# Patient Record
Sex: MALE | Race: OTHER
[De-identification: names, ages, dates, MRNs, and addresses within clinical notes are randomized per-mention and may not be internally consistent; named-entity substitution may affect disease eponyms.]

---

## 2017-06-21 ENCOUNTER — HOSPITAL ENCOUNTER (INPATIENT)
Dept: HOSPITAL 8 - ED | Age: 22
LOS: 2 days | Discharge: HOME | DRG: 392 | End: 2017-06-23
Admitting: INTERNAL MEDICINE
Payer: COMMERCIAL

## 2017-06-21 VITALS — DIASTOLIC BLOOD PRESSURE: 68 MMHG | SYSTOLIC BLOOD PRESSURE: 124 MMHG

## 2017-06-21 VITALS — WEIGHT: 119.49 LBS | BODY MASS INDEX: 20.4 KG/M2 | HEIGHT: 64 IN

## 2017-06-21 DIAGNOSIS — M41.9: ICD-10-CM

## 2017-06-21 DIAGNOSIS — Z80.9: ICD-10-CM

## 2017-06-21 DIAGNOSIS — E86.0: ICD-10-CM

## 2017-06-21 DIAGNOSIS — E83.52: ICD-10-CM

## 2017-06-21 DIAGNOSIS — Z98.1: ICD-10-CM

## 2017-06-21 DIAGNOSIS — F12.90: ICD-10-CM

## 2017-06-21 DIAGNOSIS — R10.9: Primary | ICD-10-CM

## 2017-06-21 DIAGNOSIS — Z83.3: ICD-10-CM

## 2017-06-21 DIAGNOSIS — R73.9: ICD-10-CM

## 2017-06-21 DIAGNOSIS — G43.D0: ICD-10-CM

## 2017-06-21 LAB
AST SERPL-CCNC: 35 U/L (ref 15–37)
BUN SERPL-MCNC: 10 MG/DL (ref 7–18)

## 2017-06-21 PROCEDURE — 76700 US EXAM ABDOM COMPLETE: CPT

## 2017-06-21 PROCEDURE — 83690 ASSAY OF LIPASE: CPT

## 2017-06-21 PROCEDURE — 85025 COMPLETE CBC W/AUTO DIFF WBC: CPT

## 2017-06-21 PROCEDURE — 80048 BASIC METABOLIC PNL TOTAL CA: CPT

## 2017-06-21 PROCEDURE — 96374 THER/PROPH/DIAG INJ IV PUSH: CPT

## 2017-06-21 PROCEDURE — S0028 INJECTION, FAMOTIDINE, 20 MG: HCPCS

## 2017-06-21 PROCEDURE — 80053 COMPREHEN METABOLIC PANEL: CPT

## 2017-06-21 PROCEDURE — 74177 CT ABD & PELVIS W/CONTRAST: CPT

## 2017-06-21 PROCEDURE — 96375 TX/PRO/DX INJ NEW DRUG ADDON: CPT

## 2017-06-21 PROCEDURE — 36415 COLL VENOUS BLD VENIPUNCTURE: CPT

## 2017-06-21 PROCEDURE — 86677 HELICOBACTER PYLORI ANTIBODY: CPT

## 2017-06-21 RX ADMIN — ONDANSETRON PRN MG: 2 INJECTION, SOLUTION INTRAMUSCULAR; INTRAVENOUS at 20:44

## 2017-06-21 RX ADMIN — ENOXAPARIN SODIUM SCH MG: 40 INJECTION SUBCUTANEOUS at 22:22

## 2017-06-21 RX ADMIN — DEXTROSE AND SODIUM CHLORIDE SCH MLS/HR: 5; .45 INJECTION, SOLUTION INTRAVENOUS at 22:18

## 2017-06-21 RX ADMIN — MORPHINE SULFATE PRN MG: 4 INJECTION INTRAVENOUS at 21:05

## 2017-06-21 RX ADMIN — MORPHINE SULFATE PRN MG: 4 INJECTION INTRAVENOUS at 17:41

## 2017-06-21 RX ADMIN — MORPHINE SULFATE PRN MG: 4 INJECTION INTRAVENOUS at 18:13

## 2017-06-22 VITALS — SYSTOLIC BLOOD PRESSURE: 100 MMHG | DIASTOLIC BLOOD PRESSURE: 55 MMHG

## 2017-06-22 VITALS — SYSTOLIC BLOOD PRESSURE: 91 MMHG | DIASTOLIC BLOOD PRESSURE: 51 MMHG

## 2017-06-22 VITALS — SYSTOLIC BLOOD PRESSURE: 133 MMHG | DIASTOLIC BLOOD PRESSURE: 83 MMHG

## 2017-06-22 VITALS — DIASTOLIC BLOOD PRESSURE: 62 MMHG | SYSTOLIC BLOOD PRESSURE: 103 MMHG

## 2017-06-22 VITALS — DIASTOLIC BLOOD PRESSURE: 56 MMHG | SYSTOLIC BLOOD PRESSURE: 95 MMHG

## 2017-06-22 LAB — BUN SERPL-MCNC: 10 MG/DL (ref 7–18)

## 2017-06-22 RX ADMIN — OXYCODONE HYDROCHLORIDE PRN MG: 5 TABLET ORAL at 19:49

## 2017-06-22 RX ADMIN — DEXTROSE AND SODIUM CHLORIDE SCH MLS/HR: 5; .45 INJECTION, SOLUTION INTRAVENOUS at 19:49

## 2017-06-22 RX ADMIN — METOCLOPRAMIDE SCH MG: 5 INJECTION, SOLUTION INTRAMUSCULAR; INTRAVENOUS at 14:30

## 2017-06-22 RX ADMIN — OXYCODONE HYDROCHLORIDE PRN MG: 5 TABLET ORAL at 13:49

## 2017-06-22 RX ADMIN — MORPHINE SULFATE PRN MG: 4 INJECTION INTRAVENOUS at 05:29

## 2017-06-22 RX ADMIN — ONDANSETRON SCH MG: 2 INJECTION, SOLUTION INTRAMUSCULAR; INTRAVENOUS at 22:00

## 2017-06-22 RX ADMIN — ONDANSETRON SCH MG: 2 INJECTION, SOLUTION INTRAMUSCULAR; INTRAVENOUS at 18:31

## 2017-06-22 RX ADMIN — ENOXAPARIN SODIUM SCH MG: 40 INJECTION SUBCUTANEOUS at 22:22

## 2017-06-22 RX ADMIN — OXYCODONE HYDROCHLORIDE PRN MG: 5 TABLET ORAL at 09:39

## 2017-06-22 RX ADMIN — ONDANSETRON SCH MG: 2 INJECTION, SOLUTION INTRAMUSCULAR; INTRAVENOUS at 13:49

## 2017-06-22 RX ADMIN — ONDANSETRON PRN MG: 2 INJECTION, SOLUTION INTRAMUSCULAR; INTRAVENOUS at 09:39

## 2017-06-22 RX ADMIN — DEXTROSE AND SODIUM CHLORIDE SCH MLS/HR: 5; .45 INJECTION, SOLUTION INTRAVENOUS at 06:25

## 2017-06-22 RX ADMIN — METOCLOPRAMIDE SCH MG: 5 INJECTION, SOLUTION INTRAMUSCULAR; INTRAVENOUS at 19:49

## 2017-06-22 RX ADMIN — LORAZEPAM PRN MG: 2 INJECTION INTRAMUSCULAR; INTRAVENOUS at 20:16

## 2017-06-23 VITALS — DIASTOLIC BLOOD PRESSURE: 60 MMHG | SYSTOLIC BLOOD PRESSURE: 100 MMHG

## 2017-06-23 VITALS — DIASTOLIC BLOOD PRESSURE: 51 MMHG | SYSTOLIC BLOOD PRESSURE: 94 MMHG

## 2017-06-23 RX ADMIN — ONDANSETRON SCH MG: 2 INJECTION, SOLUTION INTRAMUSCULAR; INTRAVENOUS at 01:48

## 2017-06-23 RX ADMIN — ONDANSETRON SCH MG: 2 INJECTION, SOLUTION INTRAMUSCULAR; INTRAVENOUS at 05:50

## 2017-06-23 RX ADMIN — ONDANSETRON SCH MG: 2 INJECTION, SOLUTION INTRAMUSCULAR; INTRAVENOUS at 10:00

## 2017-06-23 RX ADMIN — LORAZEPAM PRN MG: 2 INJECTION INTRAMUSCULAR; INTRAVENOUS at 11:07

## 2017-06-23 RX ADMIN — METOCLOPRAMIDE SCH MG: 5 INJECTION, SOLUTION INTRAMUSCULAR; INTRAVENOUS at 08:30

## 2017-06-23 RX ADMIN — METOCLOPRAMIDE SCH MG: 5 INJECTION, SOLUTION INTRAMUSCULAR; INTRAVENOUS at 01:48

## 2017-06-23 RX ADMIN — DEXTROSE AND SODIUM CHLORIDE SCH MLS/HR: 5; .45 INJECTION, SOLUTION INTRAVENOUS at 04:48

## 2018-04-19 ENCOUNTER — OFFICE VISIT (OUTPATIENT)
Dept: URGENT CARE | Facility: PHYSICIAN GROUP | Age: 23
End: 2018-04-19
Payer: COMMERCIAL

## 2018-04-19 VITALS
WEIGHT: 131 LBS | HEART RATE: 76 BPM | TEMPERATURE: 99.2 F | HEIGHT: 63 IN | BODY MASS INDEX: 23.21 KG/M2 | DIASTOLIC BLOOD PRESSURE: 60 MMHG | SYSTOLIC BLOOD PRESSURE: 110 MMHG | OXYGEN SATURATION: 98 %

## 2018-04-19 DIAGNOSIS — M54.50 ACUTE LEFT-SIDED LOW BACK PAIN WITHOUT SCIATICA: ICD-10-CM

## 2018-04-19 PROCEDURE — 99203 OFFICE O/P NEW LOW 30 MIN: CPT | Performed by: PHYSICIAN ASSISTANT

## 2018-04-19 RX ORDER — MELOXICAM 15 MG/1
15 TABLET ORAL DAILY
Qty: 30 TAB | Refills: 0 | Status: ON HOLD | OUTPATIENT
Start: 2018-04-19 | End: 2020-07-05

## 2018-04-19 ASSESSMENT — ENCOUNTER SYMPTOMS
CONSTITUTIONAL NEGATIVE: 1
PERIANAL NUMBNESS: 0
FALLS: 0
NUMBNESS: 0
TINGLING: 0
BOWEL INCONTINENCE: 0
CARDIOVASCULAR NEGATIVE: 1
BACK PAIN: 1
GASTROINTESTINAL NEGATIVE: 1
PARESIS: 0
RESPIRATORY NEGATIVE: 1

## 2018-04-19 ASSESSMENT — PAIN SCALES - GENERAL: PAINLEVEL: 8=MODERATE-SEVERE PAIN

## 2018-04-19 NOTE — LETTER
April 19, 2018         Patient: Grey Quarles   YOB: 1995   Date of Visit: 4/19/2018           To Whom it May Concern:    Grey Quarles was seen in my clinic on 4/19/2018. He may return to work on Sat. April 21st. He is restricted from lifting more than 20lbs and bending for more than 4 hours per shift. These restrictions are recommended for 2 weeks    If you have any questions or concerns, please don't hesitate to call.        Sincerely,           Emmanuel Pena P.A.-C.  Electronically Signed

## 2018-04-20 NOTE — PROGRESS NOTES
Subjective:      Grey Quarles is a 22 y.o. male who presents with Back Pain (sharp pain, painful walking, Hx of back surgery(March 12, 2017) Lower back, x4 days)            Patient had spinal fusion surgery last year, 3/12/17, in [place] to correct scoliosis. Muscular tenderness last 4 days. Requesting note for work.      Back Pain   This is a new problem. The current episode started in the past 7 days (4 days). The problem occurs constantly. The problem has been gradually improving since onset. The pain is present in the lumbar spine. The quality of the pain is described as aching. The pain does not radiate. The symptoms are aggravated by position and standing. Pertinent negatives include no bladder incontinence, bowel incontinence, numbness, paresis, perianal numbness or tingling. Risk factors: Surgery. He has tried nothing for the symptoms. The treatment provided no relief.       PMH:  has a past medical history of Abdominal migraine; Abdominal migraine; Colitis; and Gastritis.  MEDS:   Current Outpatient Prescriptions:   •  metoclopramide (REGLAN) 10 MG Tab, Take 1 Tab by mouth 3 times a day as needed (Nausea / vomiting)., Disp: 6 Tab, Rfl: 0  •  ondansetron (ZOFRAN ODT) 4 MG TABLET DISPERSIBLE, Take 1 Tab by mouth every 6 hours as needed for Nausea/Vomiting., Disp: 10 Tab, Rfl: 0  ALLERGIES: No Known Allergies  SURGHX:   Past Surgical History:   Procedure Laterality Date   • GASTROSCOPY-ENDO  11/15/2014    Performed by Robert Taylor M.D. at [facility]COPY [facility]   • GASTROSCOPY  9/3/2014    Performed by Robert Taylor M.D. at SUR[facility]ERY [facility]   • OTHER      hernia     SOCHX:  reports that he has never smoked. He has never used smokeless tobacco. He reports that he uses drugs, including Inhaled and Marijuana. He reports that he does not drink alcohol.  FH: family history is not on file.      Review of Systems   Constitutional: Negative.    HENT: Negative.    Respiratory: Negative.   "  Cardiovascular: Negative.    Gastrointestinal: Negative.  Negative for bowel incontinence.   Genitourinary: Negative.  Negative for bladder incontinence.   Musculoskeletal: Positive for back pain. Negative for falls.   Neurological: Negative for tingling and numbness.       Medications, Allergies, and current problem list reviewed today in Epic     Objective:     /60   Pulse 76   Temp 37.3 °C (99.2 °F)   Ht 1.6 m (5' 3\")   Wt 59.4 kg (131 lb)   SpO2 98%   BMI 23.21 kg/m²      Physical Exam   Constitutional: He is oriented to person, place, and time. He appears well-developed and well-nourished. No distress.   HENT:   Head: Normocephalic and atraumatic.   Eyes: Conjunctivae and EOM are normal.   Neck: Normal range of motion. Neck supple.   Cardiovascular: Normal rate, regular rhythm and normal heart sounds.    No murmur heard.  Pulmonary/Chest: Effort normal and breath sounds normal. No respiratory distress. He has no wheezes.   Musculoskeletal:        Lumbar back: He exhibits tenderness and pain. He exhibits normal range of motion, no bony tenderness, no swelling, no edema, no deformity and no spasm.        Back:    No midline tenderness. No deformities, bruising, ecchymosis. Range of motion normal. Lower extremity strength and DTRs are equal. No ataxia.   Neurological: He is alert and oriented to person, place, and time.   Skin: Skin is warm and dry. He is not diaphoretic.   Psychiatric: He has a normal mood and affect. His behavior is normal. Judgment and thought content normal.   Nursing note and vitals reviewed.              Assessment/Plan:     1. Acute left-sided low back pain without sciatica  meloxicam (MOBIC) 15 MG tablet     Exam unremarkable, vital signs normal, no red flag symptoms. Acute muscular strain.  Conservative measures, meloxicam  Note for work  Return to clinic or go to ED if symptoms worsen or persist. Indications for ED discussed at length. Patient and mother voices " understanding. Follow-up with your primary care provider in 3-5 days. Red flags discussed. All side effects of medication discussed including allergic response, GI upset, tendon injury, etc.    Please note that this dictation was created using voice recognition software. I have made every reasonable attempt to correct obvious errors, but I expect that there are errors of grammar and possibly content that I did not discover before finalizing the note.

## 2018-05-16 ENCOUNTER — HOSPITAL ENCOUNTER (EMERGENCY)
Facility: MEDICAL CENTER | Age: 23
End: 2018-05-16
Attending: EMERGENCY MEDICINE
Payer: COMMERCIAL

## 2018-05-16 ENCOUNTER — APPOINTMENT (OUTPATIENT)
Dept: RADIOLOGY | Facility: MEDICAL CENTER | Age: 23
End: 2018-05-16
Attending: EMERGENCY MEDICINE
Payer: COMMERCIAL

## 2018-05-16 VITALS
OXYGEN SATURATION: 97 % | SYSTOLIC BLOOD PRESSURE: 116 MMHG | WEIGHT: 125 LBS | TEMPERATURE: 98.5 F | DIASTOLIC BLOOD PRESSURE: 64 MMHG | HEART RATE: 98 BPM | BODY MASS INDEX: 21.34 KG/M2 | HEIGHT: 64 IN | RESPIRATION RATE: 18 BRPM

## 2018-05-16 DIAGNOSIS — E86.0 DEHYDRATION: ICD-10-CM

## 2018-05-16 DIAGNOSIS — R10.13 ABDOMINAL PAIN, ACUTE, EPIGASTRIC: ICD-10-CM

## 2018-05-16 DIAGNOSIS — M41.9 SCOLIOSIS, UNSPECIFIED SCOLIOSIS TYPE, UNSPECIFIED SPINAL REGION: ICD-10-CM

## 2018-05-16 DIAGNOSIS — F12.10 MARIJUANA ABUSE: ICD-10-CM

## 2018-05-16 DIAGNOSIS — R11.2 NON-INTRACTABLE VOMITING WITH NAUSEA, UNSPECIFIED VOMITING TYPE: ICD-10-CM

## 2018-05-16 LAB
ALBUMIN SERPL BCP-MCNC: 4.9 G/DL (ref 3.2–4.9)
ALBUMIN/GLOB SERPL: 1.4 G/DL
ALP SERPL-CCNC: 48 U/L (ref 30–99)
ALT SERPL-CCNC: 12 U/L (ref 2–50)
ANION GAP SERPL CALC-SCNC: 17 MMOL/L (ref 0–11.9)
AST SERPL-CCNC: 38 U/L (ref 12–45)
BASOPHILS # BLD AUTO: 0.2 % (ref 0–1.8)
BASOPHILS # BLD: 0.03 K/UL (ref 0–0.12)
BILIRUB SERPL-MCNC: 0.9 MG/DL (ref 0.1–1.5)
BUN SERPL-MCNC: 14 MG/DL (ref 8–22)
CALCIUM SERPL-MCNC: 9.9 MG/DL (ref 8.5–10.5)
CHLORIDE SERPL-SCNC: 105 MMOL/L (ref 96–112)
CO2 SERPL-SCNC: 18 MMOL/L (ref 20–33)
CREAT SERPL-MCNC: 0.87 MG/DL (ref 0.5–1.4)
EKG IMPRESSION: NORMAL
EOSINOPHIL # BLD AUTO: 0 K/UL (ref 0–0.51)
EOSINOPHIL NFR BLD: 0 % (ref 0–6.9)
ERYTHROCYTE [DISTWIDTH] IN BLOOD BY AUTOMATED COUNT: 36.8 FL (ref 35.9–50)
GLOBULIN SER CALC-MCNC: 3.6 G/DL (ref 1.9–3.5)
GLUCOSE SERPL-MCNC: 127 MG/DL (ref 65–99)
HCT VFR BLD AUTO: 49.3 % (ref 42–52)
HGB BLD-MCNC: 16.9 G/DL (ref 14–18)
IMM GRANULOCYTES # BLD AUTO: 0.05 K/UL (ref 0–0.11)
IMM GRANULOCYTES NFR BLD AUTO: 0.3 % (ref 0–0.9)
LACTATE BLD-SCNC: 1.3 MMOL/L (ref 0.5–2)
LIPASE SERPL-CCNC: 280 U/L (ref 11–82)
LYMPHOCYTES # BLD AUTO: 1.36 K/UL (ref 1–4.8)
LYMPHOCYTES NFR BLD: 9.4 % (ref 22–41)
MCH RBC QN AUTO: 28.9 PG (ref 27–33)
MCHC RBC AUTO-ENTMCNC: 34.3 G/DL (ref 33.7–35.3)
MCV RBC AUTO: 84.4 FL (ref 81.4–97.8)
MONOCYTES # BLD AUTO: 0.62 K/UL (ref 0–0.85)
MONOCYTES NFR BLD AUTO: 4.3 % (ref 0–13.4)
NEUTROPHILS # BLD AUTO: 12.35 K/UL (ref 1.82–7.42)
NEUTROPHILS NFR BLD: 85.8 % (ref 44–72)
NRBC # BLD AUTO: 0 K/UL
NRBC BLD-RTO: 0 /100 WBC
PLATELET # BLD AUTO: 342 K/UL (ref 164–446)
PMV BLD AUTO: 10.9 FL (ref 9–12.9)
POTASSIUM SERPL-SCNC: 4.4 MMOL/L (ref 3.6–5.5)
PROT SERPL-MCNC: 8.5 G/DL (ref 6–8.2)
RBC # BLD AUTO: 5.84 M/UL (ref 4.7–6.1)
SODIUM SERPL-SCNC: 140 MMOL/L (ref 135–145)
WBC # BLD AUTO: 14.4 K/UL (ref 4.8–10.8)

## 2018-05-16 PROCEDURE — 96375 TX/PRO/DX INJ NEW DRUG ADDON: CPT

## 2018-05-16 PROCEDURE — 80053 COMPREHEN METABOLIC PANEL: CPT

## 2018-05-16 PROCEDURE — 99285 EMERGENCY DEPT VISIT HI MDM: CPT

## 2018-05-16 PROCEDURE — 85025 COMPLETE CBC W/AUTO DIFF WBC: CPT

## 2018-05-16 PROCEDURE — 83605 ASSAY OF LACTIC ACID: CPT

## 2018-05-16 PROCEDURE — 700105 HCHG RX REV CODE 258: Performed by: EMERGENCY MEDICINE

## 2018-05-16 PROCEDURE — 96374 THER/PROPH/DIAG INJ IV PUSH: CPT

## 2018-05-16 PROCEDURE — 93005 ELECTROCARDIOGRAM TRACING: CPT | Performed by: EMERGENCY MEDICINE

## 2018-05-16 PROCEDURE — 700111 HCHG RX REV CODE 636 W/ 250 OVERRIDE (IP): Performed by: EMERGENCY MEDICINE

## 2018-05-16 PROCEDURE — 83690 ASSAY OF LIPASE: CPT

## 2018-05-16 PROCEDURE — 74177 CT ABD & PELVIS W/CONTRAST: CPT

## 2018-05-16 PROCEDURE — 700117 HCHG RX CONTRAST REV CODE 255: Performed by: EMERGENCY MEDICINE

## 2018-05-16 PROCEDURE — 96361 HYDRATE IV INFUSION ADD-ON: CPT

## 2018-05-16 RX ORDER — SODIUM CHLORIDE 9 MG/ML
1000 INJECTION, SOLUTION INTRAVENOUS ONCE
Status: COMPLETED | OUTPATIENT
Start: 2018-05-16 | End: 2018-05-16

## 2018-05-16 RX ORDER — DIPHENHYDRAMINE HYDROCHLORIDE 50 MG/ML
25 INJECTION INTRAMUSCULAR; INTRAVENOUS ONCE
Status: COMPLETED | OUTPATIENT
Start: 2018-05-16 | End: 2018-05-16

## 2018-05-16 RX ORDER — HALOPERIDOL 5 MG/ML
2.5 INJECTION INTRAMUSCULAR ONCE
Status: COMPLETED | OUTPATIENT
Start: 2018-05-16 | End: 2018-05-16

## 2018-05-16 RX ORDER — ONDANSETRON 4 MG/1
4 TABLET, ORALLY DISINTEGRATING ORAL EVERY 8 HOURS PRN
Qty: 10 TAB | Refills: 0 | Status: SHIPPED | OUTPATIENT
Start: 2018-05-16 | End: 2018-09-30

## 2018-05-16 RX ORDER — METOCLOPRAMIDE HYDROCHLORIDE 5 MG/ML
10 INJECTION INTRAMUSCULAR; INTRAVENOUS ONCE
Status: COMPLETED | OUTPATIENT
Start: 2018-05-16 | End: 2018-05-16

## 2018-05-16 RX ADMIN — METOCLOPRAMIDE 10 MG: 5 INJECTION, SOLUTION INTRAMUSCULAR; INTRAVENOUS at 06:40

## 2018-05-16 RX ADMIN — SODIUM CHLORIDE 1000 ML: 9 INJECTION, SOLUTION INTRAVENOUS at 05:09

## 2018-05-16 RX ADMIN — DIPHENHYDRAMINE HYDROCHLORIDE 25 MG: 50 INJECTION, SOLUTION INTRAMUSCULAR; INTRAVENOUS at 06:41

## 2018-05-16 RX ADMIN — SODIUM CHLORIDE 1000 ML: 9 INJECTION, SOLUTION INTRAVENOUS at 04:03

## 2018-05-16 RX ADMIN — IOHEXOL 100 ML: 350 INJECTION, SOLUTION INTRAVENOUS at 05:53

## 2018-05-16 RX ADMIN — HALOPERIDOL LACTATE 2.5 MG: 5 INJECTION, SOLUTION INTRAMUSCULAR at 04:04

## 2018-05-16 ASSESSMENT — PAIN SCALES - GENERAL: PAINLEVEL_OUTOF10: 9

## 2018-05-16 ASSESSMENT — LIFESTYLE VARIABLES: DO YOU DRINK ALCOHOL: NO

## 2018-05-16 NOTE — DISCHARGE INSTRUCTIONS
You were seen and evaluated in the Emergency Department at [facility] for:     Nausea, vomiting, and upper abdominal pain.     You had the following tests and studies:    Blood tests and CT scan showed dehydration, some mild inflammation of your pancreas, but no dangerous cause of your symptoms.     You received the following medications:    Haldol, reglan, benadryl    You received the following prescriptions:    Ondansetron for nausea.  ----------------------------    Please make sure to follow up with:    Your primary care doctor for a recheck in 1 day, but come right back today if you get ANY worse!  Try to avoid marijuana use, this may be contributing to your symptoms.  Come right back to the ER if you get ANY worse.    Good luck, we hope you get better soon!  ----------------------------    We always encourage patients to return IMMEDIATELY if they have:  Increased or changing pain, passing out, fevers over 100.4 (taken in your mouth or rectally) for more than 2 days, redness or swelling of skin or tissues, feeling like your heart is beating fast, chest pain that is new or worsening, trouble breathing, feeling like your throat is closing up and can not breath, inability to walk, weakness of any part of your body, new dizziness, severe bleeding that won't stop from any part of your body, if you can't eat or drink, or if you have any other concerns.   If you feel worse, please know that you can always return with any questions, concerns, worse symptoms, or you are feeling unsafe. We certainly cannot say for sure that we have ruled out every illness or dangerous disease, but we feel that at this specific time, your exam, tests, and vital signs like heart rate and blood pressure are safe for discharge.         Abdominal Pain, Adult  Many things can cause belly (abdominal) pain. Most times, belly pain is not dangerous. Many cases of belly pain can be watched and treated at home. Sometimes belly pain is  serious, though. Your doctor will try to find the cause of your belly pain.  Follow these instructions at home:  · Take over-the-counter and prescription medicines only as told by your doctor. Do not take medicines that help you poop (laxatives) unless told to by your doctor.  · Drink enough fluid to keep your pee (urine) clear or pale yellow.  · Watch your belly pain for any changes.  · Keep all follow-up visits as told by your doctor. This is important.  Contact a doctor if:  · Your belly pain changes or gets worse.  · You are not hungry, or you lose weight without trying.  · You are having trouble pooping (constipated) or have watery poop (diarrhea) for more than 2-3 days.  · You have pain when you pee or poop.  · Your belly pain wakes you up at night.  · Your pain gets worse with meals, after eating, or with certain foods.  · You are throwing up and cannot keep anything down.  · You have a fever.  Get help right away if:  · Your pain does not go away as soon as your doctor says it should.  · You cannot stop throwing up.  · Your pain is only in areas of your belly, such as the right side or the left lower part of the belly.  · You have bloody or black poop, or poop that looks like tar.  · You have very bad pain, cramping, or bloating in your belly.  · You have signs of not having enough fluid or water in your body (dehydration), such as:  ¨ Dark pee, very little pee, or no pee.  ¨ Cracked lips.  ¨ Dry mouth.  ¨ Sunken eyes.  ¨ Sleepiness.  ¨ Weakness.  This information is not intended to replace advice given to you by your health care provider. Make sure you discuss any questions you have with your health care provider.  Document Released: 06/05/2009 Document Revised: 07/07/2017 Document Reviewed: 05/31/2017  [employer] Interactive Patient Education © 2017 [employer]      Dehydration, Adult  Dehydration is when there is not enough fluid or water in your body. This happens when you lose more fluids than you take  in. Dehydration can range from mild to very bad. It should be treated right away to keep it from getting very bad.  Symptoms of mild dehydration may include:  · Thirst.  · Dry lips.  · Slightly dry mouth.  · Dry, warm skin.  · Dizziness.  Symptoms of moderate dehydration may include:  · Very dry mouth.  · Muscle cramps.  · Dark pee (urine). Pee may be the color of tea.  · Your body making less pee.  · Your eyes making fewer tears.  · Heartbeat that is uneven or faster than normal (palpitations).  · Headache.  · Light-headedness, especially when you stand up from sitting.  · Fainting (syncope).  Symptoms of very bad dehydration may include:  · Changes in skin, such as:  ¨ Cold and clammy skin.  ¨ Blotchy (mottled) or pale skin.  ¨ Skin that does not quickly return to normal after being lightly pinched and let go (poor skin turgor).  · Changes in body fluids, such as:  ¨ Feeling very thirsty.  ¨ Your eyes making fewer tears.  ¨ Not sweating when body temperature is high, such as in hot weather.  ¨ Your body making very little pee.  · Changes in vital signs, such as:  ¨ Weak pulse.  ¨ Pulse that is more than 100 beats a minute when you are sitting still.  ¨ Fast breathing.  ¨ Low blood pressure.  · Other changes, such as:  ¨ Sunken eyes.  ¨ Cold hands and feet.  ¨ Confusion.  ¨ Lack of energy (lethargy).  ¨ Trouble waking up from sleep.  ¨ Short-term weight loss.  ¨ Unconsciousness.  Follow these instructions at home:  · If told by your doctor, drink an ORS:  ¨ Make an ORS by using instructions on the package.  ¨ Start by drinking small amounts, about ½ cup (120 mL) every 5-10 minutes.  ¨ Slowly drink more until you have had the amount that your doctor said to have.  · Drink enough clear fluid to keep your pee clear or pale yellow. If you were told to drink an ORS, finish the ORS first, then start slowly drinking clear fluids. Drink fluids such as:  ¨ Water. Do not drink only water by itself. Doing that can make the  salt (sodium) level in your body get too low (hyponatremia).  ¨ Ice chips.  ¨ Fruit juice that you have added water to (diluted).  ¨ Low-calorie sports drinks.  · Avoid:  ¨ Alcohol.  ¨ Drinks that have a lot of sugar. These include high-calorie sports drinks, fruit juice that does not have water added, and soda.  ¨ Caffeine.  ¨ Foods that are greasy or have a lot of fat or sugar.  · Take over-the-counter and prescription medicines only as told by your doctor.  · Do not take salt tablets. Doing that can make the salt level in your body get too high (hypernatremia).  · Eat foods that have minerals (electrolytes). Examples include bananas, oranges, potatoes, tomatoes, and spinach.  · Keep all follow-up visits as told by your doctor. This is important.  Contact a doctor if:  · You have belly (abdominal) pain that:  ¨ Gets worse.  ¨ Stays in one area (localizes).  · You have a rash.  · You have a stiff neck.  · You get angry or annoyed more easily than normal (irritability).  · You are more sleepy than normal.  · You have a harder time waking up than normal.  · You feel:  ¨ Weak.  ¨ Dizzy.  ¨ Very thirsty.  · You have peed (urinated) only a small amount of very dark pee during 6-8 hours.  Get help right away if:  · You have symptoms of very bad dehydration.  · You cannot drink fluids without throwing up (vomiting).  · Your symptoms get worse with treatment.  · You have a fever.  · You have a very bad headache.  · You are throwing up or having watery poop (diarrhea) and it:  ¨ Gets worse.  ¨ Does not go away.  · You have blood or something green (bile) in your throw-up.  · You have blood in your poop (stool). This may cause poop to look black and tarry.  · You have not peed in 6-8 hours.  · You pass out (faint).  · Your heart rate when you are sitting still is more than 100 beats a minute.  · You have trouble breathing.  This information is not intended to replace advice given to you by your health care provider. Make  sure you discuss any questions you have with your health care provider.  Document Released: 10/14/2010 Document Revised: 07/07/2017 Document Reviewed: 02/10/2017  [employer] Interactive Patient Education © 2017 [employer]      Marijuana Abuse  Your exam shows you have used marijuana or pot. There are many health problems related to marijuana abuse. These include:  · Bronchitis.  · Chronic cough.  · Emphysema.  · Lung and upper airway cancer.  Abusers also experience impairment in:  · Memory.  · Judgment.  · Ability to learn.  · Coordination.  Students who smoke marijuana:  · Get lower grades.  · Are less likely to graduate than those who do not.  Adults who abuse marijuana:  · Have problems at work.  · May even lose their jobs due to:  · Poor work performance.  · Absenteeism.  Attention, memory, and learning skills have been shown to be diminished for up to 6 months after stopping regular use, and there is evidence that the effects can be cumulative over a lifetime.   Heavier use of marijuana also puts a strain on relationships with friends and loved ones and can lead to moodiness and loss of confidence. Acute intoxication can lead to:  · Increased anxiety.  · A panic episode.  It also increases the risk for having an automobile accident. This is especially true if the pot is combined with alcohol or other intoxicants. Treatment for acute intoxication is rarely needed. However, medicine to reduce anxiety may be helpful in some people.  Millions of people are considered to be dependent on marijuana. It is long-term regular use that leads to addiction and all of its complex problems. Information on the problem of addiction and the health problems of long-term abuse is posted at the National [place] for Drug Abuse website, www.drugabuse.gov. Consult with your doctor or counselor if you want further information and support in handling this common problem.  Document Released: 01/25/2006 Document Revised: 03/11/2013  Document Reviewed: 11/11/2008  [employer]® Patient Information ©2014 [employer]

## 2018-05-16 NOTE — ED PROVIDER NOTES
ED PROVIDER NOTE     Scribed for Ehsan Posey M.D. by Carla Suresh. 5/16/2018, 3:58 AM.    CHIEF COMPLAINT  Chief Complaint   Patient presents with   • Abdominal Pain   • N/V   • Back Pain       HPI    Primary care provider: Ashley Arroyo M.D.  Means of arrival: Walk-in  History obtained from: Patient  History limited by: None    Grey Quarles is a 23 y.o. male who presents with abdominal pain onset yesterday. The patient reports he was sleeping when the onset suddenly began. His abdominal pain is located to his epigastric region. The patient reports associated rhinorrhea, nausea, and vomiting. He has taken Promethazine and a muscle relaxer with no relief to his pain. Grey states he has a history of Scoliosis and cyclic vomiting. He additionally presents with back pain. The patient denies fever, cough, flank pain, dysuria, chest pain, and shortness of breath. Grey confirms he smokes marijuana once a day. He denies alcohol use. The patient states he has had similar abdominal pain before. He was seen in the ED for similar symptoms and was diagnosed with cyclic vomiting with concern for cannabinoid hyperemesis, although he still smokes marijuana daily.       REVIEW OF SYSTEMS  Constitutional: Negative for fevers or chills.   HENT: Negative for sore throat.  Positive for rhinorrhea.   Eyes: Negative for vision changes or discharge.   Respiratory: Negative for cough or shortness of breath.    Cardiovascular: Negative for chest pain or palpitations.   Gastrointestinal: Positive for nausea, vomiting, abdominal pain.   Genitourinary: Negative for dysuria or flank pain.   Musculoskeletal: Positive for back pain and left knee pain.   Skin: Negative for itching or rash.   Neurological: Negative for sensory or motor changes.   Endo/Heme/Allergies: Negative for weight changes or hives.   Psychiatric/Behavioral: Negative for depression or suicidal ideas.   All other systems reviewed and are negative.  "C.    PAST MEDICAL HISTORY   has a past medical history of Abdominal migraine; Abdominal migraine; Colitis; and Gastritis.    PAST FAMILY HISTORY  History reviewed. No pertinent family history.    SOCIAL HISTORY  Social History     Social History Main Topics   • Smoking status: Never Smoker   • Smokeless tobacco: Never Used   • Alcohol use No   • Drug use: Yes     Types: Inhaled, Marijuana      Comment: THC daily   • Sexual activity: No       SURGICAL HISTORY   has a past surgical history that includes gastroscopy (9/3/2014); gastroscopy-endo (11/15/2014); and other.    CURRENT MEDICATIONS  Home Medications     Reviewed by Bronwyn Barron R.N. (Registered Nurse) on 05/16/18 at 0175  Med List Status: Not Addressed   Medication Last Dose Status   meloxicam (MOBIC) 15 MG tablet  Active   metoclopramide (REGLAN) 10 MG Tab  Active   ondansetron (ZOFRAN ODT) 4 MG TABLET DISPERSIBLE  Active                ALLERGIES  No Known Allergies    PHYSICAL EXAM  VITAL SIGNS: /67   Pulse (!) 110   Temp 36.9 °C (98.5 °F)   Resp 18   Ht 1.626 m (5' 4\")   Wt 56.7 kg (125 lb)   SpO2 98%   BMI 21.46 kg/m²    Pulse ox interpretation: On room air, I interpret this pulse ox as normal.  Constitutional: Unkempt, mild distress.  HEENT: Normocephalic, atraumatic. Posterior pharynx clear, mucous membranes dry.  Eyes:  EOMI. Normal sclera.  Neck: Supple, Full range of motion, nontender.  Chest/Pulmonary: Clear to ausculation bilaterally, no wheezes or rhonchi.  Cardiovascular: Tachycardia and regular rhythm, no murmur.   Abdomen: Soft, no rebound, guarding, or masses. Epigastric tenderness.   Back: No CVA tenderness, nontender midline, no step offs. Scoliosis is present but no midline tenderness.   Musculoskeletal: No deformity, no edema.  : Normal external exam.   Neuro: Clear speech, normal coordination, cranial nerves II-XII grossly intact.  Psych: Normal mood and affect.  Skin: No rashes, warm and dry.    DIAGNOSTIC STUDIES / " PROCEDURES    LABS & EKG  Results for orders placed or performed during the hospital encounter of 05/16/18   CBC WITH DIFFERENTIAL   Result Value Ref Range    WBC 14.4 (H) 4.8 - 10.8 K/uL    RBC 5.84 4.70 - 6.10 M/uL    Hemoglobin 16.9 14.0 - 18.0 g/dL    Hematocrit 49.3 42.0 - 52.0 %    MCV 84.4 81.4 - 97.8 fL    MCH 28.9 27.0 - 33.0 pg    MCHC 34.3 33.7 - 35.3 g/dL    RDW 36.8 35.9 - 50.0 fL    Platelet Count 342 164 - 446 K/uL    MPV 10.9 9.0 - 12.9 fL    Neutrophils-Polys 85.80 (H) 44.00 - 72.00 %    Lymphocytes 9.40 (L) 22.00 - 41.00 %    Monocytes 4.30 0.00 - 13.40 %    Eosinophils 0.00 0.00 - 6.90 %    Basophils 0.20 0.00 - 1.80 %    Immature Granulocytes 0.30 0.00 - 0.90 %    Nucleated RBC 0.00 /100 WBC    Neutrophils (Absolute) 12.35 (H) 1.82 - 7.42 K/uL    Lymphs (Absolute) 1.36 1.00 - 4.80 K/uL    Monos (Absolute) 0.62 0.00 - 0.85 K/uL    Eos (Absolute) 0.00 0.00 - 0.51 K/uL    Baso (Absolute) 0.03 0.00 - 0.12 K/uL    Immature Granulocytes (abs) 0.05 0.00 - 0.11 K/uL    NRBC (Absolute) 0.00 K/uL   CMP   Result Value Ref Range    Sodium 140 135 - 145 mmol/L    Potassium 4.4 3.6 - 5.5 mmol/L    Chloride 105 96 - 112 mmol/L    Co2 18 (L) 20 - 33 mmol/L    Anion Gap 17.0 (H) 0.0 - 11.9    Glucose 127 (H) 65 - 99 mg/dL    Bun 14 8 - 22 mg/dL    Creatinine 0.87 0.50 - 1.40 mg/dL    Calcium 9.9 8.5 - 10.5 mg/dL    AST(SGOT) 38 12 - 45 U/L    ALT(SGPT) 12 2 - 50 U/L    Alkaline Phosphatase 48 30 - 99 U/L    Total Bilirubin 0.9 0.1 - 1.5 mg/dL    Albumin 4.9 3.2 - 4.9 g/dL    Total Protein 8.5 (H) 6.0 - 8.2 g/dL    Globulin 3.6 (H) 1.9 - 3.5 g/dL    A-G Ratio 1.4 g/dL   LIPASE   Result Value Ref Range    Lipase 280 (H) 11 - 82 U/L   ESTIMATED GFR   Result Value Ref Range    GFR If African American >60 >60 mL/min/1.73 m 2    GFR If Non African American >60 >60 mL/min/1.73 m 2   LACTIC ACID   Result Value Ref Range    Lactic Acid 1.3 0.5 - 2.0 mmol/L   EKG (ER)   Result Value Ref Range    Report       [facility]    Test Date:  2018  Pt Name:    TOMÁS HOOKS         Department: ER  MRN:        [id number]                      Room:        13  Gender:     Male                         Technician: [id number]  :        1995                   Requested By:EHSAN ROBERTS  Order #:    [id number]                    Reading MD: Ehsan Roberts MD    Measurements  Intervals                                Axis  Rate:       77                           P:          62  NH:         112                          QRS:        75  QRSD:       80                           T:          59  QT:         400  QTc:        453    Interpretive Statements  SINUS RHYTHM  BORDERLINE SHORT NH INTERVAL  no stemi, strain, dysrhythmia, or long qtc    Compared to ECG 2016 21:32:21  Sinus arrhythmia no longer present  First degree AV block no longer present    Electronically Signed On 2018 16:09:39 PDT by Ehsan Roberts MD         RADIOLOGY  CT-ABDOMEN-PELVIS WITH   Final Result      1.  Artifact limits exam.   2.  Appendix is partially visualized.  Appendicitis is felt unlikely.   3.  No focal mesenteric inflammatory process.   4.  No gross CT evidence for acute pancreatitis.        COURSE & MEDICAL DECISION MAKING    This is a 23 y.o. male who presents with n/v, epigastric abdominal pain, many prior episodes.     Differential Diagnosis includes but is not limited to:  Cyclic vomiting syndrome, pancreatitis, appendicitis, hepatobiliary disease, gastritis, cannabinoid hyperemesis syndrome    4:00 AM Patient was evaluated at bedside. Ordered CBC with Differential, CMP, Lipase, Urinalysis, and EKG to evaluate. Patient will be treated with Haldol 2.5 mg and NS Infusion 1,000 mL secondary to his tachycardia and dry mucous membranes, with concern for dehydration.    EKG reviewed, no STEMI, strain, dysrhythmia, or long intervals.    5:06 AM Labs reviewed, the patient does have an elevated white  blood cell counts, no significant anemia, he does have an anion gap metabolic acidosis.  LFTs are within normal limits, lipase slightly elevated.  He is feeling slightly better with haloperidol.  Still has epigastric tenderness. Ordered for Lactic Acid and CT-Abdomen Pelvis to evaluate. Patient will be treated with NS Infusion 1,000 mL.     While awaiting imaging study results the patient has renewed nausea, plan metoclopramide and diphenhydramine.  Lactic acid level is normal doubt ischemic gut or severe sepsis.    CT scan without any acute process.  Doubt appendicitis or perforation or obstruction.  No CT evidence of pancreatitis or pseudocyst or other complication.    On reassessment, the patient has normal vital signs and a soft abdomen, he is feeling much better.  He has moist mucous membranes normal vital signs and is tolerating his fluid bolus well, I feel like he has had a very positive response to IV fluid rehydration.  Long discussion regarding need to see his marijuana as cannabinoid hyperemesis syndrome is a possibility, however he must return to the ER immediately if he has any new or worsening symptoms.  His workup is reassuring at this time, I feel he is safe for discharge as he is much more comfortable and not vomiting.  Follow-up with primary care doctor, gastroenterology if symptoms persist.  Ondansetron prescribed for symptom control at home.    Medications   NS infusion 1,000 mL (0 mL Intravenous Stopped 5/16/18 0509)   haloperidol lactate (HALDOL) injection 2.5 mg (2.5 mg Intravenous Given 5/16/18 0404)   NS infusion 1,000 mL (0 mL Intravenous Stopped 5/16/18 0632)   iohexol (OMNIPAQUE) 350 mg/mL (100 mL Intravenous Given 5/16/18 0577)   diphenhydrAMINE (BENADRYL) injection 25 mg (25 mg Intravenous Given 5/16/18 0641)   metoclopramide (REGLAN) injection 10 mg (10 mg Intravenous Given 5/16/18 0640)     FINAL IMPRESSION  1. Non-intractable vomiting with nausea, unspecified vomiting type    2.  Dehydration    3. Abdominal pain, acute, epigastric    4. Marijuana abuse    5. Scoliosis, unspecified scoliosis type, unspecified spinal region        PRESCRIPTIONS  Discharge Medication List as of 5/16/2018  7:07 AM      START taking these medications    Details   ondansetron (ZOFRAN ODT) 4 MG TABLET DISPERSIBLE Take 1 Tab by mouth every 8 hours as needed for Nausea., Disp-10 Tab, R-0, Print Rx Paper             FOLLOW UP  Ashley Arroyo M.D.  [address] [id number] [phone]    Schedule an appointment as soon as possible for a visit in 1 day      [facility], Emergency Dept  [address] [facility] [id number] [phone]  Today  If symptoms worsen        -DISCHARGE-       The patient is referred to a primary physician for blood pressure management, diabetic screening, and for all other preventative health concerns.     Pertinent Labs & Imaging studies reviewed and verified by myself, as well as nursing notes and the patient's past medical, family, and social histories (See chart for details).    Results, exam findings, clinical impression, presumed diagnosis, treatment options, and strict return precautions were discussed with the patient, and they verbalized understanding, agreed with, and appreciated the plan of care.    ICarla (Scribe), am scribing for, and in the presence of, Ehsan Posey M.D..    Electronically signed by: Carla uSresh (Scribe), 5/16/2018    IEhsan M.D. personally performed the services described in this documentation, as scribed by Carla Suresh in my presence, and it is both accurate and complete.    Portions of this record were made with voice recognition software.  Despite my review, spelling/grammar/context errors may still remain.  Interpretation of this chart should be taken in this context.    The note accurately reflects work and decisions made by me.  Ehsan Posey  5/16/2018  4:13 PM

## 2018-05-16 NOTE — ED NOTES
ED doc educated pt in regards to stopping cannabis at this time that is attributing to the abdomen pain.  Pt will be discharged once NS infused.

## 2018-05-16 NOTE — ED NOTES
"Grey Quarles  Chief Complaint   Patient presents with   • Abdominal Pain   • N/V   • Back Pain     Pt ambulatory to triage with above complaint. Pt states abd pain and N/V started at noon yesterday. Pt has been unable to keep any food or fluids down. Pt states back pain may be unrelated to previous s/s, pain started 1 month ago. Pt has hx of corrective back surgery for scoliosis.     /67   Pulse (!) 110   Temp 36.9 °C (98.5 °F)   Resp 18   Ht 1.626 m (5' 4\")   Wt 56.7 kg (125 lb)   SpO2 98%   BMI 21.46 kg/m²     Pt informed of triage process and encouraged to notify staff of any changes or concerns. Pt verbalized understanding of instructions. Apologized for long wait time. Pt placed back in lobby.   "

## 2018-09-30 ENCOUNTER — HOSPITAL ENCOUNTER (EMERGENCY)
Facility: MEDICAL CENTER | Age: 23
End: 2018-09-30
Attending: EMERGENCY MEDICINE
Payer: COMMERCIAL

## 2018-09-30 VITALS
TEMPERATURE: 99 F | HEIGHT: 63 IN | OXYGEN SATURATION: 95 % | WEIGHT: 130 LBS | HEART RATE: 75 BPM | RESPIRATION RATE: 20 BRPM | DIASTOLIC BLOOD PRESSURE: 90 MMHG | SYSTOLIC BLOOD PRESSURE: 133 MMHG | BODY MASS INDEX: 23.04 KG/M2

## 2018-09-30 DIAGNOSIS — E86.0 DEHYDRATION: ICD-10-CM

## 2018-09-30 DIAGNOSIS — R10.33 PERIUMBILICAL ABDOMINAL PAIN: ICD-10-CM

## 2018-09-30 DIAGNOSIS — F12.188 CANNABIS HYPEREMESIS SYNDROME CONCURRENT WITH AND DUE TO CANNABIS ABUSE (HCC): ICD-10-CM

## 2018-09-30 LAB
ALBUMIN SERPL BCP-MCNC: 5.7 G/DL (ref 3.2–4.9)
ALBUMIN/GLOB SERPL: 1.5 G/DL
ALP SERPL-CCNC: 59 U/L (ref 30–99)
ALT SERPL-CCNC: 16 U/L (ref 2–50)
ANION GAP SERPL CALC-SCNC: 15 MMOL/L (ref 0–11.9)
AST SERPL-CCNC: 33 U/L (ref 12–45)
BASOPHILS # BLD AUTO: 0.2 % (ref 0–1.8)
BASOPHILS # BLD: 0.03 K/UL (ref 0–0.12)
BILIRUB SERPL-MCNC: 0.8 MG/DL (ref 0.1–1.5)
BUN SERPL-MCNC: 27 MG/DL (ref 8–22)
CALCIUM SERPL-MCNC: 10.9 MG/DL (ref 8.5–10.5)
CHLORIDE SERPL-SCNC: 103 MMOL/L (ref 96–112)
CO2 SERPL-SCNC: 25 MMOL/L (ref 20–33)
CREAT SERPL-MCNC: 1.12 MG/DL (ref 0.5–1.4)
EOSINOPHIL # BLD AUTO: 0 K/UL (ref 0–0.51)
EOSINOPHIL NFR BLD: 0 % (ref 0–6.9)
ERYTHROCYTE [DISTWIDTH] IN BLOOD BY AUTOMATED COUNT: 38.1 FL (ref 35.9–50)
GLOBULIN SER CALC-MCNC: 3.9 G/DL (ref 1.9–3.5)
GLUCOSE SERPL-MCNC: 119 MG/DL (ref 65–99)
HCT VFR BLD AUTO: 50 % (ref 42–52)
HGB BLD-MCNC: 17.9 G/DL (ref 14–18)
IMM GRANULOCYTES # BLD AUTO: 0.07 K/UL (ref 0–0.11)
IMM GRANULOCYTES NFR BLD AUTO: 0.4 % (ref 0–0.9)
LIPASE SERPL-CCNC: 8 U/L (ref 11–82)
LYMPHOCYTES # BLD AUTO: 0.71 K/UL (ref 1–4.8)
LYMPHOCYTES NFR BLD: 4.2 % (ref 22–41)
MCH RBC QN AUTO: 29.9 PG (ref 27–33)
MCHC RBC AUTO-ENTMCNC: 35.8 G/DL (ref 33.7–35.3)
MCV RBC AUTO: 83.5 FL (ref 81.4–97.8)
MONOCYTES # BLD AUTO: 0.9 K/UL (ref 0–0.85)
MONOCYTES NFR BLD AUTO: 5.4 % (ref 0–13.4)
NEUTROPHILS # BLD AUTO: 15.02 K/UL (ref 1.82–7.42)
NEUTROPHILS NFR BLD: 89.8 % (ref 44–72)
NRBC # BLD AUTO: 0 K/UL
NRBC BLD-RTO: 0 /100 WBC
PLATELET # BLD AUTO: 314 K/UL (ref 164–446)
PMV BLD AUTO: 10.9 FL (ref 9–12.9)
POTASSIUM SERPL-SCNC: 3.5 MMOL/L (ref 3.6–5.5)
PROT SERPL-MCNC: 9.6 G/DL (ref 6–8.2)
RBC # BLD AUTO: 5.99 M/UL (ref 4.7–6.1)
SODIUM SERPL-SCNC: 143 MMOL/L (ref 135–145)
WBC # BLD AUTO: 16.7 K/UL (ref 4.8–10.8)

## 2018-09-30 PROCEDURE — 96375 TX/PRO/DX INJ NEW DRUG ADDON: CPT

## 2018-09-30 PROCEDURE — 700111 HCHG RX REV CODE 636 W/ 250 OVERRIDE (IP): Performed by: EMERGENCY MEDICINE

## 2018-09-30 PROCEDURE — 99284 EMERGENCY DEPT VISIT MOD MDM: CPT

## 2018-09-30 PROCEDURE — 80053 COMPREHEN METABOLIC PANEL: CPT

## 2018-09-30 PROCEDURE — 96374 THER/PROPH/DIAG INJ IV PUSH: CPT

## 2018-09-30 PROCEDURE — 700105 HCHG RX REV CODE 258: Performed by: EMERGENCY MEDICINE

## 2018-09-30 PROCEDURE — 85025 COMPLETE CBC W/AUTO DIFF WBC: CPT

## 2018-09-30 PROCEDURE — 83690 ASSAY OF LIPASE: CPT

## 2018-09-30 RX ORDER — HALOPERIDOL 5 MG/ML
5 INJECTION INTRAMUSCULAR ONCE
Status: COMPLETED | OUTPATIENT
Start: 2018-09-30 | End: 2018-09-30

## 2018-09-30 RX ORDER — ONDANSETRON 4 MG/1
4 TABLET, ORALLY DISINTEGRATING ORAL EVERY 8 HOURS PRN
Qty: 10 TAB | Refills: 0 | Status: ON HOLD | OUTPATIENT
Start: 2018-09-30 | End: 2021-05-30

## 2018-09-30 RX ORDER — MORPHINE SULFATE 4 MG/ML
4 INJECTION, SOLUTION INTRAMUSCULAR; INTRAVENOUS ONCE
Status: COMPLETED | OUTPATIENT
Start: 2018-09-30 | End: 2018-09-30

## 2018-09-30 RX ORDER — LORAZEPAM 2 MG/ML
1 INJECTION INTRAMUSCULAR ONCE
Status: COMPLETED | OUTPATIENT
Start: 2018-09-30 | End: 2018-09-30

## 2018-09-30 RX ORDER — SODIUM CHLORIDE 9 MG/ML
1000 INJECTION, SOLUTION INTRAVENOUS ONCE
Status: COMPLETED | OUTPATIENT
Start: 2018-09-30 | End: 2018-09-30

## 2018-09-30 RX ORDER — DICYCLOMINE HYDROCHLORIDE 10 MG/1
10 CAPSULE ORAL
Qty: 30 CAP | Refills: 0 | Status: ON HOLD
Start: 2018-09-30 | End: 2021-05-30

## 2018-09-30 RX ORDER — ONDANSETRON 4 MG/1
4 TABLET, ORALLY DISINTEGRATING ORAL ONCE
Qty: 10 TAB | Refills: 0 | Status: SHIPPED
Start: 2018-09-30 | End: 2018-09-30

## 2018-09-30 RX ORDER — ONDANSETRON 2 MG/ML
4 INJECTION INTRAMUSCULAR; INTRAVENOUS
Status: DISCONTINUED | OUTPATIENT
Start: 2018-09-30 | End: 2018-09-30 | Stop reason: HOSPADM

## 2018-09-30 RX ADMIN — HALOPERIDOL LACTATE 5 MG: 5 INJECTION, SOLUTION INTRAMUSCULAR at 16:30

## 2018-09-30 RX ADMIN — ONDANSETRON HYDROCHLORIDE 4 MG: 2 INJECTION INTRAMUSCULAR; INTRAVENOUS at 16:35

## 2018-09-30 RX ADMIN — SODIUM CHLORIDE 1000 ML: 9 INJECTION, SOLUTION INTRAVENOUS at 16:29

## 2018-09-30 RX ADMIN — LORAZEPAM 1 MG: 2 INJECTION INTRAMUSCULAR; INTRAVENOUS at 16:55

## 2018-09-30 RX ADMIN — MORPHINE SULFATE 4 MG: 4 INJECTION INTRAVENOUS at 16:52

## 2018-09-30 RX ADMIN — SODIUM CHLORIDE 1000 ML: 9 INJECTION, SOLUTION INTRAVENOUS at 17:50

## 2018-09-30 ASSESSMENT — PAIN SCALES - GENERAL: PAINLEVEL_OUTOF10: 10

## 2018-09-30 NOTE — ED TRIAGE NOTES
Chief Complaint   Patient presents with   • Back Pain     ambulates to triage c/o mid back pain since Scoliosis surgery last year. pain became worse since started having abdominal pain.    • N/V     since yesterday. vomited multiple time yesterday. denies diarrhea.     Noted uncomfortable in triage. Triage process explained. Instructed to notify staff for any worsening symptoms.

## 2018-09-30 NOTE — ED PROVIDER NOTES
ED Provider Note    CHIEF COMPLAINT  Chief Complaint   Patient presents with   • Back Pain     ambulates to triage c/o mid back pain since Scoliosis surgery last year. pain became worse since started having abdominal pain.    • N/V     since yesterday. vomited multiple time yesterday. denies diarrhea.       HPI  Grey Quarles is a 23 y.o. male who presents for abdominal pain radiating to the back associated with uncontrolled vomiting since yesterday.  Pain severity 10 of 10.  Denies fever diarrhea constipation dysuria urgency or frequency.  History of cannabis hyperemesis for the past 3 years.  He is greatly decreased his cannabis intake but smoked 3 days ago and he believes this triggered his episode.  It is become much less frequent lately.  Denies diabetes peptic ulcer disease pancreatitis or gastritis.  He did drink alcohol yesterday.  Does not take nonsteroidal anti-inflammatories.  No past surgical history of the abdomen.  History of spinal mague placement for scoliosis    REVIEW OF SYSTEMS  Pertinent positives include: Abdominal pain, hyperemesis.  Pertinent negatives include: Dysuria, urgency, frequency, hematuria, kidney stones.  10+ systems reviewed and negative.      PAST MEDICAL HISTORY  Past Medical History:   Diagnosis Date   • Abdominal migraine    • Colitis    • Gastritis    Cannabis hyperemesis    SOCIAL HISTORY  Social History   Substance Use Topics   • Smoking status: Never Smoker   • Smokeless tobacco: Never Used   • Alcohol use No     History   Drug Use   • Types: Inhaled, Marijuana     Reports use less than daily now       SURGICAL HISTORY  Past Surgical History:   Procedure Laterality Date   • GASTROSCOPY-ENDO  11/15/2014    Performed by Robert Taylor M.D. at ENDOSCOPY [facility]   • GASTROSCOPY  9/3/2014    Performed by Robert Taylor M.D. at SURGERY [facility]   • OTHER      hernia       CURRENT MEDICATIONS    Current Outpatient Prescriptions   Medication Sig Dispense  "Refill   • ondansetron (ZOFRAN ODT) 4 MG TABLET DISPERSIBLE Take 1 Tab by mouth every 8 hours as needed for Nausea. 10 Tab 0   • meloxicam (MOBIC) 15 MG tablet Take 1 Tab by mouth every day. 30 Tab 0       ALLERGIES  No Known Allergies    PHYSICAL EXAM  VITAL SIGNS: /90   Pulse (!) 114   Temp 37.2 °C (99 °F)   Resp 16   Ht 1.6 m (5' 3\")   Wt 59 kg (130 lb)   SpO2 94%   BMI 23.03 kg/m²   Reviewed and elevated blood pressure, tachycardic  Constitutional: Well developed, Well nourished, appears at least mildly ill.  HENT: Normocephalic, atraumatic, bilateral external ears normal, oropharynx moist, No exudates or erythema.   Eyes: PERRLA, conjunctiva pink, no scleral icterus.   Cardiovascular: Regular tachycardic without murmur, rub, gallop.  Respiratory: No rales, rhonchi, wheeze.  Gastrointestinal: Soft, supraumbilical without rebound or guarding.  No masses or distention.  Bowel sounds normal.  Skin: No erythema, no rash.   Genitourinary:  No costovertebral angle tenderness.   Neurologic: Alert & oriented x 3, cranial nerves 2-12 intact by passive exam.  No focal deficit noted.  Psychiatric: Affect normal, Judgment normal, Mood normal.     DIFFERENTIAL DIAGNOSIS:  And it was hyperemesis, pancreatitis, doubt bowel obstruction, viral syndrome.    RADIOLOGY/PROCEDURES  CT abdomen pelvis reviewed from May and unremarkable    LABORATORY:  Results for orders placed or performed during the hospital encounter of 09/30/18   CBC WITH DIFFERENTIAL   Result Value Ref Range    WBC 16.7 (H) 4.8 - 10.8 K/uL    RBC 5.99 4.70 - 6.10 M/uL    Hemoglobin 17.9 14.0 - 18.0 g/dL    Hematocrit 50.0 42.0 - 52.0 %    MCV 83.5 81.4 - 97.8 fL    MCH 29.9 27.0 - 33.0 pg    MCHC 35.8 (H) 33.7 - 35.3 g/dL    RDW 38.1 35.9 - 50.0 fL    Platelet Count 314 164 - 446 K/uL    MPV 10.9 9.0 - 12.9 fL    Neutrophils-Polys 89.80 (H) 44.00 - 72.00 %    Lymphocytes 4.20 (L) 22.00 - 41.00 %    Monocytes 5.40 0.00 - 13.40 %    Eosinophils 0.00 " 0.00 - 6.90 %    Basophils 0.20 0.00 - 1.80 %    Immature Granulocytes 0.40 0.00 - 0.90 %    Nucleated RBC 0.00 /100 WBC    Neutrophils (Absolute) 15.02 (H) 1.82 - 7.42 K/uL    Lymphs (Absolute) 0.71 (L) 1.00 - 4.80 K/uL    Monos (Absolute) 0.90 (H) 0.00 - 0.85 K/uL    Eos (Absolute) 0.00 0.00 - 0.51 K/uL    Baso (Absolute) 0.03 0.00 - 0.12 K/uL    Immature Granulocytes (abs) 0.07 0.00 - 0.11 K/uL    NRBC (Absolute) 0.00 K/uL   COMP METABOLIC PANEL   Result Value Ref Range    Sodium 143 135 - 145 mmol/L    Potassium 3.5 (L) 3.6 - 5.5 mmol/L    Chloride 103 96 - 112 mmol/L    Co2 25 20 - 33 mmol/L    Anion Gap 15.0 (H) 0.0 - 11.9    Glucose 119 (H) 65 - 99 mg/dL    Bun 27 (H) 8 - 22 mg/dL    Creatinine 1.12 0.50 - 1.40 mg/dL    Calcium 10.9 (H) 8.5 - 10.5 mg/dL    AST(SGOT) 33 12 - 45 U/L    ALT(SGPT) 16 2 - 50 U/L    Alkaline Phosphatase 59 30 - 99 U/L    Total Bilirubin 0.8 0.1 - 1.5 mg/dL    Albumin 5.7 (H) 3.2 - 4.9 g/dL    Total Protein 9.6 (H) 6.0 - 8.2 g/dL    Globulin 3.9 (H) 1.9 - 3.5 g/dL    A-G Ratio 1.5 g/dL   LIPASE   Result Value Ref Range    Lipase 8 (L) 11 - 82 U/L     Labs reviewed from a visit and patient had an elevated lipase in the 200s at that time but no CT evidence of pancreatitis    INTERVENTIONS: Indication IV fluids dehydration as suspected by history, elevated BUN to creatinine ratio and acidosis  Medications   ondansetron (ZOFRAN) syringe/vial injection 4 mg (4 mg Intravenous Given 9/30/18 1635)   NS infusion 1,000 mL (not administered)   haloperidol lactate (HALDOL) injection 5 mg (5 mg Intravenous Given 9/30/18 1630)   morphine (pf) 4 mg/ml injection 4 mg (4 mg Intravenous Given 9/30/18 1652)   LORazepam (ATIVAN) injection 1 mg (1 mg Intravenous Given 9/30/18 1655)     Response: Improved hydration pain and nausea on repeat assessment with very minimal periumbilical abdominal pain.    COURSE & MEDICAL DECISION MAKING  Patient presents with abdominal pain vomiting and dehydration due to  cannabis hyperemesis syndrome.  There is no evidence of pancreatitis.  Had a recent CT abdomen pelvis in May that was unremarkable.  Gastritis and peptic ulcer disease are less likely.    Patient given a p.o. trial.    PLAN:  New Prescriptions    ONDANSETRON (ZOFRAN ODT) 4 MG TABLET DISPERSIBLE    Take 1 Tab by mouth every 8 hours as needed.     Staying from all cannabis use  Abdominal pain handout given  Return for uncontrolled vomiting, severe pain, GI bleed, severe dizziness    Your physician if not better 2 days    CONDITION: Stable, improved.    FINAL IMPRESSION  1. Cannabis hyperemesis syndrome concurrent with and due to cannabis abuse (HCC)    2. Periumbilical abdominal pain    3. Dehydration          Electronically signed by: Godfrey Raymundo, 9/30/2018 4:21 PM

## 2018-09-30 NOTE — ED NOTES
Pt reports increasing anxiety after Haldol medication.  ERP updated.  Pt instructed on slow breathing technique.

## 2018-09-30 NOTE — ED NOTES
Pt reports mid back pain, abdominal pain and N/V.  Pt reports history of cannabinoid hyperemesis syndrome.

## 2018-10-01 NOTE — DISCHARGE INSTRUCTIONS
Abdominal Pain, Extended Version   Belly Pain, Stomach Pain    Take a clear fluid diet 12 hours and use Tylenol for pain.  Return to emergency department if pain has not resolved in 1-2 days.  Return to the ED sooner for worsening pain (especially in the lower right abdomen), pain that is worse with movement, uncontrolled vomiting, new fever, bleeding or ill appearance.  Your pain and vomiting are likely due to marijuana use.  Stop all marijuana use.  Take Zofran if needed for nausea and vomiting    You had an elevated or high normal blood pressure reading today.  This does not necessarily mean you have hypertension.  Please followup with your/a primary physician for comprehensive blood pressure evaluation.  BP Readings from Last 3 Encounters:   09/30/18 133/90   05/16/18 116/64   04/19/18 110/60         Your exam may not have shown the exact reason you have abdominal pain.  Since there are many different causes of abdominal pain, another checkup and more tests may be needed. One of the many possible causes of abdominal pain in any person who has not had their appendix removed is Acute Appendicitis.  Appendicitis is often a very difficult to diagnosis. Normal blood tests, urine tests, and even ultrasound and CT can not ensure there is not an early appendicitis. Sometimes only the changes which occur over time will allow appendicitis and other causes of abdominal pain to be determined.  Because of this, it is important you follow all of the instructions below.                                                                                                HOME CARE INSTRUCTIONS  Rest.  Do not eat solid food until your pain is gone.  While You Have Pain:  Stay on a clear liquid diet.  A clear liquid is one you can see through (water, weak tea, broth or bouillon, ginger ale, Jell-o, Wallace-Aid, Gatorade, apple juice, popsicles or ice chips).   When Your Pain is Gone:  Start a light diet (dry toast, crackers, applesauce,  white rice, bananas, broth or bouillon) and increase the diet slowly, as long as it does not bother you.  No dairy products (including cheese and eggs) and no spicy, fatty, fried or high fiber foods.  No alcohol, caffeine or cigarettes.  Take your regular medicines unless the caregiver told you not to.  Take any prescribed medicine as directed.  Do not take medicine containing aspirin, ibuprofen (Advil® / Motrin® ), naprosyn/naproxen (Aleve®) or ketoprofen (Orudis® ) unless told to by your own caregiver.    SEEK IMMEDIATE MEDICAL ATTENTION IF :  Your pain is not gone in 8-12 hours.  Your pain becomes worse, changes location or feels different.  You have a fever or shaking chills.  You keep throwing up or cannot drink liquids.   You see blood when you throw up or see blood in your bowel movements.    Your bowel movements become dark or black.  You move your bowels frequently.  Your bowel movements stop (become blocked) or you cannot pass gas.  You have bloody, frequent or painful urination (?passing water?).  Your skin or the whites of your eyes look yellow.  Your stomach becomes bloated or bigger.  You notice bleeding or discharge from your vagina.  You have dizziness or fainting.  You have chest or back pain.   Anything else worries you.  You become short of breath.    If you have questions or concerns, please call your caregiver.     Adapted from ©2001  [employer] Aftercare Instruction Sheets  Last reviewed July 12, 2005, [employer], creator of [employer] Patient Information System.    [employer]® Patient Information ©2007 [employer].

## 2018-12-24 ENCOUNTER — HOSPITAL ENCOUNTER (EMERGENCY)
Facility: MEDICAL CENTER | Age: 23
End: 2018-12-24
Attending: EMERGENCY MEDICINE
Payer: COMMERCIAL

## 2018-12-24 VITALS
BODY MASS INDEX: 22.09 KG/M2 | WEIGHT: 129.41 LBS | HEIGHT: 64 IN | SYSTOLIC BLOOD PRESSURE: 143 MMHG | HEART RATE: 82 BPM | RESPIRATION RATE: 16 BRPM | OXYGEN SATURATION: 98 % | TEMPERATURE: 97.7 F | DIASTOLIC BLOOD PRESSURE: 92 MMHG

## 2018-12-24 DIAGNOSIS — R11.15 NON-INTRACTABLE CYCLICAL VOMITING WITH NAUSEA: ICD-10-CM

## 2018-12-24 LAB
ALBUMIN SERPL BCP-MCNC: 5.1 G/DL (ref 3.2–4.9)
ALBUMIN/GLOB SERPL: 1.6 G/DL
ALP SERPL-CCNC: 58 U/L (ref 30–99)
ALT SERPL-CCNC: 15 U/L (ref 2–50)
ANION GAP SERPL CALC-SCNC: 15 MMOL/L (ref 0–11.9)
AST SERPL-CCNC: 23 U/L (ref 12–45)
BASOPHILS # BLD AUTO: 0.5 % (ref 0–1.8)
BASOPHILS # BLD: 0.05 K/UL (ref 0–0.12)
BILIRUB SERPL-MCNC: 1.2 MG/DL (ref 0.1–1.5)
BUN SERPL-MCNC: 22 MG/DL (ref 8–22)
CALCIUM SERPL-MCNC: 10.1 MG/DL (ref 8.5–10.5)
CHLORIDE SERPL-SCNC: 105 MMOL/L (ref 96–112)
CO2 SERPL-SCNC: 19 MMOL/L (ref 20–33)
CREAT SERPL-MCNC: 0.97 MG/DL (ref 0.5–1.4)
EOSINOPHIL # BLD AUTO: 0.01 K/UL (ref 0–0.51)
EOSINOPHIL NFR BLD: 0.1 % (ref 0–6.9)
ERYTHROCYTE [DISTWIDTH] IN BLOOD BY AUTOMATED COUNT: 35.4 FL (ref 35.9–50)
GLOBULIN SER CALC-MCNC: 3.2 G/DL (ref 1.9–3.5)
GLUCOSE SERPL-MCNC: 121 MG/DL (ref 65–99)
HCT VFR BLD AUTO: 46 % (ref 42–52)
HGB BLD-MCNC: 16.3 G/DL (ref 14–18)
IMM GRANULOCYTES # BLD AUTO: 0.05 K/UL (ref 0–0.11)
IMM GRANULOCYTES NFR BLD AUTO: 0.5 % (ref 0–0.9)
LYMPHOCYTES # BLD AUTO: 1.52 K/UL (ref 1–4.8)
LYMPHOCYTES NFR BLD: 15.2 % (ref 22–41)
MCH RBC QN AUTO: 28.9 PG (ref 27–33)
MCHC RBC AUTO-ENTMCNC: 35.4 G/DL (ref 33.7–35.3)
MCV RBC AUTO: 81.6 FL (ref 81.4–97.8)
MONOCYTES # BLD AUTO: 0.49 K/UL (ref 0–0.85)
MONOCYTES NFR BLD AUTO: 4.9 % (ref 0–13.4)
NEUTROPHILS # BLD AUTO: 7.88 K/UL (ref 1.82–7.42)
NEUTROPHILS NFR BLD: 78.8 % (ref 44–72)
NRBC # BLD AUTO: 0 K/UL
NRBC BLD-RTO: 0 /100 WBC
PLATELET # BLD AUTO: 295 K/UL (ref 164–446)
PMV BLD AUTO: 11.1 FL (ref 9–12.9)
POTASSIUM SERPL-SCNC: 3.6 MMOL/L (ref 3.6–5.5)
PROT SERPL-MCNC: 8.3 G/DL (ref 6–8.2)
RBC # BLD AUTO: 5.64 M/UL (ref 4.7–6.1)
SODIUM SERPL-SCNC: 139 MMOL/L (ref 135–145)
WBC # BLD AUTO: 10 K/UL (ref 4.8–10.8)

## 2018-12-24 PROCEDURE — 80053 COMPREHEN METABOLIC PANEL: CPT

## 2018-12-24 PROCEDURE — 96374 THER/PROPH/DIAG INJ IV PUSH: CPT

## 2018-12-24 PROCEDURE — 85025 COMPLETE CBC W/AUTO DIFF WBC: CPT

## 2018-12-24 PROCEDURE — 96375 TX/PRO/DX INJ NEW DRUG ADDON: CPT

## 2018-12-24 PROCEDURE — 700111 HCHG RX REV CODE 636 W/ 250 OVERRIDE (IP): Performed by: EMERGENCY MEDICINE

## 2018-12-24 PROCEDURE — 700105 HCHG RX REV CODE 258: Performed by: EMERGENCY MEDICINE

## 2018-12-24 PROCEDURE — 99285 EMERGENCY DEPT VISIT HI MDM: CPT

## 2018-12-24 RX ORDER — HYDROXYZINE 50 MG/1
50 TABLET, FILM COATED ORAL EVERY 8 HOURS PRN
Qty: 15 TAB | Refills: 0 | Status: ON HOLD | OUTPATIENT
Start: 2018-12-24 | End: 2021-05-30

## 2018-12-24 RX ORDER — ONDANSETRON 2 MG/ML
4 INJECTION INTRAMUSCULAR; INTRAVENOUS ONCE
Status: DISCONTINUED | OUTPATIENT
Start: 2018-12-24 | End: 2018-12-24 | Stop reason: HOSPADM

## 2018-12-24 RX ORDER — ONDANSETRON 2 MG/ML
4 INJECTION INTRAMUSCULAR; INTRAVENOUS ONCE
Status: COMPLETED | OUTPATIENT
Start: 2018-12-24 | End: 2018-12-24

## 2018-12-24 RX ORDER — MORPHINE SULFATE 10 MG/ML
4 INJECTION, SOLUTION INTRAMUSCULAR; INTRAVENOUS ONCE
Status: COMPLETED | OUTPATIENT
Start: 2018-12-24 | End: 2018-12-24

## 2018-12-24 RX ORDER — SODIUM CHLORIDE 9 MG/ML
1000 INJECTION, SOLUTION INTRAVENOUS CONTINUOUS
Status: ACTIVE | OUTPATIENT
Start: 2018-12-24 | End: 2018-12-24

## 2018-12-24 RX ORDER — LORAZEPAM 2 MG/ML
1 INJECTION INTRAMUSCULAR ONCE
Status: COMPLETED | OUTPATIENT
Start: 2018-12-24 | End: 2018-12-24

## 2018-12-24 RX ORDER — ONDANSETRON 4 MG/1
4 TABLET, ORALLY DISINTEGRATING ORAL EVERY 6 HOURS PRN
Qty: 20 TAB | Refills: 0 | Status: ON HOLD | OUTPATIENT
Start: 2018-12-24 | End: 2020-07-05

## 2018-12-24 RX ADMIN — PROCHLORPERAZINE EDISYLATE 5 MG: 5 INJECTION INTRAMUSCULAR; INTRAVENOUS at 12:12

## 2018-12-24 RX ADMIN — SODIUM CHLORIDE 1000 ML: 9 INJECTION, SOLUTION INTRAVENOUS at 10:27

## 2018-12-24 RX ADMIN — MORPHINE SULFATE 4 MG: 10 INJECTION INTRAVENOUS at 10:27

## 2018-12-24 RX ADMIN — ONDANSETRON 4 MG: 2 INJECTION INTRAMUSCULAR; INTRAVENOUS at 10:27

## 2018-12-24 RX ADMIN — LORAZEPAM 1 MG: 2 INJECTION INTRAMUSCULAR; INTRAVENOUS at 12:16

## 2018-12-24 NOTE — ED NOTES
Pt reports generalized abdominal pain and N/V x 3 days.  Pt reports hx of cannabis hyperemesis syndrome.  Pt reports recent marijuana use.

## 2018-12-24 NOTE — ED TRIAGE NOTES
"Gilbert Diop  23 y.o. male  Chief Complaint   Patient presents with   • Epigastric Pain     X 3 days. Report 10/10 \"tearing\" epigastric pain that radiates through to pt back.   • N/V     X 3 days       Pt amb to triage with steady gait for above complaint. Denies diarrhea, constipation, or problems urinating.  Pt actively vomiting in triage.  RUE /98  LUE /97  Pt is alert and oriented, speaking in full sentences, follows commands and responds appropriately to questions. NAD. Resp are even and unlabored.  Pt placed in lobby. Pt educated on triage process. Pt encouraged to alert staff for any changes.    "

## 2018-12-24 NOTE — ED PROVIDER NOTES
"ED Provider Note    CHIEF COMPLAINT  Chief Complaint   Patient presents with   • Epigastric Pain     X 3 days. Report 10/10 \"tearing\" epigastric pain that radiates through to pt back.   • N/V     X 3 days       HPI  Gilbert Diop is a 23 y.o. male who presents to emerge from today with complaints of epigastric abdominal pain nausea and vomiting.  Patient has a history of cyclic vomiting continues use of marijuana despite multiple doctor's recommendations against this.  He has had multiple visits to the emergency room for this similar problem.  Abdominal pain is similar to what he is experienced in the past described as cramping rating through his abdomen both sides no chest pain or shortness of breath no fever chills no changes to bowel or bladder.    REVIEW OF SYSTEMS  See HPI for further details. All other systems are negative.     PAST MEDICAL HISTORY  Past Medical History:   Diagnosis Date   • Abdominal migraine    • Abdominal migraine    • Colitis    • Gastritis        FAMILY HISTORY  [unfilled]    SOCIAL HISTORY  Social History     Social History   • Marital status: Single     Spouse name: N/A   • Number of children: N/A   • Years of education: N/A     Social History Main Topics   • Smoking status: Never Smoker   • Smokeless tobacco: Never Used   • Alcohol use No   • Drug use: Yes     Types: Inhaled, Marijuana      Comment: THC daily   • Sexual activity: No     Other Topics Concern   • Not on file     Social History Narrative   • No narrative on file       SURGICAL HISTORY  Past Surgical History:   Procedure Laterality Date   • GASTROSCOPY-ENDO  11/15/2014    Performed by Robert Taylor M.D. at ENDOSCOPY [facility]   • GASTROSCOPY  9/3/2014    Performed by Robert Taylor M.D. at SURGERY [facility]   • OTHER      hernia       CURRENT MEDICATIONS  Home Medications    **Home medications have not yet been reviewed for this encounter**         ALLERGIES  No Known Allergies    PHYSICAL " "EXAM  VITAL SIGNS: /92   Pulse 82   Temp 36.5 °C (97.7 °F) (Temporal)   Resp 16   Ht 1.626 m (5' 4\")   Wt 58.7 kg (129 lb 6.6 oz)   SpO2 98%   BMI 22.21 kg/m²       Constitutional: Well developed, Well nourished,  acute distress, Non-toxic appearance.   HENT: Normocephalic, Atraumatic, Bilateral external ears normal, Oropharynx mucous membranes appear dry/dehydrated, No oral exudates, Nose normal.   Eyes: PERRLA, EOMI, Conjunctiva normal, No discharge.   Neck: Normal range of motion, No tenderness, Supple, No stridor.   Lymphatic: No lymphadenopathy noted.   Cardiovascular: Normal heart rate, Normal rhythm, No murmurs, No rubs, No gallops.   Thorax & Lungs: Normal breath sounds, No respiratory distress, No wheezing, No chest tenderness.   Abdomen: Bowel sounds normal, Soft, diffuse tenderness, No masses, No pulsatile masses.  No rebound, guarding or peritoneal signs noted  Skin: Warm, Dry, No erythema, No rash.   Back: No tenderness, No CVA tenderness.    Extremities: Intact distal pulses, No edema, No tenderness, No cyanosis, No clubbing.   Musculoskeletal: Good range of motion in all major joints. No tenderness to palpation or major deformities noted.   Neurologic: Alert & oriented x 3, Normal motor function, Normal sensory function, No focal deficits noted.   Psychiatric: Affect normal, Judgment normal, Mood normal.       RADIOLOGY/PROCEDURES  No orders to display         COURSE & MEDICAL DECISION MAKING  Pertinent Labs & Imaging studies reviewed. (See chart for details)  Patient given IV fluids 1 L over an hour because he is not able to hold in oral fluids or food secondary to cyclic vomiting he improved his heart rate is come down to 90 after the fluids and is able to hold down oral fluids after Zofran/Compazine given and felt much better.  Given morphine here in the emergency room as well as Ativan with resolution of symptoms prior to discharge on reexamination abdomen soft, supple, nonsurgical " patient able to hold down fluids feels much improved discuss cessation of marijuana follow-up with his primary care physician return if further problems.  Prescription for Zofran was given.    FINAL IMPRESSION  1.  Acute  abdominal pain  2.  Cyclic vomiting  3.  Marijuana abuse         Electronically signed by: Peter Patten, 12/24/2018 2:19 PM

## 2018-12-24 NOTE — ED NOTES
Pt provided with discharge instructions, prescriptions x2, instructions for follow up appointment, s/s of when to seek emergency care.  Pt verbalizes understanding.  Pt discharged in good condition. Pt instructed not to operate vehicle.  Pt states his Mom is picking him up.

## 2020-07-05 ENCOUNTER — APPOINTMENT (OUTPATIENT)
Dept: RADIOLOGY | Facility: MEDICAL CENTER | Age: 25
End: 2020-07-05
Attending: HOSPITALIST

## 2020-07-05 ENCOUNTER — HOSPITAL ENCOUNTER (OUTPATIENT)
Facility: MEDICAL CENTER | Age: 25
End: 2020-07-05
Attending: EMERGENCY MEDICINE | Admitting: INTERNAL MEDICINE

## 2020-07-05 VITALS
WEIGHT: 143.96 LBS | OXYGEN SATURATION: 98 % | TEMPERATURE: 97 F | BODY MASS INDEX: 24.58 KG/M2 | DIASTOLIC BLOOD PRESSURE: 50 MMHG | HEART RATE: 64 BPM | HEIGHT: 64 IN | SYSTOLIC BLOOD PRESSURE: 97 MMHG | RESPIRATION RATE: 14 BRPM

## 2020-07-05 DIAGNOSIS — T50.901A ACCIDENTAL DRUG OVERDOSE, INITIAL ENCOUNTER: ICD-10-CM

## 2020-07-05 LAB
ALBUMIN SERPL BCP-MCNC: 4.1 G/DL (ref 3.2–4.9)
ALBUMIN/GLOB SERPL: 1.8 G/DL
ALP SERPL-CCNC: 49 U/L (ref 30–99)
ALT SERPL-CCNC: 36 U/L (ref 2–50)
ANION GAP SERPL CALC-SCNC: 11 MMOL/L (ref 7–16)
ANION GAP SERPL CALC-SCNC: 19 MMOL/L (ref 7–16)
ANISOCYTOSIS BLD QL SMEAR: ABNORMAL
APAP SERPL-MCNC: <5 UG/ML (ref 10–30)
APTT PPP: 28 SEC (ref 24.7–36)
AST SERPL-CCNC: 27 U/L (ref 12–45)
BASOPHILS # BLD AUTO: 0 % (ref 0–1.8)
BASOPHILS # BLD AUTO: 0.2 % (ref 0–1.8)
BASOPHILS # BLD: 0 K/UL (ref 0–0.12)
BASOPHILS # BLD: 0.03 K/UL (ref 0–0.12)
BILIRUB SERPL-MCNC: 0.5 MG/DL (ref 0.1–1.5)
BUN SERPL-MCNC: 15 MG/DL (ref 8–22)
BUN SERPL-MCNC: 20 MG/DL (ref 8–22)
CALCIUM SERPL-MCNC: 8.5 MG/DL (ref 8.5–10.5)
CALCIUM SERPL-MCNC: 9 MG/DL (ref 8.5–10.5)
CHLORIDE SERPL-SCNC: 102 MMOL/L (ref 96–112)
CHLORIDE SERPL-SCNC: 104 MMOL/L (ref 96–112)
CO2 SERPL-SCNC: 18 MMOL/L (ref 20–33)
CO2 SERPL-SCNC: 25 MMOL/L (ref 20–33)
CREAT SERPL-MCNC: 0.7 MG/DL (ref 0.5–1.4)
CREAT SERPL-MCNC: 1.15 MG/DL (ref 0.5–1.4)
EKG IMPRESSION: NORMAL
EOSINOPHIL # BLD AUTO: 0.04 K/UL (ref 0–0.51)
EOSINOPHIL # BLD AUTO: 0.22 K/UL (ref 0–0.51)
EOSINOPHIL NFR BLD: 0.3 % (ref 0–6.9)
EOSINOPHIL NFR BLD: 0.9 % (ref 0–6.9)
ERYTHROCYTE [DISTWIDTH] IN BLOOD BY AUTOMATED COUNT: 40.8 FL (ref 35.9–50)
ERYTHROCYTE [DISTWIDTH] IN BLOOD BY AUTOMATED COUNT: 44.5 FL (ref 35.9–50)
ETHANOL BLD-MCNC: <10.1 MG/DL (ref 0–10.1)
GLOBULIN SER CALC-MCNC: 2.3 G/DL (ref 1.9–3.5)
GLUCOSE SERPL-MCNC: 342 MG/DL (ref 65–99)
GLUCOSE SERPL-MCNC: 76 MG/DL (ref 65–99)
HCT VFR BLD AUTO: 41.9 % (ref 42–52)
HCT VFR BLD AUTO: 47.6 % (ref 42–52)
HGB BLD-MCNC: 13.9 G/DL (ref 14–18)
HGB BLD-MCNC: 14.5 G/DL (ref 14–18)
IMM GRANULOCYTES # BLD AUTO: 0.04 K/UL (ref 0–0.11)
IMM GRANULOCYTES NFR BLD AUTO: 0.3 % (ref 0–0.9)
INR PPP: 1.11 (ref 0.87–1.13)
LYMPHOCYTES # BLD AUTO: 2.2 K/UL (ref 1–4.8)
LYMPHOCYTES # BLD AUTO: 6.59 K/UL (ref 1–4.8)
LYMPHOCYTES NFR BLD: 16 % (ref 22–41)
LYMPHOCYTES NFR BLD: 27 % (ref 22–41)
MANUAL DIFF BLD: NORMAL
MCH RBC QN AUTO: 29.4 PG (ref 27–33)
MCH RBC QN AUTO: 29.6 PG (ref 27–33)
MCHC RBC AUTO-ENTMCNC: 30.5 G/DL (ref 33.7–35.3)
MCHC RBC AUTO-ENTMCNC: 33.2 G/DL (ref 33.7–35.3)
MCV RBC AUTO: 88.8 FL (ref 81.4–97.8)
MCV RBC AUTO: 97.1 FL (ref 81.4–97.8)
METAMYELOCYTES NFR BLD MANUAL: 0.9 %
MICROCYTES BLD QL SMEAR: ABNORMAL
MONOCYTES # BLD AUTO: 0.85 K/UL (ref 0–0.85)
MONOCYTES # BLD AUTO: 0.95 K/UL (ref 0–0.85)
MONOCYTES NFR BLD AUTO: 3.5 % (ref 0–13.4)
MONOCYTES NFR BLD AUTO: 6.9 % (ref 0–13.4)
MORPHOLOGY BLD-IMP: NORMAL
NEUTROPHILS # BLD AUTO: 10.45 K/UL (ref 1.82–7.42)
NEUTROPHILS # BLD AUTO: 16.54 K/UL (ref 1.82–7.42)
NEUTROPHILS NFR BLD: 67.8 % (ref 44–72)
NEUTROPHILS NFR BLD: 76.3 % (ref 44–72)
NRBC # BLD AUTO: 0 K/UL
NRBC # BLD AUTO: 0 K/UL
NRBC BLD-RTO: 0 /100 WBC
NRBC BLD-RTO: 0 /100 WBC
PLATELET # BLD AUTO: 225 K/UL (ref 164–446)
PLATELET # BLD AUTO: 260 K/UL (ref 164–446)
PLATELET BLD QL SMEAR: NORMAL
PMV BLD AUTO: 10.9 FL (ref 9–12.9)
PMV BLD AUTO: 11.3 FL (ref 9–12.9)
POTASSIUM SERPL-SCNC: 3.9 MMOL/L (ref 3.6–5.5)
POTASSIUM SERPL-SCNC: 4.3 MMOL/L (ref 3.6–5.5)
PROT SERPL-MCNC: 6.4 G/DL (ref 6–8.2)
PROTHROMBIN TIME: 14.6 SEC (ref 12–14.6)
RBC # BLD AUTO: 4.72 M/UL (ref 4.7–6.1)
RBC # BLD AUTO: 4.9 M/UL (ref 4.7–6.1)
RBC BLD AUTO: PRESENT
SALICYLATES SERPL-MCNC: <1 MG/DL (ref 15–25)
SODIUM SERPL-SCNC: 139 MMOL/L (ref 135–145)
SODIUM SERPL-SCNC: 140 MMOL/L (ref 135–145)
VARIANT LYMPHS BLD QL SMEAR: NORMAL
WBC # BLD AUTO: 13.7 K/UL (ref 4.8–10.8)
WBC # BLD AUTO: 24.4 K/UL (ref 4.8–10.8)

## 2020-07-05 PROCEDURE — A9270 NON-COVERED ITEM OR SERVICE: HCPCS | Performed by: INTERNAL MEDICINE

## 2020-07-05 PROCEDURE — 85027 COMPLETE CBC AUTOMATED: CPT

## 2020-07-05 PROCEDURE — 94760 N-INVAS EAR/PLS OXIMETRY 1: CPT

## 2020-07-05 PROCEDURE — G0378 HOSPITAL OBSERVATION PER HR: HCPCS

## 2020-07-05 PROCEDURE — 71045 X-RAY EXAM CHEST 1 VIEW: CPT

## 2020-07-05 PROCEDURE — 93010 ELECTROCARDIOGRAM REPORT: CPT | Performed by: INTERNAL MEDICINE

## 2020-07-05 PROCEDURE — 80053 COMPREHEN METABOLIC PANEL: CPT

## 2020-07-05 PROCEDURE — 96375 TX/PRO/DX INJ NEW DRUG ADDON: CPT

## 2020-07-05 PROCEDURE — 85007 BL SMEAR W/DIFF WBC COUNT: CPT

## 2020-07-05 PROCEDURE — 700111 HCHG RX REV CODE 636 W/ 250 OVERRIDE (IP): Performed by: INTERNAL MEDICINE

## 2020-07-05 PROCEDURE — 700105 HCHG RX REV CODE 258: Performed by: HOSPITALIST

## 2020-07-05 PROCEDURE — 85610 PROTHROMBIN TIME: CPT

## 2020-07-05 PROCEDURE — 85025 COMPLETE CBC W/AUTO DIFF WBC: CPT

## 2020-07-05 PROCEDURE — 96374 THER/PROPH/DIAG INJ IV PUSH: CPT

## 2020-07-05 PROCEDURE — 700102 HCHG RX REV CODE 250 W/ 637 OVERRIDE(OP): Performed by: INTERNAL MEDICINE

## 2020-07-05 PROCEDURE — 93005 ELECTROCARDIOGRAM TRACING: CPT | Performed by: INTERNAL MEDICINE

## 2020-07-05 PROCEDURE — 700105 HCHG RX REV CODE 258: Performed by: INTERNAL MEDICINE

## 2020-07-05 PROCEDURE — 36415 COLL VENOUS BLD VENIPUNCTURE: CPT

## 2020-07-05 PROCEDURE — 99285 EMERGENCY DEPT VISIT HI MDM: CPT

## 2020-07-05 PROCEDURE — 99236 HOSP IP/OBS SAME DATE HI 85: CPT | Performed by: INTERNAL MEDICINE

## 2020-07-05 PROCEDURE — 87040 BLOOD CULTURE FOR BACTERIA: CPT

## 2020-07-05 PROCEDURE — C9113 INJ PANTOPRAZOLE SODIUM, VIA: HCPCS | Performed by: INTERNAL MEDICINE

## 2020-07-05 PROCEDURE — 80048 BASIC METABOLIC PNL TOTAL CA: CPT

## 2020-07-05 PROCEDURE — 80307 DRUG TEST PRSMV CHEM ANLYZR: CPT

## 2020-07-05 PROCEDURE — 85730 THROMBOPLASTIN TIME PARTIAL: CPT

## 2020-07-05 RX ORDER — ONDANSETRON 2 MG/ML
INJECTION INTRAMUSCULAR; INTRAVENOUS
Status: DISPENSED
Start: 2020-07-05 | End: 2020-07-05

## 2020-07-05 RX ORDER — AMOXICILLIN 250 MG
2 CAPSULE ORAL 2 TIMES DAILY
Status: DISCONTINUED | OUTPATIENT
Start: 2020-07-05 | End: 2020-07-05 | Stop reason: HOSPADM

## 2020-07-05 RX ORDER — POLYETHYLENE GLYCOL 3350 17 G/17G
1 POWDER, FOR SOLUTION ORAL
Status: DISCONTINUED | OUTPATIENT
Start: 2020-07-05 | End: 2020-07-05 | Stop reason: HOSPADM

## 2020-07-05 RX ORDER — PROCHLORPERAZINE EDISYLATE 5 MG/ML
5-10 INJECTION INTRAMUSCULAR; INTRAVENOUS EVERY 4 HOURS PRN
Status: DISCONTINUED | OUTPATIENT
Start: 2020-07-05 | End: 2020-07-05 | Stop reason: HOSPADM

## 2020-07-05 RX ORDER — CALCIUM CARBONATE 500 MG/1
500 TABLET, CHEWABLE ORAL 3 TIMES DAILY PRN
Status: DISCONTINUED | OUTPATIENT
Start: 2020-07-05 | End: 2020-07-05 | Stop reason: HOSPADM

## 2020-07-05 RX ORDER — PANTOPRAZOLE SODIUM 40 MG/10ML
40 INJECTION, POWDER, LYOPHILIZED, FOR SOLUTION INTRAVENOUS 2 TIMES DAILY
Status: DISCONTINUED | OUTPATIENT
Start: 2020-07-05 | End: 2020-07-05 | Stop reason: HOSPADM

## 2020-07-05 RX ORDER — PROMETHAZINE HYDROCHLORIDE 25 MG/1
12.5-25 TABLET ORAL EVERY 4 HOURS PRN
Status: DISCONTINUED | OUTPATIENT
Start: 2020-07-05 | End: 2020-07-05 | Stop reason: HOSPADM

## 2020-07-05 RX ORDER — SODIUM CHLORIDE, SODIUM LACTATE, POTASSIUM CHLORIDE, CALCIUM CHLORIDE 600; 310; 30; 20 MG/100ML; MG/100ML; MG/100ML; MG/100ML
INJECTION, SOLUTION INTRAVENOUS CONTINUOUS
Status: DISCONTINUED | OUTPATIENT
Start: 2020-07-05 | End: 2020-07-05 | Stop reason: HOSPADM

## 2020-07-05 RX ORDER — OMEPRAZOLE 20 MG/1
20 CAPSULE, DELAYED RELEASE ORAL 2 TIMES DAILY
Qty: 14 CAP | Refills: 0 | Status: SHIPPED | OUTPATIENT
Start: 2020-07-05 | End: 2020-07-12

## 2020-07-05 RX ORDER — ONDANSETRON 4 MG/1
4 TABLET, ORALLY DISINTEGRATING ORAL EVERY 4 HOURS PRN
Status: DISCONTINUED | OUTPATIENT
Start: 2020-07-05 | End: 2020-07-05 | Stop reason: HOSPADM

## 2020-07-05 RX ORDER — SODIUM CHLORIDE, SODIUM LACTATE, POTASSIUM CHLORIDE, CALCIUM CHLORIDE 600; 310; 30; 20 MG/100ML; MG/100ML; MG/100ML; MG/100ML
1000 INJECTION, SOLUTION INTRAVENOUS ONCE
Status: COMPLETED | OUTPATIENT
Start: 2020-07-05 | End: 2020-07-05

## 2020-07-05 RX ORDER — BISACODYL 10 MG
10 SUPPOSITORY, RECTAL RECTAL
Status: DISCONTINUED | OUTPATIENT
Start: 2020-07-05 | End: 2020-07-05 | Stop reason: HOSPADM

## 2020-07-05 RX ORDER — PROMETHAZINE HYDROCHLORIDE 25 MG/1
12.5-25 SUPPOSITORY RECTAL EVERY 4 HOURS PRN
Status: DISCONTINUED | OUTPATIENT
Start: 2020-07-05 | End: 2020-07-05 | Stop reason: HOSPADM

## 2020-07-05 RX ORDER — NALOXONE HYDROCHLORIDE 4 MG/.1ML
1 SPRAY NASAL
Qty: 3 PACKAGE | Refills: 2 | Status: ON HOLD | OUTPATIENT
Start: 2020-07-05 | End: 2021-05-30

## 2020-07-05 RX ORDER — ONDANSETRON 2 MG/ML
4 INJECTION INTRAMUSCULAR; INTRAVENOUS EVERY 4 HOURS PRN
Status: DISCONTINUED | OUTPATIENT
Start: 2020-07-05 | End: 2020-07-05 | Stop reason: HOSPADM

## 2020-07-05 RX ADMIN — ONDANSETRON 4 MG: 2 INJECTION INTRAMUSCULAR; INTRAVENOUS at 03:31

## 2020-07-05 RX ADMIN — PANTOPRAZOLE SODIUM 40 MG: 40 INJECTION, POWDER, LYOPHILIZED, FOR SOLUTION INTRAVENOUS at 07:57

## 2020-07-05 RX ADMIN — SODIUM CHLORIDE, POTASSIUM CHLORIDE, SODIUM LACTATE AND CALCIUM CHLORIDE: 600; 310; 30; 20 INJECTION, SOLUTION INTRAVENOUS at 11:35

## 2020-07-05 RX ADMIN — SODIUM CHLORIDE, POTASSIUM CHLORIDE, SODIUM LACTATE AND CALCIUM CHLORIDE 1000 ML: 600; 310; 30; 20 INJECTION, SOLUTION INTRAVENOUS at 03:31

## 2020-07-05 RX ADMIN — DOCUSATE SODIUM 50 MG AND SENNOSIDES 8.6 MG 2 TABLET: 8.6; 5 TABLET, FILM COATED ORAL at 06:22

## 2020-07-05 ASSESSMENT — ENCOUNTER SYMPTOMS
DIARRHEA: 0
NAUSEA: 1
SPUTUM PRODUCTION: 0
CHILLS: 0
WEIGHT LOSS: 0
EYE PAIN: 0
NECK PAIN: 0
BACK PAIN: 0
TINGLING: 0
HEADACHES: 0
ABDOMINAL PAIN: 0
SENSORY CHANGE: 0
MYALGIAS: 0
SPEECH CHANGE: 0
ORTHOPNEA: 0
PHOTOPHOBIA: 0
VOMITING: 1
HALLUCINATIONS: 0
DIZZINESS: 0
SHORTNESS OF BREATH: 0
TREMORS: 0
COUGH: 0
FEVER: 0
CONSTIPATION: 0
DOUBLE VISION: 0
PALPITATIONS: 0
FOCAL WEAKNESS: 0
BLURRED VISION: 0

## 2020-07-05 ASSESSMENT — COPD QUESTIONNAIRES
IN THE PAST 12 MONTHS DO YOU DO LESS THAN YOU USED TO BECAUSE OF YOUR BREATHING PROBLEMS: DISAGREE/UNSURE
COPD SCREENING SCORE: 0
DO YOU EVER COUGH UP ANY MUCUS OR PHLEGM?: NO/ONLY WITH OCCASIONAL COLDS OR INFECTIONS
DURING THE PAST 4 WEEKS HOW MUCH DID YOU FEEL SHORT OF BREATH: NONE/LITTLE OF THE TIME
HAVE YOU SMOKED AT LEAST 100 CIGARETTES IN YOUR ENTIRE LIFE: NO/DON'T KNOW

## 2020-07-05 ASSESSMENT — LIFESTYLE VARIABLES
DOES PATIENT WANT TO STOP DRINKING: NO
EVER_SMOKED: YES
SUBSTANCE_ABUSE: 1
EVER HAD A DRINK FIRST THING IN THE MORNING TO STEADY YOUR NERVES TO GET RID OF A HANGOVER: NO
TOTAL SCORE: 0
TOTAL SCORE: 0
HAVE PEOPLE ANNOYED YOU BY CRITICIZING YOUR DRINKING: NO
ON A TYPICAL DAY WHEN YOU DRINK ALCOHOL HOW MANY DRINKS DO YOU HAVE: 0
EVER FELT BAD OR GUILTY ABOUT YOUR DRINKING: NO
TOTAL SCORE: 0
AVERAGE NUMBER OF DAYS PER WEEK YOU HAVE A DRINK CONTAINING ALCOHOL: 0
ALCOHOL_USE: NO
HAVE YOU EVER FELT YOU SHOULD CUT DOWN ON YOUR DRINKING: NO
HOW MANY TIMES IN THE PAST YEAR HAVE YOU HAD 5 OR MORE DRINKS IN A DAY: 0
CONSUMPTION TOTAL: NEGATIVE

## 2020-07-05 ASSESSMENT — FIBROSIS 4 INDEX
FIB4 SCORE: 0.46
FIB4 SCORE: 0.53

## 2020-07-05 ASSESSMENT — COGNITIVE AND FUNCTIONAL STATUS - GENERAL
MOBILITY SCORE: 24
SUGGESTED CMS G CODE MODIFIER MOBILITY: CH
SUGGESTED CMS G CODE MODIFIER DAILY ACTIVITY: CH
DAILY ACTIVITIY SCORE: 24

## 2020-07-05 ASSESSMENT — PATIENT HEALTH QUESTIONNAIRE - PHQ9
1. LITTLE INTEREST OR PLEASURE IN DOING THINGS: NOT AT ALL
2. FEELING DOWN, DEPRESSED, IRRITABLE, OR HOPELESS: NOT AT ALL
SUM OF ALL RESPONSES TO PHQ9 QUESTIONS 1 AND 2: 0

## 2020-07-05 NOTE — PROGRESS NOTES
Patient being discharged to home. Discharge instructions reviewed with patient and questions answered. Special review over narcan instructions. Patient belongings accompanied with discharge to mother.  PIV removed tele box removed.

## 2020-07-05 NOTE — DISCHARGE INSTRUCTIONS
Discharge Instructions    Discharged to home by car with relative. Discharged via walking, hospital escort: Yes.  Special equipment needed: Not Applicable    Be sure to schedule a follow-up appointment with your primary care doctor or any specialists as instructed.     Discharge Plan:   Diet Plan: Discussed  Activity Level: Discussed  Smoking Cessation Offered: Patient Refused  Confirmed Follow up Appointment: Patient to Call and Schedule Appointment  Confirmed Symptoms Management: Discussed  Medication Reconciliation Updated: Yes    I understand that a diet low in cholesterol, fat, and sodium is recommended for good health. Unless I have been given specific instructions below for another diet, I accept this instruction as my diet prescription.   Other diet: regular    Special Instructions: None    · Is patient discharged on Warfarin / Coumadin?   No     Depression / Suicide Risk    As you are discharged from this [facility] facility, it is important to learn how to keep safe from harming yourself.    Recognize the warning signs:  · Abrupt changes in personality, positive or negative- including increase in energy   · Giving away possessions  · Change in eating patterns- significant weight changes-  positive or negative  · Change in sleeping patterns- unable to sleep or sleeping all the time   · Unwillingness or inability to communicate  · Depression  · Unusual sadness, discouragement and loneliness  · Talk of wanting to die  · Neglect of personal appearance   · Rebelliousness- reckless behavior  · Withdrawal from people/activities they love  · Confusion- inability to concentrate     If you or a loved one observes any of these behaviors or has concerns about self-harm, here's what you can do:  · Talk about it- your feelings and reasons for harming yourself  · Remove any means that you might use to hurt yourself (examples: pills, rope, extension cords, firearm)  · Get professional help from the community (Mental  Health, Substance Abuse, psychological counseling)  · Do not be alone:Call your Safe Contact- someone whom you trust who will be there for you.  · Call your local CRISIS HOTLINE 999-4012 or 649-827-4265  · Call your local Children's Mobile Crisis Response Team [place] (393) 911-6222 or www.Adreal  · Call the toll free National Suicide Prevention Hotlines   · National Suicide Prevention Lifeline 038-020-EZNU (8993)  · National Hope Line Network 800-SUICIDE (943-4989)    Naloxone nasal spray  What is this medicine?  NALOXONE (nal OX one) is a narcotic blocker. It is used to treat narcotic drug overdose.  This medicine may be used for other purposes; ask your health care provider or pharmacist if you have questions.  COMMON BRAND NAME(S): Narcan  What should I tell my health care provider before I take this medicine?  They need to know if you have any of these conditions:  · drug abuse or addiction  · heart disease  · an unusual or allergic reaction to naloxone, other medicines, foods, dyes, or preservatives  · pregnant or trying to get pregnant  · breast-feeding  How should I use this medicine?  This medicine is for use in the nose. Lay the person on their back. Support their neck with your hand and allow the head to tilt back before giving the medicine. The nasal spray should be given into 1 nostril. After giving the medicine, move the person onto their side. Do not remove or test the nasal spray until ready to use.  Get emergency medical help right away after giving the first dose of this medicine, even if the person wakes up. You should be familiar with how to recognize the signs and symptoms of a narcotic overdose. If more doses are needed, give the additional dose in the other nostril.  Talk to your pediatrician regarding the use of this medicine in children. While this drug may be prescribed for children as young as newborns for selected conditions, precautions do apply.  Overdosage: If you think  you have taken too much of this medicine contact a poison control center or emergency room at once.  NOTE: This medicine is only for you. Do not share this medicine with others.  What if I miss a dose?  This does not apply.  What may interact with this medicine?  This medicine is only used during an emergency. No interactions are expected during emergency use.  This list may not describe all possible interactions. Give your health care provider a list of all the medicines, herbs, non-prescription drugs, or dietary supplements you use. Also tell them if you smoke, drink alcohol, or use illegal drugs. Some items may interact with your medicine.  What should I watch for while using this medicine?  Keep this medicine ready for use in the case of a narcotic overdose. Make sure that you have the phone number of your doctor or health care professional and local hospital ready. You may need to have additional doses of this medicine. Each nasal spray contains a single dose. Some emergencies may require additional doses.  After use, bring the treated person to the nearest hospital or call 911. Make sure the treating health care professional knows that the person has received an injection of this medicine. You will receive additional instructions on what to do during and after use of this medicine before an emergency occurs.  What side effects may I notice from receiving this medicine?  Side effects that you should report to your doctor or health care professional as soon as possible:  · allergic reactions like skin rash, itching or hives, swelling of the face, lips, or tongue  · breathing problems  · fast, irregular heartbeat  · high blood pressure  · pain that was controlled by narcotic pain medicine  · seizures  Side effects that usually do not require medical attention (report to your doctor or health care professional if they continue or are bothersome):  · anxious  · chills  · diarrhea  · fever  · headache  · muscle  pain  · nausea, vomiting  · nose irritation like dryness, congestion, and swelling  · sweating  This list may not describe all possible side effects. Call your doctor for medical advice about side effects. You may report side effects to FDA at 1-409-NDY-9472.  Where should I keep my medicine?  Keep out of the reach of children.  Store between 4 and 40 degrees C (39 and 104 degrees F). Do not freeze. Throw away any unused medicine after the expiration date. Keep in original box until ready to use.  NOTE: This sheet is a summary. It may not cover all possible information. If you have questions about this medicine, talk to your doctor, pharmacist, or health care provider.  © 2020 [employer]vier/Gold Standard (2017-01-20 13:55:10)

## 2020-07-05 NOTE — ED TRIAGE NOTES
Pt was brought in by his friend for overdosing on 1-10-325mg percocet, 1-30mg oxycodone, and 2- 2mg xanax. Pt was unresponsive on arrival. Pt given 2mg of narcan IM and 2mg IV of narcan.

## 2020-07-05 NOTE — ASSESSMENT & PLAN NOTE
He had episode of black tarry vomiting.  He denies taking excessive amount of NSAIDs.  H&H every 6 hourly per  Started him on IV pantoprazole.  Lorena liquid diet.  Symptomatic management for vomiting.

## 2020-07-05 NOTE — H&P
Hospital Medicine History & Physical Note    Date of Service  7/5/2020    Primary Care Physician  Ashley Arroyo M.D.    Code Status  Prior    Chief Complaint  Chief Complaint   Patient presents with   • Drug Overdose       History of Presenting Illness    25 y.o. male with past medical history of colitis who presented to the hospital on 7/5/2020 with drug overdose.  He took one  mg Percocet, one 30 mg oxycodone, and 2 2 mg Xanax.  Patient was unresponsive on arrival and he received IV Narcan.  I evaluated him at the bedside he does not remember taking these medication and he expressed that he feels confused how he ended up in the hospital.  He denies any symptoms of pain.  At the time of evaluation he had episode of vomiting which is black tarry in color.  He denies having any symptoms of abdominal pain.  He expressed that he has history of colitis but he has not been taking any medications.  Denies any suicidal ideation.    History is limited as patient is mildly confused at the time of evaluation.    I discussed about this admission with ER physician Dr. Barker.     Review of Systems  Review of Systems   Constitutional: Negative for chills, fever and weight loss.   HENT: Negative for hearing loss and tinnitus.    Eyes: Negative for blurred vision, double vision, photophobia and pain.   Respiratory: Negative for cough, sputum production and shortness of breath.    Cardiovascular: Negative for chest pain, palpitations, orthopnea and leg swelling.   Gastrointestinal: Positive for nausea and vomiting. Negative for abdominal pain, constipation and diarrhea.   Genitourinary: Negative for dysuria, frequency and urgency.   Musculoskeletal: Negative for back pain, joint pain, myalgias and neck pain.   Skin: Negative for rash.   Neurological: Negative for dizziness, tingling, tremors, sensory change, speech change, focal weakness and headaches.   Psychiatric/Behavioral: Positive for substance abuse. Negative for  hallucinations and suicidal ideas.   All other systems reviewed and are negative.      Past Medical History   has a past medical history of Abdominal migraine, Abdominal migraine, Colitis, and Gastritis.    Surgical History   has a past surgical history that includes gastroscopy (9/3/2014); gastroscopy-endo (11/15/2014); and other.     Family History  I reviewed family history denies history of chronic medical conditions.    Social History   reports that he has never smoked. He has never used smokeless tobacco. He reports current drug use. Drugs: Inhaled and Marijuana. He reports that he does not drink alcohol.    Allergies  No Known Allergies    Medications  Prior to Admission Medications   Prescriptions Last Dose Informant Patient Reported? Taking?   dicyclomine (BENTYL) 10 MG Cap   No No   Sig: Take 1 Cap by mouth 4 Times a Day,Before Meals and at Bedtime.   hydrOXYzine HCl (ATARAX) 50 MG Tab   No No   Sig: Take 1 Tab by mouth every 8 hours as needed for Anxiety.   meloxicam (MOBIC) 15 MG tablet   No No   Sig: Take 1 Tab by mouth every day.   ondansetron (ZOFRAN ODT) 4 MG TABLET DISPERSIBLE   No No   Sig: Take 1 Tab by mouth every 8 hours as needed.   ondansetron (ZOFRAN ODT) 4 MG TABLET DISPERSIBLE   No No   Sig: Take 1 Tab by mouth every 6 hours as needed for Nausea.      Facility-Administered Medications: None       Physical Exam  Pulse:  [] 81  Resp:  [16-29] 16  BP: ()/(69-76) 94/69  SpO2:  [98 %-100 %] 99 %    Physical Exam  Vitals signs reviewed.   Constitutional:       General: He is not in acute distress.  HENT:      Head: Normocephalic and atraumatic. No contusion.      Right Ear: External ear normal.      Left Ear: External ear normal.      Nose: Nose normal.      Mouth/Throat:      Pharynx: No oropharyngeal exudate.   Eyes:      General:         Right eye: No discharge.         Left eye: No discharge.      Pupils: Pupils are equal, round, and reactive to light.   Neck:       Musculoskeletal: No neck rigidity or muscular tenderness.   Cardiovascular:      Rate and Rhythm: Normal rate and regular rhythm.      Heart sounds: No murmur. No friction rub. No gallop.    Pulmonary:      Effort: Pulmonary effort is normal.      Breath sounds: No wheezing or rhonchi.   Abdominal:      General: Bowel sounds are normal. There is no distension.      Palpations: Abdomen is soft.      Tenderness: There is no abdominal tenderness. There is no rebound.   Musculoskeletal: Normal range of motion.         General: No swelling or tenderness.   Skin:     General: Skin is warm and dry.      Coloration: Skin is not jaundiced.   Neurological:      General: No focal deficit present.      Mental Status: He is alert and oriented to person, place, and time.      Cranial Nerves: No cranial nerve deficit.      Sensory: No sensory deficit.      Comments: He knows that he is in the hospital.  Knows Keny Flores is the president of [employer].   Psychiatric:         Mood and Affect: Mood normal.         Laboratory:  Recent Labs     07/05/20  0030   WBC 24.4*   RBC 4.90   HEMOGLOBIN 14.5   HEMATOCRIT 47.6   MCV 97.1   MCH 29.6   MCHC 30.5*   RDW 44.5   PLATELETCT 260   MPV 11.3     Recent Labs     07/05/20  0030   SODIUM 139   POTASSIUM 4.3   CHLORIDE 102   CO2 18*   GLUCOSE 342*   BUN 20   CREATININE 1.15   CALCIUM 8.5     Recent Labs     07/05/20  0030   GLUCOSE 342*         No results for input(s): NTPROBNP in the last 72 hours.      No results for input(s): TROPONINT in the last 72 hours.    Imaging:  No orders to display         Assessment/Plan:      Drug overdose  Assessment & Plan  As per chart he took one  mg Percocet, one 30 mg oxycodone, and two 2 mg Xanax.  He received Narcan  He denies any suicidal ideation.  Mr. Marv Diop was here with a suspected overdose of T40.4 - Synthetic narcotics; he has other known overdoses: T40.2 - Other opioids.    At the time of evaluation he expressed that he is  confused.  Admitted telemetry for close observation.    I discussed with poison control and this is his [id number].  Poison control recommended to monitor at least 6-hour post Narcan administration.    I ordered EKG to evaluate for any interval changes.      Vomiting- (present on admission)  Assessment & Plan  He had episode of black tarry vomiting.  He denies taking excessive amount of NSAIDs.  H&H every 6 hourly per  Started him on IV pantoprazole.  Lorena liquid diet.  Symptomatic management for vomiting.

## 2020-07-05 NOTE — DISCHARGE SUMMARY
Discharge Summary    CHIEF COMPLAINT ON ADMISSION  Chief Complaint   Patient presents with   • Drug Overdose       Reason for Admission  overdose of narcotics.    Admission Date  7/5/2020    CODE STATUS  Full Code    HPI & HOSPITAL COURSE  This is a 25 y.o. male here with recreational narcotic and BZD OD.  25 y.o. male with past medical history of colitis who presented to the hospital on 7/5/2020 with drug overdose.  He took one  mg Percocet, one 30 mg oxycodone, and 2 2 mg Xanax.  Patient was unresponsive on arrival and he received IV Narcan.  I evaluated him at the bedside he does not remember taking these medication and he expressed that he feels confused how he ended up in the hospital.  He denies any symptoms of pain.  At the time of evaluation he had episode of vomiting which is black tarry in color.  He denies having any symptoms of abdominal pain.  He expressed that he has history of colitis but he has not been taking any medications.  Denies any suicidal ideation to admission MD as well as to myself at discharge.  Repeat wbc decreased from 24 to 14, appears reactive.  Lungs CTA b/l, no source of infection.  BCs x2 ordered, but low level of suspicion of bacteremia or sepsis.  This is 2/2 acute narcotic overdose.  No further emesis noted, HG stable, given prilosec at dicharge and narcan NS PRN OD.  Patient states he is not going to use recreational narcotics again and is not on any chronic narcotics.  I spent time with patient discussing the near death experience and high incidence of accidental deaths.  He was ambulating well, eating.  NO cough or emesis.  VSS.       Therefore, he is discharged in good and stable condition to home with close outpatient follow-up.    The patient recovered much more quickly than anticipated on admission.    Discharge Date  7/5/20    FOLLOW UP ITEMS POST DISCHARGE  Follow up with PCP in 1 week for recheck.    DISCHARGE DIAGNOSES  Active Problems:    Accidental drug  overdose POA: Yes  Resolved Problems:    Vomiting POA: Yes      FOLLOW UP  No future appointments.  No follow-up provider specified.    MEDICATIONS ON DISCHARGE     Medication List      START taking these medications      Instructions   Narcan 4 MG/0.1ML Liqd  Generic drug:  Naloxone   Spray 4 mg in nose Once PRN (acute respiratory failure from narcotic overdose) for up to 1 dose.  Dose:  1 Spray     omeprazole 20 MG delayed-release capsule  Commonly known as:  PRILOSEC   Take 1 Cap by mouth 2 times a day for 7 days.  Dose:  20 mg        CHANGE how you take these medications      Instructions   ondansetron 4 MG Tbdp  What changed:  Another medication with the same name was removed. Continue taking this medication, and follow the directions you see here.  Commonly known as:  Zofran ODT   Take 1 Tab by mouth every 8 hours as needed.  Dose:  4 mg        CONTINUE taking these medications      Instructions   dicyclomine 10 MG Caps  Commonly known as:  BENTYL   Take 1 Cap by mouth 4 Times a Day,Before Meals and at Bedtime.  Dose:  10 mg     hydrOXYzine HCl 50 MG Tabs  Commonly known as:  ATARAX   Take 1 Tab by mouth every 8 hours as needed for Anxiety.  Dose:  50 mg        STOP taking these medications    meloxicam 15 MG tablet  Commonly known as:  MOBIC            Allergies  No Known Allergies    DIET  Orders Placed This Encounter   Procedures   • Diet Order Clear Liquid     Standing Status:   Standing     Number of Occurrences:   1     Order Specific Question:   Diet:     Answer:   Clear Liquid [10]       ACTIVITY  As tolerated.  Weight bearing as tolerated    CONSULTATIONS  Poison control    PROCEDURES  CXR wnl.    LABORATORY  Lab Results   Component Value Date    SODIUM 140 07/05/2020    POTASSIUM 3.9 07/05/2020    CHLORIDE 104 07/05/2020    CO2 25 07/05/2020    GLUCOSE 76 07/05/2020    BUN 15 07/05/2020    CREATININE 0.70 07/05/2020    CREATININE 0.4 04/10/2006        Lab Results   Component Value Date    WBC 13.7  (H) 07/05/2020    HEMOGLOBIN 13.9 (L) 07/05/2020    HEMATOCRIT 41.9 (L) 07/05/2020    PLATELETCT 225 07/05/2020        Total time of the discharge process exceeds 40 minutes.

## 2020-07-05 NOTE — PROGRESS NOTES
Bedside report received from nightshift RN. Bed locked and in lowest position. Call light within reach. Patient stable and has no further needs at this time. Hourly rounding in place.

## 2020-07-05 NOTE — ED PROVIDER NOTES
"ED Provider Note    ED Provider Note      Primary care provider: Ashley Arroyo M.D.    I verified that the patient was wearing a mask and I was wearing appropriate PPE every time I entered the room. The patient's mask was on the patient at all times during my encounter except for a brief view of the oropharynx.      CHIEF COMPLAINT  Chief Complaint   Patient presents with   • Drug Overdose       HPI  Gilbert Diop is a 25 y.o. male who presents to the Emergency Department with chief complaint of unresponsive.  Patient is brought here by his friends after he became unresponsive after ingesting several drugs.  Friends report that he took 10 mg of Percocet 30 mg of oxycodone and 2 mg of Xanax.  Denies any alcohol ingestion further history of present illness limited by altered mental status and critical condition.    REVIEW OF SYSTEMS  Unable to obtain due to critical presentation      Past medical surgical social history family history meds and allergies obtained by chart review or by questioning patient after initial evaluation    PAST MEDICAL HISTORY   has a past medical history of Abdominal migraine, Abdominal migraine, Colitis, and Gastritis.    SURGICAL HISTORY   has a past surgical history that includes gastroscopy (9/3/2014); gastroscopy-endo (11/15/2014); and other.    SOCIAL HISTORY  Social History     Tobacco Use   • Smoking status: Never Smoker   • Smokeless tobacco: Never Used   Substance Use Topics   • Alcohol use: No   • Drug use: Yes     Types: Inhaled, Marijuana     Comment: THC daily      Social History     Substance and Sexual Activity   Drug Use Yes   • Types: Inhaled, Marijuana    Comment: THC daily       FAMILY HISTORY  Non-Contributory    CURRENT MEDICATIONS  None    ALLERGIES  No Known Allergies    PHYSICAL EXAM  VITAL SIGNS: /74   Pulse (!) 102   Resp 20   Ht 1.626 m (5' 4\")   Wt 59 kg (130 lb)   SpO2 98%   BMI 22.31 kg/m²   Pulse ox interpretation: Hypoxic and " cyanotic  Constitutional: Cyanotic unresponsive  HEENT: Atraumatic normocephalic, pupils pinpoint bilaterally moist mucous membranes    neck:   Cardiovascular: Tachycardic thready pulses in the periphery  Thorax & Lungs: Decreased respiratory excursion and sonorous respirations  GI: Soft nontender nondistended positive bowel sounds, no peritoneal signs  Skin: Cyanotic  Musculoskeletal: Moving all extremities with full range and 5 of 5 strength, no acute  deformity  Neurologic: GCS 3  Psychiatric: gCS 3      DIAGNOSTIC STUDIES / PROCEDURES  LABS      Results for orders placed or performed during the hospital encounter of 07/05/20   CBC WITH DIFFERENTIAL   Result Value Ref Range    WBC 24.4 (H) 4.8 - 10.8 K/uL    RBC 4.90 4.70 - 6.10 M/uL    Hemoglobin 14.5 14.0 - 18.0 g/dL    Hematocrit 47.6 42.0 - 52.0 %    MCV 97.1 81.4 - 97.8 fL    MCH 29.6 27.0 - 33.0 pg    MCHC 30.5 (L) 33.7 - 35.3 g/dL    RDW 44.5 35.9 - 50.0 fL    Platelet Count 260 164 - 446 K/uL    MPV 11.3 9.0 - 12.9 fL    Neutrophils-Polys 67.80 44.00 - 72.00 %    Lymphocytes 27.00 22.00 - 41.00 %    Monocytes 3.50 0.00 - 13.40 %    Eosinophils 0.90 0.00 - 6.90 %    Basophils 0.00 0.00 - 1.80 %    Nucleated RBC 0.00 /100 WBC    Neutrophils (Absolute) 16.54 (H) 1.82 - 7.42 K/uL    Lymphs (Absolute) 6.59 (H) 1.00 - 4.80 K/uL    Monos (Absolute) 0.85 0.00 - 0.85 K/uL    Eos (Absolute) 0.22 0.00 - 0.51 K/uL    Baso (Absolute) 0.00 0.00 - 0.12 K/uL    NRBC (Absolute) 0.00 K/uL    Anisocytosis 1+     Microcytosis 1+    BASIC METABOLIC PANEL   Result Value Ref Range    Sodium 139 135 - 145 mmol/L    Potassium 4.3 3.6 - 5.5 mmol/L    Chloride 102 96 - 112 mmol/L    Co2 18 (L) 20 - 33 mmol/L    Glucose 342 (H) 65 - 99 mg/dL    Bun 20 8 - 22 mg/dL    Creatinine 1.15 0.50 - 1.40 mg/dL    Calcium 8.5 8.5 - 10.5 mg/dL    Anion Gap 19.0 (H) 7.0 - 16.0   DIAGNOSTIC ALCOHOL   Result Value Ref Range    Diagnostic Alcohol <10.1 0.0 - 10.1 mg/dL   ACETAMINOPHEN   Result  Value Ref Range    Acetaminophen -Tylenol <5 (L) 10 - 30 ug/mL   Salicylate   Result Value Ref Range    Salicylates, Quant. <1 (L) 15 - 25 mg/dL   DIFFERENTIAL MANUAL   Result Value Ref Range    Metamyelocytes 0.90 %    Manual Diff Status PERFORMED    PERIPHERAL SMEAR REVIEW   Result Value Ref Range    Peripheral Smear Review see below    PLATELET ESTIMATE   Result Value Ref Range    Plt Estimation Normal    MORPHOLOGY   Result Value Ref Range    RBC Morphology Present     Reactive Lymphocytes Few    ESTIMATED GFR   Result Value Ref Range    GFR If African American >60 >60 mL/min/1.73 m 2    GFR If Non African American >60 >60 mL/min/1.73 m 2   Prothrombin Time   Result Value Ref Range    PT 14.6 12.0 - 14.6 sec    INR 1.11 0.87 - 1.13   APTT   Result Value Ref Range    APTT 28.0 24.7 - 36.0 sec   EKG   Result Value Ref Range    Report       [facility]    Test Date:  2020  Pt Name:    GABRIEL CEBALLOS     Department: 183  MRN:        [id number]                      Room:       T812  Gender:     Male                         Technician: TOSHIA  :        1995                   Requested By:SACHIN UP  Order #:    [id number]                    Reading MD:    Measurements  Intervals                                Axis  Rate:       62                           P:          68  SC:         128                          QRS:        62  QRSD:       88                           T:          42  QT:         428  QTc:        435    Interpretive Statements  SINUS RHYTHM  Compared to ECG 2018 04:02:38  No significant changes         All labs reviewed by me.      RADIOLOGY  No orders to display     The radiologist's interpretation of all radiological studies have been reviewed by me.    COURSE & MEDICAL DECISION MAKING  Pertinent Labs & Imaging studies reviewed. (See chart for details)    12:46 AM - Patient seen and examined at bedside.         Patient noted to have slightly elevated blood  "pressure likely circumstantial secondary to presenting complaint. Referred to primary care physician for further evaluation.      Medical Decision Making: Patient arrives apneic cyanotic friend reports narcotic ingestion patient was given initial 2 mg IM Narcan IV was quickly established given additional 2 mg IV.  He regained consciousness shortly thereafter he did require bag valve mask respirations until the Narcan and taken full effect.  Patient is return to somewhat normal mental status however he is still very somnolent is unclear exactly what he ingested no known duration of medications active in his system.  Anion gap acidosis as well as hyperglycemia no history of diabetes is likely stress reaction.  Patient was initially evaluated by critical care however is been several hours since Narcan at this time they feel as though he is stable for telemetry.  Patient was then discussed with hospitalist service and was admitted to the hospitalist service.    Critical care:  60 minutes of critical care time was spent with this patient including initial evaluation initial resuscitation the ordering of labs EKGs images and rhythm strip interpretation of such. Interventions based on such. Discussing the case with the patient the patient's friends consulting physicians. Does not include separately billable procedures.      BP (!) 90/48 Comment: RN notified  Pulse 83   Temp 36.3 °C (97.4 °F) (Temporal)   Resp 16   Ht 1.626 m (5' 4\")   Wt 65.3 kg (143 lb 15.4 oz)   SpO2 98%   BMI 24.71 kg/m²                 FINAL IMPRESSION  1. Accidental drug overdose, initial encounter Active   2.  respiratory failure  3.  Acute narcotic overdose  4.  Mixed respiratory and metabolic acidosis  5.  Leukocytosis  6.  Critical care 30 minutes    This dictation has been created using voice recognition software and/or scribes. The accuracy of the dictation is limited by the abilities of the software and the expertise of the scribes. I " expect there may be some errors of grammar and possibly content. I made every attempt to manually correct the errors within my dictation. However, errors related to voice recognition software and/or scribes may still exist and should be interpreted within the appropriate context.

## 2020-07-05 NOTE — PROGRESS NOTES
Pt arrived to unit via gurney at 0400. Pt oriented to room, unit, and plan of care. Tele-monitor placed and monitor room notified. All questions answered at this time. Call light within reach; fall precautions in place.

## 2020-07-05 NOTE — PROGRESS NOTES
2 RN Skin Check    2 RN skin check complete w/ SHAWNA Elias  Devices in place: heart monitor  Skin assessed under devices: yes.  Confirmed pressure ulcers found on: n/a.  New potential pressure ulcers noted on n/a. Wound consult placed N/A.  The following interventions in place Pillows, moisturizer.    Skin is intact

## 2020-07-05 NOTE — ASSESSMENT & PLAN NOTE
As per chart he took one  mg Percocet, one 30 mg oxycodone, and two 2 mg Xanax.  He received Narcan  He denies any suicidal ideation.  Mr. Marv Diop was here with a suspected overdose of T40.4 - Synthetic narcotics; he has other known overdoses: T40.2 - Other opioids.    At the time of evaluation he expressed that he is confused.  Admitted telemetry for close observation.    I discussed with poison control and this is his [id number].  Poison control recommended to monitor at least 6-hour post Narcan administration.    I ordered EKG to evaluate for any interval changes.

## 2020-07-05 NOTE — CARE PLAN
Problem: Communication  Goal: The ability to communicate needs accurately and effectively will improve  Outcome: PROGRESSING AS EXPECTED   Patient updated and included in plan of care. Patient calls appropriately when he has questions.   Problem: Safety  Goal: Will remain free from injury  Outcome: PROGRESSING AS EXPECTED   Bed alarm on and patient educated to call for assistance. Patient compliant.

## 2020-07-10 LAB
BACTERIA BLD CULT: NORMAL
BACTERIA BLD CULT: NORMAL
SIGNIFICANT IND 70042: NORMAL
SIGNIFICANT IND 70042: NORMAL
SITE SITE: NORMAL
SITE SITE: NORMAL
SOURCE SOURCE: NORMAL
SOURCE SOURCE: NORMAL

## 2020-11-11 ENCOUNTER — HOSPITAL ENCOUNTER (EMERGENCY)
Facility: MEDICAL CENTER | Age: 25
End: 2020-11-12
Attending: EMERGENCY MEDICINE

## 2020-11-11 DIAGNOSIS — R11.15 CYCLIC VOMITING SYNDROME: ICD-10-CM

## 2020-11-11 DIAGNOSIS — E86.0 DEHYDRATION: ICD-10-CM

## 2020-11-11 LAB
ALBUMIN SERPL BCP-MCNC: 5.4 G/DL (ref 3.2–4.9)
ALBUMIN/GLOB SERPL: 1.7 G/DL
ALP SERPL-CCNC: 63 U/L (ref 30–99)
ALT SERPL-CCNC: 21 U/L (ref 2–50)
ANION GAP SERPL CALC-SCNC: 18 MMOL/L (ref 7–16)
AST SERPL-CCNC: 23 U/L (ref 12–45)
BASOPHILS # BLD AUTO: 0.2 % (ref 0–1.8)
BASOPHILS # BLD: 0.02 K/UL (ref 0–0.12)
BILIRUB SERPL-MCNC: 0.8 MG/DL (ref 0.1–1.5)
BUN SERPL-MCNC: 26 MG/DL (ref 8–22)
CALCIUM SERPL-MCNC: 10.6 MG/DL (ref 8.5–10.5)
CHLORIDE SERPL-SCNC: 97 MMOL/L (ref 96–112)
CO2 SERPL-SCNC: 25 MMOL/L (ref 20–33)
CREAT SERPL-MCNC: 0.88 MG/DL (ref 0.5–1.4)
EKG IMPRESSION: NORMAL
EOSINOPHIL # BLD AUTO: 0 K/UL (ref 0–0.51)
EOSINOPHIL NFR BLD: 0 % (ref 0–6.9)
ERYTHROCYTE [DISTWIDTH] IN BLOOD BY AUTOMATED COUNT: 36.7 FL (ref 35.9–50)
GLOBULIN SER CALC-MCNC: 3.2 G/DL (ref 1.9–3.5)
GLUCOSE SERPL-MCNC: 121 MG/DL (ref 65–99)
HCT VFR BLD AUTO: 47.9 % (ref 42–52)
HGB BLD-MCNC: 17 G/DL (ref 14–18)
IMM GRANULOCYTES # BLD AUTO: 0.03 K/UL (ref 0–0.11)
IMM GRANULOCYTES NFR BLD AUTO: 0.3 % (ref 0–0.9)
LIPASE SERPL-CCNC: 14 U/L (ref 11–82)
LYMPHOCYTES # BLD AUTO: 0.58 K/UL (ref 1–4.8)
LYMPHOCYTES NFR BLD: 6.4 % (ref 22–41)
MCH RBC QN AUTO: 29.3 PG (ref 27–33)
MCHC RBC AUTO-ENTMCNC: 35.5 G/DL (ref 33.7–35.3)
MCV RBC AUTO: 82.6 FL (ref 81.4–97.8)
MONOCYTES # BLD AUTO: 0.61 K/UL (ref 0–0.85)
MONOCYTES NFR BLD AUTO: 6.7 % (ref 0–13.4)
NEUTROPHILS # BLD AUTO: 7.87 K/UL (ref 1.82–7.42)
NEUTROPHILS NFR BLD: 86.4 % (ref 44–72)
NRBC # BLD AUTO: 0 K/UL
NRBC BLD-RTO: 0 /100 WBC
PLATELET # BLD AUTO: 276 K/UL (ref 164–446)
PMV BLD AUTO: 11.4 FL (ref 9–12.9)
POTASSIUM SERPL-SCNC: 3.6 MMOL/L (ref 3.6–5.5)
PROT SERPL-MCNC: 8.6 G/DL (ref 6–8.2)
RBC # BLD AUTO: 5.8 M/UL (ref 4.7–6.1)
SODIUM SERPL-SCNC: 140 MMOL/L (ref 135–145)
WBC # BLD AUTO: 9.1 K/UL (ref 4.8–10.8)

## 2020-11-11 PROCEDURE — 93005 ELECTROCARDIOGRAM TRACING: CPT

## 2020-11-11 PROCEDURE — 93005 ELECTROCARDIOGRAM TRACING: CPT | Performed by: EMERGENCY MEDICINE

## 2020-11-11 PROCEDURE — 80053 COMPREHEN METABOLIC PANEL: CPT

## 2020-11-11 PROCEDURE — 85025 COMPLETE CBC W/AUTO DIFF WBC: CPT

## 2020-11-11 PROCEDURE — 96375 TX/PRO/DX INJ NEW DRUG ADDON: CPT

## 2020-11-11 PROCEDURE — 96374 THER/PROPH/DIAG INJ IV PUSH: CPT

## 2020-11-11 PROCEDURE — 700111 HCHG RX REV CODE 636 W/ 250 OVERRIDE (IP): Performed by: EMERGENCY MEDICINE

## 2020-11-11 PROCEDURE — 700105 HCHG RX REV CODE 258: Performed by: EMERGENCY MEDICINE

## 2020-11-11 PROCEDURE — 99284 EMERGENCY DEPT VISIT MOD MDM: CPT

## 2020-11-11 PROCEDURE — 83690 ASSAY OF LIPASE: CPT

## 2020-11-11 RX ORDER — ONDANSETRON 2 MG/ML
4 INJECTION INTRAMUSCULAR; INTRAVENOUS ONCE
Status: COMPLETED | OUTPATIENT
Start: 2020-11-11 | End: 2020-11-11

## 2020-11-11 RX ORDER — SODIUM CHLORIDE 9 MG/ML
1000 INJECTION, SOLUTION INTRAVENOUS ONCE
Status: COMPLETED | OUTPATIENT
Start: 2020-11-11 | End: 2020-11-12

## 2020-11-11 RX ORDER — METOCLOPRAMIDE HYDROCHLORIDE 5 MG/ML
5 INJECTION INTRAMUSCULAR; INTRAVENOUS ONCE
Status: COMPLETED | OUTPATIENT
Start: 2020-11-11 | End: 2020-11-11

## 2020-11-11 RX ORDER — MORPHINE SULFATE 4 MG/ML
4 INJECTION, SOLUTION INTRAMUSCULAR; INTRAVENOUS ONCE
Status: COMPLETED | OUTPATIENT
Start: 2020-11-11 | End: 2020-11-11

## 2020-11-11 RX ADMIN — SODIUM CHLORIDE 1000 ML: 9 INJECTION, SOLUTION INTRAVENOUS at 23:18

## 2020-11-11 RX ADMIN — MORPHINE SULFATE 4 MG: 4 INJECTION INTRAVENOUS at 23:19

## 2020-11-11 RX ADMIN — METOCLOPRAMIDE 5 MG: 5 INJECTION, SOLUTION INTRAMUSCULAR; INTRAVENOUS at 23:19

## 2020-11-11 RX ADMIN — ONDANSETRON 4 MG: 2 INJECTION INTRAMUSCULAR; INTRAVENOUS at 23:19

## 2020-11-11 ASSESSMENT — FIBROSIS 4 INDEX: FIB4 SCORE: .46

## 2020-11-11 ASSESSMENT — PAIN DESCRIPTION - PAIN TYPE: TYPE: ACUTE PAIN

## 2020-11-12 VITALS
RESPIRATION RATE: 16 BRPM | DIASTOLIC BLOOD PRESSURE: 63 MMHG | BODY MASS INDEX: 17.47 KG/M2 | OXYGEN SATURATION: 99 % | HEART RATE: 69 BPM | SYSTOLIC BLOOD PRESSURE: 106 MMHG | TEMPERATURE: 98.9 F | HEIGHT: 66 IN | WEIGHT: 108.69 LBS

## 2020-11-12 PROCEDURE — 700111 HCHG RX REV CODE 636 W/ 250 OVERRIDE (IP): Performed by: EMERGENCY MEDICINE

## 2020-11-12 PROCEDURE — 96375 TX/PRO/DX INJ NEW DRUG ADDON: CPT

## 2020-11-12 PROCEDURE — 700105 HCHG RX REV CODE 258: Performed by: EMERGENCY MEDICINE

## 2020-11-12 PROCEDURE — 96376 TX/PRO/DX INJ SAME DRUG ADON: CPT

## 2020-11-12 RX ORDER — ONDANSETRON 4 MG/1
4 TABLET, ORALLY DISINTEGRATING ORAL EVERY 6 HOURS PRN
Qty: 10 TAB | Refills: 0 | Status: ON HOLD | OUTPATIENT
Start: 2020-11-12 | End: 2021-05-30

## 2020-11-12 RX ORDER — LORAZEPAM 2 MG/ML
1 INJECTION INTRAMUSCULAR ONCE
Status: COMPLETED | OUTPATIENT
Start: 2020-11-12 | End: 2020-11-12

## 2020-11-12 RX ORDER — MORPHINE SULFATE 4 MG/ML
4 INJECTION, SOLUTION INTRAMUSCULAR; INTRAVENOUS ONCE
Status: COMPLETED | OUTPATIENT
Start: 2020-11-12 | End: 2020-11-12

## 2020-11-12 RX ORDER — SODIUM CHLORIDE 9 MG/ML
1000 INJECTION, SOLUTION INTRAVENOUS ONCE
Status: COMPLETED | OUTPATIENT
Start: 2020-11-12 | End: 2020-11-12

## 2020-11-12 RX ORDER — HYDROMORPHONE HYDROCHLORIDE 1 MG/ML
0.5 INJECTION, SOLUTION INTRAMUSCULAR; INTRAVENOUS; SUBCUTANEOUS ONCE
Status: COMPLETED | OUTPATIENT
Start: 2020-11-12 | End: 2020-11-12

## 2020-11-12 RX ADMIN — LORAZEPAM 1 MG: 2 INJECTION INTRAMUSCULAR; INTRAVENOUS at 00:13

## 2020-11-12 RX ADMIN — HYDROMORPHONE HYDROCHLORIDE 0.5 MG: 1 INJECTION, SOLUTION INTRAMUSCULAR; INTRAVENOUS; SUBCUTANEOUS at 00:56

## 2020-11-12 RX ADMIN — SODIUM CHLORIDE 1000 ML: 9 INJECTION, SOLUTION INTRAVENOUS at 00:56

## 2020-11-12 RX ADMIN — MORPHINE SULFATE 4 MG: 4 INJECTION INTRAVENOUS at 00:10

## 2020-11-12 NOTE — ED NOTES
"During triage suicide screening, in response to whether pt has felt suicidal, pt states, \"I have because the pain has gotten so bad.\" Denies intent, plan, or prior suicide attempts. When asked if the pt would still feel suicidal if his pain was under control, pt stated, \"no.\" CN notified. ERP notified, declined to initiate legal hold and suicide risk protocol.   "

## 2020-11-12 NOTE — ED NOTES
"Pt returned from speaking with \"his ride.\" Pt edcuated in triage process. Pt taken back for EKG  "

## 2020-11-12 NOTE — ED NOTES
Pt called from [facility] for 2nd time with no response. Bathroom and senior lounge checked.  staff saw pt go outside and has not returned.

## 2020-11-12 NOTE — ED TRIAGE NOTES
"Gilbert Marvsuleman Diop  25 y.o. male  Chief Complaint   Patient presents with   • N/V     x 3 days; unable to keep anything down   • Abdominal Pain     x 3 days; sharp 10/10 right above belly button       Patient ambulatory to triage with a steady gait for above complaint. Pt in obvious discomfort. EKG completed for initial  and protocol ordered. Pt vomited 150ml clear thin emesis in waiting room. CN aware.    Patient is alert and oriented, speaking in full sentences, following commands, and responding appropriately to questions. Educated on triage process and instructed patient to alert staff to any changes in condition or worsening symptoms.     /88   Pulse (!) 131   Temp 37.2 °C (98.9 °F) (Temporal)   Resp 16   Ht 1.676 m (5' 6\")   Wt 49.3 kg (108 lb 11 oz)   SpO2 99%   BMI 17.54 kg/m²       "

## 2020-11-12 NOTE — ED NOTES
"Pt discharged home. IV discontinued and gauze placed, pt in possession of belongings. Pt provided discharge education and information pertaining to medications and follow up appointments. Pt received copy of discharge instructions and verbalized understanding. /63   Pulse 69   Temp 37.2 °C (98.9 °F) (Temporal)   Resp 16   Ht 1.676 m (5' 6\")   Wt 49.3 kg (108 lb 11 oz)   SpO2 99%   BMI 17.54 kg/m²     "

## 2020-11-12 NOTE — ED PROVIDER NOTES
ED Provider Note  Primary care provider: Ashley Arroyo M.D.  Means of arrival: Walk In  History obtained from: Patient  History limited by: None    CHIEF COMPLAINT  Chief Complaint   Patient presents with   • N/V     x 3 days; unable to keep anything down   • Abdominal Pain     x 3 days; sharp 10/10 right above belly button       [facility]  Gilbert Diop is a 25 y.o. male past medical history of multiple admissions for cyclical vomiting syndrome, cannabis hyperemesis syndrome, past history of accidental overdose of recreational opioids who presents to the Emergency Department for evaluation of sharp periumbilical abdominal pain onset 3 days ago. Patient rates the pain a 10/10. He complains of associated nausea and a multiple episodes of vomiting (3 days). He states he is unable to keep any solids or fluids down.  Patient denies any diarrhea, melena, hematochezia.  However history positive for blood in vomitus seen earlier today however which cleared up later with clear clear liquid vomitus noticed in the ED.   Denies anything that makes the pain or vomiting better or worse.  Took marijuana last 2 weeks ago.        I verified that the patient was wearing a mask and I was wearing appropriate PPE every time I entered the room. The patient's mask was on the patient at all times during my encounter except for a brief view of the oropharynx.      REVIEW OF SYSTEMS  Pertinent positives include upper abdominal pain, nausea, vomiting. Pertinent negatives include no diarrhea, melena, hematochezia, dizziness, syncope, chest pain, shortness of breath, palpitations as above, all other systems reviewed and are negative.   See HPI for further details.     PAST MEDICAL HISTORY   has a past medical history of Abdominal migraine, Abdominal migraine, Colitis, and Gastritis.    SURGICAL HISTORY   has a past surgical history that includes gastroscopy (9/3/2014); gastroscopy-endo (11/15/2014); and other.    SOCIAL  "HISTORY  Social History     Tobacco Use   • Smoking status: Never Smoker   • Smokeless tobacco: Never Used   Substance Use Topics   • Alcohol use: No   • Drug use: Yes     Types: Inhaled, Marijuana     Comment: THC daily      Social History     Substance and Sexual Activity   Drug Use Yes   • Types: Inhaled, Marijuana    Comment: THC daily       FAMILY HISTORY  History reviewed. No pertinent family history.    CURRENT MEDICATIONS  Current Outpatient Medications   Medication Instructions   • dicyclomine (BENTYL) 10 mg, Oral, 4 TIMES DAILY - BEFORE MEALS , NIGHTLY   • hydrOXYzine HCl (ATARAX) 50 mg, Oral, EVERY 8 HOURS PRN   • Naloxone (NARCAN) 4 MG/0.1ML Liquid 1 Spray, Nasal, ONCE PRN   • ondansetron (ZOFRAN ODT) 4 mg, Oral, EVERY 8 HOURS PRN       ALLERGIES  Allergies   Allergen Reactions   • Tylenol      Nausea, \"it just doesn't sit right\"       PHYSICAL EXAM  VITAL SIGNS: /88   Pulse (!) 131   Temp 37.2 °C (98.9 °F) (Temporal)   Resp 16   Ht 1.676 m (5' 6\")   Wt 49.3 kg (108 lb 11 oz)   SpO2 99%   BMI 17.54 kg/m²     Constitutional: cachectic, in acute distress, holding  vomit bag with clear liquid.   HENT: Normocephalic, Atraumatic, Bilateral external ears normal, Oropharynx dry, No oral exudates. Tongue coating  Eyes: PERRLA, EOMI, Conjunctiva normal, No discharge.   Neck: No tenderness, Supple, No stridor.   Lymphatic: No lymphadenopathy noted.   Cardiovascular: tachycardic, Normal rhythm.   Thorax & Lungs: Clear to auscultation bilaterally, No respiratory distress, No wheezing, No crackles.   Abdomen: Soft,No masses, No pulsatile masses. Epigastric tenderness.   Skin: Warm, Dry, No erythema, No rash.   Extremities:, No edema No cyanosis.   Musculoskeletal: No tenderness to palpation or major deformities noted.  Intact distal pulses. Scoliosis repair scar present.   Neurologic: Awake, alert. Moves all extremities spontaneously.  Psychiatric: Affect normal, Judgment normal, Mood normal. "     LABS  Recent Labs     11/11/20  2238   WBC 9.1   RBC 5.80   HEMOGLOBIN 17.0   HEMATOCRIT 47.9   MCV 82.6   MCH 29.3   RDW 36.7   PLATELETCT 276   MPV 11.4   NEUTSPOLYS 86.40*   LYMPHOCYTES 6.40*   MONOCYTES 6.70   EOSINOPHILS 0.00   BASOPHILS 0.20     Recent Labs     11/11/20  2238   SODIUM 140   POTASSIUM 3.6   CHLORIDE 97   CO2 25   GLUCOSE 121*   BUN 26*     Recent Labs     11/11/20  2238   ALBUMIN 5.4*   TBILIRUBIN 0.8   ALKPHOSPHAT 63   TOTPROTEIN 8.6*   ALTSGPT 21   ASTSGOT 23   CREATININE 0.88       EKG  NA     RADIOLOGY  No orders to display     The radiologist's interpretation of all radiological studies have been reviewed by me.    COURSE & MEDICAL DECISION MAKING  Pertinent Labs & Imaging studies reviewed. (See chart for details)    11:03 PM - Patient seen and examined at bedside. Patient will be treated with 1 L normal saline, metoclopramide 5 mg injection and Zofran 4 mg injection was morphine 4 mg injection. Ordered EKG, CBC w diff, CMP, Lipase, and UA Culture if indicated to evaluate his symptoms. The differential diagnoses include but are not limited to: Cyclical vomiting syndrome versus cannabis hyperemesis syndrome versus gastroenteritis versus gastroparesis.     11:27 PM  CBC unremarkable  Chemistry panel showing high anion gap however also with elevated albumin in the setting of dehydration. azotemia most likely prerenal, hypercalcemia however when corrected for calcium is 9.5. Lipase negative     12:25 AM  Morphine 4 mg and lorazepam 1 mg administered in view of persistant pain.    12:37 AM:   Another litre NS bolus is ordered.   0.5 mg hydromorphone ordered.      Decision Making:  This is a patient with a past history of vomiting syndrome hospital admissions and ER visits for this.  In the past he was attributed to cannabis hyperemesis, although the patient now is denying frequent marijuana use states he last used a couple of weeks ago.  The patient does appear somewhat dry and  significantly uncomfortable with pain vomiting he is hydrated with IV fluids and given Reglan Zofran Dilaudid and Ativan which is been effective in the past steadily improving I think will be discharged.  He is dry enough that I think 2 L of fluid need to be infused.  At that point we will discharge the patient home he is aware of this plan I will discuss with my partner in case there is a problem arise in the meantime        FINAL IMPRESSION  1. Cyclic vomiting syndrome    2. Dehydration

## 2020-11-12 NOTE — PROGRESS NOTES
Called for pt in lobby to be triaged.  staff states that pt went outside to tell his ride to leave. Checked and called for pt outside, with no response. Will recheck.

## 2021-05-29 ENCOUNTER — APPOINTMENT (OUTPATIENT)
Dept: RADIOLOGY | Facility: MEDICAL CENTER | Age: 26
End: 2021-05-29
Attending: EMERGENCY MEDICINE

## 2021-05-29 ENCOUNTER — HOSPITAL ENCOUNTER (OUTPATIENT)
Facility: MEDICAL CENTER | Age: 26
End: 2021-05-30
Attending: EMERGENCY MEDICINE | Admitting: INTERNAL MEDICINE

## 2021-05-29 DIAGNOSIS — R11.2 NON-INTRACTABLE VOMITING WITH NAUSEA, UNSPECIFIED VOMITING TYPE: ICD-10-CM

## 2021-05-29 DIAGNOSIS — E86.0 DEHYDRATION: ICD-10-CM

## 2021-05-29 DIAGNOSIS — E87.6 HYPOKALEMIA: ICD-10-CM

## 2021-05-29 DIAGNOSIS — R10.84 ACUTE GENERALIZED ABDOMINAL PAIN: ICD-10-CM

## 2021-05-29 LAB
ALBUMIN SERPL BCP-MCNC: 5.3 G/DL (ref 3.2–4.9)
ALBUMIN/GLOB SERPL: 1.4 G/DL
ALP SERPL-CCNC: 77 U/L (ref 30–99)
ALT SERPL-CCNC: 17 U/L (ref 2–50)
ANION GAP SERPL CALC-SCNC: 10 MMOL/L (ref 7–16)
ANION GAP SERPL CALC-SCNC: 22 MMOL/L (ref 7–16)
AST SERPL-CCNC: 34 U/L (ref 12–45)
BASOPHILS # BLD AUTO: 0.2 % (ref 0–1.8)
BASOPHILS # BLD: 0.03 K/UL (ref 0–0.12)
BILIRUB SERPL-MCNC: 1.1 MG/DL (ref 0.1–1.5)
BUN SERPL-MCNC: 21 MG/DL (ref 8–22)
BUN SERPL-MCNC: 24 MG/DL (ref 8–22)
CALCIUM SERPL-MCNC: 10.6 MG/DL (ref 8.5–10.5)
CALCIUM SERPL-MCNC: 8.1 MG/DL (ref 8.5–10.5)
CHLORIDE SERPL-SCNC: 89 MMOL/L (ref 96–112)
CHLORIDE SERPL-SCNC: 98 MMOL/L (ref 96–112)
CO2 SERPL-SCNC: 27 MMOL/L (ref 20–33)
CO2 SERPL-SCNC: 31 MMOL/L (ref 20–33)
CREAT SERPL-MCNC: 0.94 MG/DL (ref 0.5–1.4)
CREAT SERPL-MCNC: 1.17 MG/DL (ref 0.5–1.4)
EKG IMPRESSION: NORMAL
EOSINOPHIL # BLD AUTO: 0 K/UL (ref 0–0.51)
EOSINOPHIL NFR BLD: 0 % (ref 0–6.9)
ERYTHROCYTE [DISTWIDTH] IN BLOOD BY AUTOMATED COUNT: 32.9 FL (ref 35.9–50)
GLOBULIN SER CALC-MCNC: 3.8 G/DL (ref 1.9–3.5)
GLUCOSE SERPL-MCNC: 134 MG/DL (ref 65–99)
GLUCOSE SERPL-MCNC: 164 MG/DL (ref 65–99)
HCT VFR BLD AUTO: 47.9 % (ref 42–52)
HGB BLD-MCNC: 17.8 G/DL (ref 14–18)
IMM GRANULOCYTES # BLD AUTO: 0.09 K/UL (ref 0–0.11)
IMM GRANULOCYTES NFR BLD AUTO: 0.6 % (ref 0–0.9)
LIPASE SERPL-CCNC: 17 U/L (ref 11–82)
LYMPHOCYTES # BLD AUTO: 1.63 K/UL (ref 1–4.8)
LYMPHOCYTES NFR BLD: 10.2 % (ref 22–41)
MCH RBC QN AUTO: 29.3 PG (ref 27–33)
MCHC RBC AUTO-ENTMCNC: 37.2 G/DL (ref 33.7–35.3)
MCV RBC AUTO: 78.9 FL (ref 81.4–97.8)
MONOCYTES # BLD AUTO: 1.64 K/UL (ref 0–0.85)
MONOCYTES NFR BLD AUTO: 10.3 % (ref 0–13.4)
NEUTROPHILS # BLD AUTO: 12.58 K/UL (ref 1.82–7.42)
NEUTROPHILS NFR BLD: 78.7 % (ref 44–72)
NRBC # BLD AUTO: 0 K/UL
NRBC BLD-RTO: 0 /100 WBC
PLATELET # BLD AUTO: 380 K/UL (ref 164–446)
PMV BLD AUTO: 11.7 FL (ref 9–12.9)
POTASSIUM SERPL-SCNC: 2.9 MMOL/L (ref 3.6–5.5)
POTASSIUM SERPL-SCNC: 3.1 MMOL/L (ref 3.6–5.5)
PROT SERPL-MCNC: 9.1 G/DL (ref 6–8.2)
RBC # BLD AUTO: 6.07 M/UL (ref 4.7–6.1)
SODIUM SERPL-SCNC: 138 MMOL/L (ref 135–145)
SODIUM SERPL-SCNC: 139 MMOL/L (ref 135–145)
WBC # BLD AUTO: 16 K/UL (ref 4.8–10.8)

## 2021-05-29 PROCEDURE — 700101 HCHG RX REV CODE 250: Performed by: STUDENT IN AN ORGANIZED HEALTH CARE EDUCATION/TRAINING PROGRAM

## 2021-05-29 PROCEDURE — 96365 THER/PROPH/DIAG IV INF INIT: CPT

## 2021-05-29 PROCEDURE — 99220 PR INITIAL OBSERVATION CARE,LEVL III: CPT | Mod: GC | Performed by: INTERNAL MEDICINE

## 2021-05-29 PROCEDURE — C9113 INJ PANTOPRAZOLE SODIUM, VIA: HCPCS | Performed by: STUDENT IN AN ORGANIZED HEALTH CARE EDUCATION/TRAINING PROGRAM

## 2021-05-29 PROCEDURE — G0378 HOSPITAL OBSERVATION PER HR: HCPCS

## 2021-05-29 PROCEDURE — 96366 THER/PROPH/DIAG IV INF ADDON: CPT

## 2021-05-29 PROCEDURE — 700111 HCHG RX REV CODE 636 W/ 250 OVERRIDE (IP): Performed by: STUDENT IN AN ORGANIZED HEALTH CARE EDUCATION/TRAINING PROGRAM

## 2021-05-29 PROCEDURE — 700117 HCHG RX CONTRAST REV CODE 255: Performed by: EMERGENCY MEDICINE

## 2021-05-29 PROCEDURE — A9270 NON-COVERED ITEM OR SERVICE: HCPCS | Performed by: EMERGENCY MEDICINE

## 2021-05-29 PROCEDURE — 93005 ELECTROCARDIOGRAM TRACING: CPT

## 2021-05-29 PROCEDURE — U0005 INFEC AGEN DETEC AMPLI PROBE: HCPCS

## 2021-05-29 PROCEDURE — 80048 BASIC METABOLIC PNL TOTAL CA: CPT

## 2021-05-29 PROCEDURE — 83690 ASSAY OF LIPASE: CPT

## 2021-05-29 PROCEDURE — A9270 NON-COVERED ITEM OR SERVICE: HCPCS | Performed by: STUDENT IN AN ORGANIZED HEALTH CARE EDUCATION/TRAINING PROGRAM

## 2021-05-29 PROCEDURE — 93005 ELECTROCARDIOGRAM TRACING: CPT | Performed by: EMERGENCY MEDICINE

## 2021-05-29 PROCEDURE — 96376 TX/PRO/DX INJ SAME DRUG ADON: CPT

## 2021-05-29 PROCEDURE — 85025 COMPLETE CBC W/AUTO DIFF WBC: CPT

## 2021-05-29 PROCEDURE — 96375 TX/PRO/DX INJ NEW DRUG ADDON: CPT

## 2021-05-29 PROCEDURE — 700102 HCHG RX REV CODE 250 W/ 637 OVERRIDE(OP): Performed by: EMERGENCY MEDICINE

## 2021-05-29 PROCEDURE — 700111 HCHG RX REV CODE 636 W/ 250 OVERRIDE (IP): Performed by: EMERGENCY MEDICINE

## 2021-05-29 PROCEDURE — 700105 HCHG RX REV CODE 258: Performed by: EMERGENCY MEDICINE

## 2021-05-29 PROCEDURE — 99285 EMERGENCY DEPT VISIT HI MDM: CPT

## 2021-05-29 PROCEDURE — U0003 INFECTIOUS AGENT DETECTION BY NUCLEIC ACID (DNA OR RNA); SEVERE ACUTE RESPIRATORY SYNDROME CORONAVIRUS 2 (SARS-COV-2) (CORONAVIRUS DISEASE [COVID-19]), AMPLIFIED PROBE TECHNIQUE, MAKING USE OF HIGH THROUGHPUT TECHNOLOGIES AS DESCRIBED BY CMS-2020-01-R: HCPCS

## 2021-05-29 PROCEDURE — 80053 COMPREHEN METABOLIC PANEL: CPT

## 2021-05-29 PROCEDURE — 74177 CT ABD & PELVIS W/CONTRAST: CPT

## 2021-05-29 PROCEDURE — 700102 HCHG RX REV CODE 250 W/ 637 OVERRIDE(OP): Performed by: STUDENT IN AN ORGANIZED HEALTH CARE EDUCATION/TRAINING PROGRAM

## 2021-05-29 RX ORDER — BISACODYL 10 MG
10 SUPPOSITORY, RECTAL RECTAL
Status: DISCONTINUED | OUTPATIENT
Start: 2021-05-29 | End: 2021-05-30 | Stop reason: HOSPADM

## 2021-05-29 RX ORDER — MORPHINE SULFATE 4 MG/ML
2 INJECTION, SOLUTION INTRAMUSCULAR; INTRAVENOUS EVERY 4 HOURS PRN
Status: DISCONTINUED | OUTPATIENT
Start: 2021-05-29 | End: 2021-05-30 | Stop reason: HOSPADM

## 2021-05-29 RX ORDER — PROCHLORPERAZINE EDISYLATE 5 MG/ML
10 INJECTION INTRAMUSCULAR; INTRAVENOUS EVERY 6 HOURS PRN
Status: DISCONTINUED | OUTPATIENT
Start: 2021-05-29 | End: 2021-05-30 | Stop reason: HOSPADM

## 2021-05-29 RX ORDER — ONDANSETRON 2 MG/ML
4 INJECTION INTRAMUSCULAR; INTRAVENOUS EVERY 4 HOURS PRN
Status: DISCONTINUED | OUTPATIENT
Start: 2021-05-29 | End: 2021-05-29

## 2021-05-29 RX ORDER — AMOXICILLIN 250 MG
2 CAPSULE ORAL 2 TIMES DAILY
Status: DISCONTINUED | OUTPATIENT
Start: 2021-05-29 | End: 2021-05-30 | Stop reason: HOSPADM

## 2021-05-29 RX ORDER — PANTOPRAZOLE SODIUM 40 MG/10ML
40 INJECTION, POWDER, LYOPHILIZED, FOR SOLUTION INTRAVENOUS 2 TIMES DAILY
Status: DISCONTINUED | OUTPATIENT
Start: 2021-05-29 | End: 2021-05-30 | Stop reason: HOSPADM

## 2021-05-29 RX ORDER — HALOPERIDOL 5 MG/ML
2.5 INJECTION INTRAMUSCULAR ONCE
Status: COMPLETED | OUTPATIENT
Start: 2021-05-29 | End: 2021-05-29

## 2021-05-29 RX ORDER — SODIUM CHLORIDE AND POTASSIUM CHLORIDE 300; 900 MG/100ML; MG/100ML
INJECTION, SOLUTION INTRAVENOUS CONTINUOUS
Status: DISCONTINUED | OUTPATIENT
Start: 2021-05-29 | End: 2021-05-30 | Stop reason: HOSPADM

## 2021-05-29 RX ORDER — PROCHLORPERAZINE EDISYLATE 5 MG/ML
10 INJECTION INTRAMUSCULAR; INTRAVENOUS ONCE
Status: DISPENSED | OUTPATIENT
Start: 2021-05-29 | End: 2021-05-30

## 2021-05-29 RX ORDER — POLYETHYLENE GLYCOL 3350 17 G/17G
1 POWDER, FOR SOLUTION ORAL
Status: DISCONTINUED | OUTPATIENT
Start: 2021-05-29 | End: 2021-05-30 | Stop reason: HOSPADM

## 2021-05-29 RX ORDER — DIPHENHYDRAMINE HYDROCHLORIDE 50 MG/ML
25 INJECTION INTRAMUSCULAR; INTRAVENOUS ONCE
Status: COMPLETED | OUTPATIENT
Start: 2021-05-29 | End: 2021-05-29

## 2021-05-29 RX ORDER — ONDANSETRON 2 MG/ML
12 INJECTION INTRAMUSCULAR; INTRAVENOUS ONCE
Status: COMPLETED | OUTPATIENT
Start: 2021-05-29 | End: 2021-05-29

## 2021-05-29 RX ORDER — SODIUM CHLORIDE, SODIUM LACTATE, POTASSIUM CHLORIDE, CALCIUM CHLORIDE 600; 310; 30; 20 MG/100ML; MG/100ML; MG/100ML; MG/100ML
INJECTION, SOLUTION INTRAVENOUS CONTINUOUS
Status: DISCONTINUED | OUTPATIENT
Start: 2021-05-29 | End: 2021-05-29

## 2021-05-29 RX ORDER — KETOROLAC TROMETHAMINE 30 MG/ML
30 INJECTION, SOLUTION INTRAMUSCULAR; INTRAVENOUS ONCE
Status: COMPLETED | OUTPATIENT
Start: 2021-05-29 | End: 2021-05-29

## 2021-05-29 RX ORDER — HALOPERIDOL 5 MG/ML
5 INJECTION INTRAMUSCULAR ONCE
Status: COMPLETED | OUTPATIENT
Start: 2021-05-29 | End: 2021-05-29

## 2021-05-29 RX ORDER — POTASSIUM CHLORIDE 7.45 MG/ML
10 INJECTION INTRAVENOUS ONCE
Status: COMPLETED | OUTPATIENT
Start: 2021-05-29 | End: 2021-05-29

## 2021-05-29 RX ORDER — SODIUM CHLORIDE 9 MG/ML
2000 INJECTION, SOLUTION INTRAVENOUS ONCE
Status: COMPLETED | OUTPATIENT
Start: 2021-05-29 | End: 2021-05-29

## 2021-05-29 RX ADMIN — HALOPERIDOL LACTATE 5 MG: 5 INJECTION, SOLUTION INTRAMUSCULAR at 11:24

## 2021-05-29 RX ADMIN — DIPHENHYDRAMINE HYDROCHLORIDE 25 MG: 50 INJECTION INTRAMUSCULAR; INTRAVENOUS at 11:07

## 2021-05-29 RX ADMIN — PROCHLORPERAZINE EDISYLATE 10 MG: 5 INJECTION INTRAMUSCULAR; INTRAVENOUS at 21:22

## 2021-05-29 RX ADMIN — PANTOPRAZOLE SODIUM 40 MG: 40 INJECTION, POWDER, LYOPHILIZED, FOR SOLUTION INTRAVENOUS at 18:04

## 2021-05-29 RX ADMIN — LIDOCAINE HYDROCHLORIDE 30 ML: 20 SOLUTION OROPHARYNGEAL at 11:11

## 2021-05-29 RX ADMIN — SODIUM CHLORIDE 2000 ML: 9 INJECTION, SOLUTION INTRAVENOUS at 11:49

## 2021-05-29 RX ADMIN — LIDOCAINE HYDROCHLORIDE 30 ML: 20 SOLUTION OROPHARYNGEAL at 21:17

## 2021-05-29 RX ADMIN — MORPHINE SULFATE 2 MG: 4 INJECTION INTRAVENOUS at 19:26

## 2021-05-29 RX ADMIN — PROCHLORPERAZINE EDISYLATE 10 MG: 5 INJECTION INTRAMUSCULAR; INTRAVENOUS at 18:04

## 2021-05-29 RX ADMIN — POTASSIUM CHLORIDE 10 MEQ: 7.46 INJECTION, SOLUTION INTRAVENOUS at 15:03

## 2021-05-29 RX ADMIN — POTASSIUM CHLORIDE AND SODIUM CHLORIDE: 900; 300 INJECTION, SOLUTION INTRAVENOUS at 18:04

## 2021-05-29 RX ADMIN — MORPHINE SULFATE 2 MG: 4 INJECTION INTRAVENOUS at 23:29

## 2021-05-29 RX ADMIN — IOHEXOL 100 ML: 350 INJECTION, SOLUTION INTRAVENOUS at 11:38

## 2021-05-29 RX ADMIN — HALOPERIDOL LACTATE 2.5 MG: 5 INJECTION, SOLUTION INTRAMUSCULAR at 12:29

## 2021-05-29 RX ADMIN — ONDANSETRON 12 MG: 2 INJECTION INTRAMUSCULAR; INTRAVENOUS at 11:19

## 2021-05-29 RX ADMIN — KETOROLAC TROMETHAMINE 30 MG: 30 INJECTION, SOLUTION INTRAMUSCULAR; INTRAVENOUS at 11:08

## 2021-05-29 ASSESSMENT — PAIN DESCRIPTION - PAIN TYPE
TYPE: ACUTE PAIN

## 2021-05-29 ASSESSMENT — LIFESTYLE VARIABLES
HAVE PEOPLE ANNOYED YOU BY CRITICIZING YOUR DRINKING: NO
AVERAGE NUMBER OF DAYS PER WEEK YOU HAVE A DRINK CONTAINING ALCOHOL: 0
TOTAL SCORE: 0
TOTAL SCORE: 0
HOW MANY TIMES IN THE PAST YEAR HAVE YOU HAD 5 OR MORE DRINKS IN A DAY: 0
DOES PATIENT WANT TO STOP DRINKING: NO
TOTAL SCORE: 0
EVER HAD A DRINK FIRST THING IN THE MORNING TO STEADY YOUR NERVES TO GET RID OF A HANGOVER: NO
ON A TYPICAL DAY WHEN YOU DRINK ALCOHOL HOW MANY DRINKS DO YOU HAVE: 0
EVER FELT BAD OR GUILTY ABOUT YOUR DRINKING: NO
CONSUMPTION TOTAL: NEGATIVE
HAVE YOU EVER FELT YOU SHOULD CUT DOWN ON YOUR DRINKING: NO
ALCOHOL_USE: NO

## 2021-05-29 ASSESSMENT — PATIENT HEALTH QUESTIONNAIRE - PHQ9
SUM OF ALL RESPONSES TO PHQ9 QUESTIONS 1 AND 2: 0
1. LITTLE INTEREST OR PLEASURE IN DOING THINGS: NOT AT ALL
2. FEELING DOWN, DEPRESSED, IRRITABLE, OR HOPELESS: NOT AT ALL

## 2021-05-29 ASSESSMENT — FIBROSIS 4 INDEX
FIB4 SCORE: .4364357804719847625
FIB4 SCORE: 0.52

## 2021-05-29 NOTE — ED NOTES
PT semi reclined in bed, awake, speaking without signs of distress. States no nausea after PO ice chips trial. Denies other needs.

## 2021-05-29 NOTE — ED NOTES
Med rec completed per Pt at bedside.  Allergies reviewed with Pt. No known drug allergies.  Pt denies taking any prescription medications. No vitamins/supplements. No recent over-the-counter medications.  No oral antibiotics in last 14 days.  Pt's preferred pharmacy: [facility] on [address]

## 2021-05-29 NOTE — ED NOTES
Darrius Valdivia Pt lying in bed, appears to be sleeping with regular respirations and without signs of distress. Oxygen placed again 2Lpm/NC with room air SPO2 89%.

## 2021-05-29 NOTE — ED NOTES
Pt states he had hx of this type pain and N/V but not for quite a number of months. Denies ETOH consumption. States nothing makes better. States nothing makes worse. Speaking without signs of distress.

## 2021-05-29 NOTE — ED TRIAGE NOTES
"Gilbert Diop  26 y.o.  Chief Complaint   Patient presents with   • N/V     X3 days   • Abdominal Pain     \"right above belly button\"     Patient to triage with above complaint.  Pt emesis X1 in triage room, clear brownish.  Pt reports using Mariajuana daily until 5 days ago.      Vitals:    05/29/21 0934   BP: 129/78   Pulse: (!) 150   Resp: (!) 24   Temp: 37.2 °C (98.9 °F)   SpO2: 97%     Triage process explained to patient, apologized for wait time, and returned to lobby.  Pt informed to notify staff of any change in condition. NAD at this time.    "

## 2021-05-29 NOTE — ED NOTES
Pt states continued abdominal pain to area of belly button. Semi reclined in bed, awake, speaking without signs of distress. Physician notified. No new orders received.

## 2021-05-29 NOTE — ED NOTES
Pt lying in bed, awake, speaking without signs of distress. States continued nausea at this time.

## 2021-05-29 NOTE — ED PROVIDER NOTES
"ED Provider  Scribed for Chuck Dougherty D.O. by Lorena Aguilar. 5/29/2021  10:39 AM    Means of arrival: Walk in   History obtained from: Patient   History limited by: None    CHIEF COMPLAINT  Chief Complaint   Patient presents with    N/V     X3 days    Abdominal Pain     \"right above belly button\"       HPI  Gilbert Diop is a 26 y.o. male who presents to the ED with upper abdominal pain onset 3 days ago. The patient localizes his pain to \"above my belly button\" and states that it is a 10 out of 10. He reports associated vomiting, left flank pain, sore throat, chills, and sweats. He states that he is vomiting secondary to his abdominal pain and that his throat is sore secondary to his vomiting. He says that he has had pain like this before, however states that it is worse now. Patient denies diarrhea or fever. He also denies drinking alcohol but states that he uses marijuana occasionally. He states that he has not had any abdominal surgeries.     REVIEW OF SYSTEMS  See HPI for further details. All other systems are negative.     PAST MEDICAL HISTORY   has a past medical history of Abdominal migraine, Abdominal migraine, Colitis, and Gastritis.    SOCIAL HISTORY  Social History     Tobacco Use    Smoking status: Never Smoker    Smokeless tobacco: Never Used   Vaping Use    Vaping Use: Some days    Substances: THC   Substance and Sexual Activity    Alcohol use: No    Drug use: Yes     Types: Inhaled, Marijuana     Comment: THC daily    Sexual activity: Never       SURGICAL HISTORY   has a past surgical history that includes gastroscopy (9/3/2014); gastroscopy-endo (11/15/2014); and other.    CURRENT MEDICATIONS  Home Medications       Reviewed by Corin Hdz (Pharmacy Tech) on 05/29/21 at 1414  Med List Status: Complete     Medication Last Dose Status   dicyclomine (BENTYL) 10 MG Cap NOT TAKING Active   hydrOXYzine HCl (ATARAX) 50 MG Tab NOT TAKING Active   Naloxone (NARCAN) 4 MG/0.1ML Liquid " "NOT TAKING Active   ondansetron (ZOFRAN ODT) 4 MG TABLET DISPERSIBLE NOT TAKING Active   ondansetron (ZOFRAN ODT) 4 MG TABLET DISPERSIBLE NOT TAKING Active                    ALLERGIES  Allergies   Allergen Reactions    Tylenol Anaphylaxis and Nausea     Nausea, \"it just doesn't sit right\"  5/29/2021 patient states that his throat swells shut       PHYSICAL EXAM  VITAL SIGNS: /78   Pulse (!) 150   Temp 37.2 °C (98.9 °F) (Temporal)   Resp (!) 24   Ht 1.626 m (5' 4\")   Wt 59 kg (130 lb)   SpO2 97%   BMI 22.31 kg/m²   Constitutional: Alert, actively vomiting, no obvious blood.  HENT: No signs of trauma, mucous membranes are moist  Eyes: Conjunctiva normal, Non-icteric.   Neck: Normal range of motion, No tenderness, Supple.  Lymphatic: No lymphadenopathy noted.   Cardiovascular: Regular rate and rhythm, no murmurs.   Thorax & Lungs: Tachypneic, Normal breath sounds, No respiratory distress, No wheezing, No chest tenderness.   Abdomen: Diffuse tenderness, left flank tenderness, Bowel sounds normal, Soft, No masses, No pulsatile masses. No peritoneal signs.  Skin: Warm, Dry, normal color.   Back: No bony tenderness, No CVA tenderness.   Extremities: No edema, No tenderness, No cyanosis  Musculoskeletal: Good range of motion in all major joints. No tenderness to palpation or major deformities noted.   Neurologic: Alert and oriented x4, Normal motor function, Normal sensory function, No focal deficits noted.   Psychiatric: Affect normal, Judgment normal, Mood normal.     DIAGNOSTIC STUDIES / PROCEDURES    EKG  12 Lead EKG interpreted by me shown below.      LABS  Results for orders placed or performed during the hospital encounter of 05/29/21   CBC WITH DIFFERENTIAL   Result Value Ref Range    WBC 16.0 (H) 4.8 - 10.8 K/uL    RBC 6.07 4.70 - 6.10 M/uL    Hemoglobin 17.8 14.0 - 18.0 g/dL    Hematocrit 47.9 42.0 - 52.0 %    MCV 78.9 (L) 81.4 - 97.8 fL    MCH 29.3 27.0 - 33.0 pg    MCHC 37.2 (H) 33.7 - 35.3 g/dL    " RDW 32.9 (L) 35.9 - 50.0 fL    Platelet Count 380 164 - 446 K/uL    MPV 11.7 9.0 - 12.9 fL    Neutrophils-Polys 78.70 (H) 44.00 - 72.00 %    Lymphocytes 10.20 (L) 22.00 - 41.00 %    Monocytes 10.30 0.00 - 13.40 %    Eosinophils 0.00 0.00 - 6.90 %    Basophils 0.20 0.00 - 1.80 %    Immature Granulocytes 0.60 0.00 - 0.90 %    Nucleated RBC 0.00 /100 WBC    Neutrophils (Absolute) 12.58 (H) 1.82 - 7.42 K/uL    Lymphs (Absolute) 1.63 1.00 - 4.80 K/uL    Monos (Absolute) 1.64 (H) 0.00 - 0.85 K/uL    Eos (Absolute) 0.00 0.00 - 0.51 K/uL    Baso (Absolute) 0.03 0.00 - 0.12 K/uL    Immature Granulocytes (abs) 0.09 0.00 - 0.11 K/uL    NRBC (Absolute) 0.00 K/uL   COMP METABOLIC PANEL   Result Value Ref Range    Sodium 138 135 - 145 mmol/L    Potassium 3.1 (L) 3.6 - 5.5 mmol/L    Chloride 89 (L) 96 - 112 mmol/L    Co2 27 20 - 33 mmol/L    Anion Gap 22.0 (H) 7.0 - 16.0    Glucose 164 (H) 65 - 99 mg/dL    Bun 24 (H) 8 - 22 mg/dL    Creatinine 1.17 0.50 - 1.40 mg/dL    Calcium 10.6 (H) 8.5 - 10.5 mg/dL    AST(SGOT) 34 12 - 45 U/L    ALT(SGPT) 17 2 - 50 U/L    Alkaline Phosphatase 77 30 - 99 U/L    Total Bilirubin 1.1 0.1 - 1.5 mg/dL    Albumin 5.3 (H) 3.2 - 4.9 g/dL    Total Protein 9.1 (H) 6.0 - 8.2 g/dL    Globulin 3.8 (H) 1.9 - 3.5 g/dL    A-G Ratio 1.4 g/dL   LIPASE   Result Value Ref Range    Lipase 17 11 - 82 U/L   ESTIMATED GFR   Result Value Ref Range    GFR If African American >60 >60 mL/min/1.73 m 2    GFR If Non African American >60 >60 mL/min/1.73 m 2   Basic Metabolic Panel   Result Value Ref Range    Sodium 139 135 - 145 mmol/L    Potassium 2.9 (L) 3.6 - 5.5 mmol/L    Chloride 98 96 - 112 mmol/L    Co2 31 20 - 33 mmol/L    Glucose 134 (H) 65 - 99 mg/dL    Bun 21 8 - 22 mg/dL    Creatinine 0.94 0.50 - 1.40 mg/dL    Calcium 8.1 (L) 8.5 - 10.5 mg/dL    Anion Gap 10.0 7.0 - 16.0   ESTIMATED GFR   Result Value Ref Range    GFR If African American >60 >60 mL/min/1.73 m 2    GFR If Non African American >60 >60 mL/min/1.73 m  2   SARS-CoV-2 PCR (24 hour In-House): Collect NP swab in VTM    Specimen: Respirate   Result Value Ref Range    SARS-CoV-2 Source NP Swab    EKG (NOW)   Result Value Ref Range    Report       [facility] Emergency Dept[facility]    Test Date:  2021  Pt Name:    GABRIEL CEBALLOS     Department: ER  MRN:        [id number]                      Room:  Gender:     Male                         Technician: [id number]  :        1995                   Requested By:ER TRIAGE PROTOCOL  Order #:    [id number]                    Reading MD: NAOMI MORALES D.O.    Measurements  Intervals                                Axis  Rate:       120                          P:  OK:                                      QRS:        79  QRSD:       81                           T:          45  QT:         360  QTc:        510    Interpretive Statements  Sinus rhythm Prolonged QT interval  Compared to ECG 2020 22:11:43  Sinus rhythm no longer present  Inferior Q waves no longer present  Q waves no longer present  ST (T wave) deviation no longer present  Electronically Signed On 2021 14:50:43 PDT by NAOMI MORALES D.O.        All labs reviewed by me.    RADIOLOGY  CT-ABDOMEN-PELVIS WITH   Final Result      Probable wall thickening of the distal esophagus which can be seen in the setting of esophagitis.        The radiologist's interpretations of all radiological studies have been reviewed by me.    Films have been independently by me      COURSE  Pertinent Labs & Imaging studies reviewed. (See chart for details)    Review of past medical records shows the patient has a history of cyclical vomiting syndrome secondary to cannabis use.     10:39 AM - Patient seen and examined at bedside. Discussed plan of care. The patient will be medicated with Toradol 30 mg, GI Cocktail 30 ml, and Benadryl 25 mg. Ordered for CT-Abdomen Pelvis, CBC with differential, CMP, Lipase, and EKG to evaluate his symptoms.      11:14 AM - Per nursing staff, the patient says that he has a sensitivity to Compazine. He will be treated with Haldol 5 mg and Zofran 12 mg.     11:33 PM - Patient will be treated with 2000 ml NS infusion.     12:23 Pm - Patient will be treated with Haldol 2.5 mg.     2:43 PM - Patient will be treated with KCl 10 mEq.    2:53 PM - Patient was reevaluated at bedside. His vomiting has stopped, however he is nauseas and his pain is unchanged. Repeat labs show a potassium of 2.9. Discussed need for admission with the patient and he verbalizes understanding and agreement to this plan of care.     3:04 PM - I discussed the patient's case and the above findings with Dr. Pena (R) who agrees to admit the patient for hospitalization.       MEDICAL DECISION MAKING  This is a 26 y.o. male who presents with generalized abdominal pain nausea vomiting for 3 days.  He has a history of cyclical vomiting.  He states he stopped smoking marijuana 5 days ago but still smokes occasionally.  I do not suspect that he is quit smoking marijuana.    He was medicated for his nausea and this did improve.  Still continue to complain of pain.  He was medicated with Toradol, and with GI cocktail.  His lab test showed dehydration.  His potassium was low beginning at 3.1.  IV fluid of 2 L was given repeat potassium is 2.9.  The patient will be admitted for further evaluation and treatment.      DISPOSITION:  Patient will be hospitalized by Dr. Pena in guarded condition.     FINAL IMPRESSION  1. Non-intractable vomiting with nausea, unspecified vomiting type    2. Acute generalized abdominal pain    3. Dehydration    4. Hypokalemia         Lorena HUBER), am scribing for, and in the presence of, Chuck Dougherty D.O..    Electronically signed by: Lorena Aguilar (Anabella), 5/29/2021    Chuck HUBER D.O. personally performed the services described in this documentation, as scribed by Lorena Aguilar in my presence,  and it is both accurate and complete. C    The note accurately reflects work and decisions made by me.  Chuck Dougherty D.O.  5/29/2021  5:40 PM

## 2021-05-29 NOTE — ED NOTES
Pt awake, lying in bed, continued nausea and small amount of vomiting. Speaking without signs of distress.

## 2021-05-30 ENCOUNTER — PATIENT OUTREACH (OUTPATIENT)
Dept: HEALTH INFORMATION MANAGEMENT | Facility: OTHER | Age: 26
End: 2021-05-30

## 2021-05-30 VITALS
RESPIRATION RATE: 18 BRPM | OXYGEN SATURATION: 95 % | HEIGHT: 64 IN | SYSTOLIC BLOOD PRESSURE: 114 MMHG | WEIGHT: 137.57 LBS | BODY MASS INDEX: 23.49 KG/M2 | DIASTOLIC BLOOD PRESSURE: 65 MMHG | HEART RATE: 75 BPM | TEMPERATURE: 97.5 F

## 2021-05-30 LAB
ALBUMIN SERPL BCP-MCNC: 3.7 G/DL (ref 3.2–4.9)
ALBUMIN/GLOB SERPL: 1.5 G/DL
ALP SERPL-CCNC: 54 U/L (ref 30–99)
ALT SERPL-CCNC: 18 U/L (ref 2–50)
ANION GAP SERPL CALC-SCNC: 8 MMOL/L (ref 7–16)
AST SERPL-CCNC: 53 U/L (ref 12–45)
BASOPHILS # BLD AUTO: 0.2 % (ref 0–1.8)
BASOPHILS # BLD: 0.02 K/UL (ref 0–0.12)
BILIRUB SERPL-MCNC: 0.9 MG/DL (ref 0.1–1.5)
BUN SERPL-MCNC: 14 MG/DL (ref 8–22)
CALCIUM SERPL-MCNC: 8.5 MG/DL (ref 8.5–10.5)
CHLORIDE SERPL-SCNC: 100 MMOL/L (ref 96–112)
CO2 SERPL-SCNC: 27 MMOL/L (ref 20–33)
CREAT SERPL-MCNC: 0.76 MG/DL (ref 0.5–1.4)
EOSINOPHIL # BLD AUTO: 0.01 K/UL (ref 0–0.51)
EOSINOPHIL NFR BLD: 0.1 % (ref 0–6.9)
ERYTHROCYTE [DISTWIDTH] IN BLOOD BY AUTOMATED COUNT: 35.8 FL (ref 35.9–50)
GLOBULIN SER CALC-MCNC: 2.5 G/DL (ref 1.9–3.5)
GLUCOSE SERPL-MCNC: 109 MG/DL (ref 65–99)
HCT VFR BLD AUTO: 37.4 % (ref 42–52)
HGB BLD-MCNC: 12.9 G/DL (ref 14–18)
IMM GRANULOCYTES # BLD AUTO: 0.05 K/UL (ref 0–0.11)
IMM GRANULOCYTES NFR BLD AUTO: 0.5 % (ref 0–0.9)
LYMPHOCYTES # BLD AUTO: 2.05 K/UL (ref 1–4.8)
LYMPHOCYTES NFR BLD: 18.9 % (ref 22–41)
MAGNESIUM SERPL-MCNC: 2.4 MG/DL (ref 1.5–2.5)
MCH RBC QN AUTO: 28.9 PG (ref 27–33)
MCHC RBC AUTO-ENTMCNC: 34.5 G/DL (ref 33.7–35.3)
MCV RBC AUTO: 83.9 FL (ref 81.4–97.8)
MONOCYTES # BLD AUTO: 1.22 K/UL (ref 0–0.85)
MONOCYTES NFR BLD AUTO: 11.2 % (ref 0–13.4)
NEUTROPHILS # BLD AUTO: 7.52 K/UL (ref 1.82–7.42)
NEUTROPHILS NFR BLD: 69.1 % (ref 44–72)
NRBC # BLD AUTO: 0 K/UL
NRBC BLD-RTO: 0 /100 WBC
PLATELET # BLD AUTO: 225 K/UL (ref 164–446)
PMV BLD AUTO: 11.9 FL (ref 9–12.9)
POTASSIUM SERPL-SCNC: 3.6 MMOL/L (ref 3.6–5.5)
PROT SERPL-MCNC: 6.2 G/DL (ref 6–8.2)
RBC # BLD AUTO: 4.46 M/UL (ref 4.7–6.1)
SARS-COV-2 RNA RESP QL NAA+PROBE: NOTDETECTED
SODIUM SERPL-SCNC: 135 MMOL/L (ref 135–145)
SPECIMEN SOURCE: NORMAL
WBC # BLD AUTO: 10.9 K/UL (ref 4.8–10.8)

## 2021-05-30 PROCEDURE — 83735 ASSAY OF MAGNESIUM: CPT

## 2021-05-30 PROCEDURE — G0378 HOSPITAL OBSERVATION PER HR: HCPCS

## 2021-05-30 PROCEDURE — C9113 INJ PANTOPRAZOLE SODIUM, VIA: HCPCS | Performed by: STUDENT IN AN ORGANIZED HEALTH CARE EDUCATION/TRAINING PROGRAM

## 2021-05-30 PROCEDURE — 85025 COMPLETE CBC W/AUTO DIFF WBC: CPT

## 2021-05-30 PROCEDURE — 96376 TX/PRO/DX INJ SAME DRUG ADON: CPT

## 2021-05-30 PROCEDURE — 96366 THER/PROPH/DIAG IV INF ADDON: CPT

## 2021-05-30 PROCEDURE — 700111 HCHG RX REV CODE 636 W/ 250 OVERRIDE (IP): Performed by: STUDENT IN AN ORGANIZED HEALTH CARE EDUCATION/TRAINING PROGRAM

## 2021-05-30 PROCEDURE — 80053 COMPREHEN METABOLIC PANEL: CPT

## 2021-05-30 PROCEDURE — 99217 PR OBSERVATION CARE DISCHARGE: CPT | Performed by: NURSE PRACTITIONER

## 2021-05-30 PROCEDURE — 700101 HCHG RX REV CODE 250

## 2021-05-30 RX ADMIN — MORPHINE SULFATE 2 MG: 4 INJECTION INTRAVENOUS at 03:50

## 2021-05-30 RX ADMIN — POTASSIUM CHLORIDE AND SODIUM CHLORIDE: 900; 300 INJECTION, SOLUTION INTRAVENOUS at 02:24

## 2021-05-30 RX ADMIN — MORPHINE SULFATE 2 MG: 4 INJECTION INTRAVENOUS at 08:59

## 2021-05-30 RX ADMIN — PROCHLORPERAZINE EDISYLATE 10 MG: 5 INJECTION INTRAMUSCULAR; INTRAVENOUS at 03:49

## 2021-05-30 RX ADMIN — PANTOPRAZOLE SODIUM 40 MG: 40 INJECTION, POWDER, LYOPHILIZED, FOR SOLUTION INTRAVENOUS at 05:03

## 2021-05-30 ASSESSMENT — PAIN DESCRIPTION - PAIN TYPE
TYPE: ACUTE PAIN

## 2021-05-30 NOTE — CARE PLAN
The patient is Stable - Low risk of patient condition declining or worsening         Progress made toward(s) clinical / shift goals:    Problem: Knowledge Deficit - Standard  Goal: Patient and family/care givers will demonstrate understanding of plan of care, disease process/condition, diagnostic tests and medications  5/30/2021 0831 by Magalis Smiley R.N.  Outcome: Progressing  5/30/2021 0831 by Magalis Smiley R.N.  Outcome: Progressing     Problem: Pain - Standard  Goal: Alleviation of pain or a reduction in pain to the patient’s comfort goal  5/30/2021 0831 by Magalis Smiley R.N.  Outcome: Progressing  5/30/2021 0831 by Magalis Smiley R.N.  Outcome: Progressing       Patient is not progressing towards the following goals:

## 2021-05-30 NOTE — PROGRESS NOTES
27 y/o M with suspected cyclical vomiting syndrome presenting with N/V and abdominal pain.  Initial workup negative except for hypokalemia and leucocytosis.  Lipase normal.  Continues to use marijuana, counseled.  Pain control.  IV fluids.  K replacement.  Hopefully home in AM if labs and symptoms stabilize.  OBS admit.  Pls see resident note for full details.

## 2021-05-30 NOTE — PROGRESS NOTES
2 RN Skin Check    2 RN skin check complete.   Devices in place: N/A.  Skin assessed under devices: N\A.  Confirmed pressure ulcers found on: N/A  New potential pressure ulcers noted on N/A. Wound consult placed N/A.  The following interventions in place Pillows.

## 2021-05-30 NOTE — ED NOTES
PT awake, lying in bed, speaking without signs of distress. States nausea is improved. Denies needs at this time.

## 2021-05-30 NOTE — DISCHARGE INSTRUCTIONS
FOLLOW UP ITEMS POST DISCHARGE  Please call [phone] to schedule PCP appointment for patient.    Required specialty appointments include:       Discharge Instructions per SIDNEY Reilly  -Follow up with PCP  -STOP using marijuana for now    DIET: as tolerated    ACTIVITY: as tolerated    DIAGNOSIS: cyclic vomiting    Return to ER if symptoms persists, chest pain, palpitations, shortness of breath, numbness, tingling, weakness, and HIGH fever      Cyclic Vomiting Syndrome, Adult  Cyclic vomiting syndrome (CVS) is a condition that causes episodes of severe nausea and vomiting. It can last for hours or even days. Attacks may occur several times a month or several times a year. Between episodes of CVS, you may be otherwise healthy. CVS is also called abdominal migraine.  What are the causes?  The cause of this condition is not known. Although many of the episodes can happen for no obvious reason, you may have specific CVS triggers. Episodes may be triggered by:  · An infection, especially colds and the flu.  · Emotional stress, including excitement or anxiety about upcoming events, such as school, parties, or travel.  · Certain foods or beverages, such as chocolate, cheese, alcohol, and food additives.  · Motion sickness.  · Eating a large meal before bed.  · Being very tired.  · Being too hot.  What increases the risk?  You are more likely to develop this condition if:  · You get migraine headaches.  · You have a family history of CVS or migraine headaches.  What are the signs or symptoms?  Symptoms tend to happen at the same time of day, and each episode tends to last about the same amount of time. Symptoms commonly start at night or when you wake up. Many people have warning signs (prodrome) before an episode, which may include slight nausea, sweating, and pale skin (pallor).  The most common symptoms of a CVS attack include:  · Severe vomiting. Vomiting may happen every 5-15  minutes.  · Severe nausea.  · Gagging (retching).  Other symptoms may include:  · Headache.  · Dizziness.  · Sensitivity to light.  · Extreme thirst.  · Abdominal pain. This can be severe.  · Loose stools or diarrhea.  · Fever.  · Pale skin (pallor), especially on the face.  · Weakness.  · Exhaustion.  · Sleepiness after a CVS episode.  · Dehydration. This can cause:  ? Thirst.  ? Dry mouth.  ? Decreased urination.  ? Fatigue.  How is this diagnosed?  This condition may be diagnosed based on your symptoms, medical history, and family history of CVS or migraine. Your health care provider will ask whether you have had:  · Episodes of severe nausea and vomiting that have happened a total of 5 or more times, or 3 or more times in the past 6 months.  · Episodes that last for 1 hour or more, and occur 1 week apart or farther apart.  · Episodes that are similar each time.  · Normal health between episodes.  Your health care provider will also do a physical exam. To rule out other conditions, you may have tests, such as:  · Blood tests.  · Urine tests.  · Imaging tests.  How is this treated?  There is no cure for this condition, but treatment can help manage or prevent CVS episodes. Work with your health care provider to find the best treatment for you. Treatment may include:  · Avoiding stress and CVS triggers.  · Eating smaller, more frequent meals.  · Taking medicines, such as:  ? Over-the-counter pain medicine.  ? Anti-nausea medicines.  ? Antacids.  ? Antihistamines.  ? Medicines for migraines.  ? Antidepressants.  ? Antibiotics.  Severe nausea and vomiting may require you to stay at the hospital. You may need IV fluids to prevent or treat dehydration.  Follow these instructions at home:  During an episode  · Take over-the-counter and prescription medicines only as told by your health care provider.  · Stay in bed and rest in a dark, quiet room.  After an episode    · Drink an oral rehydration solution (ORS), if  directed by your health care provider. This is a drink that helps you replace fluids and the salts and minerals in your blood (electrolytes). It can be found at pharmacies and retail stores.  · Drink small amounts of clear fluids slowly and gradually add more.  ? Drink clear fluids such as water or fruit juice that has water added (is diluted). You may also eat low-calorie popsicles.  ? Avoid drinking fluids that contain a lot of sugar or caffeine, such as sports drinks and soda.  · Eat soft foods in small amounts every 3-4 hours. Eat your regular diet, but avoid spicy or fatty foods, such as french fries and pizza.  General instructions  · Monitor your condition for any changes.  · If you were prescribed an antibiotic medicine, take it as told by your health care provider. Do not stop taking the antibiotic even if you start to feel better.  · Keep track of your attacks and symptoms, and pay attention to any triggers. Avoid those triggers when you can.  · Keep all follow-up visits as told by your health care provider. This is important.  Contact a health care provider if:  · Your condition gets worse.  · You cannot drink fluids without vomiting.  · You have pain and trouble swallowing after an episode.  Get help right away if:  · You have blood in your vomit.  · Your vomit looks like coffee grounds.  · You have stools that are bloody or black, or stools that look like tar.  · You have signs of dehydration, such as:  ? Sunken eyes.  ? Not making tears while crying.  ? Very dry mouth.  ? Cracked lips.  ? Decreased urine production.  ? Dark urine. Urine may be the color of tea.  ? Weakness.  ? Sleepiness.  Summary  · Cyclic vomiting syndrome (CVS) causes episodes of severe nausea and vomiting that can last for hours or even days.  · Vomiting and diarrhea can make you feel weak and can lead to dehydration. If you notice signs of dehydration, call your health care provider right away.  · Treatment can help you manage or  prevent CVS episodes. Work with your health care provider to find the best treatment for you.  · Keep all follow-up visits as told by your health care provider. This is important.  This information is not intended to replace advice given to you by your health care provider. Make sure you discuss any questions you have with your health care provider.  Document Released: 02/02/2018 Document Revised: 04/08/2020 Document Reviewed: 02/02/2018  [employer] Patient Education © 2020 [employer]    Discharge Instructions    Discharged to home by car with relative. Discharged via walking, hospital escort: Yes.  Special equipment needed: Not Applicable    Be sure to schedule a follow-up appointment with your primary care doctor or any specialists as instructed.     Discharge Plan:   Diet Plan: Discussed  Activity Level: Discussed  Confirmed Follow up Appointment: Patient to Call and Schedule Appointment  Confirmed Symptoms Management: Discussed  Medication Reconciliation Updated: Yes    I understand that a diet low in cholesterol, fat, and sodium is recommended for good health. Unless I have been given specific instructions below for another diet, I accept this instruction as my diet prescription.   Other diet: heart healthy     Special Instructions: None    · Is patient discharged on Warfarin / Coumadin?   No     Depression / Suicide Risk    As you are discharged from this [facility] facility, it is important to learn how to keep safe from harming yourself.    Recognize the warning signs:  · Abrupt changes in personality, positive or negative- including increase in energy   · Giving away possessions  · Change in eating patterns- significant weight changes-  positive or negative  · Change in sleeping patterns- unable to sleep or sleeping all the time   · Unwillingness or inability to communicate  · Depression  · Unusual sadness, discouragement and loneliness  · Talk of wanting to die  · Neglect of personal  appearance   · Rebelliousness- reckless behavior  · Withdrawal from people/activities they love  · Confusion- inability to concentrate     If you or a loved one observes any of these behaviors or has concerns about self-harm, here's what you can do:  · Talk about it- your feelings and reasons for harming yourself  · Remove any means that you might use to hurt yourself (examples: pills, rope, extension cords, firearm)  · Get professional help from the community (Mental Health, Substance Abuse, psychological counseling)  · Do not be alone:Call your Safe Contact- someone whom you trust who will be there for you.  · Call your local CRISIS HOTLINE 600-4161 or 148-765-8008  · Call your local Children's Mobile Crisis Response Team [place] (430) 520-3181 or www.SureSpeak  · Call the toll free National Suicide Prevention Hotlines   · National Suicide Prevention Lifeline 500-651-DFTW (7173)  · National Hope Line Network 800-SUICIDE (264-1567)

## 2021-05-30 NOTE — ASSESSMENT & PLAN NOTE
Longstanding history of cyclic vomiting  Likely secondary to Marijuana Use  Does not improve with warm showers  Symptomatic care with Compazine, Fluids, pain medication  Marijuana cessation counseling provided  Clear liquid diet for now

## 2021-05-30 NOTE — ASSESSMENT & PLAN NOTE
[place] to nausea and vomiting  Replace with IV KCl in NS  Received 10mEQ KCl in ED  Additional 40EQ KCL IV  Check CMP in AM  Telemetry monitoring  Replace with oral K-DUR in AM if patient can tolerate

## 2021-05-30 NOTE — H&P
History & Physical Note    Date of Admission: 5/29/2021  Admission Status: Observation-Outpatient  [facility] Team: [facility]  Yellow Team  Attending: Rolando Strong M.D.   Senior Resident: Dr. Gramajo  Contact Number: [phone]    Chief Complaint: Nausea, vomiting, and abdominal pain    History of Present Illness (HPI):   Gilbert is a 26 y.o. male with a past medical history significant for marijuana use and cyclic vomiting syndrome who presented 5/29/2021 with several days of worsening abdominal pain, nausea, and vomiting.  Patient reports that his abdominal pain causes him to vomit.  He reports that the pain is severe at 9 out of 10 and nonradiating.  Denies any back pain.  Denies any fever or chills.  Patient reports that he uses marijuana approximately 4 times per month and states that the last time he used marijuana was 3 days ago.  Patient reports that he does not use alcohol and denies any IV drug use.    In the ED, the patient had vital signs as follows: Temperature 37.2, pulse 150, respiratory rate 24, blood pressure 129/78, O2 sat 97% on room air. Potassium was low at 2.9. Lipase was normal at 17. The patient received Toradol 30mg, GI cocktail, benadryl, 2.5mg of Haldol, and Kcl 10mEQ.    Review of Systems:   Review of Systems   Constitutional: Negative for chills and fever.   HENT: Negative for hearing loss and tinnitus.    Eyes: Negative for blurred vision and double vision.   Respiratory: Negative for cough, hemoptysis and shortness of breath.    Cardiovascular: Negative for chest pain and palpitations.   Gastrointestinal: Positive for abdominal pain, heartburn, nausea and vomiting. Negative for diarrhea.   Genitourinary: Negative for dysuria, frequency and urgency.   Skin: Negative for itching and rash.   Neurological: Positive for weakness. Negative for dizziness and seizures.   Endo/Heme/Allergies: Negative for environmental allergies. Does not bruise/bleed easily.   Psychiatric/Behavioral:  "Negative for depression and suicidal ideas.     Past Medical History:   Past Medical History was reviewed with patient.   has a past medical history of Abdominal migraine, Abdominal migraine, Colitis, and Gastritis.    Past Surgical History: Past Surgical History was reviewed with patient.   has a past surgical history that includes gastroscopy (9/3/2014); gastroscopy-endo (11/15/2014); and other.    Medications: Medications have been reviewed with patient.  Prior to Admission Medications   Prescriptions Last Dose Informant Patient Reported? Taking?   Naloxone (NARCAN) 4 MG/0.1ML Liquid NOT TAKING at NOT TAKING Patient No No   Sig: Spray 4 mg in nose Once PRN (acute respiratory failure from narcotic overdose) for up to 1 dose.   Patient not taking: Reported on 5/29/2021   dicyclomine (BENTYL) 10 MG Cap NOT TAKING at NOT TAKING Patient No No   Sig: Take 1 Cap by mouth 4 Times a Day,Before Meals and at Bedtime.   Patient not taking: Reported on 5/29/2021   hydrOXYzine HCl (ATARAX) 50 MG Tab NOT TAKING at NOT TAKING Patient No No   Sig: Take 1 Tab by mouth every 8 hours as needed for Anxiety.   Patient not taking: Reported on 5/29/2021   ondansetron (ZOFRAN ODT) 4 MG TABLET DISPERSIBLE NOT TAKING at NOT TAKING Patient No No   Sig: Take 1 Tab by mouth every 8 hours as needed.   Patient not taking: Reported on 5/29/2021   ondansetron (ZOFRAN ODT) 4 MG TABLET DISPERSIBLE NOT TAKING at NOT TAKING Patient No No   Sig: Take 1 Tab by mouth every 6 hours as needed for Nausea.   Patient not taking: Reported on 5/29/2021      Facility-Administered Medications: None        Allergies: Allergies have been reviewed with patient.  Allergies   Allergen Reactions   • Tylenol Anaphylaxis and Nausea     Nausea, \"it just doesn't sit right\"  5/29/2021 patient states that his throat swells shut       Family History: Denies family history of vomiting syndrome. Denies family history of diabetes      Social History:   Tobacco: Denies, but " admits to Marijuana smoking 4 times per week  Alcohol: Denies  Recreational drugs (illegal and prescription):  Marijuana, Denies IV drug use  Living situation:  Lives at home in [place]  Recent travel:  Denies  Primary Care Provider: reviewed Pcp Pt States None  Other (stressors, spirituality, exposures): None  Physical Exam:   Vitals:  Temp:  [37.2 °C (98.9 °F)-37.3 °C (99.1 °F)] 37.3 °C (99.1 °F)  Pulse:  [] 94  Resp:  [14-24] 16  BP: (109-151)/(63-90) 120/90  SpO2:  [86 %-99 %] 97 %    Physical Exam  Constitutional:       General: He is not in acute distress.     Appearance: He is ill-appearing and diaphoretic. He is not toxic-appearing.   HENT:      Head: Normocephalic and atraumatic.      Mouth/Throat:      Mouth: Mucous membranes are dry.      Pharynx: No oropharyngeal exudate or posterior oropharyngeal erythema.   Eyes:      Pupils: Pupils are equal, round, and reactive to light.   Cardiovascular:      Rate and Rhythm: Normal rate and regular rhythm.      Heart sounds: Normal heart sounds. No murmur heard.   No friction rub. No gallop.    Pulmonary:      Effort: Pulmonary effort is normal. No respiratory distress.      Breath sounds: Normal breath sounds. No stridor. No wheezing, rhonchi or rales.   Chest:      Chest wall: No tenderness.   Abdominal:      General: Abdomen is flat. Bowel sounds are normal. There is no distension.      Palpations: Abdomen is soft.      Tenderness: There is abdominal tenderness. There is no right CVA tenderness, left CVA tenderness, guarding or rebound.      Hernia: No hernia is present.   Musculoskeletal:         General: No swelling or tenderness.      Cervical back: No rigidity or tenderness.   Skin:     Coloration: Skin is not jaundiced or pale.   Neurological:      General: No focal deficit present.      Mental Status: He is alert and oriented to person, place, and time. Mental status is at baseline.      Cranial Nerves: No cranial nerve deficit.      Sensory: No sensory  deficit.     Labs:   Potassium 2.9    Imaging:   CT abdomen pelvis with esophagitis  No new Assessment & Plan notes have been filed under this hospital service since the last note was generated.  Service: Hospital Medicine    Previous Data Review: reviewed    Problem Representation:     * Cyclic vomiting syndrome- (present on admission)  Assessment & Plan  Longstanding history of cyclic vomiting  Likely secondary to Marijuana Use  Does not improve with warm showers  Symptomatic care with Compazine, Fluids, pain medication  Marijuana cessation counseling provided  Clear liquid diet for now    Hypokalemia- (present on admission)  Assessment & Plan  [place] to nausea and vomiting  Replace with IV KCl in NS  Received 10mEQ KCl in ED  Additional 40EQ KCL IV  Check CMP in AM  Telemetry monitoring  Replace with oral K-DUR in AM if patient can tolerate    GERD (gastroesophageal reflux disease)- (present on admission)  Assessment & Plan  With esophagitis on CT  Start IV PPI BID  GI cocktail PRN    Dehydration- (present on admission)  Assessment & Plan  Due to nausea and vomiting  Continue IV fluids  Encourage oral hydration when patient can tolerate    Ken Gramajo D.O. PGY-3

## 2021-05-30 NOTE — CARE PLAN
The patient is Stable - Low risk of patient condition declining or worsening         Progress made toward(s) clinical / shift goals:    Problem: Knowledge Deficit - Standard  Goal: Patient and family/care givers will demonstrate understanding of plan of care, disease process/condition, diagnostic tests and medications  Outcome: Progressing     Problem: Pain - Standard  Goal: Alleviation of pain or a reduction in pain to the patient’s comfort goal  Outcome: Progressing       Patient is not progressing towards the following goals:

## 2021-05-30 NOTE — CARE PLAN
The patient is Stable - Low risk of patient condition declining or worsening         Progress made toward(s) clinical / shift goals:  administer PRN medications as directed.    Patient is not progressing towards the following goals:      Problem: Knowledge Deficit - Standard  Goal: Patient and family/care givers will demonstrate understanding of plan of care, disease process/condition, diagnostic tests and medications  Outcome: Progressing     Problem: Pain - Standard  Goal: Alleviation of pain or a reduction in pain to the patient’s comfort goal  Outcome: Progressing

## 2021-11-09 ENCOUNTER — HOSPITAL ENCOUNTER (INPATIENT)
Facility: MEDICAL CENTER | Age: 26
LOS: 2 days | DRG: 897 | End: 2021-11-11
Attending: EMERGENCY MEDICINE | Admitting: HOSPITALIST

## 2021-11-09 ENCOUNTER — APPOINTMENT (OUTPATIENT)
Dept: RADIOLOGY | Facility: MEDICAL CENTER | Age: 26
DRG: 897 | End: 2021-11-09
Attending: EMERGENCY MEDICINE

## 2021-11-09 DIAGNOSIS — R10.9 ACUTE ABDOMINAL PAIN: ICD-10-CM

## 2021-11-09 DIAGNOSIS — E11.9 DIABETES MELLITUS, NEW ONSET (HCC): ICD-10-CM

## 2021-11-09 DIAGNOSIS — R11.2 INTRACTABLE VOMITING WITH NAUSEA, UNSPECIFIED VOMITING TYPE: ICD-10-CM

## 2021-11-09 DIAGNOSIS — R73.9 HYPERGLYCEMIA: ICD-10-CM

## 2021-11-09 DIAGNOSIS — R94.31 QT PROLONGATION: ICD-10-CM

## 2021-11-09 DIAGNOSIS — E86.0 DEHYDRATION: ICD-10-CM

## 2021-11-09 DIAGNOSIS — N17.9 AKI (ACUTE KIDNEY INJURY) (HCC): ICD-10-CM

## 2021-11-09 DIAGNOSIS — E87.20 LACTIC ACIDOSIS: ICD-10-CM

## 2021-11-09 PROBLEM — D72.829 LEUKOCYTOSIS: Status: ACTIVE | Noted: 2021-11-09

## 2021-11-09 PROBLEM — F19.10 POLYSUBSTANCE ABUSE (HCC): Status: ACTIVE | Noted: 2021-11-09

## 2021-11-09 PROBLEM — R65.10 SIRS (SYSTEMIC INFLAMMATORY RESPONSE SYNDROME) (HCC): Status: ACTIVE | Noted: 2021-11-09

## 2021-11-09 LAB
ALBUMIN SERPL BCP-MCNC: 5.6 G/DL (ref 3.2–4.9)
ALBUMIN/GLOB SERPL: 1.6 G/DL
ALP SERPL-CCNC: 71 U/L (ref 30–99)
ALT SERPL-CCNC: 13 U/L (ref 2–50)
AMPHET UR QL SCN: NEGATIVE
ANION GAP SERPL CALC-SCNC: 17 MMOL/L (ref 7–16)
ANION GAP SERPL CALC-SCNC: 21 MMOL/L (ref 7–16)
ANION GAP SERPL CALC-SCNC: 23 MMOL/L (ref 7–16)
APPEARANCE UR: CLEAR
AST SERPL-CCNC: 23 U/L (ref 12–45)
B-OH-BUTYR SERPL-MCNC: 0.56 MMOL/L (ref 0.02–0.27)
BACTERIA #/AREA URNS HPF: NEGATIVE /HPF
BARBITURATES UR QL SCN: NEGATIVE
BASOPHILS # BLD AUTO: 0.1 % (ref 0–1.8)
BASOPHILS # BLD: 0.02 K/UL (ref 0–0.12)
BENZODIAZ UR QL SCN: NEGATIVE
BILIRUB SERPL-MCNC: 0.7 MG/DL (ref 0.1–1.5)
BILIRUB UR QL STRIP.AUTO: NEGATIVE
BUN SERPL-MCNC: 26 MG/DL (ref 8–22)
BUN SERPL-MCNC: 32 MG/DL (ref 8–22)
BUN SERPL-MCNC: 39 MG/DL (ref 8–22)
BZE UR QL SCN: NEGATIVE
CALCIUM SERPL-MCNC: 10.1 MG/DL (ref 8.5–10.5)
CALCIUM SERPL-MCNC: 10.2 MG/DL (ref 8.5–10.5)
CALCIUM SERPL-MCNC: 9.3 MG/DL (ref 8.5–10.5)
CANNABINOIDS UR QL SCN: POSITIVE
CHLORIDE SERPL-SCNC: 84 MMOL/L (ref 96–112)
CHLORIDE SERPL-SCNC: 87 MMOL/L (ref 96–112)
CHLORIDE SERPL-SCNC: 93 MMOL/L (ref 96–112)
CO2 SERPL-SCNC: 25 MMOL/L (ref 20–33)
CO2 SERPL-SCNC: 25 MMOL/L (ref 20–33)
CO2 SERPL-SCNC: 27 MMOL/L (ref 20–33)
COLOR UR: YELLOW
CREAT SERPL-MCNC: 0.96 MG/DL (ref 0.5–1.4)
CREAT SERPL-MCNC: 1.19 MG/DL (ref 0.5–1.4)
CREAT SERPL-MCNC: 1.7 MG/DL (ref 0.5–1.4)
EKG IMPRESSION: NORMAL
EOSINOPHIL # BLD AUTO: 0 K/UL (ref 0–0.51)
EOSINOPHIL NFR BLD: 0 % (ref 0–6.9)
EPI CELLS #/AREA URNS HPF: NEGATIVE /HPF
ERYTHROCYTE [DISTWIDTH] IN BLOOD BY AUTOMATED COUNT: 35.4 FL (ref 35.9–50)
EST. AVERAGE GLUCOSE BLD GHB EST-MCNC: 100 MG/DL
ETHANOL BLD-MCNC: <10.1 MG/DL (ref 0–10)
GLOBULIN SER CALC-MCNC: 3.6 G/DL (ref 1.9–3.5)
GLUCOSE SERPL-MCNC: 122 MG/DL (ref 65–99)
GLUCOSE SERPL-MCNC: 144 MG/DL (ref 65–99)
GLUCOSE SERPL-MCNC: 277 MG/DL (ref 65–99)
GLUCOSE UR STRIP.AUTO-MCNC: NEGATIVE MG/DL
HBA1C MFR BLD: 5.1 % (ref 4–5.6)
HCT VFR BLD AUTO: 47.9 % (ref 42–52)
HGB BLD-MCNC: 17.4 G/DL (ref 14–18)
HYALINE CASTS #/AREA URNS LPF: ABNORMAL /LPF
IMM GRANULOCYTES # BLD AUTO: 0.09 K/UL (ref 0–0.11)
IMM GRANULOCYTES NFR BLD AUTO: 0.6 % (ref 0–0.9)
KETONES UR STRIP.AUTO-MCNC: ABNORMAL MG/DL
LACTATE BLD-SCNC: 1 MMOL/L (ref 0.5–2)
LACTATE BLD-SCNC: 1.9 MMOL/L (ref 0.5–2)
LACTATE BLD-SCNC: 2.9 MMOL/L (ref 0.5–2)
LACTATE BLD-SCNC: 3.6 MMOL/L (ref 0.5–2)
LEUKOCYTE ESTERASE UR QL STRIP.AUTO: NEGATIVE
LIPASE SERPL-CCNC: 16 U/L (ref 11–82)
LIPASE SERPL-CCNC: 16 U/L (ref 11–82)
LYMPHOCYTES # BLD AUTO: 0.54 K/UL (ref 1–4.8)
LYMPHOCYTES NFR BLD: 3.4 % (ref 22–41)
MAGNESIUM SERPL-MCNC: 2.1 MG/DL (ref 1.5–2.5)
MCH RBC QN AUTO: 29.1 PG (ref 27–33)
MCHC RBC AUTO-ENTMCNC: 36.3 G/DL (ref 33.7–35.3)
MCV RBC AUTO: 80.2 FL (ref 81.4–97.8)
METHADONE UR QL SCN: NEGATIVE
MICRO URNS: ABNORMAL
MONOCYTES # BLD AUTO: 1.3 K/UL (ref 0–0.85)
MONOCYTES NFR BLD AUTO: 8.3 % (ref 0–13.4)
NEUTROPHILS # BLD AUTO: 13.78 K/UL (ref 1.82–7.42)
NEUTROPHILS NFR BLD: 87.6 % (ref 44–72)
NITRITE UR QL STRIP.AUTO: NEGATIVE
NRBC # BLD AUTO: 0 K/UL
NRBC BLD-RTO: 0 /100 WBC
OPIATES UR QL SCN: POSITIVE
OSMOLALITY SERPL: 288 MOSM/KG H2O (ref 278–298)
OSMOLALITY UR: 839 MOSM/KG H2O (ref 300–900)
OXYCODONE UR QL SCN: POSITIVE
PCP UR QL SCN: NEGATIVE
PH UR STRIP.AUTO: 7.5 [PH] (ref 5–8)
PHOSPHATE SERPL-MCNC: 2.9 MG/DL (ref 2.5–4.5)
PLATELET # BLD AUTO: 363 K/UL (ref 164–446)
PMV BLD AUTO: 11.4 FL (ref 9–12.9)
POTASSIUM SERPL-SCNC: 3 MMOL/L (ref 3.6–5.5)
POTASSIUM SERPL-SCNC: 3.1 MMOL/L (ref 3.6–5.5)
POTASSIUM SERPL-SCNC: 3.3 MMOL/L (ref 3.6–5.5)
PROPOXYPH UR QL SCN: NEGATIVE
PROT SERPL-MCNC: 9.2 G/DL (ref 6–8.2)
PROT UR QL STRIP: 30 MG/DL
RBC # BLD AUTO: 5.97 M/UL (ref 4.7–6.1)
RBC # URNS HPF: ABNORMAL /HPF
RBC UR QL AUTO: NEGATIVE
SALICYLATES SERPL-MCNC: <1 MG/DL (ref 15–25)
SODIUM SERPL-SCNC: 133 MMOL/L (ref 135–145)
SODIUM SERPL-SCNC: 134 MMOL/L (ref 135–145)
SODIUM SERPL-SCNC: 135 MMOL/L (ref 135–145)
SP GR UR STRIP.AUTO: >=1.045
UROBILINOGEN UR STRIP.AUTO-MCNC: 0.2 MG/DL
WBC # BLD AUTO: 15.7 K/UL (ref 4.8–10.8)
WBC #/AREA URNS HPF: ABNORMAL /HPF

## 2021-11-09 PROCEDURE — 83930 ASSAY OF BLOOD OSMOLALITY: CPT

## 2021-11-09 PROCEDURE — 700117 HCHG RX CONTRAST REV CODE 255: Performed by: EMERGENCY MEDICINE

## 2021-11-09 PROCEDURE — 74177 CT ABD & PELVIS W/CONTRAST: CPT

## 2021-11-09 PROCEDURE — 83516 IMMUNOASSAY NONANTIBODY: CPT

## 2021-11-09 PROCEDURE — 80307 DRUG TEST PRSMV CHEM ANLYZR: CPT

## 2021-11-09 PROCEDURE — 96365 THER/PROPH/DIAG IV INF INIT: CPT

## 2021-11-09 PROCEDURE — 83735 ASSAY OF MAGNESIUM: CPT

## 2021-11-09 PROCEDURE — 700111 HCHG RX REV CODE 636 W/ 250 OVERRIDE (IP)

## 2021-11-09 PROCEDURE — 700111 HCHG RX REV CODE 636 W/ 250 OVERRIDE (IP): Performed by: STUDENT IN AN ORGANIZED HEALTH CARE EDUCATION/TRAINING PROGRAM

## 2021-11-09 PROCEDURE — 96368 THER/DIAG CONCURRENT INF: CPT

## 2021-11-09 PROCEDURE — 700105 HCHG RX REV CODE 258: Performed by: STUDENT IN AN ORGANIZED HEALTH CARE EDUCATION/TRAINING PROGRAM

## 2021-11-09 PROCEDURE — 96367 TX/PROPH/DG ADDL SEQ IV INF: CPT

## 2021-11-09 PROCEDURE — 83036 HEMOGLOBIN GLYCOSYLATED A1C: CPT

## 2021-11-09 PROCEDURE — 700102 HCHG RX REV CODE 250 W/ 637 OVERRIDE(OP): Performed by: STUDENT IN AN ORGANIZED HEALTH CARE EDUCATION/TRAINING PROGRAM

## 2021-11-09 PROCEDURE — 80048 BASIC METABOLIC PNL TOTAL CA: CPT

## 2021-11-09 PROCEDURE — A9270 NON-COVERED ITEM OR SERVICE: HCPCS | Performed by: STUDENT IN AN ORGANIZED HEALTH CARE EDUCATION/TRAINING PROGRAM

## 2021-11-09 PROCEDURE — 81001 URINALYSIS AUTO W/SCOPE: CPT

## 2021-11-09 PROCEDURE — 96376 TX/PRO/DX INJ SAME DRUG ADON: CPT

## 2021-11-09 PROCEDURE — 80179 DRUG ASSAY SALICYLATE: CPT

## 2021-11-09 PROCEDURE — 36415 COLL VENOUS BLD VENIPUNCTURE: CPT

## 2021-11-09 PROCEDURE — 770020 HCHG ROOM/CARE - TELE (206)

## 2021-11-09 PROCEDURE — 700105 HCHG RX REV CODE 258: Performed by: EMERGENCY MEDICINE

## 2021-11-09 PROCEDURE — 83605 ASSAY OF LACTIC ACID: CPT | Mod: 91

## 2021-11-09 PROCEDURE — 80053 COMPREHEN METABOLIC PANEL: CPT

## 2021-11-09 PROCEDURE — 83935 ASSAY OF URINE OSMOLALITY: CPT

## 2021-11-09 PROCEDURE — 84681 ASSAY OF C-PEPTIDE: CPT

## 2021-11-09 PROCEDURE — 96375 TX/PRO/DX INJ NEW DRUG ADDON: CPT

## 2021-11-09 PROCEDURE — 84100 ASSAY OF PHOSPHORUS: CPT

## 2021-11-09 PROCEDURE — 82077 ASSAY SPEC XCP UR&BREATH IA: CPT

## 2021-11-09 PROCEDURE — C9113 INJ PANTOPRAZOLE SODIUM, VIA: HCPCS | Performed by: STUDENT IN AN ORGANIZED HEALTH CARE EDUCATION/TRAINING PROGRAM

## 2021-11-09 PROCEDURE — 93005 ELECTROCARDIOGRAM TRACING: CPT | Performed by: EMERGENCY MEDICINE

## 2021-11-09 PROCEDURE — 85025 COMPLETE CBC W/AUTO DIFF WBC: CPT

## 2021-11-09 PROCEDURE — 87040 BLOOD CULTURE FOR BACTERIA: CPT

## 2021-11-09 PROCEDURE — 83690 ASSAY OF LIPASE: CPT

## 2021-11-09 PROCEDURE — 96366 THER/PROPH/DIAG IV INF ADDON: CPT

## 2021-11-09 PROCEDURE — 700111 HCHG RX REV CODE 636 W/ 250 OVERRIDE (IP): Performed by: EMERGENCY MEDICINE

## 2021-11-09 PROCEDURE — 700101 HCHG RX REV CODE 250: Performed by: EMERGENCY MEDICINE

## 2021-11-09 PROCEDURE — 82010 KETONE BODYS QUAN: CPT

## 2021-11-09 PROCEDURE — 99285 EMERGENCY DEPT VISIT HI MDM: CPT

## 2021-11-09 PROCEDURE — 99223 1ST HOSP IP/OBS HIGH 75: CPT | Mod: GC | Performed by: HOSPITALIST

## 2021-11-09 RX ORDER — DIPHENHYDRAMINE HYDROCHLORIDE 50 MG/ML
25 INJECTION INTRAMUSCULAR; INTRAVENOUS ONCE
Status: COMPLETED | OUTPATIENT
Start: 2021-11-09 | End: 2021-11-09

## 2021-11-09 RX ORDER — POTASSIUM CHLORIDE 7.45 MG/ML
10 INJECTION INTRAVENOUS
Status: DISCONTINUED | OUTPATIENT
Start: 2021-11-09 | End: 2021-11-09

## 2021-11-09 RX ORDER — DEXTROSE MONOHYDRATE, SODIUM CHLORIDE, AND POTASSIUM CHLORIDE 50; 1.49; 4.5 G/1000ML; G/1000ML; G/1000ML
INJECTION, SOLUTION INTRAVENOUS CONTINUOUS
Status: DISCONTINUED | OUTPATIENT
Start: 2021-11-09 | End: 2021-11-09

## 2021-11-09 RX ORDER — SODIUM CHLORIDE, SODIUM LACTATE, POTASSIUM CHLORIDE, CALCIUM CHLORIDE 600; 310; 30; 20 MG/100ML; MG/100ML; MG/100ML; MG/100ML
1000 INJECTION, SOLUTION INTRAVENOUS ONCE
Status: COMPLETED | OUTPATIENT
Start: 2021-11-09 | End: 2021-11-09

## 2021-11-09 RX ORDER — PANTOPRAZOLE SODIUM 40 MG/10ML
40 INJECTION, POWDER, LYOPHILIZED, FOR SOLUTION INTRAVENOUS DAILY
Status: DISCONTINUED | OUTPATIENT
Start: 2021-11-10 | End: 2021-11-11 | Stop reason: HOSPADM

## 2021-11-09 RX ORDER — SODIUM CHLORIDE 9 MG/ML
1000 INJECTION, SOLUTION INTRAVENOUS ONCE
Status: COMPLETED | OUTPATIENT
Start: 2021-11-09 | End: 2021-11-09

## 2021-11-09 RX ORDER — POTASSIUM CHLORIDE 7.45 MG/ML
10 INJECTION INTRAVENOUS
Status: DISPENSED | OUTPATIENT
Start: 2021-11-09 | End: 2021-11-09

## 2021-11-09 RX ORDER — SODIUM CHLORIDE 9 MG/ML
INJECTION, SOLUTION INTRAVENOUS CONTINUOUS
Status: DISCONTINUED | OUTPATIENT
Start: 2021-11-09 | End: 2021-11-09

## 2021-11-09 RX ORDER — POTASSIUM CHLORIDE 7.45 MG/ML
10 INJECTION INTRAVENOUS ONCE
Status: COMPLETED | OUTPATIENT
Start: 2021-11-09 | End: 2021-11-09

## 2021-11-09 RX ORDER — METOCLOPRAMIDE HYDROCHLORIDE 5 MG/ML
10 INJECTION INTRAMUSCULAR; INTRAVENOUS ONCE
Status: COMPLETED | OUTPATIENT
Start: 2021-11-09 | End: 2021-11-09

## 2021-11-09 RX ORDER — AMOXICILLIN 250 MG
2 CAPSULE ORAL 2 TIMES DAILY
Status: DISCONTINUED | OUTPATIENT
Start: 2021-11-09 | End: 2021-11-11 | Stop reason: HOSPADM

## 2021-11-09 RX ORDER — PANTOPRAZOLE SODIUM 40 MG/10ML
40 INJECTION, POWDER, LYOPHILIZED, FOR SOLUTION INTRAVENOUS ONCE
Status: COMPLETED | OUTPATIENT
Start: 2021-11-09 | End: 2021-11-09

## 2021-11-09 RX ORDER — POLYETHYLENE GLYCOL 3350 17 G/17G
1 POWDER, FOR SOLUTION ORAL
Status: DISCONTINUED | OUTPATIENT
Start: 2021-11-09 | End: 2021-11-11 | Stop reason: HOSPADM

## 2021-11-09 RX ORDER — POTASSIUM CHLORIDE 20 MEQ/1
40 TABLET, EXTENDED RELEASE ORAL ONCE
Status: COMPLETED | OUTPATIENT
Start: 2021-11-09 | End: 2021-11-09

## 2021-11-09 RX ORDER — ONDANSETRON 4 MG/1
4 TABLET, ORALLY DISINTEGRATING ORAL ONCE
Status: COMPLETED | OUTPATIENT
Start: 2021-11-09 | End: 2021-11-09

## 2021-11-09 RX ORDER — SODIUM CHLORIDE 9 MG/ML
1000 INJECTION, SOLUTION INTRAVENOUS ONCE
Status: ACTIVE | OUTPATIENT
Start: 2021-11-09 | End: 2021-11-10

## 2021-11-09 RX ORDER — HYDROMORPHONE HYDROCHLORIDE 1 MG/ML
0.5 INJECTION, SOLUTION INTRAMUSCULAR; INTRAVENOUS; SUBCUTANEOUS EVERY 4 HOURS PRN
Status: DISCONTINUED | OUTPATIENT
Start: 2021-11-09 | End: 2021-11-10

## 2021-11-09 RX ORDER — HYDROMORPHONE HYDROCHLORIDE 1 MG/ML
0.5 INJECTION, SOLUTION INTRAMUSCULAR; INTRAVENOUS; SUBCUTANEOUS ONCE
Status: COMPLETED | OUTPATIENT
Start: 2021-11-09 | End: 2021-11-09

## 2021-11-09 RX ORDER — CEFTRIAXONE 1 G/1
1 INJECTION, POWDER, FOR SOLUTION INTRAMUSCULAR; INTRAVENOUS ONCE
Status: COMPLETED | OUTPATIENT
Start: 2021-11-09 | End: 2021-11-09

## 2021-11-09 RX ORDER — BISACODYL 10 MG
10 SUPPOSITORY, RECTAL RECTAL
Status: DISCONTINUED | OUTPATIENT
Start: 2021-11-09 | End: 2021-11-11 | Stop reason: HOSPADM

## 2021-11-09 RX ADMIN — POTASSIUM CHLORIDE 10 MEQ: 7.46 INJECTION, SOLUTION INTRAVENOUS at 21:31

## 2021-11-09 RX ADMIN — THIAMINE HYDROCHLORIDE 100 MG: 100 INJECTION, SOLUTION INTRAMUSCULAR; INTRAVENOUS at 22:02

## 2021-11-09 RX ADMIN — SODIUM CHLORIDE, POTASSIUM CHLORIDE, SODIUM LACTATE AND CALCIUM CHLORIDE 1000 ML: 600; 310; 30; 20 INJECTION, SOLUTION INTRAVENOUS at 13:33

## 2021-11-09 RX ADMIN — HYDROMORPHONE HYDROCHLORIDE 0.5 MG: 1 INJECTION, SOLUTION INTRAMUSCULAR; INTRAVENOUS; SUBCUTANEOUS at 18:42

## 2021-11-09 RX ADMIN — METRONIDAZOLE 500 MG: 500 INJECTION, SOLUTION INTRAVENOUS at 14:28

## 2021-11-09 RX ADMIN — POTASSIUM CHLORIDE 40 MEQ: 1500 TABLET, EXTENDED RELEASE ORAL at 21:31

## 2021-11-09 RX ADMIN — PANTOPRAZOLE SODIUM 40 MG: 40 INJECTION, POWDER, LYOPHILIZED, FOR SOLUTION INTRAVENOUS at 17:24

## 2021-11-09 RX ADMIN — IOHEXOL 100 ML: 350 INJECTION, SOLUTION INTRAVENOUS at 13:49

## 2021-11-09 RX ADMIN — HYDROMORPHONE HYDROCHLORIDE 0.5 MG: 1 INJECTION, SOLUTION INTRAMUSCULAR; INTRAVENOUS; SUBCUTANEOUS at 15:32

## 2021-11-09 RX ADMIN — SODIUM CHLORIDE 1000 ML: 9 INJECTION, SOLUTION INTRAVENOUS at 16:11

## 2021-11-09 RX ADMIN — POTASSIUM CHLORIDE 10 MEQ: 7.46 INJECTION, SOLUTION INTRAVENOUS at 20:18

## 2021-11-09 RX ADMIN — SENNOSIDES-DOCUSATE SODIUM TAB 8.6-50 MG 2 TABLET: 8.6-5 TAB at 18:36

## 2021-11-09 RX ADMIN — DIPHENHYDRAMINE HYDROCHLORIDE 25 MG: 50 INJECTION INTRAMUSCULAR; INTRAVENOUS at 13:32

## 2021-11-09 RX ADMIN — SODIUM CHLORIDE 1000 ML: 9 INJECTION, SOLUTION INTRAVENOUS at 20:18

## 2021-11-09 RX ADMIN — CEFTRIAXONE SODIUM 1 G: 1 INJECTION, POWDER, FOR SOLUTION INTRAMUSCULAR; INTRAVENOUS at 14:27

## 2021-11-09 RX ADMIN — POTASSIUM CHLORIDE 10 MEQ: 7.46 INJECTION, SOLUTION INTRAVENOUS at 18:36

## 2021-11-09 RX ADMIN — POTASSIUM CHLORIDE 10 MEQ: 7.46 INJECTION, SOLUTION INTRAVENOUS at 17:28

## 2021-11-09 RX ADMIN — METOCLOPRAMIDE 10 MG: 5 INJECTION, SOLUTION INTRAMUSCULAR; INTRAVENOUS at 13:33

## 2021-11-09 RX ADMIN — HYDROMORPHONE HYDROCHLORIDE 0.5 MG: 1 INJECTION, SOLUTION INTRAMUSCULAR; INTRAVENOUS; SUBCUTANEOUS at 22:44

## 2021-11-09 RX ADMIN — FAMOTIDINE 20 MG: 10 INJECTION INTRAVENOUS at 14:28

## 2021-11-09 RX ADMIN — ONDANSETRON 4 MG: 4 TABLET, ORALLY DISINTEGRATING ORAL at 10:57

## 2021-11-09 ASSESSMENT — LIFESTYLE VARIABLES
SUBSTANCE_ABUSE: 1
HAVE PEOPLE ANNOYED YOU BY CRITICIZING YOUR DRINKING: NO
EVER HAD A DRINK FIRST THING IN THE MORNING TO STEADY YOUR NERVES TO GET RID OF A HANGOVER: NO
HAVE YOU EVER FELT YOU SHOULD CUT DOWN ON YOUR DRINKING: NO
HOW MANY TIMES IN THE PAST YEAR HAVE YOU HAD 5 OR MORE DRINKS IN A DAY: 0
TOTAL SCORE: 0
TOTAL SCORE: 0
CONSUMPTION TOTAL: NEGATIVE
EVER FELT BAD OR GUILTY ABOUT YOUR DRINKING: NO
AVERAGE NUMBER OF DAYS PER WEEK YOU HAVE A DRINK CONTAINING ALCOHOL: 0
ON A TYPICAL DAY WHEN YOU DRINK ALCOHOL HOW MANY DRINKS DO YOU HAVE: 0
ALCOHOL_USE: NO
DOES PATIENT WANT TO STOP DRINKING: NO
TOTAL SCORE: 0

## 2021-11-09 ASSESSMENT — ENCOUNTER SYMPTOMS
SPUTUM PRODUCTION: 0
BLURRED VISION: 0
TREMORS: 0
CHILLS: 0
SORE THROAT: 0
FEVER: 1
DIAPHORESIS: 0
TINGLING: 0
DIZZINESS: 0
NERVOUS/ANXIOUS: 1
BACK PAIN: 1
HEADACHES: 0
NAUSEA: 1
FALLS: 0
FOCAL WEAKNESS: 0
COUGH: 0
ABDOMINAL PAIN: 1
WHEEZING: 0
EYE REDNESS: 0
HEARTBURN: 0
SHORTNESS OF BREATH: 0
DOUBLE VISION: 0
LOSS OF CONSCIOUSNESS: 0
PALPITATIONS: 0
WEIGHT LOSS: 0
FLANK PAIN: 0
DIARRHEA: 1
VOMITING: 1

## 2021-11-09 ASSESSMENT — FIBROSIS 4 INDEX
FIB4 SCORE: 0.42
FIB4 SCORE: 1.33

## 2021-11-09 ASSESSMENT — COGNITIVE AND FUNCTIONAL STATUS - GENERAL
SUGGESTED CMS G CODE MODIFIER MOBILITY: CH
MOBILITY SCORE: 24
DAILY ACTIVITIY SCORE: 24
SUGGESTED CMS G CODE MODIFIER DAILY ACTIVITY: CH

## 2021-11-09 ASSESSMENT — PATIENT HEALTH QUESTIONNAIRE - PHQ9
7. TROUBLE CONCENTRATING ON THINGS, SUCH AS READING THE NEWSPAPER OR WATCHING TELEVISION: NOT AT ALL
9. THOUGHTS THAT YOU WOULD BE BETTER OFF DEAD, OR OF HURTING YOURSELF: SEVERAL DAYS
SUM OF ALL RESPONSES TO PHQ QUESTIONS 1-9: 6
3. TROUBLE FALLING OR STAYING ASLEEP OR SLEEPING TOO MUCH: SEVERAL DAYS
2. FEELING DOWN, DEPRESSED, IRRITABLE, OR HOPELESS: SEVERAL DAYS
4. FEELING TIRED OR HAVING LITTLE ENERGY: SEVERAL DAYS
SUM OF ALL RESPONSES TO PHQ9 QUESTIONS 1 AND 2: 2
6. FEELING BAD ABOUT YOURSELF - OR THAT YOU ARE A FAILURE OR HAVE LET YOURSELF OR YOUR FAMILY DOWN: NOT AL ALL
5. POOR APPETITE OR OVEREATING: SEVERAL DAYS
8. MOVING OR SPEAKING SO SLOWLY THAT OTHER PEOPLE COULD HAVE NOTICED. OR THE OPPOSITE, BEING SO FIGETY OR RESTLESS THAT YOU HAVE BEEN MOVING AROUND A LOT MORE THAN USUAL: NOT AT ALL
1. LITTLE INTEREST OR PLEASURE IN DOING THINGS: SEVERAL DAYS

## 2021-11-09 ASSESSMENT — PAIN DESCRIPTION - PAIN TYPE: TYPE: ACUTE PAIN

## 2021-11-09 NOTE — ED TRIAGE NOTES
Comes in with abd pain, nausea and vomiting for 2 days.  No noted temp fevers.  Pain with urination noted.

## 2021-11-09 NOTE — ED PROVIDER NOTES
ED Provider Note    CHIEF COMPLAINT  Chief Complaint   Patient presents with   • N/V   • Abdominal Pain       HPI    Primary care provider: None on file  Means of arrival: POV/walk-in  History obtained from: Patient  History limited by: Nothing    Gilbert Diop is a 26 y.o. male who presents with abdominal pain and vomiting.  Onset 2 days ago.  Constant worsening.  Describes sharp periumbilical nonradiating abdominal pain constant and worsening over the last 2 days.  Associated with numerous episodes of nausea and vomiting.  No prior episodes like this.  No sick contacts at home.  Patient tells me that he has nothing like this in the past but he does have on records review a history of abdominal migraines.  Again he has not tried anything to feel better.  Nothing seems to make symptoms worse other than anytime he tries to eat or drink anything he vomits.  Occasionally has streaks of blood in his vomit but no michelle hematemesis.  Denies any hematochezia.  No melena.  Some mild pain with urinating over the last day but no penile discharge or new sexual partners.  Denies any falls or injuries or trauma.  Not vaccinated against COVID-19.    REVIEW OF SYSTEMS  Constitutional: Negative for fever or chills.  Positive for malaise.  HENT: Negative for rhinorrhea or sore throat.    Respiratory: Negative for cough or shortness of breath.    Cardiovascular: Negative for chest pain or syncope.   Gastrointestinal: Positive for nausea vomiting periumbilical abdominal pain.  Genitourinary: Negative for dysuria or flank pain.   Musculoskeletal: Negative for back pain or joint pain.  Positive for leg spasms intermittently.  Skin: Negative for itching or rash.   Neurological: Negative for sensory or motor changes.   Endo/Heme/Allergies: Negative for weight changes or hives.   Psychiatric/Behavioral: Negative for depression or suicidal ideas.   See HPI for further details. All other systems are negative.     PAST MEDICAL  "HISTORY   has a past medical history of Abdominal migraine, Abdominal migraine, Colitis, and Gastritis.    PAST FAMILY HISTORY  No family history on file.    SOCIAL HISTORY  Social History     Tobacco Use   • Smoking status: Never Smoker   • Smokeless tobacco: Never Used   Vaping Use   • Vaping Use: Some days   • Substances: THC   Substance and Sexual Activity   • Alcohol use: No   • Drug use: Yes     Types: Inhaled, Marijuana     Comment: THC daily   • Sexual activity: Never       SURGICAL HISTORY   has a past surgical history that includes gastroscopy (9/3/2014); gastroscopy-endo (11/15/2014); and other.    CURRENT MEDICATIONS  No daily meds.    ALLERGIES  Allergies   Allergen Reactions   • Acetaminophen [Tylenol] Anaphylaxis and Nausea     Nausea, \"it just doesn't sit right\"  5/29/2021 patient states that his throat swells shut       PHYSICAL EXAM  VITAL SIGNS: /75   Pulse 95   Temp 36.7 °C (98.1 °F) (Oral)   Resp 20   Ht 1.727 m (5' 8\")   Wt 60.6 kg (133 lb 9.6 oz)   SpO2 97%   BMI 20.31 kg/m²    Pulse ox interpretation: On room air, I interpret this pulse ox as normal.  Constitutional: Well-developed, well-nourished, but sitting up in significant distress.   HEENT: Normocephalic, atraumatic. Posterior pharynx clear, mucous membranes very dry.  Eyes:  EOMI. Normal sclerae.  Neck: Supple, nontender.  Chest/Pulmonary: Clear to ausculation bilaterally, no wheezes or rhonchi.  Cardiovascular: Tachycardic rate, regular rhythm, no murmur.   Abdomen: Soft, diffuse tenderness; no rebound, guarding, or masses.  Back: No CVA or midline tenderness.   Musculoskeletal: No deformity or edema.  Neuro: Clear speech, normal coordination, cranial nerves II-XII grossly intact, no focal asymmetry or sensory deficits.   Psych: Normal mood and affect.  Skin: No rashes, warm and dry.      DIAGNOSTIC STUDIES / PROCEDURES    LABS & EKG  Results for orders placed or performed during the hospital encounter of 11/09/21   CBC " WITH DIFFERENTIAL   Result Value Ref Range    WBC 15.7 (H) 4.8 - 10.8 K/uL    RBC 5.97 4.70 - 6.10 M/uL    Hemoglobin 17.4 14.0 - 18.0 g/dL    Hematocrit 47.9 42.0 - 52.0 %    MCV 80.2 (L) 81.4 - 97.8 fL    MCH 29.1 27.0 - 33.0 pg    MCHC 36.3 (H) 33.7 - 35.3 g/dL    RDW 35.4 (L) 35.9 - 50.0 fL    Platelet Count 363 164 - 446 K/uL    MPV 11.4 9.0 - 12.9 fL    Neutrophils-Polys 87.60 (H) 44.00 - 72.00 %    Lymphocytes 3.40 (L) 22.00 - 41.00 %    Monocytes 8.30 0.00 - 13.40 %    Eosinophils 0.00 0.00 - 6.90 %    Basophils 0.10 0.00 - 1.80 %    Immature Granulocytes 0.60 0.00 - 0.90 %    Nucleated RBC 0.00 /100 WBC    Neutrophils (Absolute) 13.78 (H) 1.82 - 7.42 K/uL    Lymphs (Absolute) 0.54 (L) 1.00 - 4.80 K/uL    Monos (Absolute) 1.30 (H) 0.00 - 0.85 K/uL    Eos (Absolute) 0.00 0.00 - 0.51 K/uL    Baso (Absolute) 0.02 0.00 - 0.12 K/uL    Immature Granulocytes (abs) 0.09 0.00 - 0.11 K/uL    NRBC (Absolute) 0.00 K/uL   COMP METABOLIC PANEL   Result Value Ref Range    Sodium 134 (L) 135 - 145 mmol/L    Potassium 3.3 (L) 3.6 - 5.5 mmol/L    Chloride 84 (L) 96 - 112 mmol/L    Co2 27 20 - 33 mmol/L    Anion Gap 23.0 (H) 7.0 - 16.0    Glucose 277 (H) 65 - 99 mg/dL    Bun 39 (H) 8 - 22 mg/dL    Creatinine 1.70 (H) 0.50 - 1.40 mg/dL    Calcium 10.2 8.5 - 10.5 mg/dL    AST(SGOT) 23 12 - 45 U/L    ALT(SGPT) 13 2 - 50 U/L    Alkaline Phosphatase 71 30 - 99 U/L    Total Bilirubin 0.7 0.1 - 1.5 mg/dL    Albumin 5.6 (H) 3.2 - 4.9 g/dL    Total Protein 9.2 (H) 6.0 - 8.2 g/dL    Globulin 3.6 (H) 1.9 - 3.5 g/dL    A-G Ratio 1.6 g/dL   LIPASE   Result Value Ref Range    Lipase 16 11 - 82 U/L   URINALYSIS    Specimen: Urine   Result Value Ref Range    Color Yellow     Character Clear     Specific Gravity >=1.045 (A) <1.035    Ph 7.5 5.0 - 8.0    Glucose Negative Negative mg/dL    Ketones Trace (A) Negative mg/dL    Protein 30 (A) Negative mg/dL    Bilirubin Negative Negative    Urobilinogen, Urine 0.2 Negative    Nitrite Negative  Negative    Leukocyte Esterase Negative Negative    Occult Blood Negative Negative    Micro Urine Req Microscopic    ESTIMATED GFR   Result Value Ref Range    GFR If African American 60 >60 mL/min/1.73 m 2    GFR If Non African American 50 (A) >60 mL/min/1.73 m 2   LIPASE   Result Value Ref Range    Lipase 16 11 - 82 U/L   MAGNESIUM   Result Value Ref Range    Magnesium 2.1 1.5 - 2.5 mg/dL   LACTIC ACID   Result Value Ref Range    Lactic Acid 3.6 (H) 0.5 - 2.0 mmol/L   URINE DRUG SCREEN   Result Value Ref Range    Amphetamines Urine Negative Negative    Barbiturates Negative Negative    Benzodiazepines Negative Negative    Cocaine Metabolite Negative Negative    Methadone Negative Negative    Opiates Positive (A) Negative    Oxycodone Positive (A) Negative    Phencyclidine -Pcp Negative Negative    Propoxyphene Negative Negative    Cannabinoid Metab Positive (A) Negative   URINE MICROSCOPIC (W/UA)   Result Value Ref Range    WBC 0-2 (A) /hpf    RBC 0-2 (A) /hpf    Bacteria Negative None /hpf    Epithelial Cells Negative /hpf    Hyaline Cast 6-10 (A) /lpf   Basic Metabolic Panel   Result Value Ref Range    Sodium 133 (L) 135 - 145 mmol/L    Potassium 3.0 (L) 3.6 - 5.5 mmol/L    Chloride 87 (L) 96 - 112 mmol/L    Co2 25 20 - 33 mmol/L    Glucose 144 (H) 65 - 99 mg/dL    Bun 32 (H) 8 - 22 mg/dL    Creatinine 1.19 0.50 - 1.40 mg/dL    Calcium 10.1 8.5 - 10.5 mg/dL    Anion Gap 21.0 (H) 7.0 - 16.0   BETA-HYDROXYBUTYRIC ACID   Result Value Ref Range    beta-Hydroxybutyric Acid 0.56 (H) 0.02 - 0.27 mmol/L   PHOSPHORUS   Result Value Ref Range    Phosphorus 2.9 2.5 - 4.5 mg/dL   LACTIC ACID   Result Value Ref Range    Lactic Acid 2.9 (H) 0.5 - 2.0 mmol/L   DIAGNOSTIC ALCOHOL   Result Value Ref Range    Diagnostic Alcohol <10.1 0.0 - 10.0 mg/dL   ESTIMATED GFR   Result Value Ref Range    GFR If African American >60 >60 mL/min/1.73 m 2    GFR If Non African American >60 >60 mL/min/1.73 m 2   EKG (NOW)   Result Value Ref Range     Report       [facility]    Test Date:  2021  Pt Name:    GABRIEL CEBALLOS     Department: ER  MRN:        [id number]                      Room:       ORTHO  Gender:     Male                         Technician: [id number]  :        1995                   Requested By:EHSAN ROBERTS  Order #:    [id number]                    Reading MD: Ehsan Roberts MD    Measurements  Intervals                                Axis  Rate:       103                          P:          70  MI:         117                          QRS:        72  QRSD:       98                           T:          74  QT:         404  QTc:        529    Interpretive Statements  Sinus tachycardia  Ventricular premature complex  Biatrial enlargement  Prolonged QT interval  Compared to ECG 2021 09:36:52  Ventricular premature complex(es) now present  Atrial abnormality now present  Sinus rhythm no longer present  QT long  No STEMI  Electronically Signed On 2021 19:08:19 P ST by Ehsan Roberts MD           RADIOLOGY  CT-ABDOMEN-PELVIS WITH   Final Result      1.  No acute abnormalities are identified in the abdomen or pelvis.        COURSE & MEDICAL DECISION MAKING    This is a 26 y.o. male who presents with abdominal pain and vomiting for 2 days.    Differential Diagnosis includes but is not limited to:  Obstruction, appendicitis, dehydration, electrolyte abnormality, cyclic vomiting, pancreatitis    ED Course:  26-year-old male with above concerning presentation, diffusely tender and he is tachycardic.  White count elevated so I am worried he could be septic I will proceed immediately with advanced imaging.  N.p.o. until surgical process ruled out, looks very dehydrated I will give a crystalloid fluid bolus as well as Reglan and Benadryl.    Labs are concerning for an acute kidney injury with lactic acidosis, as well as a high anion gap metabolic acidosis, likely due to dehydration  from severe vomiting.  Thankfully nothing acute or surgical on CT scan, but given symptoms I will cover with enteric antibiotics and hospitalize the patient for further IV fluid rehydration.  New onset sugar of greater than 200.  Lactic acid level clearing with IV fluids.  [facility] internal medicine team was consulted and they will kindly admit the patient for further work-up and treatment.  LFTs and lipase reassuring doubt acute hepatobiliary disease.  Interestingly UDS does show cannabis in the urine possible cannabinoid hyperemesis syndrome.  Patient hemodynamically stable for admission in guarded condition.    Upon my evaluation, this patient had a high probability of imminent or life-threatening deterioration due to lactic acidosis with acute kidney injury.     I personally provided 32 minutes of total critical care time outside of time spent on separately billable/documented procedures. This required my direct attention, intervention, and management which included the following:  -review of laboratory data  -review of radiology studies  -discussion with consultant: hospitalist  -monitoring for potential decompensation  -Aggressive IV fluid rehydration    Medications   NS (BOLUS) infusion 1,000 mL (0 mL Intravenous Held 11/9/21 1345)   senna-docusate (PERICOLACE or SENOKOT S) 8.6-50 MG per tablet 2 Tablet (2 Tablets Oral Given 11/9/21 1836)     And   polyethylene glycol/lytes (MIRALAX) PACKET 1 Packet (has no administration in time range)     And   magnesium hydroxide (MILK OF MAGNESIA) suspension 30 mL (has no administration in time range)     And   bisacodyl (DULCOLAX) suppository 10 mg (has no administration in time range)   hyoscyamine-lidocaine-Maalox (GI Cocktail) oral susp cup 15 mL (15 mL Oral Refused 11/9/21 1645)   potassium chloride (KCL) ivpb 10 mEq (10 mEq Intravenous New Bag 11/9/21 1836)   cefTRIAXone (Rocephin) 1 g in  mL IVPB (has no administration in time range)   metroNIDAZOLE (FLAGYL) IVPB  500 mg (has no administration in time range)   pantoprazole (PROTONIX) injection 40 mg (has no administration in time range)   HYDROmorphone (Dilaudid) injection 0.5 mg (0.5 mg Intravenous Given 11/9/21 1842)   thiamine (B-1) IVPB 100 mg in 100 mL D5W (premix) (has no administration in time range)   NS (BOLUS) infusion 1,000 mL (has no administration in time range)   ondansetron (ZOFRAN ODT) dispertab 4 mg (4 mg Oral Given 11/9/21 1057)   lactated ringers (LR) bolus (0 mL Intravenous Stopped 11/9/21 1600)   metoclopramide (REGLAN) injection 10 mg (10 mg Intravenous Given 11/9/21 1333)   diphenhydrAMINE (BENADRYL) injection 25 mg (25 mg Intravenous Given 11/9/21 1332)   cefTRIAXone (Rocephin) injection 1 g (1 g Intravenous Given 11/9/21 1427)   metroNIDAZOLE (FLAGYL) IVPB 500 mg (0 mg Intravenous Stopped 11/9/21 1528)   iohexol (OMNIPAQUE) 350 mg/mL (100 mL Intravenous Given 11/9/21 1349)   famotidine (PEPCID) injection 20 mg (20 mg Intravenous Given 11/9/21 1428)   NS (BOLUS) infusion 1,000 mL (1,000 mL Intravenous New Bag 11/9/21 1611)   HYDROmorphone (Dilaudid) injection 0.5 mg (0.5 mg Intravenous Given 11/9/21 1532)   pantoprazole (PROTONIX) injection 40 mg (40 mg Intravenous Given 11/9/21 1724)       FINAL IMPRESSION  1. Acute abdominal pain    2. ERIN (acute kidney injury) (HCC)    3. Dehydration    4. Intractable vomiting with nausea, unspecified vomiting type    5. Lactic acidosis    6. Diabetes mellitus, new onset (HCC)    7. Hyperglycemia    8. QT prolongation        -ADMIT-      Pertinent Labs & Imaging studies reviewed and verified by myself, as well as nursing notes and the patient's past medical, family, and social histories (See chart for details).    Portions of this record were made with voice recognition software.  Despite my review, spelling/grammar/context errors may still remain.  Interpretation of this chart should be taken in this context.    Electronically signed by Ehsan Posey M.D. on  11/9/2021 at 7:10 PM.

## 2021-11-09 NOTE — ED NOTES
1300> Pt amb to room. IV start successful.   1330> ERP eval complete.  1440> CT complete. Fluid and abx infusing. Pt able to sit EOB to use urinal. Urine sample to lab.

## 2021-11-10 ENCOUNTER — APPOINTMENT (OUTPATIENT)
Dept: RADIOLOGY | Facility: MEDICAL CENTER | Age: 26
DRG: 897 | End: 2021-11-10
Attending: STUDENT IN AN ORGANIZED HEALTH CARE EDUCATION/TRAINING PROGRAM

## 2021-11-10 PROBLEM — M41.9 SCOLIOSIS: Status: ACTIVE | Noted: 2021-11-10

## 2021-11-10 PROBLEM — R73.9 HYPERGLYCEMIA: Status: ACTIVE | Noted: 2021-11-10

## 2021-11-10 LAB
ALBUMIN SERPL BCP-MCNC: 4.7 G/DL (ref 3.2–4.9)
ALBUMIN/GLOB SERPL: 1.9 G/DL
ALP SERPL-CCNC: 56 U/L (ref 30–99)
ALT SERPL-CCNC: 9 U/L (ref 2–50)
ANION GAP SERPL CALC-SCNC: 11 MMOL/L (ref 7–16)
AST SERPL-CCNC: 19 U/L (ref 12–45)
BASOPHILS # BLD AUTO: 0.1 % (ref 0–1.8)
BASOPHILS # BLD: 0.02 K/UL (ref 0–0.12)
BILIRUB SERPL-MCNC: 0.7 MG/DL (ref 0.1–1.5)
BUN SERPL-MCNC: 19 MG/DL (ref 8–22)
CALCIUM SERPL-MCNC: 8.9 MG/DL (ref 8.5–10.5)
CHLORIDE SERPL-SCNC: 102 MMOL/L (ref 96–112)
CHOLEST SERPL-MCNC: 157 MG/DL (ref 100–199)
CO2 SERPL-SCNC: 23 MMOL/L (ref 20–33)
CORTIS SERPL-MCNC: 39.8 UG/DL (ref 0–23)
CREAT SERPL-MCNC: 0.77 MG/DL (ref 0.5–1.4)
EOSINOPHIL # BLD AUTO: 0 K/UL (ref 0–0.51)
EOSINOPHIL NFR BLD: 0 % (ref 0–6.9)
ERYTHROCYTE [DISTWIDTH] IN BLOOD BY AUTOMATED COUNT: 39.8 FL (ref 35.9–50)
GLOBULIN SER CALC-MCNC: 2.5 G/DL (ref 1.9–3.5)
GLUCOSE SERPL-MCNC: 120 MG/DL (ref 65–99)
HCT VFR BLD AUTO: 42.2 % (ref 42–52)
HDLC SERPL-MCNC: 56 MG/DL
HGB BLD-MCNC: 14.4 G/DL (ref 14–18)
IMM GRANULOCYTES # BLD AUTO: 0.09 K/UL (ref 0–0.11)
IMM GRANULOCYTES NFR BLD AUTO: 0.5 % (ref 0–0.9)
LACTATE BLD-SCNC: 1.4 MMOL/L (ref 0.5–2)
LACTATE BLD-SCNC: 1.5 MMOL/L (ref 0.5–2)
LDLC SERPL CALC-MCNC: 90 MG/DL
LYMPHOCYTES # BLD AUTO: 1.64 K/UL (ref 1–4.8)
LYMPHOCYTES NFR BLD: 9.8 % (ref 22–41)
MAGNESIUM SERPL-MCNC: 2.1 MG/DL (ref 1.5–2.5)
MCH RBC QN AUTO: 28.8 PG (ref 27–33)
MCHC RBC AUTO-ENTMCNC: 34.1 G/DL (ref 33.7–35.3)
MCV RBC AUTO: 84.4 FL (ref 81.4–97.8)
MONOCYTES # BLD AUTO: 1.97 K/UL (ref 0–0.85)
MONOCYTES NFR BLD AUTO: 11.8 % (ref 0–13.4)
NEUTROPHILS # BLD AUTO: 12.93 K/UL (ref 1.82–7.42)
NEUTROPHILS NFR BLD: 77.8 % (ref 44–72)
NRBC # BLD AUTO: 0 K/UL
NRBC BLD-RTO: 0 /100 WBC
PHOSPHATE SERPL-MCNC: 3.2 MG/DL (ref 2.5–4.5)
PLATELET # BLD AUTO: 246 K/UL (ref 164–446)
PMV BLD AUTO: 11.6 FL (ref 9–12.9)
POTASSIUM SERPL-SCNC: 3.9 MMOL/L (ref 3.6–5.5)
PROT SERPL-MCNC: 7.2 G/DL (ref 6–8.2)
RBC # BLD AUTO: 5 M/UL (ref 4.7–6.1)
SODIUM SERPL-SCNC: 136 MMOL/L (ref 135–145)
TRIGL SERPL-MCNC: 53 MG/DL (ref 0–149)
WBC # BLD AUTO: 16.7 K/UL (ref 4.8–10.8)

## 2021-11-10 PROCEDURE — 700102 HCHG RX REV CODE 250 W/ 637 OVERRIDE(OP): Performed by: STUDENT IN AN ORGANIZED HEALTH CARE EDUCATION/TRAINING PROGRAM

## 2021-11-10 PROCEDURE — 84100 ASSAY OF PHOSPHORUS: CPT

## 2021-11-10 PROCEDURE — 99356 PR PROLONGED SVC I/P OR OBS SETTING 1ST HOUR: CPT | Mod: GC | Performed by: HOSPITALIST

## 2021-11-10 PROCEDURE — 700105 HCHG RX REV CODE 258: Performed by: STUDENT IN AN ORGANIZED HEALTH CARE EDUCATION/TRAINING PROGRAM

## 2021-11-10 PROCEDURE — 700111 HCHG RX REV CODE 636 W/ 250 OVERRIDE (IP): Performed by: STUDENT IN AN ORGANIZED HEALTH CARE EDUCATION/TRAINING PROGRAM

## 2021-11-10 PROCEDURE — A9270 NON-COVERED ITEM OR SERVICE: HCPCS | Performed by: STUDENT IN AN ORGANIZED HEALTH CARE EDUCATION/TRAINING PROGRAM

## 2021-11-10 PROCEDURE — 83605 ASSAY OF LACTIC ACID: CPT

## 2021-11-10 PROCEDURE — 83735 ASSAY OF MAGNESIUM: CPT

## 2021-11-10 PROCEDURE — 80061 LIPID PANEL: CPT

## 2021-11-10 PROCEDURE — C9113 INJ PANTOPRAZOLE SODIUM, VIA: HCPCS | Performed by: STUDENT IN AN ORGANIZED HEALTH CARE EDUCATION/TRAINING PROGRAM

## 2021-11-10 PROCEDURE — C9113 INJ PANTOPRAZOLE SODIUM, VIA: HCPCS

## 2021-11-10 PROCEDURE — 99232 SBSQ HOSP IP/OBS MODERATE 35: CPT | Mod: GC,25 | Performed by: HOSPITALIST

## 2021-11-10 PROCEDURE — 85025 COMPLETE CBC W/AUTO DIFF WBC: CPT

## 2021-11-10 PROCEDURE — 80053 COMPREHEN METABOLIC PANEL: CPT

## 2021-11-10 PROCEDURE — 82533 TOTAL CORTISOL: CPT

## 2021-11-10 PROCEDURE — 74022 RADEX COMPL AQT ABD SERIES: CPT

## 2021-11-10 PROCEDURE — 700101 HCHG RX REV CODE 250: Performed by: STUDENT IN AN ORGANIZED HEALTH CARE EDUCATION/TRAINING PROGRAM

## 2021-11-10 PROCEDURE — 770020 HCHG ROOM/CARE - TELE (206)

## 2021-11-10 PROCEDURE — 700111 HCHG RX REV CODE 636 W/ 250 OVERRIDE (IP)

## 2021-11-10 PROCEDURE — 36415 COLL VENOUS BLD VENIPUNCTURE: CPT

## 2021-11-10 RX ORDER — HYDROMORPHONE HYDROCHLORIDE 1 MG/ML
0.5 INJECTION, SOLUTION INTRAMUSCULAR; INTRAVENOUS; SUBCUTANEOUS ONCE
Status: COMPLETED | OUTPATIENT
Start: 2021-11-10 | End: 2021-11-10

## 2021-11-10 RX ORDER — HYDROMORPHONE HYDROCHLORIDE 2 MG/1
2 TABLET ORAL EVERY 4 HOURS PRN
Status: DISCONTINUED | OUTPATIENT
Start: 2021-11-10 | End: 2021-11-11

## 2021-11-10 RX ORDER — PANTOPRAZOLE SODIUM 40 MG/10ML
40 INJECTION, POWDER, LYOPHILIZED, FOR SOLUTION INTRAVENOUS ONCE
Status: COMPLETED | OUTPATIENT
Start: 2021-11-10 | End: 2021-11-10

## 2021-11-10 RX ORDER — DEXTROSE MONOHYDRATE, SODIUM CHLORIDE, AND POTASSIUM CHLORIDE 50; 1.49; 9 G/1000ML; G/1000ML; G/1000ML
INJECTION, SOLUTION INTRAVENOUS CONTINUOUS
Status: DISCONTINUED | OUTPATIENT
Start: 2021-11-10 | End: 2021-11-11 | Stop reason: HOSPADM

## 2021-11-10 RX ORDER — SIMETHICONE 80 MG
80 TABLET,CHEWABLE ORAL 3 TIMES DAILY PRN
Status: DISCONTINUED | OUTPATIENT
Start: 2021-11-10 | End: 2021-11-11 | Stop reason: HOSPADM

## 2021-11-10 RX ORDER — SODIUM CHLORIDE 9 MG/ML
1000 INJECTION, SOLUTION INTRAVENOUS ONCE
Status: COMPLETED | OUTPATIENT
Start: 2021-11-10 | End: 2021-11-10

## 2021-11-10 RX ORDER — ALPRAZOLAM 0.25 MG/1
0.25 TABLET ORAL NIGHTLY PRN
Status: DISCONTINUED | OUTPATIENT
Start: 2021-11-10 | End: 2021-11-11

## 2021-11-10 RX ORDER — SODIUM CHLORIDE 9 MG/ML
INJECTION, SOLUTION INTRAVENOUS CONTINUOUS
Status: DISCONTINUED | OUTPATIENT
Start: 2021-11-10 | End: 2021-11-10

## 2021-11-10 RX ORDER — PANTOPRAZOLE SODIUM 40 MG/10ML
INJECTION, POWDER, LYOPHILIZED, FOR SOLUTION INTRAVENOUS
Status: COMPLETED
Start: 2021-11-10 | End: 2021-11-10

## 2021-11-10 RX ORDER — OXYCODONE HYDROCHLORIDE 5 MG/1
5 TABLET ORAL EVERY 6 HOURS PRN
Status: DISCONTINUED | OUTPATIENT
Start: 2021-11-10 | End: 2021-11-11

## 2021-11-10 RX ORDER — PROCHLORPERAZINE MALEATE 5 MG/1
5 TABLET ORAL EVERY 6 HOURS PRN
Status: DISCONTINUED | OUTPATIENT
Start: 2021-11-10 | End: 2021-11-11 | Stop reason: HOSPADM

## 2021-11-10 RX ORDER — ONDANSETRON 4 MG/1
4 TABLET, ORALLY DISINTEGRATING ORAL ONCE
Status: DISCONTINUED | OUTPATIENT
Start: 2021-11-10 | End: 2021-11-10

## 2021-11-10 RX ADMIN — HYDROMORPHONE HYDROCHLORIDE 0.5 MG: 1 INJECTION, SOLUTION INTRAMUSCULAR; INTRAVENOUS; SUBCUTANEOUS at 02:49

## 2021-11-10 RX ADMIN — METRONIDAZOLE 500 MG: 500 INJECTION, SOLUTION INTRAVENOUS at 06:26

## 2021-11-10 RX ADMIN — HYDROMORPHONE HYDROCHLORIDE 0.5 MG: 1 INJECTION, SOLUTION INTRAMUSCULAR; INTRAVENOUS; SUBCUTANEOUS at 04:52

## 2021-11-10 RX ADMIN — HYDROMORPHONE HYDROCHLORIDE 2 MG: 2 TABLET ORAL at 22:18

## 2021-11-10 RX ADMIN — PROCHLORPERAZINE MALEATE 5 MG: 5 TABLET ORAL at 05:36

## 2021-11-10 RX ADMIN — METRONIDAZOLE 500 MG: 500 INJECTION, SOLUTION INTRAVENOUS at 14:03

## 2021-11-10 RX ADMIN — THIAMINE HYDROCHLORIDE 100 MG: 100 INJECTION, SOLUTION INTRAMUSCULAR; INTRAVENOUS at 15:36

## 2021-11-10 RX ADMIN — PROCHLORPERAZINE MALEATE 5 MG: 5 TABLET ORAL at 17:02

## 2021-11-10 RX ADMIN — OXYCODONE 5 MG: 5 TABLET ORAL at 18:36

## 2021-11-10 RX ADMIN — CEFTRIAXONE SODIUM 1 G: 1 INJECTION, POWDER, FOR SOLUTION INTRAMUSCULAR; INTRAVENOUS at 13:54

## 2021-11-10 RX ADMIN — PANTOPRAZOLE SODIUM 40 MG: 40 INJECTION, POWDER, LYOPHILIZED, FOR SOLUTION INTRAVENOUS at 20:30

## 2021-11-10 RX ADMIN — POTASSIUM CHLORIDE, DEXTROSE MONOHYDRATE AND SODIUM CHLORIDE: 150; 5; 900 INJECTION, SOLUTION INTRAVENOUS at 16:58

## 2021-11-10 RX ADMIN — METRONIDAZOLE 500 MG: 500 INJECTION, SOLUTION INTRAVENOUS at 22:21

## 2021-11-10 RX ADMIN — SODIUM CHLORIDE 1000 ML: 9 INJECTION, SOLUTION INTRAVENOUS at 08:00

## 2021-11-10 RX ADMIN — PANTOPRAZOLE SODIUM 40 MG: 40 INJECTION, POWDER, LYOPHILIZED, FOR SOLUTION INTRAVENOUS at 05:35

## 2021-11-10 RX ADMIN — METRONIDAZOLE 500 MG: 500 INJECTION, SOLUTION INTRAVENOUS at 00:38

## 2021-11-10 RX ADMIN — HYDROMORPHONE HYDROCHLORIDE 0.5 MG: 1 INJECTION, SOLUTION INTRAMUSCULAR; INTRAVENOUS; SUBCUTANEOUS at 09:43

## 2021-11-10 RX ADMIN — HYDROMORPHONE HYDROCHLORIDE 0.5 MG: 1 INJECTION, SOLUTION INTRAMUSCULAR; INTRAVENOUS; SUBCUTANEOUS at 13:54

## 2021-11-10 ASSESSMENT — ENCOUNTER SYMPTOMS
VOMITING: 1
HEARTBURN: 0
DIAPHORESIS: 0
COUGH: 0
ABDOMINAL PAIN: 1
SPUTUM PRODUCTION: 0
DIARRHEA: 0
DOUBLE VISION: 0
HEADACHES: 0
TREMORS: 0
WHEEZING: 0
WEIGHT LOSS: 0
NERVOUS/ANXIOUS: 1
SORE THROAT: 0
NAUSEA: 1
SHORTNESS OF BREATH: 0
FLANK PAIN: 0
CHILLS: 0
TINGLING: 0
EYE REDNESS: 0
BACK PAIN: 0
FOCAL WEAKNESS: 0
FEVER: 0
PALPITATIONS: 0
LOSS OF CONSCIOUSNESS: 0
BLURRED VISION: 0
FALLS: 0
DIZZINESS: 0

## 2021-11-10 ASSESSMENT — PAIN DESCRIPTION - PAIN TYPE
TYPE: ACUTE PAIN

## 2021-11-10 ASSESSMENT — LIFESTYLE VARIABLES: SUBSTANCE_ABUSE: 1

## 2021-11-10 NOTE — ASSESSMENT & PLAN NOTE
Most likely associated with dehydration secondary to vomiting.  BUN/CR: 39/1.7 (baseline 14/0.7) --> downtrending  -Continuous dextrose 5% and 0.9% NaCl with KCl 20 mg infusion for altered electrolytes and dehydration 2/2 vomiting  -Normal saline boluses as needed  -Daily weights  -Monitor I's and O's

## 2021-11-10 NOTE — ASSESSMENT & PLAN NOTE
Differential diagnoses include ileus versus substance abuse versus abdominal vasospasm (abdominal migraine) vs cannabis induced cyclic vomiting.  Cannabis hyperemesis syndrome unlikely since patient denies taking hot showers to ease their nausea.  Abdominal CT was reported unremarkable but upon further inspection he does have dilated loops of air filled bowel.  -On ceftriaxone and metronidazole to cover potential GI septic source  -3 view abdominal series x-ray: no bowel ischemia/inflammation or dilated bowel loops  -Clear liquid diet  -Low dose xanax and oxycodone for pain management

## 2021-11-10 NOTE — ASSESSMENT & PLAN NOTE
Most likely secondary to vomiting versus alcohol abuse (even though patient denies it at this time). Prolonged Qtc in setting of.  -Replete potassium as needed  -Trend with BMP  -Blood alcohol: <10

## 2021-11-10 NOTE — DIETARY
"Nutrition services: Day 1 of admit.  Gilbert Diop is a 26 y.o. male with admitting DX of abdominal pain, sepsis  Consult received for MST 2    Met with pt at bedside. He reports a 10-20 lb weight loss in the past 1-2 months d/t smoking as it makes him sick. Pt reports he was experiencing nausea and vomiting PTA and has vomited today. He states he was eating 2 meals per day PTA. -150 lbs.     Assessment:  Height: 172.7 cm (5' 8\")  Weight: 60.1 kg (132 lb 7.9 oz)  Body mass index is 20.15 kg/m²., BMI classification: normal weight  Diet/Intake: Clear liquid    Evaluation:   1. Pt admitted for abdominal pain and sepsis with hyperglycemia, ERIN, SIRS, polysubstance abuse, leukocytosis, GERD, marijuana use, lactic acidosis, nausea and vomiting, hypokalemia  2. Pt had nausea and vomiting for 3 days PTA per MD note  3. PO intake <50% x2 meals today per ADL's  4. Nutrition Focused Physical Exam: pt appears nourished  5. Weight history per chart review  137.6 lb 05/29/21  108.7 lb 11/11/20  -Based on pt's last weight in chart, he has had a 3.7% weight loss in < 6 months which is not severe.   6. Labs: glucose 120  7. Meds: pantoprazole, senna-docusate, thiamine, bowel regimen, prochlorperazine  8. Skin: no pressure related injuries noted  9. GI: last BM 11/8    Malnutrition Risk: Does not meet ASPEN criteria at this time.     Recommendations/Plan:  1. Advance diet past clears as tolerated per MD  2. Added Boost Breeze BID  3. Encourage intake of all meals and supplements  4. Document intake of all meals  as % taken in ADL's to provide interdisciplinary communication across all shifts.   5. Monitor weight.  6. Nutrition rep will continue to see patient for ongoing meal and snack preferences.     RD following.    "

## 2021-11-10 NOTE — DISCHARGE PLANNING
Care Transition Team Assessment    LSW met with pt at bedside to complete assessment. Pt A&Ox4 and able to verify the information on the face sheet. Pt reported he lives with his parents in a single story house. Pt is independent with all ADLs and IADLs. Pt reported his brother or mother provides transportation for him as needed. Pt stated his parents and sister who lives in [place] are good supports for him.    Pt reported he is employed full-time at [employer]. Pt confirmed he does not have any insurance. LSW made phone call to [facility] and requested the pt be screened for Medicaid. Pt denies any MH concerns. Pt reported he uses marijuana and xanax. LSW provided pt with chemical dependency resources, including facilities that will accept uninsured pt's.    Pt's mom or brother will be able to provide transportation home upon DC.    Information Source  Orientation Level: Oriented X4  Information Given By: Patient  Informant's Name: Gilbert Diop  Who is responsible for making decisions for patient? : Patient    Readmission Evaluation  Is this a readmission?: No    Elopement Risk  Legal Hold: No  Ambulatory or Self Mobile in Wheelchair: No-Not an Elopement Risk    Interdisciplinary Discharge Planning  Patient or legal guardian wants to designate a caregiver: No    Discharge Preparedness  What is your plan after discharge?: Home with help  What are your discharge supports?: Parent,Sibling  Prior Functional Level: Ambulatory,Independent with Activities of Daily Living,Independent with Medication Management  Difficulity with ADLs: None  Difficulity with IADLs: None    Functional Assesment  Prior Functional Level: Ambulatory,Independent with Activities of Daily Living,Independent with Medication Management    Finances  Financial Barriers to Discharge: No  Prescription Coverage: No    Advance Directive  Advance Directive?: None    Domestic Abuse  Have you ever been the victim of abuse or violence?: No  Physical Abuse or  Sexual Abuse: No  Verbal Abuse or Emotional Abuse: No  Possible Abuse/Neglect Reported to:: Not Applicable    Psychological Assessment  History of Substance Abuse: Prescription opioids,Marijuana  History of Psychiatric Problems: No  Non-compliant with Treatment: No  Newly Diagnosed Illness: No    Discharge Risks or Barriers  Discharge risks or barriers?: No PCP,Uninsured / underinsured,Substance abuse  Patient risk factors: No PCP,Substance abuse,Uninsured or underinsured    Anticipated Discharge Information  Discharge Disposition: Discharged to home/self care (01)

## 2021-11-10 NOTE — PROGRESS NOTES
"Daily Progress Note:     Date of Service: 11/10/2021  Primary Team: [facility] Team   Attending: MARYAM Fuentes M.D.   Senior Resident: Dr. Loo  Intern: Dr. Fuentes  Contact:  [phone]    Chief Complaint:   Abdominal pain    Subjective:  Patient was evaluated at bedside and was found asleep. When awoken, he started groaning, stating severe abdominal pain. I barely palpated abdomen and he began groaning, stating diffuse, unbearable pain. Patient stated he had one small bowel movement with no blood; 2 episodes of clear vomit during the night. When evaluated with the team, patient stated he also took \"M30s\"; again denied any other substance.   In afternoon, patient was found with empty bottle of xanax after uncle left; unknown if patient had bottle with him vs brought in. Unknown if ingestion. Talked to patient and educated him on the causes, risks of his chronic abdominal pain.    Consultants/Specialty:  None    Review of Systems:   Review of Systems   Constitutional: Negative for chills, diaphoresis, fever (subjective), malaise/fatigue and weight loss.   HENT: Negative for hearing loss and sore throat.    Eyes: Negative for blurred vision, double vision and redness.   Respiratory: Negative for cough, sputum production, shortness of breath and wheezing.    Cardiovascular: Negative for chest pain, palpitations and leg swelling.   Gastrointestinal: Positive for abdominal pain (periumbilical; diffused throughout), nausea and vomiting. Negative for diarrhea (2 episodes; non bloody) and heartburn.   Genitourinary: Negative for dysuria, flank pain, frequency, hematuria and urgency.   Musculoskeletal: Negative for back pain and falls.   Skin: Negative for itching and rash.   Neurological: Negative for dizziness, tingling, tremors, focal weakness, loss of consciousness and headaches.   Psychiatric/Behavioral: Positive for substance abuse (xanax, oxycodone, marijuana, M30). The patient is nervous/anxious.  "       Objective Data:   Vitals:   Temp:  [36.2 °C (97.1 °F)-37.2 °C (98.9 °F)] 37.2 °C (98.9 °F)  Pulse:  [] 91  Resp:  [14-23] 16  BP: ()/(38-86) 136/83  SpO2:  [91 %-99 %] 98 %  Physical Exam:   Physical Exam  Vitals and nursing note reviewed.   Constitutional:       General: He is awake. He is not in acute distress.     Appearance: He is normal weight. He is not ill-appearing, toxic-appearing or diaphoretic.   HENT:      Head: Normocephalic and atraumatic.      Nose: Nose normal.      Mouth/Throat:      Mouth: Mucous membranes are dry.      Pharynx: Oropharynx is clear.   Eyes:      General: No scleral icterus.     Extraocular Movements: Extraocular movements intact.      Conjunctiva/sclera: Conjunctivae normal.      Pupils: Pupils are equal, round, and reactive to light.   Cardiovascular:      Rate and Rhythm: Regular rhythm. Tachycardia present.      Pulses: Normal pulses.      Heart sounds: Normal heart sounds. No murmur heard.  No gallop.    Pulmonary:      Effort: Pulmonary effort is normal. No respiratory distress.      Breath sounds: Normal breath sounds. No wheezing, rhonchi or rales.       Chest:      Chest wall: No tenderness.   Abdominal:      General: Abdomen is flat. Bowel sounds are normal. There is no distension.      Palpations: Abdomen is soft. There is no hepatomegaly, splenomegaly or mass.      Tenderness: There is abdominal tenderness (to palpation throughout; no grimacing). There is no guarding or rebound.   Musculoskeletal:         General: No swelling or tenderness. Normal range of motion.      Cervical back: Normal range of motion and neck supple.      Right lower leg: No edema.      Left lower leg: No edema.   Skin:     General: Skin is warm and dry.      Coloration: Skin is not jaundiced.      Findings: No bruising.      Comments: Excoriations on face and back   Neurological:      General: No focal deficit present.      Mental Status: He is alert, oriented to person, place,  and time and easily aroused.   Psychiatric:         Attention and Perception: Attention normal.         Mood and Affect: Mood is anxious.         Speech: Speech normal.         Labs:   See results tab    Imaging:   DX-ABDOMEN COMPLETE WITH AP OR PA CXR   Final Result      1.  No acute cardiopulmonary abnormality.   2.  No dilated loops of bowel or free air.         CT-ABDOMEN-PELVIS WITH   Final Result      1.  No acute abnormalities are identified in the abdomen or pelvis.      DX-UPPER GI-SERIES WITH KUB    (Results Pending)      26-year-old male who presented on 11/9/2021 with a past medical history of polysubstance abuse (Xanax, oxycodone, marijuana, M30, benzos), gastritis (positive for H. Pylori in past), abdominal vasospasms, comes to the ED on 11/9/2021 for abdominal pain and nausea and vomiting for the last 3 days. On IV fluids; monitoring I's and O's. On low dose xanax (for potential withdrawal) and low dose oxycodone for pain management. Three-view abdominal series are unremarkable.    * Metabolic acidosis with increased anion gap and accumulation of organic acids  Assessment & Plan  In the setting of polysubstance abuse vs dehydration versus starvation ketosis vs thiamine deficiency vs infection vs exogenous acids  Delta ration 3.6 = chronic respiratory acidosis or metabolic alkalosis present; in this case, most likely metabolic alkalosis in the setting of hypovolemia 2/2 vomiting  -Lactic acid initially elevated 2/2 recurrent vomiting; downtrending in the setting of IVFs  -Thiamine for malnutrition in the setting of starvation  -Beta hydroxybutyrate elevated; most likely due to starvation ketosis since patient has only consumed street drugs and no food or water for 3 days  -Serum osm within normal limits; however, it was collected when patient was dry; hence not a true indicator of osmolar gap  -Continue with IV infusion  -Replete electrolytes as needed    Nausea & vomiting- (present on  admission)  Assessment & Plan  Nausea and vomiting for the last 3 days; at times remarks coffee-ground like vomitus with and without blood streaks.  -Was given metoclopramide and Dilaudid in the ED  -Prochlorperazine for N/V in the setting of long Qtc    Abdominal pain- (present on admission)  Assessment & Plan  Differential diagnoses include ileus versus substance abuse versus abdominal vasospasm (abdominal migraine) vs cannabis induced cyclic vomiting.  Cannabis hyperemesis syndrome unlikely since patient denies taking hot showers to ease their nausea.  Abdominal CT was reported unremarkable but upon further inspection he does have dilated loops of air filled bowel.  -On ceftriaxone and metronidazole to cover potential GI septic source  -3 view abdominal series x-ray: no bowel ischemia/inflammation or dilated bowel loops  -Clear liquid diet  -Low dose xanax and oxycodone for pain management     Hyperglycemia  Assessment & Plan  Most likely stress induced although patient has had hyperglycemia in the past. Potentially new onset DM?  -A1c: 5.1  -C-peptide: pending  -B-hydroxybuterate: elevated, most likely in the setting of starvation ketosis    ERIN (acute kidney injury) (HCC)  Assessment & Plan  Most likely associated with dehydration secondary to vomiting.  BUN/CR: 39/1.7 (baseline 14/0.7) --> downtrending  -Continuous dextrose 5% and 0.9% NaCl with KCl 20 mg infusion for altered electrolytes and dehydration 2/2 vomiting  -Normal saline boluses as needed  -Daily weights  -Monitor I's and O's    GERD (gastroesophageal reflux disease)- (present on admission)  Assessment & Plan  History of; denies epigastric pain at this time  -On IV pantoprazole (patient cannot tolerate orals at this time)  -On GI cocktail   -Hx of H.pylori with previous completion of treatment; pending    Hypokalemia  Assessment & Plan  Most likely secondary to vomiting versus alcohol abuse (even though patient denies it at this time). Prolonged  "Qtc in setting of.  -Replete potassium as needed  -Trend with BMP  -Blood alcohol: <10    Scoliosis  Assessment & Plan  S/P internal fixation with metal rods in 2014. Patient cites residual left lower extremity weakness    Polysubstance abuse ([facility])  Assessment & Plan  States he started using Xanax approximately 2 months ago and oxycodone 1 month ago; buys it from the street. Upon further investigation, past progress notes indicates that he has been using for the last several years. Physical examination demonstrated pinpoint pupils upon admission. Past history remarkable for oxycodone, benzos, alcohol, marijuana, \"M30\".  -Counseled on cessation.    SIRS (systemic inflammatory response syndrome) ([facility])  Assessment & Plan  SIRS criteria identified on my evaluation include:  Tachycardia, with heart rate greater than 90 BPM and Leukocytosis, with WBC greater than 12,000  SIRS is non-infectious, the patient does not have sepsis.  More likely to be an inflammatory response from his polysubstance abuse.  Has past history of elevated WBCs in the setting of abdominal pain, nausea and vomiting. Currently no source of infection but covering potential GI source with ceftriaxone and metronidazole  -WBC actually increased (15.7 -->16.7) despite abx therapy; will keep current regimen and continue to assess for septic source of WBC continues to increase    Marijuana use- (present on admission)  Assessment & Plan  Chronic use since 16-year-old. Does not appear to have cannabis hyperemesis since he does not take multiple hot showers to soothe his nausea.  -Counseled on marijuana cessation    "

## 2021-11-10 NOTE — ASSESSMENT & PLAN NOTE
"States he started using Xanax approximately 2 months ago and oxycodone 1 month ago; buys it from the street. Upon further investigation, past progress notes indicates that he has been using for the last several years. Physical examination demonstrated pinpoint pupils upon admission. Past history remarkable for oxycodone, benzos, alcohol, marijuana, \"M30\".  -Counseled on cessation.  "

## 2021-11-10 NOTE — ASSESSMENT & PLAN NOTE
In the setting of polysubstance abuse vs dehydration versus starvation ketosis vs thiamine deficiency vs infection vs exogenous acids  Delta ration 3.6 = chronic respiratory acidosis or metabolic alkalosis present; in this case, most likely metabolic alkalosis in the setting of hypovolemia 2/2 vomiting  -Lactic acid initially elevated 2/2 recurrent vomiting; downtrending in the setting of IVFs  -Thiamine for malnutrition in the setting of starvation  -Beta hydroxybutyrate elevated; most likely due to starvation ketosis since patient has only consumed street drugs and no food or water for 3 days  -Serum osm within normal limits; however, it was collected when patient was dry; hence not a true indicator of osmolar gap  -Continue with IV infusion  -Replete electrolytes as needed

## 2021-11-10 NOTE — ASSESSMENT & PLAN NOTE
S/P internal fixation with metal rods in 2014. Patient cites residual left lower extremity weakness

## 2021-11-10 NOTE — ASSESSMENT & PLAN NOTE
History of; denies epigastric pain at this time  -On IV pantoprazole (patient cannot tolerate orals at this time)  -On GI cocktail   -Hx of H.pylori with previous completion of treatment; pending

## 2021-11-10 NOTE — ASSESSMENT & PLAN NOTE
Nausea and vomiting for the last 3 days; at times remarks coffee-ground like vomitus with and without blood streaks.  -Was given metoclopramide and Dilaudid in the ED  -Prochlorperazine for N/V in the setting of long Qtc

## 2021-11-10 NOTE — SENIOR ADMIT NOTE
Gilbert Fair is a 26-year-old male past medical history of scoliosis status post surgery with implantation of hardware with residual left lower extremity weakness who has had multiple hospitalizations for episodes of periumbilical pain and vomiting.  He presented on 11/9/2021 to the ED for recurrent episodes of coffee ground vomiting and constant periumbilical and epigastric pain.  Patient states that he was unable to tolerate solids and liquids.  He states that at 1 point he was vomiting streaks of blood and was having a bit of difficulty swallowing both solids and liquids over the past 3 days.  He reported 1-2 loose bowel movements no melena no hematochezia today and history of constipation.  He states that these episodes began several years ago and occur about once a month and present similarly.  Patient admits to having used oxycodone over the past 2 months when confronted with positive UDS and he reports occasional marijuana use.  He denies any history of IV drug use, methamphetamine use, recent alcohol ingestion, recent NSAID use.  He states that he has had an EGD and colonoscopy several years ago and recalls having been diagnosed with ulcers.      ED course  On admission is normotensive blood pressure 128/84, pulse 110 to 140, afebrile.  Labs notable for WBC 15.7 with elevated neutrophil and monocytes sodium 134, potassium 3.3, chloride 84, bicarb 27, anion gap 23, glucose 277, BUN 39, creatinine 1.7, lactic acid 3.6, will lipase 16, magnesium 2.1.  Alcohol levels negative, UDS positive for cannabinoids, opiates, oxycodone.  He UDS negative for amphetamines.  UA with elevated specific gravity, trace ketones, proteinuria, hyaline casts, urine pH 7.5.  EKG on admission  notable for prolonged QTC of 526, atrial enlargement, abnormal R wave progression.     Physical exam  General patient appears uncomfortable and with some psychomotor agitation  HEENT: Moist oral mucosa no oral lesions, no epistaxis  Skin:  Several excoriations on forehead  Cardiovascular: Sinus tachycardia no murmurs on auscultation  Lungs: Clear in all lung fields no wheezing no rales  Abdomen: No rebound no guarding epigastric and periumbilical tenderness with palpation  Extremities: No cyanosis no edema      Epigastric and periumbilical abdominal pain and coffee-ground emesis  UDS positive for cannabinoids however patient denied feeling better under hot water and per chart review these episodes began around age 10, therefore cannabis induced cyclic vomiting as a possible diagnosis however other differentials must also be considered.  Patient did report taking opioids for 1 month and ran out the day prior to admission.  Opiate-induced ileus seen with dilated bowel loops on CT versus opioid withdrawal may also explain current presentation.  Patient does report having a history of GERD with EGD in the past revealing gastritis and positive H. pylori in 2014.  Labs also notable for hyperglycemia, which has been a pattern over multiple admission however this may be stress-induced uncertain whether this is partial pancreatic endocrine insufficiency.  It is uncertain whether patient may have a form of diabetic gastroparesis.  He did present with leukocytosis on admission and tachycardia and so infectious gastroenteritis is also possible.  He was initiated on ceftriaxone and Flagyl for this.. He was also started on IV Protonix 40 mg daily in addition to GI cocktail.  Will employ deciduous use of IV Dilaudid for control of pain.  Anti-tTG,  fecal lactoferrin pending to evaluate for celiac or IBD or infectious colitis.  We will consider empiric treatment for H. pylori as he had a history of this in the past  Repeat H&H in the morning    Sepsis  Heart rate over 100, white blood cell count over 12,000 on admission.  Possible source is gastroenteritis.   No signs of bowel inflammation or hepatobiliary on CT abdomen.  LFTs within normal limits  Patient initiated  on ceftriaxone and Flagyl for possible underlying infection    Acute kidney injury  BUN 39, creatinine 1.7, baseline creatinine 0.8  Renal function improving following aggressive hydration    Mixed acid-base disturbance  Anion gap acidosis with anion gap of 23, with bicarb of 27.  Lactic acid elevated on admission at 3.6 likely secondary to recurrent vomiting.  Metabolic alkalosis likely secondary to recurrent vomiting and dehydration.  CT with no signs of ischemic bowel, lipase within normal limits, renal function improving  Lactic acid downtrending following aggressive fluid repletion.  Urinalysis with trace ketones  Obtain VBG, repeat BMP, beta hydroxybutyrate, urine and serum osmolarity to evaluate for ethylene glycol    Hyperglycemia  Possibly secondary to acute stress response however patient has been hyperglycemic in the past raising suspicion for new onset diabetes  We will obtain A1c and C-peptide levels and beta hydroxybutyrate levels  If either the above are abnormal with will consider hepatocyte nuclear factor-4-alpha (HNF4A), hepatocyte nuclear factor-1-alpha (HNF1A), and the glucokinase (GCK) gene.    Hypokalemia  Likely secondary to GI losses and dehydration  Administered 40 mEq IV KCl and plan on giving an additional 40 for serum potassium level of 3.0    Long QTC  Likely in the setting of hypokalemia and opioid use  Patient admitted to telemetry    Scoliosis status post surgery with internal fixation  Patient reports residual left lower extremity weakness    Cannabis use  opioid use  We will monitor for signs of cannabis induced hyperemesis and opioid withdrawal    Depression, anxiety  Patient reports depression and anxiety for several years and was on Xanax in the past

## 2021-11-10 NOTE — ASSESSMENT & PLAN NOTE
Chronic use since 16-year-old. Does not appear to have cannabis hyperemesis since he does not take multiple hot showers to soothe his nausea.  -Counseled on marijuana cessation

## 2021-11-10 NOTE — ASSESSMENT & PLAN NOTE
Has past history of elevated WBCs in the setting of abdominal pain, nausea and vomiting. Currently no source of infection but covering potential GI source with ceftriaxone and metronidazole  -WBC actually increased (15.7 -->16.7) despite abx therapy; will keep current regimen and continue to assess for septic source of WBC continues to increase

## 2021-11-10 NOTE — ASSESSMENT & PLAN NOTE
Most likely stress induced although patient has had hyperglycemia in the past. Potentially new onset DM?  -A1c: 5.1  -C-peptide: pending  -B-hydroxybuterate: elevated, most likely in the setting of starvation ketosis

## 2021-11-10 NOTE — PROGRESS NOTES
4 Eyes Skin Assessment Completed by Janeth, RN and Mike RN.    Head WDL  Ears WDL  Nose WDL  Mouth WDL  Neck WDL  Breast/Chest WDL  Shoulder Blades WDL  Spine Healed scars from scoliosis surgery  (R) Arm/Elbow/Hand WDL  (L) Arm/Elbow/Hand WDL  Abdomen WDL  Groin WDL  Scrotum/Coccyx/Buttocks WDL  (R) Leg WDL  (L) Leg WDL  (R) Heel/Foot/Toe WDL  (L) Heel/Foot/Toe WDL          Devices In Places Tele Box and DARLIN's      Interventions In Place Pillows and Pressure Redistribution Mattress    Possible Skin Injury No    Pictures Uploaded Into Epic N/A  Wound Consult Placed N/A  RN Wound Prevention Protocol Ordered No

## 2021-11-10 NOTE — ASSESSMENT & PLAN NOTE
SIRS criteria identified on my evaluation include:  Tachycardia, with heart rate greater than 90 BPM and Leukocytosis, with WBC greater than 12,000  SIRS is non-infectious, the patient does not have sepsis.  More likely to be an inflammatory response from his polysubstance abuse.  Has past history of elevated WBCs in the setting of abdominal pain, nausea and vomiting. Currently no source of infection but covering potential GI source with ceftriaxone and metronidazole  -WBC actually increased (15.7 -->16.7) despite abx therapy; will keep current regimen and continue to assess for septic source of WBC continues to increase

## 2021-11-10 NOTE — PROGRESS NOTES
Pt arrived to unit via gurney at 2320. Pt oriented to room, unit, and plan of care. Tele-monitor placed and monitor room notified. All questions answered at this time. Call light within reach; fall precautions in place.

## 2021-11-10 NOTE — H&P
"History & Physical Note    Date of Admission: 11/10/2021  Admission Status: Inpatient  [facility] Team: [facility]  Attending: MARYAM Fuentes M.D.   Re: Resident: Dr. Loo  Intern: Dr. Fuentes  Contact Number: [phone]    Chief Complaint: Abdominal pain, nausea vomiting    History of Present Illness (HPI):   Gilbert is a 26 y.o. male who presented 11/9/2021 with a past medical history of polysubstance abuse (Xanax, oxycodone, marijuana), gastritis (positive for H. Pylori), abdominal vasospasm, comes to the ED on 11/9/2021 for abdominal pain and nausea and vomiting for the last 3 days. Patient has had multiple recurrent admissions for the same symptoms since he was 10 years old. Patient states the abdominal pain  is periumbilical, 10/10, constant but pain comes in waves, does not radiate, is not alleviated with anything (says it has been relieved in the past with morphine) and not worsened with anything. Patient states he has not been able to keep any food or water down for the last 3 days. Patient also states that he has had approximately 20 episodes of vomit, coffee ground in nature, with some specks of blood mixed in. Patient also states \"muscle spasms\" on his legs and abdomen. In addition, patient states he has difficulty initiating urine, pain upon urination, and states that his urine has been cloudy for the last several days. Patient denies fever, chills, palpitations, chest pain, shortness of breath, loss of consciousness, recent falls, IV drug use, headache, hot showers do not soothe him.    Patient was evaluated at bedside and was found to be in mild acute distress; restless, saturating 100% on room air.  Patient was complaining of abdominal pain; however upon palpitation patient had no grimacing or guarding.  Pupils were pinpoint in nature.  Multiple excoriations over face, upper extremities, and back.  Well-healed scar on lumbar aspect of spine.  Patient denies smoking cigarettes or " "drinking alcohol; however past encounters demonstrate he has drank alcohol in the past.  Patient states he has been taking Xanax for the last 2 months and oxycodone for the last month from the streets; however past encounters demonstrate he has been taking pills for the last 3 to 4 years.  Patient has been smoking marijuana since 16 years old; given to him by his brother (brother is an alcoholic, vapes, and uses cocaine occasionally).  States he has recent stressors at home that include an alcoholic father and uses drugs to \"escape reality\".    Review of Systems:  Review of Systems   Constitutional: Positive for fever (subjective). Negative for chills, diaphoresis, malaise/fatigue and weight loss.   HENT: Negative for hearing loss and sore throat.    Eyes: Negative for blurred vision, double vision and redness.   Respiratory: Negative for cough, sputum production, shortness of breath and wheezing.    Cardiovascular: Negative for chest pain, palpitations and leg swelling.   Gastrointestinal: Positive for abdominal pain (periumbilical; diffused throughout), diarrhea (2 episodes; non bloody), nausea and vomiting. Negative for heartburn.   Genitourinary: Positive for dysuria. Negative for flank pain, frequency, hematuria and urgency.   Musculoskeletal: Positive for back pain. Negative for falls.   Skin: Negative for itching and rash.   Neurological: Negative for dizziness, tingling, tremors, focal weakness, loss of consciousness and headaches.   Psychiatric/Behavioral: Positive for substance abuse (xanax, oxycodone, marijuana). The patient is nervous/anxious.      Past Medical History:   Past Medical History was reviewed with patient.   has a past medical history of Abdominal migraine, Abdominal migraine, Colitis, and Gastritis.    Past Surgical History: Past Surgical History was reviewed with patient.   has a past surgical history that includes gastroscopy (9/3/2014); gastroscopy-endo (11/15/2014); and other. Scoliosis " "surgery with mague placement in 2017 with left leg residual weakness (as per patient; unable to assess for weakness)    Medications: Medications have been reviewed with patient.  None        Allergies: Allergies have been reviewed with patient.  Allergies   Allergen Reactions   • Acetaminophen [Tylenol] Anaphylaxis and Nausea     Nausea, \"it just doesn't sit right\"  5/29/2021 patient states that his throat swells shut       Family History: mother: HTN; Parental grandfather: bone cancer. Maternal uncle: blood cancer; paternal uncle: blood cancer  family history is not on file.     Social History:   Tobacco: denies  Alcohol: denies   Recreational drugs (illegal and prescription):  Xanax (last 2 months), oxycodone (last month), Marijuana (smoking since 15 y/o)   Employment: [employer] in [facility]  Activity Level: Without assistance  Living situation: Lives with parents in [place]  Recent travel:  Denies  Primary Care Provider: reviewed Pcp Pt States None  Other (stressors, spirituality, exposures): Family stress at home (father is an alcoholic)  Physical Exam:   Vitals:  Temp:  [36.7 °C (98.1 °F)-37.1 °C (98.8 °F)] 36.9 °C (98.5 °F)  Pulse:  [] 91  Resp:  [13-23] 18  BP: ()/(38-86) 90/38  SpO2:  [91 %-99 %] 96 %    Physical Exam  Vitals and nursing note reviewed.   Constitutional:       General: He is in acute distress.      Appearance: He is normal weight. He is not ill-appearing, toxic-appearing or diaphoretic.   HENT:      Head: Normocephalic and atraumatic.      Nose: Nose normal.      Mouth/Throat:      Mouth: Mucous membranes are dry.      Pharynx: Oropharynx is clear.   Eyes:      General: No scleral icterus.     Extraocular Movements: Extraocular movements intact.      Conjunctiva/sclera: Conjunctivae normal.      Pupils: Pupils are equal, round, and reactive to light.   Cardiovascular:      Rate and Rhythm: Regular rhythm. Tachycardia present.      Pulses: Normal pulses.      Heart sounds: Normal heart sounds. " No murmur heard.  No gallop.    Pulmonary:      Effort: Pulmonary effort is normal. No respiratory distress.      Breath sounds: Normal breath sounds. No wheezing, rhonchi or rales.       Chest:      Chest wall: No tenderness.   Abdominal:      General: Abdomen is flat. Bowel sounds are normal. There is no distension.      Palpations: Abdomen is soft. There is no hepatomegaly, splenomegaly or mass.      Tenderness: There is abdominal tenderness (to palpation throughout; no grimacing). There is no guarding or rebound.   Musculoskeletal:         General: No swelling or tenderness. Normal range of motion.      Cervical back: Normal range of motion and neck supple.      Right lower leg: No edema.      Left lower leg: No edema.   Skin:     General: Skin is warm and dry.      Coloration: Skin is not jaundiced.      Findings: No bruising.      Comments: Excoriations on face and back   Neurological:      General: No focal deficit present.      Mental Status: He is alert and oriented to person, place, and time.   Psychiatric:         Attention and Perception: Attention normal.         Mood and Affect: Mood is anxious.         Speech: Speech normal.         Labs:     Recent Labs     11/09/21  1101 11/10/21  0045   WBC 15.7* 16.7*   RBC 5.97 5.00   HEMOGLOBIN 17.4 14.4   HEMATOCRIT 47.9 42.2   MCV 80.2* 84.4   MCH 29.1 28.8   RDW 35.4* 39.8   PLATELETCT 363 246   MPV 11.4 11.6   NEUTSPOLYS 87.60* 77.80*   LYMPHOCYTES 3.40* 9.80*   MONOCYTES 8.30 11.80   EOSINOPHILS 0.00 0.00   BASOPHILS 0.10 0.10     Recent Labs     11/09/21  1711 11/09/21  1931 11/10/21  0045   SODIUM 133* 135 136   POTASSIUM 3.0* 3.1* 3.9   CHLORIDE 87* 93* 102   CO2 25 25 23   GLUCOSE 144* 122* 120*   BUN 32* 26* 19     Recent Labs     11/09/21  1101 11/09/21  1101 11/09/21  1711 11/09/21  1931 11/10/21  0045   ALBUMIN 5.6*  --   --   --  4.7   TBILIRUBIN 0.7  --   --   --  0.7   ALKPHOSPHAT 71  --   --   --  56   TOTPROTEIN 9.2*  --   --   --  7.2    ALTSGPT 13  --   --   --  9   ASTSGOT 23  --   --   --  19   CREATININE 1.70*   < > 1.19 0.96 0.77    < > = values in this interval not displayed.     EKG: Per my read, EKG Interpretation   Results for orders placed or performed during the hospital encounter of 21   EKG (NOW)   Result Value Ref Range    Report       [facility] Emergency Dept.    Test Date:  2021  Pt Name:    GABRIEL CEBALLOS     Department: ER  MRN:        [id number]                      Room:       ORTHO  Gender:     Male                         Technician: [id number]  :        1995                   Requested By:EHSAN ROBERTS  Order #:    [id number]                    Reading MD: Ehsan Roberts MD    Measurements  Intervals                                Axis  Rate:       103                          P:          70  NE:         117                          QRS:        72  QRSD:       98                           T:          74  QT:         404  QTc:        529    Interpretive Statements  Sinus tachycardia  Ventricular premature complex  Biatrial enlargement  Prolonged QT interval  Compared to ECG 2021 09:36:52  Ventricular premature complex(es) now present  Atrial abnormality now present  Sinus rhythm no longer present  QT long  No STEMI  Electronically Signed On 2021 19:08:19 P ST by Ehsan Roberts MD              Imaging:   Abdominal CT: No ileus or obstruction; unremarkable.    Previous Data Review: reviewed    Problem Representation:   26-year-old male who presented on 2021 with a past medical history of polysubstance abuse (Xanax, oxycodone, marijuana), gastritis (positive for H. Pylori), abdominal vasospasm, comes to the ED on 2021 for abdominal pain and nausea and vomiting for the last 3 days. On IV fluids; monitoring I's and O's. On Dilaudid for pain management. Pending three-view abdominal series for further evaluation of abdominal pain.    * Abdominal pain- (present  on admission)  Assessment & Plan  Differential diagnoses include ileus versus substance abuse versus abdominal vasospasm (abdominal migraine) vs cannabis induced cyclic vomiting.  Cannabis hyperemesis syndrome unlikely since patient denies taking hot showers to ease their nausea.  Abdominal CT was reported unremarkable but upon further inspection he does have dilated loops of air filled bowel.  -On ceftriaxone and metronidazole for prophylaxis  -3 view abdominal series x-ray: Pending  -Clear liquid diet  -Dilaudid for pain management    Nausea & vomiting- (present on admission)  Assessment & Plan  Nausea and vomiting for the last 3 days; at times remarks coffee-ground like vomitus with and without blood streaks.  -Was given metoclopramide and Dilaudid in the ED  -Continue Dilaudid    Hyperglycemia  Assessment & Plan  Most likely stress induced although patient has had hyperglycemia in the past. Potentially new onset DM?  -A1c: pending  -C-peptide: pending  -B-hydroxybuterate: pending    ERIN (acute kidney injury) (HCC)  Assessment & Plan  Most likely associated with dehydration secondary to vomiting.  BUN/CR: 39/1.7 (baseline 14/0.7)  -Continuous normal saline infusion  -Normal saline boluses as needed  -Daily weights  -Monitor I's and O's    GERD (gastroesophageal reflux disease)- (present on admission)  Assessment & Plan  History of; denies epigastric pain at this time  -On IV pantoprazole (patient cannot tolerate orals at this time)  -On GI cocktail     Lactic acidosis- (present on admission)  Assessment & Plan  Most likely due to dehydration versus thiamine deficiency  -We will continue to trend  -Thiamine for malnutrition  -Beta hydroxybutyrate for starvation ketosis: Pending    Hypokalemia  Assessment & Plan  Most likely secondary to vomiting versus alcohol abuse (even though patient denies it at this time)  -Repeat potassium as needed  -Trend with BMP  -Blood alcohol: Pending    Scoliosis  Assessment &  Plan  S/P internal fixation with metal rods in 2014. Patient cites residual left lower extremity weakness    Leukocytosis  Assessment & Plan  Has past history of elevated WBCs in the setting of abdominal pain, nausea and vomiting. Currently no source of infection but covering potential GI source with ceftriaxone and metronidazole  -We will trend CBC    Polysubstance abuse ([facility])  Assessment & Plan  States he started using Xanax approximately 2 months ago and oxycodone 1 month ago; buys it from the street. Upon further investigation, past progress notes indicates that he has been using for the last several years. Physical examination demonstrated pinpoint pupils.  -Counseled on cessation.    SIRS (systemic inflammatory response syndrome) ([facility])  Assessment & Plan  SIRS criteria identified on my evaluation include:  Tachycardia, with heart rate greater than 90 BPM and Leukocytosis, with WBC greater than 12,000  SIRS is non-infectious, the patient does not have sepsis.  More likely to be a response from his polysubstance abuse.  -On ceftriaxone and metronidazole to cover potential infection, although unlikely    Marijuana use- (present on admission)  Assessment & Plan  Chronic use since 16-year-old. Does not appear to have cannabis hyperemesis since he does not take multiple hot showers to soothe his nausea.  -Counseled on marijuana cessation

## 2021-11-11 ENCOUNTER — APPOINTMENT (OUTPATIENT)
Dept: RADIOLOGY | Facility: MEDICAL CENTER | Age: 26
DRG: 897 | End: 2021-11-11
Attending: STUDENT IN AN ORGANIZED HEALTH CARE EDUCATION/TRAINING PROGRAM

## 2021-11-11 VITALS
BODY MASS INDEX: 20.08 KG/M2 | SYSTOLIC BLOOD PRESSURE: 138 MMHG | TEMPERATURE: 99.1 F | RESPIRATION RATE: 18 BRPM | DIASTOLIC BLOOD PRESSURE: 72 MMHG | WEIGHT: 132.5 LBS | HEIGHT: 68 IN | OXYGEN SATURATION: 97 % | HEART RATE: 67 BPM

## 2021-11-11 PROBLEM — N17.9 AKI (ACUTE KIDNEY INJURY) (HCC): Status: RESOLVED | Noted: 2021-11-09 | Resolved: 2021-11-11

## 2021-11-11 PROBLEM — R65.10 SIRS (SYSTEMIC INFLAMMATORY RESPONSE SYNDROME) (HCC): Status: RESOLVED | Noted: 2021-11-09 | Resolved: 2021-11-11

## 2021-11-11 LAB
ALBUMIN SERPL BCP-MCNC: 4.3 G/DL (ref 3.2–4.9)
ALBUMIN/GLOB SERPL: 1.5 G/DL
ALP SERPL-CCNC: 54 U/L (ref 30–99)
ALT SERPL-CCNC: 12 U/L (ref 2–50)
ANION GAP SERPL CALC-SCNC: 10 MMOL/L (ref 7–16)
AST SERPL-CCNC: 22 U/L (ref 12–45)
BASOPHILS # BLD AUTO: 0.2 % (ref 0–1.8)
BASOPHILS # BLD: 0.02 K/UL (ref 0–0.12)
BILIRUB SERPL-MCNC: 0.7 MG/DL (ref 0.1–1.5)
BUN SERPL-MCNC: 13 MG/DL (ref 8–22)
C PEPTIDE SERPL-MCNC: 3.2 NG/ML (ref 0.5–3.3)
CALCIUM SERPL-MCNC: 9.1 MG/DL (ref 8.5–10.5)
CHLORIDE SERPL-SCNC: 107 MMOL/L (ref 96–112)
CO2 SERPL-SCNC: 20 MMOL/L (ref 20–33)
CREAT SERPL-MCNC: 0.58 MG/DL (ref 0.5–1.4)
EOSINOPHIL # BLD AUTO: 0 K/UL (ref 0–0.51)
EOSINOPHIL NFR BLD: 0 % (ref 0–6.9)
ERYTHROCYTE [DISTWIDTH] IN BLOOD BY AUTOMATED COUNT: 39.1 FL (ref 35.9–50)
GLOBULIN SER CALC-MCNC: 2.8 G/DL (ref 1.9–3.5)
GLUCOSE SERPL-MCNC: 122 MG/DL (ref 65–99)
HCT VFR BLD AUTO: 42.6 % (ref 42–52)
HGB BLD-MCNC: 14.4 G/DL (ref 14–18)
IMM GRANULOCYTES # BLD AUTO: 0.05 K/UL (ref 0–0.11)
IMM GRANULOCYTES NFR BLD AUTO: 0.4 % (ref 0–0.9)
LYMPHOCYTES # BLD AUTO: 1.82 K/UL (ref 1–4.8)
LYMPHOCYTES NFR BLD: 14.7 % (ref 22–41)
MAGNESIUM SERPL-MCNC: 2.2 MG/DL (ref 1.5–2.5)
MCH RBC QN AUTO: 28.8 PG (ref 27–33)
MCHC RBC AUTO-ENTMCNC: 33.8 G/DL (ref 33.7–35.3)
MCV RBC AUTO: 85.2 FL (ref 81.4–97.8)
MONOCYTES # BLD AUTO: 1.43 K/UL (ref 0–0.85)
MONOCYTES NFR BLD AUTO: 11.6 % (ref 0–13.4)
NEUTROPHILS # BLD AUTO: 9.06 K/UL (ref 1.82–7.42)
NEUTROPHILS NFR BLD: 73.1 % (ref 44–72)
NRBC # BLD AUTO: 0 K/UL
NRBC BLD-RTO: 0 /100 WBC
PLATELET # BLD AUTO: 217 K/UL (ref 164–446)
PMV BLD AUTO: 11.5 FL (ref 9–12.9)
POTASSIUM SERPL-SCNC: 3.8 MMOL/L (ref 3.6–5.5)
PROT SERPL-MCNC: 7.1 G/DL (ref 6–8.2)
RBC # BLD AUTO: 5 M/UL (ref 4.7–6.1)
SODIUM SERPL-SCNC: 137 MMOL/L (ref 135–145)
WBC # BLD AUTO: 12.4 K/UL (ref 4.8–10.8)

## 2021-11-11 PROCEDURE — C9113 INJ PANTOPRAZOLE SODIUM, VIA: HCPCS | Performed by: STUDENT IN AN ORGANIZED HEALTH CARE EDUCATION/TRAINING PROGRAM

## 2021-11-11 PROCEDURE — 80053 COMPREHEN METABOLIC PANEL: CPT

## 2021-11-11 PROCEDURE — 700101 HCHG RX REV CODE 250: Performed by: STUDENT IN AN ORGANIZED HEALTH CARE EDUCATION/TRAINING PROGRAM

## 2021-11-11 PROCEDURE — 83735 ASSAY OF MAGNESIUM: CPT

## 2021-11-11 PROCEDURE — 85025 COMPLETE CBC W/AUTO DIFF WBC: CPT

## 2021-11-11 PROCEDURE — 99239 HOSP IP/OBS DSCHRG MGMT >30: CPT | Mod: GC | Performed by: HOSPITALIST

## 2021-11-11 PROCEDURE — 36415 COLL VENOUS BLD VENIPUNCTURE: CPT

## 2021-11-11 PROCEDURE — 700111 HCHG RX REV CODE 636 W/ 250 OVERRIDE (IP): Performed by: STUDENT IN AN ORGANIZED HEALTH CARE EDUCATION/TRAINING PROGRAM

## 2021-11-11 PROCEDURE — 700102 HCHG RX REV CODE 250 W/ 637 OVERRIDE(OP): Performed by: STUDENT IN AN ORGANIZED HEALTH CARE EDUCATION/TRAINING PROGRAM

## 2021-11-11 PROCEDURE — A9270 NON-COVERED ITEM OR SERVICE: HCPCS | Performed by: STUDENT IN AN ORGANIZED HEALTH CARE EDUCATION/TRAINING PROGRAM

## 2021-11-11 RX ORDER — SIMETHICONE 80 MG
80 TABLET,CHEWABLE ORAL 3 TIMES DAILY PRN
Qty: 30 TABLET | Refills: 3 | Status: SHIPPED | OUTPATIENT
Start: 2021-11-11 | End: 2021-11-21

## 2021-11-11 RX ORDER — OMEPRAZOLE
40 KIT
Qty: 300 ML | Refills: 0 | Status: SHIPPED | OUTPATIENT
Start: 2021-11-11 | End: 2022-01-14

## 2021-11-11 RX ADMIN — OXYCODONE 5 MG: 5 TABLET ORAL at 08:18

## 2021-11-11 RX ADMIN — METRONIDAZOLE 500 MG: 500 INJECTION, SOLUTION INTRAVENOUS at 05:42

## 2021-11-11 RX ADMIN — PANTOPRAZOLE SODIUM 40 MG: 40 INJECTION, POWDER, LYOPHILIZED, FOR SOLUTION INTRAVENOUS at 05:44

## 2021-11-11 RX ADMIN — OXYCODONE 5 MG: 5 TABLET ORAL at 01:09

## 2021-11-11 RX ADMIN — POTASSIUM CHLORIDE, DEXTROSE MONOHYDRATE AND SODIUM CHLORIDE: 150; 5; 900 INJECTION, SOLUTION INTRAVENOUS at 08:21

## 2021-11-11 ASSESSMENT — PAIN DESCRIPTION - PAIN TYPE
TYPE: ACUTE PAIN

## 2021-11-11 NOTE — DISCHARGE INSTRUCTIONS
Discharge Instructions        Discharged to home by car with friend. Discharged via wheelchair, hospital escort: Refused.  Special equipment needed: Not Applicable    Follow up with suboxone clinic ASAP    Contact PCP about GI referral    Be sure to schedule a follow-up appointment with your primary care doctor or any specialists as instructed.     Discharge Plan:   Diet Plan: Discussed  Activity Level: Discussed  Confirmed Follow up Appointment: Appointment Scheduled  Confirmed Symptoms Management: Discussed  Medication Reconciliation Updated: Yes  Influenza Vaccine Indication: Indicated: 9 to 64 years of age    I understand that a diet low in cholesterol, fat, and sodium is recommended for good health. Unless I have been given specific instructions below for another diet, I accept this instruction as my diet prescription.   Other diet: Regular    Special Instructions: None    · Is patient discharged on Warfarin / Coumadin?   No     Depression / Suicide Risk    As you are discharged from this [facility], it is important to learn how to keep safe from harming yourself.    Recognize the warning signs:  · Abrupt changes in personality, positive or negative- including increase in energy   · Giving away possessions  · Change in eating patterns- significant weight changes-  positive or negative  · Change in sleeping patterns- unable to sleep or sleeping all the time   · Unwillingness or inability to communicate  · Depression  · Unusual sadness, discouragement and loneliness  · Talk of wanting to die  · Neglect of personal appearance   · Rebelliousness- reckless behavior  · Withdrawal from people/activities they love  · Confusion- inability to concentrate     If you or a loved one observes any of these behaviors or has concerns about self-harm, here's what you can do:  · Talk about it- your feelings and reasons for harming yourself  · Remove any means that you might use to hurt yourself (examples: pills, rope,  extension cords, firearm)  · Get professional help from the community (Mental Health, Substance Abuse, psychological counseling)  · Do not be alone:Call your Safe Contact- someone whom you trust who will be there for you.  · Call your local CRISIS HOTLINE 899-9657 or 962-894-4879  · Call your local Children's Mobile Crisis Response Team [place] (318) 889-0356 or www.Qitio  · Call the toll free National Suicide Prevention Hotlines   · National Suicide Prevention Lifeline 377-269-ZCXS (7725)  · National Hope Line Network 800-SUICIDE (428-8989)    Substance Abuse  Your exam indicates that you have a problem with substance abuse. Substance abuse is the misuse of alcohol or drugs that causes problems in family life, friendships, and work relationships. Substance abuse is the most important cause of premature illness, disability, and death in our society. It is also the greatest threat to a person's mental and spiritual well being.  Substance abuse can start out in an innocent way, such as social drinking or taking a little extra medication prescribed by your doctor. No one starts out with the intention of becoming an alcoholic or an addict. Substance abuse victims cannot control their use of alcohol or drugs. They may become intoxicated daily or go on weekend binges. Often there is a strong desire to quit, but attempts to stop using often fail. Encounters with law enforcement or conflicts with family members, friends, and work associates are signs of a potential problem.  Recovery is always possible, although the craving for some drugs makes it difficult to quit without assistance. Many treatment programs are available to help people stop abusing alcohol or drugs. The first step in treatment is to admit you have a problem. This is a major navin because denial is a powerful force with substance abuse.  Alcoholics Anonymous, Narcotics Anonymous, Cocaine Anonymous, and other recovery groups and programs  can be very useful in helping people to quit. If you do not feel okay about your drug or alcohol use and if it is causing you trouble, we want to encourage you to talk about it with your doctor or with someone from a recovery group who can help you. You could also call the National [employer] on Drug Abuse at 9-195-601-JBYG. It is up to you to take the first step.  AL-ANON and ALA-TEEN are support groups for friends and family members of an alcohol or drug dependent person. The people who love and care for the alcoholic or addicted person often need help, too. For information about these organizations, check your phone directory or call a local alcohol or drug treatment center.  Document Released: 01/25/2006 Document Revised: 03/11/2013 Document Reviewed: 12/19/2009  ExitCare® Patient Information ©2014 [employer].      Substance Use Disorder  Substance use disorder occurs when a person's repeated use of drugs or alcohol interferes with his or her ability to be productive. This disorder can cause problems with mental and physical health. It can affect your ability to have healthy relationships, and it can keep you from being able to meet your responsibilities at work, home, or school. It can also lead to addiction, which is a condition in which the person cannot stop using the substance consistently for a period of time.  Addiction changes the way the brain works. Because of these changes, addiction is a chronic condition. Substance use disorder can be mild, moderate, or severe.  The most commonly abused substances include:  · Alcohol.  · Tobacco.  · Marijuana.  · Stimulants, such as cocaine and methamphetamine.  · Hallucinogens, such as LSD and PCP.  · Opioids, such as some prescription pain medicines and heroin.  What are the causes?  This condition may develop due to many complex social, psychological, or physical reasons, such as:  · Stress.  · Abuse.  · Peer pressure.  · Anxiety or depression.  What increases  the risk?  This condition is more likely to develop in people who:  · Use substances to cope with stress.  · Have been abused.  · Have a mental health disorder, such as depression.  · Have a family history of substance use disorder.  What are the signs or symptoms?  Symptoms of this condition include:  · Using the substance for longer periods of time or at a higher dosage than what is normal or intended.  · Having a lasting desire to use the substance.  · Being unable to slow down or stop the use of the substance.  · Spending an abnormal amount of time getting the substance, using the substance, or recovering from using the substance.  · Using the substance in a way that interferes with work, school, social activities, and personal relationships.  · Using the substance even after having negative consequences, such as:  ? Health problems.  ? Legal or financial troubles.  ? Job loss.  ? Relationship problems.  · Needing more and more of the substance to get the same effect (developing tolerance).  · Experiencing unpleasant symptoms if you do not use the substance (withdrawal).  · Using the substance to avoid withdrawal symptoms.  How is this diagnosed?  This condition may be diagnosed based on:  · A physical exam.  · Your history of substance use.  · Your symptoms. This includes:  ? How substance use affects your life.  ? Changes in personality, behaviors, and mood.  ? Having at least two symptoms of substance use disorder within a 12-month period.  ? Health issues related to substance use, such as liver damage, shortness of breath, fatigue, cough, or heart problems.  · Blood or urine tests to screen for alcohol and drugs.  How is this treated?  This condition may be treated by:  · Stopping substance use safely. This may require taking medicines and being closely monitored for several days.  · Taking part in group and individual counseling from mental health providers who help people with substance use  disorder.  · Staying at a live-in (residential) treatment center for several days or weeks.  · Attending daily counseling sessions at a treatment center.  · Taking medicine as told by your health care provider:  ? To ease symptoms and prevent complications during withdrawal.  ? To treat other mental health issues, such as depression or anxiety.  ? To block cravings by causing the same effects as the substance.  ? To block the effects of the substance or replace good sensations with unpleasant ones.  · Participating in a support group to share your experience with others who are going through the same thing. These groups are an important part of long-term recovery for many people.  Recovery can be a long process. Many people who undergo treatment start using the substance again after stopping (relapse). If you relapse, that does not mean that treatment will not work.  Follow these instructions at home:    · Take over-the-counter and prescription medicines only as told by your health care provider.  · Do not use any drugs or alcohol.  · Avoid temptations or triggers that you associate with your use of the substance.  · Learn and practice techniques for managing stress.  · Have a plan for vulnerable moments. Get phone numbers of people who are willing to help and who are committed to your recovery.  · Attend support groups on a regular basis. These groups include 12-step programs like Alcoholics Anonymous and Narcotics Anonymous.  · Keep all follow-up visits as told by your health care providers. This is important. This includes continuing to work with therapists and support groups.  Contact a health care provider if:  · You cannot take your medicines as told.  · Your symptoms get worse.  · You have trouble resisting the urge to use drugs or alcohol.  Get help right away if you:  · Relapse.  · Think that you may have taken too much of a drug. The hotline of the National Poison Control Center is (428) 137-5949.  · Have  signs of an overdose. Symptoms include:  ? Chest pain.  ? Confusion.  ? Sleepiness or difficulty staying awake.  ? Slowed breathing.  ? Nausea or vomiting.  ? A seizure.  · Have serious thoughts about hurting yourself or someone else.  Drug overdose is an emergency. Do not wait to see if the symptoms will go away. Get medical help right away. Call your local emergency services (911 in the [place] Do not drive yourself to the hospital.  If you ever feel like you may hurt yourself or others, or have thoughts about taking your own life, get help right away. You can go to your nearest emergency department or call:  · Your local emergency services (911 in the [place]  · A suicide crisis helpline, such as the National Suicide Prevention Lifeline at 1-599.417.9608. This is open 24 hours a day.  Summary  · Substance use disorder occurs when a person's repeated use of drugs or alcohol interferes with his or her ability to be productive.  · Taking part in group and individual counseling from mental health providers is a common treatment for people with substance use disorder.  · Recovery can be a long process. Many people who undergo treatment start using the substance again after stopping (relapse). A relapse does not mean that treatment will not work.  · Attend support groups such as Alcoholics Anonymous and Narcotics Anonymous. These groups are an important part of long-term recovery for many people.  This information is not intended to replace advice given to you by your health care provider. Make sure you discuss any questions you have with your health care provider.  Document Released: 08/09/2006 Document Revised: 04/09/2020 Document Reviewed: 01/29/2019  Elsevier Patient Education © 2020 Elsevier Inc.      Finding Treatment for Addiction  Addiction is a complex disease of the brain that causes an uncontrollable (compulsive) need for:  · A substance. This includes alcohol, illegal drugs, or prescription medicines, such as  painkillers.  · An activity or behavior, such as gambling or shopping.  Addiction changes the way your brain works. Because of this change:  · The need for the medicine, drug, or activity can become so strong that you think about it all the time.  · Getting more and more of your addiction becomes the most important thing to you.  · You may find yourself leaving other activities and relationships to pursue your addiction.  · You can become physically dependent on a substance.  · Your health, behavior, emotions, and relationships can change for the worse.  How do I know if I need treatment for addiction?  Addiction is a progressive disease. Without treatment, addiction can get worse. Living with addiction puts you at higher risk for injury, poor health, loss of employment, loss of money, and even death.  You might need treatment for addiction if:  · You have tried to stop or cut down, but you have not succeeded.  · You find it annoying that your friends and family are concerned about your use or behavior.  · You feel guilty about your use or behavior.  · You need a particular substance or activity to start your day or to calm down.  · You are running out of money because of your addiction.  · You have done something illegal to support your addiction.  · Your addiction has caused you:  ? Health problems.  ? Trouble in school, work, home, or with the police.  ? To devote all your time to your addiction, and not to other responsibilities.  ? To tell lies in order to hide your problem.  What types of treatment are available?  There may be options for treatment programs and plans based on your addiction, condition, needs, and preferences. No single treatment is right for everyone.  · Treatment programs can be:  ? Outpatient. You live at home and go to work or school, but you go to a clinic for treatment.  ? Inpatient. You live and sleep at the program facility during treatment.  · Programs may include:  ? Medicine. You may  need medicine to treat the addiction itself, or to treat anxiety or depression.  ? Counseling and behavior therapy. This can help individuals and families behave in healthier ways and relate more effectively.  ? Support groups. Confidential group therapy, such as a 12-step program, can help individuals and families during treatment and recovery.  ? A combination of education, counseling, and a 12-step, spirituality-based approach.  What should I consider when selecting a treatment program?  Think about your individual requirements when selecting a treatment program. Ask about:  · The overall approach to treatment.  ? Some programs are strictly 12-step programs. Some have a more flexible approach.  ? Programs may differ in length of stay, setting, and size.  ? Some programs include your family in your treatment plan. Support may be offered to them throughout the treatment process, as well as instructions for them when you are discharged.  ? You may continue to receive support after you have left the program.  · The types of medical services that are offered. Find out if the program:  ? Offers specific treatment for your particular addiction.  ? Meets all of your needs, including physical and cultural needs.  ? Includes any medicines you might need.  ? Offers mental health counseling as part of your treatment.  ? Offers the 12-step meetings at the center, or if transport is available for patients to attend meetings at other locations.  · The cost and types of insurance that are accepted.  ? Some programs are sponsored by the government. They support patients who do not have private insurance.  ? If you do not have insurance, or if you choose to attend a program that does not accept your insurance, call the treatment center. Tell them your financial needs and whether a payment plan can be set up.  ? There are also organizations that will help you find the resources for treatment. You can find them online by searching  "\"treatment for addiction.\"  · If the program is certified by the appropriate government agency.  Where to find support  · Your health care provider can help you to find the right treatment. These discussions are confidential.  · The National [employer] on Alcoholism and Drug Dependence (NCADD). This group has information about treatment centers and programs for people who have an addiction and for family members.  ? Call: 5-929-ATZ-CALL (1-934.522.3462).  ? Visit the website: https://www.ncadd.org/  · The Substance Abuse and Mental Health Services Administration (SAMA). This organization will help you find publicly funded treatment centers, help hotlines, and counseling services near you.  ? Call: 9-540-084-HELP (1-407.193.2873).  ? Visit the website: www.findtreatment.sama.gov  · The National Problem Gambling Helpline. This is a 24-hour confidential helpline for gambling addiction.  ? Call: 2-405-625-0640  ? Visit the website: https://www.[facility].org/  In countries outside of the [place] and [place], look in local directories for contact information for services in your area.  Follow these instructions at home:  · Find supportive people who will help you stay away from your addiction and stay sober.  · Do not use the substance or engage in the activity.  · If you have been through treatment:  ? Follow your plan. The plan is usually developed by you and your health care provider during treatment.  ? Go to meetings with other people in recovery.  ? Avoid people, situations, and things that lead you to do the things you are addicted to (triggers).  Summary  · Addiction changes the way your brain works. These changes cause a desire to repeat and increase the use of the a substance or behavior.  · Addiction is a progressive disease. Without treatment, addiction can get worse. Living with addiction puts you at higher risk for injury, poor health, loss of employment, loss of money, and even death.  · There may be options " for treatment programs and plans based on your addiction, condition, needs, and preferences. No single treatment is right for everyone.  · Your health care provider can help you to find the right treatment. These discussions are confidential.  This information is not intended to replace advice given to you by your health care provider. Make sure you discuss any questions you have with your health care provider.  Document Released: 11/16/2006 Document Revised: 01/16/2019 Document Reviewed: 01/16/2019  Elsevier Patient Education © 2020 Elsevier Inc.

## 2021-11-11 NOTE — PROGRESS NOTES
Pt discharged home, leaving the unit with family, walking independently with no complications. Pt verbally acknowledges all discharge instructions, medications, and medications regimen. Pt gathered all personal belongings, Tele box and IV have been removed. All questions and needs have been met at this time. Pt has request for physicians note for work, physician notified; pt stating he cannot wait and needs to leave and asked to be notified when work note from physician was available and he will return to pick it up.

## 2021-11-11 NOTE — PROGRESS NOTES
Pt stating he is going to leave AMA d/t not receiving more pain meds. RN called MD Loo to bedside. MD discussed benefits of continuing hospital stay. Orders for GI cocktail and Protonix and one time dose of Dilaudid 2mg if pain persists for one hour. Pt refused GI cocktail. MD gave ok to give one time dose of dilaudid and continuing medicating based on Oxy prn.

## 2021-11-11 NOTE — PROGRESS NOTES
Assumed care of patient at bedside report from Lindy MATOS. Updated on POC. Call light within reach. Whiteboard updated. Fall precautions in place. Bed locked and in lowest position. All questions answered. No other needs indicated at this time.    Crisis charting performed, Covid-19 surge

## 2021-11-11 NOTE — DISCHARGE SUMMARY
Discharge Summary    CHIEF COMPLAINT ON ADMISSION  Chief Complaint   Patient presents with   • N/V   • Abdominal Pain       Reason for Admission  abd pain     Admission Date  11/9/2021    CODE STATUS  Full Code    HPI & HOSPITAL COURSE  This is a 26 y.o. male here with   Gilbert Fair is a 26-year-old male past medical history of scoliosis status post surgery with implantation of hardware with residual left lower extremity weakness who has had multiple hospitalizations for episodes of periumbilical pain and vomiting.  He presented on 11/9/2021 to the ED for recurrent episodes of coffee ground vomiting and constant periumbilical and epigastric pain. He was hospitalized multiple times for similar symptoms. Patient admits to having used oxycodone over the past 2 months when confronted with positive UDS and he reports occasional marijuana use.    On admission he was  normotensive blood pressure 128/84, pulse 110 to 140, afebrile.  Labs notable for WBC 15.7 with elevated neutrophil and monocytes sodium 134, potassium 3.3, chloride 84, bicarb 27, anion gap 23, glucose 277, BUN 39, creatinine 1.7, lactic acid 3.6, will lipase 16, magnesium 2.1.  Alcohol levels negative, UDS positive for cannabinoids, opiates, oxycodone.  EKG on admission  notable for prolonged QTC of 526, atrial enlargement, abnormal R wave progression. He was admitted for metabolic derangements secondary to illicit opioid use and uncontrolled abdominal pain.      Physical exam  General patient walking occasionally wincing  HEENT: Moist oral mucosa no oral lesions, no epistaxis  Skin: Several excoriations on forehead  Cardiovascular: Sinus tachycardia no murmurs on auscultation  Lungs: Clear in all lung fields no wheezing no rales  Abdomen: No rebound no guarding, no distention,  mild epigastric and periumbilical tenderness with palpation  Extremities: No cyanosis no edema        Epigastric and periumbilical abdominal pain and coffee-ground emesis  UDS  positive for cannabinoids however patient denied feeling better under hot water and per chart review these episodes began around age 10, therefore cannabis induced cyclic vomiting as a possible diagnosis however other differentials must also be considered.  Patient did report taking opioids for 1 month and ran out the day prior to admission.  Opiate-induced ileus seen with dilated bowel loops on CT versus opioid withdrawal may also explain current presentation.  Patient does report having a history of GERD with EGD in the past revealing gastritis and positive H. pylori in 2014.  Labs also notable for hyperglycemia, which has been a pattern over multiple admission however this may be stress-induced uncertain whether this is partial pancreatic endocrine insufficiency.  It is uncertain whether patient may have a form of diabetic gastroparesis.  He did present with leukocytosis on admission and tachycardia and so infectious gastroenteritis is also possible.  He was initiated on ceftriaxone and Flagyl for this. He was also started on IV Protonix 40 mg daily in addition to GI cocktail. Anti-tTG,  fecal lactoferrin were obtained  to evaluate for celiac or IBD or infectious colitis.  H. pylori stool antigen was unable to be obtained as patient did not have bowel movement during his stay.  He was initially treated with as needed Dilaudid for uncontrolled abdominal pain.  X-ray on admission revealed some mild ileus however patient was able to pass gas and there is no need for NG tube placement.  He was placed on a clear liquid diet and pain was treated with as needed Dilaudid.  Repeat abdominal x-ray on 11/10/2021 revealed resolution of ileus. It was suspected that he may have been given her additional narcotics bibasilar on 11/10/2021. Patient stated that that GI cocktail and Protonix were not effective in relieving his abdominal pain and threatened to leave AMA without Dilaudid.  It was explained to him that he would receive  a single dose of Dilaudid and not discharged with any narcotics.  On 11/11 patient continued to endorse abdominal pain however it did improve and abdominal exam was benign with no rebound, guarding, and normal bowel sounds.     Sepsis  Heart rate over 100, white blood cell count over 12,000 on admission.  Possible source is gastroenteritis.   No signs of bowel inflammation or hepatobiliary on CT abdomen.  LFTs within normal limits  Patient initiated on ceftriaxone and Flagyl for possible underlying infection from 11/9-11/11.  Patient remained afebrile during his admission leukocytosis is resolving.  It was determined that this was likely secondary to leukemoid reaction in the setting of acute drug intoxication followed by withdrawal.     Acute kidney injury  BUN 39, creatinine 1.7, baseline creatinine 0.8  Renal function improved following aggressive hydration     Mixed acid-base disturbance  Anion gap acidosis with anion gap of 23, with bicarb of 27.  Lactic acid elevated on admission at 3.6 likely secondary to recurrent vomiting.  Metabolic alkalosis likely secondary to recurrent vomiting and dehydration.  CT with no signs of ischemic bowel, lipase within normal limits, renal function improving  Lactic acidosis resolved following aggressive fluid repletion.  Urinalysis notable for trace ketones. beta hydroxybutyrate mildly elevated at 0.5, urine and serum osmolarity are within normal limits.  Metabolic acidosis was likely secondary to opioid intoxication. HCO3 was 20 upon day of discharge     Hyperglycemia  Possibly secondary to acute stress response however patient has been hyperglycemic in the past raising suspicion for new onset diabetes  A1c 5.1 beta hydroxybutyrate mildly elevated at 0.5, lipase 16 on admission  Glucose remained elevated around 122, likely secondary to acute stress response  C-peptide levels were obtained and pending  Recommend further workup outpatient in setting outside of stress.       Hypokalemia  Likely secondary to GI losses and dehydration  Resolved and remained stable 3.8-3.9 upon discharge     Long QTC  Likely in the setting of hypokalemia and opioid use  Patient admitted to telemetry  Counseled patient on cessation of opioids.      Scoliosis status post surgery with internal fixation  Patient reports residual left lower extremity weakness     Opioid abuse and dependence  Patient admitted to obtaining M30 opioid pills from the street and having ingested 1 prior to admission.  He typically takes about 1/day in addition to routine use of oxycodone  Patient appeared to be undergoing some degree of opioid withdrawal inpatient. He was counseled to stop using opioids due to high risk of death secondary to overdose and tainted substances in illicit narcotics. Case management was contacted and he was provided with resources for Suboxone programs. Patient is currently in the process of obtaining medicaid approval.      Depression, anxiety  Patient reports depression and anxiety for several years and was on Xanax in the past       No notes on file    Therefore, he is discharged in fair and stable condition to home with close outpatient follow-up.    The patient met 2-midnight criteria for an inpatient stay at the time of discharge.    Discharge Date  11/11/2021    FOLLOW UP ITEMS POST DISCHARGE  Follow up with PCP in 1 to 2 weeks to recheck blood glucose levels, and request outpatient GI consult after insurance is approved.  May consider obtaining H. pylori stool antigen after being off of PPI for 2 weeks  Recommend establishing with a Suboxone program as soon as possible     DISCHARGE DIAGNOSES  Principal Problem:    Metabolic acidosis with increased anion gap and accumulation of organic acids POA: Unknown  Active Problems:    Abdominal pain POA: Yes    Hypokalemia POA: Unknown    Nausea & vomiting POA: Yes    GERD (gastroesophageal reflux disease) (Chronic) POA: Yes    Marijuana use (Chronic)  "POA: Yes    ERIN (acute kidney injury) (HCC) POA: Unknown    SIRS (systemic inflammatory response syndrome) (HCC) POA: Unknown    Polysubstance abuse (HCC) POA: Unknown    Hyperglycemia POA: Unknown    Scoliosis POA: Unknown  Resolved Problems:    * No resolved hospital problems. *      FOLLOW UP  No future appointments.  No follow-up provider specified.    MEDICATIONS ON DISCHARGE     Medication List      You have not been prescribed any medications.         Allergies  Allergies   Allergen Reactions   • Acetaminophen [Tylenol] Anaphylaxis and Nausea     Nausea, \"it just doesn't sit right\"  5/29/2021 patient states that his throat swells shut       DIET  Orders Placed This Encounter   Procedures   • Diet Order Diet: Clear Liquid     Standing Status:   Standing     Number of Occurrences:   1     Order Specific Question:   Diet:     Answer:   Clear Liquid [10]       ACTIVITY  As tolerated.  Weight bearing as tolerated    CONSULTATIONS  NA    PROCEDURES  NA    LABORATORY  Lab Results   Component Value Date    SODIUM 136 11/10/2021    POTASSIUM 3.9 11/10/2021    CHLORIDE 102 11/10/2021    CO2 23 11/10/2021    GLUCOSE 120 (H) 11/10/2021    BUN 19 11/10/2021    CREATININE 0.77 11/10/2021    CREATININE 0.4 04/10/2006        Lab Results   Component Value Date    WBC 16.7 (H) 11/10/2021    HEMOGLOBIN 14.4 11/10/2021    HEMATOCRIT 42.2 11/10/2021    PLATELETCT 246 11/10/2021        Total time of the discharge process  was 40 minutes.  "

## 2021-11-12 LAB — TTG IGA SER IA-ACNC: <2 U/ML (ref 0–3)

## 2022-01-14 ENCOUNTER — APPOINTMENT (OUTPATIENT)
Dept: RADIOLOGY | Facility: MEDICAL CENTER | Age: 27
DRG: 894 | End: 2022-01-14
Attending: EMERGENCY MEDICINE
Payer: MEDICAID

## 2022-01-14 ENCOUNTER — HOSPITAL ENCOUNTER (EMERGENCY)
Facility: MEDICAL CENTER | Age: 27
DRG: 894 | End: 2022-01-14
Attending: EMERGENCY MEDICINE | Admitting: INTERNAL MEDICINE
Payer: MEDICAID

## 2022-01-14 VITALS
RESPIRATION RATE: 20 BRPM | HEIGHT: 66 IN | OXYGEN SATURATION: 98 % | BODY MASS INDEX: 24.91 KG/M2 | SYSTOLIC BLOOD PRESSURE: 149 MMHG | DIASTOLIC BLOOD PRESSURE: 98 MMHG | WEIGHT: 155 LBS | TEMPERATURE: 96.4 F | HEART RATE: 112 BPM

## 2022-01-14 DIAGNOSIS — G24.02 DYSTONIC DRUG REACTION: ICD-10-CM

## 2022-01-14 DIAGNOSIS — D72.829 LEUKOCYTOSIS, UNSPECIFIED TYPE: ICD-10-CM

## 2022-01-14 DIAGNOSIS — N17.9 AKI (ACUTE KIDNEY INJURY) (HCC): ICD-10-CM

## 2022-01-14 DIAGNOSIS — R74.8 ELEVATED CK: ICD-10-CM

## 2022-01-14 DIAGNOSIS — E87.20 LACTIC ACIDOSIS: ICD-10-CM

## 2022-01-14 DIAGNOSIS — E86.0 DEHYDRATION: ICD-10-CM

## 2022-01-14 PROBLEM — F11.93 OPIATE WITHDRAWAL (HCC): Status: ACTIVE | Noted: 2022-01-14

## 2022-01-14 LAB
ALBUMIN SERPL BCP-MCNC: 4.1 G/DL (ref 3.2–4.9)
ALBUMIN/GLOB SERPL: 1.2 G/DL
ALP SERPL-CCNC: 73 U/L (ref 30–99)
ALT SERPL-CCNC: 37 U/L (ref 2–50)
AMPHET UR QL SCN: NEGATIVE
ANION GAP SERPL CALC-SCNC: 19 MMOL/L (ref 7–16)
ANISOCYTOSIS BLD QL SMEAR: ABNORMAL
APPEARANCE UR: CLEAR
AST SERPL-CCNC: 56 U/L (ref 12–45)
BARBITURATES UR QL SCN: NEGATIVE
BASOPHILS # BLD AUTO: 0.9 % (ref 0–1.8)
BASOPHILS # BLD: 0.19 K/UL (ref 0–0.12)
BENZODIAZ UR QL SCN: POSITIVE
BILIRUB SERPL-MCNC: 0.3 MG/DL (ref 0.1–1.5)
BILIRUB UR QL STRIP.AUTO: NEGATIVE
BUN SERPL-MCNC: 37 MG/DL (ref 8–22)
BZE UR QL SCN: NEGATIVE
CALCIUM SERPL-MCNC: 9.2 MG/DL (ref 8.5–10.5)
CANNABINOIDS UR QL SCN: POSITIVE
CHLORIDE SERPL-SCNC: 107 MMOL/L (ref 96–112)
CK SERPL-CCNC: 888 U/L (ref 0–154)
CO2 SERPL-SCNC: 15 MMOL/L (ref 20–33)
COLOR UR: YELLOW
CREAT SERPL-MCNC: 1.89 MG/DL (ref 0.5–1.4)
CREAT UR-MCNC: 117.86 MG/DL
EOSINOPHIL # BLD AUTO: 0 K/UL (ref 0–0.51)
EOSINOPHIL NFR BLD: 0 % (ref 0–6.9)
ERYTHROCYTE [DISTWIDTH] IN BLOOD BY AUTOMATED COUNT: 41 FL (ref 35.9–50)
ETHANOL BLD-MCNC: <10.1 MG/DL (ref 0–10)
GLOBULIN SER CALC-MCNC: 3.3 G/DL (ref 1.9–3.5)
GLUCOSE SERPL-MCNC: 132 MG/DL (ref 65–99)
GLUCOSE UR STRIP.AUTO-MCNC: NEGATIVE MG/DL
HCT VFR BLD AUTO: 35.9 % (ref 42–52)
HGB BLD-MCNC: 12.3 G/DL (ref 14–18)
KETONES UR STRIP.AUTO-MCNC: NEGATIVE MG/DL
LACTATE BLD-SCNC: 1.1 MMOL/L (ref 0.5–2)
LACTATE BLD-SCNC: 6 MMOL/L (ref 0.5–2)
LEUKOCYTE ESTERASE UR QL STRIP.AUTO: NEGATIVE
LYMPHOCYTES # BLD AUTO: 3.03 K/UL (ref 1–4.8)
LYMPHOCYTES NFR BLD: 14.3 % (ref 22–41)
MANUAL DIFF BLD: NORMAL
MCH RBC QN AUTO: 28.9 PG (ref 27–33)
MCHC RBC AUTO-ENTMCNC: 34.3 G/DL (ref 33.7–35.3)
MCV RBC AUTO: 84.3 FL (ref 81.4–97.8)
METHADONE UR QL SCN: NEGATIVE
MICRO URNS: ABNORMAL
MICROCYTES BLD QL SMEAR: ABNORMAL
MONOCYTES # BLD AUTO: 2.27 K/UL (ref 0–0.85)
MONOCYTES NFR BLD AUTO: 10.7 % (ref 0–13.4)
MORPHOLOGY BLD-IMP: NORMAL
MYELOCYTES NFR BLD MANUAL: 6.2 %
NEUTROPHILS # BLD AUTO: 14.39 K/UL (ref 1.82–7.42)
NEUTROPHILS NFR BLD: 67.9 % (ref 44–72)
NITRITE UR QL STRIP.AUTO: NEGATIVE
NRBC # BLD AUTO: 0 K/UL
NRBC BLD-RTO: 0 /100 WBC
OPIATES UR QL SCN: NEGATIVE
OXYCODONE UR QL SCN: POSITIVE
PCP UR QL SCN: NEGATIVE
PH UR STRIP.AUTO: 8.5 [PH] (ref 5–8)
PLATELET # BLD AUTO: 441 K/UL (ref 164–446)
PLATELET BLD QL SMEAR: NORMAL
PMV BLD AUTO: 10.6 FL (ref 9–12.9)
POTASSIUM SERPL-SCNC: 4.3 MMOL/L (ref 3.6–5.5)
PROPOXYPH UR QL SCN: NEGATIVE
PROT SERPL-MCNC: 7.4 G/DL (ref 6–8.2)
PROT UR QL STRIP: NEGATIVE MG/DL
RBC # BLD AUTO: 4.26 M/UL (ref 4.7–6.1)
RBC BLD AUTO: PRESENT
RBC UR QL AUTO: NEGATIVE
SODIUM SERPL-SCNC: 141 MMOL/L (ref 135–145)
SODIUM UR-SCNC: 159 MMOL/L
SP GR UR STRIP.AUTO: 1.01
UROBILINOGEN UR STRIP.AUTO-MCNC: 0.2 MG/DL
WBC # BLD AUTO: 21.2 K/UL (ref 4.8–10.8)

## 2022-01-14 PROCEDURE — 96375 TX/PRO/DX INJ NEW DRUG ADDON: CPT

## 2022-01-14 PROCEDURE — A9270 NON-COVERED ITEM OR SERVICE: HCPCS | Performed by: HOSPITALIST

## 2022-01-14 PROCEDURE — 700111 HCHG RX REV CODE 636 W/ 250 OVERRIDE (IP): Performed by: HOSPITALIST

## 2022-01-14 PROCEDURE — 81003 URINALYSIS AUTO W/O SCOPE: CPT

## 2022-01-14 PROCEDURE — 80053 COMPREHEN METABOLIC PANEL: CPT

## 2022-01-14 PROCEDURE — 71045 X-RAY EXAM CHEST 1 VIEW: CPT

## 2022-01-14 PROCEDURE — 96374 THER/PROPH/DIAG INJ IV PUSH: CPT

## 2022-01-14 PROCEDURE — 83605 ASSAY OF LACTIC ACID: CPT | Mod: 91

## 2022-01-14 PROCEDURE — 99223 1ST HOSP IP/OBS HIGH 75: CPT | Performed by: INTERNAL MEDICINE

## 2022-01-14 PROCEDURE — 82077 ASSAY SPEC XCP UR&BREATH IA: CPT

## 2022-01-14 PROCEDURE — 82570 ASSAY OF URINE CREATININE: CPT

## 2022-01-14 PROCEDURE — 85027 COMPLETE CBC AUTOMATED: CPT

## 2022-01-14 PROCEDURE — 700111 HCHG RX REV CODE 636 W/ 250 OVERRIDE (IP): Performed by: EMERGENCY MEDICINE

## 2022-01-14 PROCEDURE — 700105 HCHG RX REV CODE 258: Performed by: INTERNAL MEDICINE

## 2022-01-14 PROCEDURE — 99284 EMERGENCY DEPT VISIT MOD MDM: CPT

## 2022-01-14 PROCEDURE — 306637 HCHG MISC ORTHO ITEM RC 0274

## 2022-01-14 PROCEDURE — 82550 ASSAY OF CK (CPK): CPT

## 2022-01-14 PROCEDURE — 96372 THER/PROPH/DIAG INJ SC/IM: CPT

## 2022-01-14 PROCEDURE — 85007 BL SMEAR W/DIFF WBC COUNT: CPT

## 2022-01-14 PROCEDURE — 87040 BLOOD CULTURE FOR BACTERIA: CPT

## 2022-01-14 PROCEDURE — 700102 HCHG RX REV CODE 250 W/ 637 OVERRIDE(OP): Performed by: HOSPITALIST

## 2022-01-14 PROCEDURE — 80307 DRUG TEST PRSMV CHEM ANLYZR: CPT

## 2022-01-14 PROCEDURE — 700105 HCHG RX REV CODE 258: Performed by: EMERGENCY MEDICINE

## 2022-01-14 PROCEDURE — 700102 HCHG RX REV CODE 250 W/ 637 OVERRIDE(OP): Performed by: INTERNAL MEDICINE

## 2022-01-14 PROCEDURE — 700111 HCHG RX REV CODE 636 W/ 250 OVERRIDE (IP): Performed by: INTERNAL MEDICINE

## 2022-01-14 PROCEDURE — 87086 URINE CULTURE/COLONY COUNT: CPT

## 2022-01-14 PROCEDURE — A9270 NON-COVERED ITEM OR SERVICE: HCPCS | Performed by: INTERNAL MEDICINE

## 2022-01-14 PROCEDURE — 84300 ASSAY OF URINE SODIUM: CPT

## 2022-01-14 PROCEDURE — 36415 COLL VENOUS BLD VENIPUNCTURE: CPT

## 2022-01-14 RX ORDER — LABETALOL HYDROCHLORIDE 5 MG/ML
10 INJECTION, SOLUTION INTRAVENOUS EVERY 4 HOURS PRN
Status: DISCONTINUED | OUTPATIENT
Start: 2022-01-14 | End: 2022-01-14 | Stop reason: HOSPADM

## 2022-01-14 RX ORDER — HEPARIN SODIUM 5000 [USP'U]/ML
5000 INJECTION, SOLUTION INTRAVENOUS; SUBCUTANEOUS EVERY 8 HOURS
Status: DISCONTINUED | OUTPATIENT
Start: 2022-01-14 | End: 2022-01-14

## 2022-01-14 RX ORDER — SODIUM BICARBONATE 650 MG/1
650 TABLET ORAL
Status: DISCONTINUED | OUTPATIENT
Start: 2022-01-14 | End: 2022-01-14

## 2022-01-14 RX ORDER — OXYCODONE HYDROCHLORIDE 5 MG/1
5 TABLET ORAL EVERY 4 HOURS PRN
Status: DISCONTINUED | OUTPATIENT
Start: 2022-01-14 | End: 2022-01-14 | Stop reason: HOSPADM

## 2022-01-14 RX ORDER — LORAZEPAM 2 MG/ML
1 INJECTION INTRAMUSCULAR ONCE
Status: COMPLETED | OUTPATIENT
Start: 2022-01-14 | End: 2022-01-14

## 2022-01-14 RX ORDER — SODIUM CHLORIDE 9 MG/ML
INJECTION, SOLUTION INTRAVENOUS CONTINUOUS
Status: DISCONTINUED | OUTPATIENT
Start: 2022-01-14 | End: 2022-01-14 | Stop reason: HOSPADM

## 2022-01-14 RX ORDER — SODIUM CHLORIDE 9 MG/ML
1000 INJECTION, SOLUTION INTRAVENOUS ONCE
Status: COMPLETED | OUTPATIENT
Start: 2022-01-14 | End: 2022-01-14

## 2022-01-14 RX ORDER — AMOXICILLIN 250 MG
2 CAPSULE ORAL 2 TIMES DAILY
Status: DISCONTINUED | OUTPATIENT
Start: 2022-01-14 | End: 2022-01-14 | Stop reason: HOSPADM

## 2022-01-14 RX ORDER — BISACODYL 10 MG
10 SUPPOSITORY, RECTAL RECTAL
Status: DISCONTINUED | OUTPATIENT
Start: 2022-01-14 | End: 2022-01-14 | Stop reason: HOSPADM

## 2022-01-14 RX ORDER — POLYETHYLENE GLYCOL 3350 17 G/17G
1 POWDER, FOR SOLUTION ORAL
Status: DISCONTINUED | OUTPATIENT
Start: 2022-01-14 | End: 2022-01-14 | Stop reason: HOSPADM

## 2022-01-14 RX ORDER — DIPHENHYDRAMINE HYDROCHLORIDE 50 MG/ML
25 INJECTION INTRAMUSCULAR; INTRAVENOUS ONCE
Status: COMPLETED | OUTPATIENT
Start: 2022-01-14 | End: 2022-01-14

## 2022-01-14 RX ORDER — CEFTRIAXONE 2 G/1
2 INJECTION, POWDER, FOR SOLUTION INTRAMUSCULAR; INTRAVENOUS ONCE
Status: COMPLETED | OUTPATIENT
Start: 2022-01-14 | End: 2022-01-14

## 2022-01-14 RX ADMIN — SODIUM CHLORIDE 1000 ML: 9 INJECTION, SOLUTION INTRAVENOUS at 05:09

## 2022-01-14 RX ADMIN — LORAZEPAM 1 MG: 2 INJECTION INTRAMUSCULAR; INTRAVENOUS at 05:08

## 2022-01-14 RX ADMIN — HEPARIN SODIUM 5000 UNITS: 5000 INJECTION INTRAVENOUS; SUBCUTANEOUS at 08:01

## 2022-01-14 RX ADMIN — SODIUM CHLORIDE 1000 ML: 9 INJECTION, SOLUTION INTRAVENOUS at 06:21

## 2022-01-14 RX ADMIN — DIPHENHYDRAMINE HYDROCHLORIDE 25 MG: 50 INJECTION INTRAMUSCULAR; INTRAVENOUS at 05:08

## 2022-01-14 RX ADMIN — SODIUM BICARBONATE 650 MG: 650 TABLET ORAL at 08:02

## 2022-01-14 RX ADMIN — OXYCODONE 5 MG: 5 TABLET ORAL at 12:30

## 2022-01-14 RX ADMIN — CEFTRIAXONE SODIUM 2 G: 2 INJECTION, POWDER, FOR SOLUTION INTRAMUSCULAR; INTRAVENOUS at 08:01

## 2022-01-14 RX ADMIN — ENOXAPARIN SODIUM 40 MG: 40 INJECTION SUBCUTANEOUS at 12:33

## 2022-01-14 RX ADMIN — SODIUM CHLORIDE: 9 INJECTION, SOLUTION INTRAVENOUS at 08:45

## 2022-01-14 ASSESSMENT — ENCOUNTER SYMPTOMS
MYALGIAS: 0
COUGH: 0
DEPRESSION: 0
DOUBLE VISION: 0
BLURRED VISION: 0
SORE THROAT: 0
PALPITATIONS: 0
STRIDOR: 0
NECK PAIN: 0
HEMOPTYSIS: 0
VOMITING: 0
WEIGHT LOSS: 0
DIZZINESS: 0
NAUSEA: 0
FEVER: 0
INSOMNIA: 0
HEADACHES: 0
BRUISES/BLEEDS EASILY: 0

## 2022-01-14 ASSESSMENT — FIBROSIS 4 INDEX: FIB4 SCORE: 0.7

## 2022-01-14 ASSESSMENT — PAIN DESCRIPTION - PAIN TYPE: TYPE: ACUTE PAIN

## 2022-01-14 NOTE — ED NOTES
Patient ambulated to bathroom independently with steady gait, urine specimen collected and sent.  IVF continue infusing, patient mother remains at bedside.

## 2022-01-14 NOTE — ED NOTES
Went to reassess pt and pt not in room, gown off and belongings gone.    Admitting MD informed of pt elopement.    [employer] called d/t pt left with IV.

## 2022-01-14 NOTE — ASSESSMENT & PLAN NOTE
Secondary to rhabdomyolysis and opiate withdrawal, avoid nephrotoxic meds and doses, IV fluid challenge, follow-up chemistry and total CK

## 2022-01-14 NOTE — ED NOTES
Pt ambulated to restroom.  Repeat lactic acid drawn and sent to lab.  IVF infusing for maintenance.

## 2022-01-14 NOTE — ED NOTES
Patient from triage to [facility] 11 in wheelchair accompanied by family.  Patient changed into gown, placed on monitor, and chart up for ERP.  Patient is alert and oriented, nodding appropriately to questions and following commands appropriately.

## 2022-01-14 NOTE — CONSULTS
RCC    Case reviewed with Dr. Lantigua, ERP  EHR reviewed including data from [facility]  Patient seen and examined  26-year-old male recent admit to [facility] who left AMA who presents with muscle cramps.  Patient appears dehydrated and has normal hemodynamics.  ID ROS is negative.  ERIN still present from rhabdo that was diagnosed at [facility].  Renal function slightly better.  Suggest hydrating patient and repeating lactic acid.  Does not appear to need ICU but may benefit from IMCU if does not improve short-term with fluids, repeat lactic acid level pending.      Case reviewed with Dr. Tirado with the hospitalist team in the ER and I placed an IMCU order in case he needs to go there.  RCC available for follow-up consultation as needed, please call.

## 2022-01-14 NOTE — ED TRIAGE NOTES
Gilbert Diop  26 y.o.  male  Chief Complaint   Patient presents with   • Spasms     arrived in triage c/o spasms 20 mins ago prior to arrival. patient was DC'd at [facility] yesterday Dx with Sepsis. denies taking new medication.     Patient drooling during triage.

## 2022-01-14 NOTE — PROGRESS NOTES
Mr. Marv Diop was seen and evaluated in the ER. Please refer to Dr. Tirado's admission note from 06:47.   In brief, patient was admitted to [facility] from 1/10/2022 to 1/13/2022.  I reviewed the notes from [facility] apparently he aggressive behavior towards his mother paramedics were called they gave him 20 mg of Ativan as well as ketamine and he required intubation for airway protection.  CT of the head was performed as well as lumbar puncture infectious disease work-up was negative.  He was extubated and left the hospital AMA.  He came here with the complaints as noted above including spasm.  His lactic acid was quite elevated.  His lactic acid now has normalized with IV fluids.  Patient states that he has been experiencing upper abdominal pain for the past 2 years he has had at least 20 ER visits and is undergone EGD and colonoscopy by GI and has been told he has an ulcer and gastritis in the past.  States that the only thing works for his pain IV morphine and IV fentanyl.  At this point time I do not feel IV narcotics are indicated for a chronic condition such as this.

## 2022-01-14 NOTE — ED PROVIDER NOTES
ED Provider Note    Scribed for Parviz Lantigua M.D. by Cosmo Moser. 1/14/2022  4:33 AM    Primary care provider: Pcp Pt States None  Means of arrival: Walk-in  History obtained from: Patient  History limited by: None    CHIEF COMPLAINT  Chief Complaint   Patient presents with    Spasms     arrived in triage c/o spasms 20 mins ago prior to arrival. patient was DC'd at [facility] yesterday Dx with Sepsis. denies taking new medication.       HPI  Gilbert Diop is a 26 y.o. male who presents to the Emergency Department for acute spasms. Patient states he recently left [facility] where he was admitted, and around 20 minutes prior to arrival he began experiencing generalized muscle spasms. He also reports feeling painful tightness to his left jaw. Patient denies any chest pain.     REVIEW OF SYSTEMS  Pertinent positives include generalized muscle spasms and jaw spasms.   Pertinent negatives include no chest pain.    All other systems reviewed and negative. See HPI for further details.       PAST MEDICAL HISTORY   has a past medical history of Abdominal migraine, Abdominal migraine, Colitis, and Gastritis.    SURGICAL HISTORY   has a past surgical history that includes gastroscopy (9/3/2014); gastroscopy-endo (11/15/2014); and other.    SOCIAL HISTORY  Social History     Tobacco Use    Smoking status: Never Smoker    Smokeless tobacco: Never Used   Vaping Use    Vaping Use: Some days    Substances: THC   Substance Use Topics    Alcohol use: No    Drug use: Yes     Types: Inhaled, Marijuana     Comment: THC daily      Social History     Substance and Sexual Activity   Drug Use Yes    Types: Inhaled, Marijuana    Comment: THC daily       FAMILY HISTORY  History reviewed. No pertinent family history.    CURRENT MEDICATIONS  Home Medications       Reviewed by Lucas Astorga R.N. (Registered Nurse) on 01/14/22 at 0422  Med List Status: Partial     Medication Last Dose Status   First-Omeprazole 2 MG/ML Suspension   "Active                    ALLERGIES  Allergies   Allergen Reactions    Acetaminophen [Tylenol] Anaphylaxis and Nausea     Nausea, \"it just doesn't sit right\"  5/29/2021 patient states that his throat swells shut       PHYSICAL EXAM  VITAL SIGNS: /103   Pulse (!) 146   Temp 37.5 °C (99.5 °F) (Temporal)   Resp 20   Ht 1.676 m (5' 6\")   Wt 70.3 kg (155 lb)   SpO2 97%   BMI 25.02 kg/m²   Nursing note and vitals reviewed.  Constitutional: Tremulous, diaphoretic.  HENT: Leftward deviation of the mandible. Head is normocephalic and atraumatic. Oropharynx is clear and moist without exudate or erythema.   Eyes: Pupils are equal, round, and reactive to light. Conjunctiva are normal.   Cardiovascular: Tachycardic with regular rhythm. No murmur heard. Normal radial pulses.  Pulmonary/Chest: Breath sounds normal. No wheezes or rales.   Abdominal: Soft and non-tender. No distention    Musculoskeletal: Extremities exhibit normal range of motion without edema or tenderness.   Neurological: Awake, alert and oriented to person, place, and time. No focal deficits noted.  Skin: Skin is warm and diaphorectic. No rash.   Psychiatric: Normal mood and affect. Appropriate for clinical situation.    DIAGNOSTIC STUDIES / PROCEDURES    EKG Interpretation  Interpreted by me as below    LABS  Results for orders placed or performed during the hospital encounter of 01/14/22   LACTIC ACID   Result Value Ref Range    Lactic Acid 6.0 (HH) 0.5 - 2.0 mmol/L   CBC WITH DIFFERENTIAL   Result Value Ref Range    WBC 21.2 (H) 4.8 - 10.8 K/uL    RBC 4.26 (L) 4.70 - 6.10 M/uL    Hemoglobin 12.3 (L) 14.0 - 18.0 g/dL    Hematocrit 35.9 (L) 42.0 - 52.0 %    MCV 84.3 81.4 - 97.8 fL    MCH 28.9 27.0 - 33.0 pg    MCHC 34.3 33.7 - 35.3 g/dL    RDW 41.0 35.9 - 50.0 fL    Platelet Count 441 164 - 446 K/uL    MPV 10.6 9.0 - 12.9 fL    Neutrophils-Polys 67.90 44.00 - 72.00 %    Lymphocytes 14.30 (L) 22.00 - 41.00 %    Monocytes 10.70 0.00 - 13.40 %    " Eosinophils 0.00 0.00 - 6.90 %    Basophils 0.90 0.00 - 1.80 %    Nucleated RBC 0.00 /100 WBC    Neutrophils (Absolute) 14.39 (H) 1.82 - 7.42 K/uL    Lymphs (Absolute) 3.03 1.00 - 4.80 K/uL    Monos (Absolute) 2.27 (H) 0.00 - 0.85 K/uL    Eos (Absolute) 0.00 0.00 - 0.51 K/uL    Baso (Absolute) 0.19 (H) 0.00 - 0.12 K/uL    NRBC (Absolute) 0.00 K/uL    Anisocytosis 1+     Microcytosis 1+    COMP METABOLIC PANEL   Result Value Ref Range    Sodium 141 135 - 145 mmol/L    Potassium 4.3 3.6 - 5.5 mmol/L    Chloride 107 96 - 112 mmol/L    Co2 15 (L) 20 - 33 mmol/L    Anion Gap 19.0 (H) 7.0 - 16.0    Glucose 132 (H) 65 - 99 mg/dL    Bun 37 (H) 8 - 22 mg/dL    Creatinine 1.89 (H) 0.50 - 1.40 mg/dL    Calcium 9.2 8.5 - 10.5 mg/dL    AST(SGOT) 56 (H) 12 - 45 U/L    ALT(SGPT) 37 2 - 50 U/L    Alkaline Phosphatase 73 30 - 99 U/L    Total Bilirubin 0.3 0.1 - 1.5 mg/dL    Albumin 4.1 3.2 - 4.9 g/dL    Total Protein 7.4 6.0 - 8.2 g/dL    Globulin 3.3 1.9 - 3.5 g/dL    A-G Ratio 1.2 g/dL   DIAGNOSTIC ALCOHOL   Result Value Ref Range    Diagnostic Alcohol <10.1 0.0 - 10.0 mg/dL   CREATINE KINASE   Result Value Ref Range    CPK Total 888 (H) 0 - 154 U/L   ESTIMATED GFR   Result Value Ref Range    GFR If  53 (A) >60 mL/min/1.73 m 2    GFR If Non  44 (A) >60 mL/min/1.73 m 2   DIFFERENTIAL MANUAL   Result Value Ref Range    Myelocytes 6.20 %    Manual Diff Status PERFORMED    PERIPHERAL SMEAR REVIEW   Result Value Ref Range    Peripheral Smear Review see below    PLATELET ESTIMATE   Result Value Ref Range    Plt Estimation Normal    MORPHOLOGY   Result Value Ref Range    RBC Morphology Present      All labs reviewed by me.    RADIOLOGY  DX-CHEST-PORTABLE (1 VIEW)   Final Result         1.  No acute cardiopulmonary disease.        The radiologist's interpretation of all radiological studies have been reviewed by me.    COURSE & MEDICAL DECISION MAKING  Nursing notes, VS, PMSFHx reviewed in chart.     Review  "of past medical records show the patient left AMA from [facility] after being admitted for \"Ventilator-dependent acute respiratory failure-extubated, resolved, Acute toxic metabolic encephalopathy, Agitated Delirium, with violent behavioral, Disturbance-suspect due to narcotic withdrawal, Febrile illness, Query Sepsis, Elevated CK/mild rhabdomyolysis, Hyponatremia, Hypokalemia, Acute kidney injury due to ATN, History of polysubstance abuse: BZD, opiate, THC , urine drug screen consistent with same\"    4:33 AM - Patient seen and examined at bedside. Patient will be treated with Benadryl 25 mg, Ativan 1 mg. Intravenous fluids were administered for tachycardic. Ordered DX-chest, Lactic acid, CBC w/ diff, CMP, UA, Blood Culture, UDS, Diagnostic Alcohol, and Creatine Kinase to evaluate his symptoms. The differential diagnoses include but are not limited to: dystonic medication reaction, sepsis     6:02 AM - Plan to admit patient to Intermediate Care Unit; Intensivist paged.     6:21 AM - I discussed the patient's case and the above findings with Dr. Cotton (Intensivist) who agrees to consult for patient's admission.     Patient presents today with muscle spasms.  Patient had some jaw clenching and muscle spasms most consistent with a dystonic drug reaction.  May be related to medications administered during his prior hospitalization.    Symptomatically improved after diphenhydramine and Ativan.  Laboratory studies here remarkable for lactic acidosis of 6.  Patient is treated with IV fluids.  He has acute kidney injury.  Also likely related to volume depletion.  Have initiated volume resuscitation in the emergency department.  Patient will be admitted to the intermediate care unit for further evaluation and treatment.    HYDRATION: Based on the patient's presentation of Tachycardia the patient was given IV fluids. IV Hydration was used because oral hydration was not adequate alone. Upon recheck " following hydration, the patient was improved.    DISPOSITION:  Patient will be hospitalized in critical condition.    CRITICAL CARE  I provided critical care services, which included medication orders, frequent reevaluations of the patient's condition and response to treatment, ordering and reviewing test results, and discussing the case with various consultants.  The critical care time associated with the care of the patient was 35 minutes. Review chart for interventions. This time is exclusive of any other billable procedures.      FINAL IMPRESSION  1. Dystonic drug reaction    2. ERIN (acute kidney injury) (HCC)    3. Dehydration    4. Leukocytosis, unspecified type    5. Elevated CK    6. Lactic acidosis          Cosmo HUBER (Scribpadma), am scribing for, and in the presence of, Parviz Lantigua M.D..    Electronically signed by: Cosmo Moser (Anabella), 1/14/2022    Parviz HUBER M.D. personally performed the services described in this documentation, as scribed by Cosmo Moser in my presence, and it is both accurate and complete.    The note accurately reflects work and decisions made by me.  Parviz Lantigua M.D.  1/14/2022  10:37 AM

## 2022-01-14 NOTE — ED NOTES
1st set of blood cultures and all labs drawn by phlebotomist.  2nd set of cultures drawn by RN.  PIV established, patient medicated per MAR, and IVF started.    Critical lactic of 6.0 reported to Dr. Lantigua.    Patient given pillow and warm blankets, lights dimmed per request, and patient resting comfortably at this time, denies additional needs.

## 2022-01-14 NOTE — H&P
Hospital Medicine History & Physical Note    Date of Service  1/14/2022    Primary Care Physician  Pcp Pt States None    Consultants  critical care    Specialist Names: Dr. Cotton    Code Status  Full Code    Chief Complaint  Chief Complaint   Patient presents with   • Spasms     arrived in triage c/o spasms 20 mins ago prior to arrival. patient was DC'd at [facility] yesterday Dx with Sepsis. denies taking new medication.       History of Presenting Illness  Gilbert Diop is a 26 y.o. male who presented 1/14/2022 with y.o. male who presents to the Emergency Department for acute spasms. Patient states he recently left [place] where he was admitted, and around 20 minutes prior to arrival he began experiencing generalized muscle spasms. He also reports feeling painful tightness to his left jaw. Patient denies any chest pain.   At bedside he admits inhaled fentanyl abuse.  Diffuse muscle pain and  Poor p.o. intake    I discussed the plan of care with patient.    Review of Systems  Review of Systems   Constitutional: Negative for fever, malaise/fatigue and weight loss.   HENT: Negative for sore throat and tinnitus.    Eyes: Negative for blurred vision and double vision.   Respiratory: Negative for cough, hemoptysis and stridor.    Cardiovascular: Negative for chest pain and palpitations.   Gastrointestinal: Negative for nausea and vomiting.   Genitourinary: Negative for dysuria and urgency.   Musculoskeletal: Negative for myalgias and neck pain.   Skin: Negative for itching and rash.   Neurological: Negative for dizziness and headaches.   Endo/Heme/Allergies: Does not bruise/bleed easily.   Psychiatric/Behavioral: Negative for depression. The patient does not have insomnia.        Past Medical History   has a past medical history of Abdominal migraine, Abdominal migraine, Colitis, and Gastritis.    Surgical History   has a past surgical history that includes gastroscopy (9/3/2014); gastroscopy-endo  "(11/15/2014); and other.     Family History  family history is not on file.   Family history reviewed with patient. There is no family history that is pertinent to the chief complaint.     Social History   reports that he has never smoked. He has never used smokeless tobacco. He reports current drug use. Drugs: Inhaled and Marijuana. He reports that he does not drink alcohol.    Allergies  Allergies   Allergen Reactions   • Acetaminophen [Tylenol] Anaphylaxis and Nausea     Nausea, \"it just doesn't sit right\"  5/29/2021 patient states that his throat swells shut       Medications  None       Physical Exam  Temp:  [37.5 °C (99.5 °F)] 37.5 °C (99.5 °F)  Pulse:  [] 116  Resp:  [18-20] 20  BP: (138-143)/(103-105) 138/103  SpO2:  [93 %-97 %] 95 %  Blood Pressure: 138/103   Temperature: 37.5 °C (99.5 °F)   Pulse: (!) 116   Respiration: 20   Pulse Oximetry: 95 %       Physical Exam  Vitals and nursing note reviewed.   Constitutional:       General: He is in acute distress.      Appearance: Normal appearance. He is normal weight. He is diaphoretic. He is not toxic-appearing.      Comments: Pale and chronically ill-appearing   HENT:      Head: Normocephalic and atraumatic.      Nose: Nose normal. No congestion or rhinorrhea.      Mouth/Throat:      Mouth: Mucous membranes are moist.      Pharynx: Oropharynx is clear.   Eyes:      Extraocular Movements: Extraocular movements intact.      Conjunctiva/sclera: Conjunctivae normal.      Pupils: Pupils are equal, round, and reactive to light.   Neck:      Vascular: No carotid bruit.   Cardiovascular:      Rate and Rhythm: Normal rate and regular rhythm.      Pulses: Normal pulses.      Heart sounds: Normal heart sounds. No murmur heard.  No gallop.    Pulmonary:      Effort: No respiratory distress.      Breath sounds: Normal breath sounds. No wheezing or rales.   Abdominal:      General: Abdomen is flat. Bowel sounds are normal. There is no distension.      Palpations: " Abdomen is soft. There is no mass.      Tenderness: There is no abdominal tenderness.      Hernia: No hernia is present.   Musculoskeletal:         General: No tenderness or signs of injury.      Cervical back: Normal range of motion and neck supple. No muscular tenderness.   Lymphadenopathy:      Cervical: No cervical adenopathy.   Skin:     Capillary Refill: Capillary refill takes less than 2 seconds.      Coloration: Skin is pale. Skin is not jaundiced.      Findings: No bruising.   Neurological:      General: No focal deficit present.      Mental Status: He is alert and oriented to person, place, and time. Mental status is at baseline.      Cranial Nerves: No cranial nerve deficit.      Motor: No weakness.      Coordination: Coordination normal.   Psychiatric:         Mood and Affect: Mood normal.         Thought Content: Thought content normal.         Judgment: Judgment normal.         Laboratory:  Recent Labs     01/14/22  0458   WBC 21.2*   RBC 4.26*   HEMOGLOBIN 12.3*   HEMATOCRIT 35.9*   MCV 84.3   MCH 28.9   MCHC 34.3   RDW 41.0   PLATELETCT 441   MPV 10.6     Recent Labs     01/14/22  0458   SODIUM 141   POTASSIUM 4.3   CHLORIDE 107   CO2 15*   GLUCOSE 132*   BUN 37*   CREATININE 1.89*   CALCIUM 9.2     Recent Labs     01/14/22  0458   ALTSGPT 37   ASTSGOT 56*   ALKPHOSPHAT 73   TBILIRUBIN 0.3   GLUCOSE 132*         No results for input(s): NTPROBNP in the last 72 hours.      No results for input(s): TROPONINT in the last 72 hours.    Imaging:  DX-CHEST-PORTABLE (1 VIEW)   Final Result         1.  No acute cardiopulmonary disease.          X-Ray:  I have personally reviewed the images and compared with prior images.    Assessment/Plan:  I anticipate this patient will require at least two midnights for appropriate medical management, necessitating inpatient admission.    * Lactic acid acidosis- (present on admission)  Assessment & Plan  Starvation acidosis, diet, IV fluid, p.o. bicarb, follow-up  chemistry    Opiate withdrawal (HCC)  Assessment & Plan  Inhaled fentanyl abuse, supportive care    ARF (acute renal failure) (HCC)- (present on admission)  Assessment & Plan  Secondary to rhabdomyolysis and opiate withdrawal, avoid nephrotoxic meds and doses, IV fluid challenge, follow-up chemistry and total CK        VTE prophylaxis: SCDs/TEDs

## 2022-01-14 NOTE — ED NOTES
Pt ambulated to restroom.  Pt moved into hospital bed.    Pt c/o abd pain.  Will address with admitting.

## 2022-01-16 LAB
BACTERIA UR CULT: NORMAL
SIGNIFICANT IND 70042: NORMAL
SITE SITE: NORMAL
SOURCE SOURCE: NORMAL

## 2022-02-16 ENCOUNTER — HOSPITAL ENCOUNTER (INPATIENT)
Facility: MEDICAL CENTER | Age: 27
LOS: 1 days | DRG: 917 | End: 2022-02-17
Attending: EMERGENCY MEDICINE | Admitting: INTERNAL MEDICINE
Payer: MEDICAID

## 2022-02-16 ENCOUNTER — APPOINTMENT (OUTPATIENT)
Dept: RADIOLOGY | Facility: MEDICAL CENTER | Age: 27
DRG: 917 | End: 2022-02-16
Payer: MEDICAID

## 2022-02-16 ENCOUNTER — APPOINTMENT (OUTPATIENT)
Dept: RADIOLOGY | Facility: MEDICAL CENTER | Age: 27
DRG: 917 | End: 2022-02-16
Attending: EMERGENCY MEDICINE
Payer: MEDICAID

## 2022-02-16 ENCOUNTER — APPOINTMENT (OUTPATIENT)
Dept: RADIOLOGY | Facility: MEDICAL CENTER | Age: 27
DRG: 917 | End: 2022-02-16
Attending: STUDENT IN AN ORGANIZED HEALTH CARE EDUCATION/TRAINING PROGRAM
Payer: MEDICAID

## 2022-02-16 DIAGNOSIS — R41.82 ALTERED MENTAL STATUS, UNSPECIFIED ALTERED MENTAL STATUS TYPE: ICD-10-CM

## 2022-02-16 DIAGNOSIS — J96.01 ACUTE RESPIRATORY FAILURE WITH HYPOXIA (HCC): ICD-10-CM

## 2022-02-16 DIAGNOSIS — T50.904A DRUG OVERDOSE, UNDETERMINED INTENT, INITIAL ENCOUNTER: ICD-10-CM

## 2022-02-16 DIAGNOSIS — R00.0 TACHYCARDIA: ICD-10-CM

## 2022-02-16 PROBLEM — K92.0 COFFEE GROUND EMESIS: Status: ACTIVE | Noted: 2022-02-16

## 2022-02-16 PROBLEM — A41.9 SEPSIS (HCC): Status: ACTIVE | Noted: 2022-02-16

## 2022-02-16 PROBLEM — J69.0 ASPIRATION PNEUMONIA (HCC): Status: ACTIVE | Noted: 2022-02-16

## 2022-02-16 PROBLEM — T50.901A DRUG OVERDOSE: Status: ACTIVE | Noted: 2022-02-16

## 2022-02-16 PROBLEM — J96.90 RESPIRATORY FAILURE (HCC): Status: ACTIVE | Noted: 2022-02-16

## 2022-02-16 LAB
ABO GROUP BLD: NORMAL
ALBUMIN SERPL BCP-MCNC: 4.6 G/DL (ref 3.2–4.9)
ALBUMIN/GLOB SERPL: 1.6 G/DL
ALP SERPL-CCNC: 65 U/L (ref 30–99)
ALT SERPL-CCNC: 30 U/L (ref 2–50)
AMPHET UR QL SCN: NEGATIVE
ANION GAP SERPL CALC-SCNC: 15 MMOL/L (ref 7–16)
APAP SERPL-MCNC: <5 UG/ML (ref 10–30)
AST SERPL-CCNC: 44 U/L (ref 12–45)
BARBITURATES UR QL SCN: NEGATIVE
BASOPHILS # BLD AUTO: 0.2 % (ref 0–1.8)
BASOPHILS # BLD: 0.05 K/UL (ref 0–0.12)
BENZODIAZ UR QL SCN: POSITIVE
BILIRUB SERPL-MCNC: 0.2 MG/DL (ref 0.1–1.5)
BLD GP AB SCN SERPL QL: NORMAL
BUN SERPL-MCNC: 46 MG/DL (ref 8–22)
BZE UR QL SCN: NEGATIVE
CALCIUM SERPL-MCNC: 8.9 MG/DL (ref 8.5–10.5)
CANNABINOIDS UR QL SCN: POSITIVE
CHLORIDE SERPL-SCNC: 107 MMOL/L (ref 96–112)
CK SERPL-CCNC: 209 U/L (ref 0–154)
CO2 SERPL-SCNC: 17 MMOL/L (ref 20–33)
CREAT SERPL-MCNC: 1.32 MG/DL (ref 0.5–1.4)
EKG IMPRESSION: NORMAL
EOSINOPHIL # BLD AUTO: 0.12 K/UL (ref 0–0.51)
EOSINOPHIL NFR BLD: 0.5 % (ref 0–6.9)
ERYTHROCYTE [DISTWIDTH] IN BLOOD BY AUTOMATED COUNT: 46 FL (ref 35.9–50)
ETHANOL BLD-MCNC: <10.1 MG/DL (ref 0–10)
FLUAV RNA SPEC QL NAA+PROBE: NEGATIVE
FLUBV RNA SPEC QL NAA+PROBE: NEGATIVE
GLOBULIN SER CALC-MCNC: 2.8 G/DL (ref 1.9–3.5)
GLUCOSE SERPL-MCNC: 77 MG/DL (ref 65–99)
HCT VFR BLD AUTO: 36.1 % (ref 42–52)
HGB BLD-MCNC: 11.7 G/DL (ref 14–18)
IMM GRANULOCYTES # BLD AUTO: 0.24 K/UL (ref 0–0.11)
IMM GRANULOCYTES NFR BLD AUTO: 0.9 % (ref 0–0.9)
LACTATE BLD-SCNC: 2.6 MMOL/L (ref 0.5–2)
LACTATE BLD-SCNC: 7.9 MMOL/L (ref 0.5–2)
LIPASE SERPL-CCNC: 13 U/L (ref 11–82)
LYMPHOCYTES # BLD AUTO: 0.94 K/UL (ref 1–4.8)
LYMPHOCYTES NFR BLD: 3.5 % (ref 22–41)
MCH RBC QN AUTO: 29.5 PG (ref 27–33)
MCHC RBC AUTO-ENTMCNC: 32.4 G/DL (ref 33.7–35.3)
MCV RBC AUTO: 90.9 FL (ref 81.4–97.8)
METHADONE UR QL SCN: NEGATIVE
MONOCYTES # BLD AUTO: 2.14 K/UL (ref 0–0.85)
MONOCYTES NFR BLD AUTO: 8 % (ref 0–13.4)
NEUTROPHILS # BLD AUTO: 23.15 K/UL (ref 1.82–7.42)
NEUTROPHILS NFR BLD: 86.9 % (ref 44–72)
NRBC # BLD AUTO: 0 K/UL
NRBC BLD-RTO: 0 /100 WBC
OPIATES UR QL SCN: NEGATIVE
OXYCODONE UR QL SCN: NEGATIVE
PCP UR QL SCN: NEGATIVE
PLATELET # BLD AUTO: 303 K/UL (ref 164–446)
PMV BLD AUTO: 11 FL (ref 9–12.9)
POTASSIUM SERPL-SCNC: 6 MMOL/L (ref 3.6–5.5)
PROCALCITONIN SERPL-MCNC: 19.81 NG/ML
PROPOXYPH UR QL SCN: NEGATIVE
PROT SERPL-MCNC: 7.4 G/DL (ref 6–8.2)
RBC # BLD AUTO: 3.97 M/UL (ref 4.7–6.1)
RH BLD: NORMAL
RSV RNA SPEC QL NAA+PROBE: NEGATIVE
SALICYLATES SERPL-MCNC: <1 MG/DL (ref 15–25)
SARS-COV-2 RNA RESP QL NAA+PROBE: NOTDETECTED
SODIUM SERPL-SCNC: 139 MMOL/L (ref 135–145)
SPECIMEN SOURCE: NORMAL
TRIGL SERPL-MCNC: 57 MG/DL (ref 0–149)
WBC # BLD AUTO: 26.6 K/UL (ref 4.8–10.8)

## 2022-02-16 PROCEDURE — 96376 TX/PRO/DX INJ SAME DRUG ADON: CPT

## 2022-02-16 PROCEDURE — 83690 ASSAY OF LIPASE: CPT

## 2022-02-16 PROCEDURE — 700111 HCHG RX REV CODE 636 W/ 250 OVERRIDE (IP): Performed by: INTERNAL MEDICINE

## 2022-02-16 PROCEDURE — 94002 VENT MGMT INPAT INIT DAY: CPT

## 2022-02-16 PROCEDURE — 80179 DRUG ASSAY SALICYLATE: CPT

## 2022-02-16 PROCEDURE — 303105 HCHG CATHETER EXTRA

## 2022-02-16 PROCEDURE — 36600 WITHDRAWAL OF ARTERIAL BLOOD: CPT

## 2022-02-16 PROCEDURE — 99291 CRITICAL CARE FIRST HOUR: CPT

## 2022-02-16 PROCEDURE — 86900 BLOOD TYPING SEROLOGIC ABO: CPT

## 2022-02-16 PROCEDURE — 70450 CT HEAD/BRAIN W/O DYE: CPT

## 2022-02-16 PROCEDURE — 84145 PROCALCITONIN (PCT): CPT

## 2022-02-16 PROCEDURE — 96366 THER/PROPH/DIAG IV INF ADDON: CPT

## 2022-02-16 PROCEDURE — 0241U HCHG SARS-COV-2 COVID-19 NFCT DS RESP RNA 4 TRGT MIC: CPT

## 2022-02-16 PROCEDURE — 304538 HCHG NG TUBE

## 2022-02-16 PROCEDURE — 85025 COMPLETE CBC W/AUTO DIFF WBC: CPT

## 2022-02-16 PROCEDURE — 80053 COMPREHEN METABOLIC PANEL: CPT

## 2022-02-16 PROCEDURE — C9803 HOPD COVID-19 SPEC COLLECT: HCPCS | Performed by: EMERGENCY MEDICINE

## 2022-02-16 PROCEDURE — 93005 ELECTROCARDIOGRAM TRACING: CPT | Performed by: EMERGENCY MEDICINE

## 2022-02-16 PROCEDURE — 82550 ASSAY OF CK (CPK): CPT

## 2022-02-16 PROCEDURE — 5A1935Z RESPIRATORY VENTILATION, LESS THAN 24 CONSECUTIVE HOURS: ICD-10-PCS | Performed by: EMERGENCY MEDICINE

## 2022-02-16 PROCEDURE — 84478 ASSAY OF TRIGLYCERIDES: CPT

## 2022-02-16 PROCEDURE — 82077 ASSAY SPEC XCP UR&BREATH IA: CPT

## 2022-02-16 PROCEDURE — 94003 VENT MGMT INPAT SUBQ DAY: CPT

## 2022-02-16 PROCEDURE — 83605 ASSAY OF LACTIC ACID: CPT

## 2022-02-16 PROCEDURE — 700111 HCHG RX REV CODE 636 W/ 250 OVERRIDE (IP)

## 2022-02-16 PROCEDURE — 96375 TX/PRO/DX INJ NEW DRUG ADDON: CPT

## 2022-02-16 PROCEDURE — 80143 DRUG ASSAY ACETAMINOPHEN: CPT

## 2022-02-16 PROCEDURE — 0BH17EZ INSERTION OF ENDOTRACHEAL AIRWAY INTO TRACHEA, VIA NATURAL OR ARTIFICIAL OPENING: ICD-10-PCS | Performed by: EMERGENCY MEDICINE

## 2022-02-16 PROCEDURE — 36415 COLL VENOUS BLD VENIPUNCTURE: CPT

## 2022-02-16 PROCEDURE — 80307 DRUG TEST PRSMV CHEM ANLYZR: CPT

## 2022-02-16 PROCEDURE — 86901 BLOOD TYPING SEROLOGIC RH(D): CPT

## 2022-02-16 PROCEDURE — 71045 X-RAY EXAM CHEST 1 VIEW: CPT

## 2022-02-16 PROCEDURE — 700105 HCHG RX REV CODE 258: Performed by: EMERGENCY MEDICINE

## 2022-02-16 PROCEDURE — 86850 RBC ANTIBODY SCREEN: CPT

## 2022-02-16 PROCEDURE — 96365 THER/PROPH/DIAG IV INF INIT: CPT

## 2022-02-16 PROCEDURE — 51702 INSERT TEMP BLADDER CATH: CPT

## 2022-02-16 PROCEDURE — 94799 UNLISTED PULMONARY SVC/PX: CPT

## 2022-02-16 PROCEDURE — 92950 HEART/LUNG RESUSCITATION CPR: CPT

## 2022-02-16 PROCEDURE — 700111 HCHG RX REV CODE 636 W/ 250 OVERRIDE (IP): Performed by: EMERGENCY MEDICINE

## 2022-02-16 PROCEDURE — 31500 INSERT EMERGENCY AIRWAY: CPT

## 2022-02-16 PROCEDURE — 700105 HCHG RX REV CODE 258: Performed by: STUDENT IN AN ORGANIZED HEALTH CARE EDUCATION/TRAINING PROGRAM

## 2022-02-16 PROCEDURE — 700101 HCHG RX REV CODE 250: Performed by: EMERGENCY MEDICINE

## 2022-02-16 PROCEDURE — 770022 HCHG ROOM/CARE - ICU (200)

## 2022-02-16 PROCEDURE — C9113 INJ PANTOPRAZOLE SODIUM, VIA: HCPCS | Performed by: EMERGENCY MEDICINE

## 2022-02-16 PROCEDURE — 99291 CRITICAL CARE FIRST HOUR: CPT | Mod: GC | Performed by: INTERNAL MEDICINE

## 2022-02-16 PROCEDURE — C9113 INJ PANTOPRAZOLE SODIUM, VIA: HCPCS | Performed by: INTERNAL MEDICINE

## 2022-02-16 RX ORDER — AMOXICILLIN 250 MG
2 CAPSULE ORAL 2 TIMES DAILY
Status: DISCONTINUED | OUTPATIENT
Start: 2022-02-16 | End: 2022-02-16

## 2022-02-16 RX ORDER — SODIUM CHLORIDE, SODIUM LACTATE, POTASSIUM CHLORIDE, CALCIUM CHLORIDE 600; 310; 30; 20 MG/100ML; MG/100ML; MG/100ML; MG/100ML
INJECTION, SOLUTION INTRAVENOUS CONTINUOUS
Status: DISCONTINUED | OUTPATIENT
Start: 2022-02-16 | End: 2022-02-17

## 2022-02-16 RX ORDER — FAMOTIDINE 20 MG/1
20 TABLET, FILM COATED ORAL EVERY 12 HOURS
Status: DISCONTINUED | OUTPATIENT
Start: 2022-02-16 | End: 2022-02-16

## 2022-02-16 RX ORDER — SODIUM CHLORIDE 9 MG/ML
1000 INJECTION, SOLUTION INTRAVENOUS ONCE
Status: COMPLETED | OUTPATIENT
Start: 2022-02-16 | End: 2022-02-16

## 2022-02-16 RX ORDER — BISACODYL 10 MG
10 SUPPOSITORY, RECTAL RECTAL
Status: DISCONTINUED | OUTPATIENT
Start: 2022-02-16 | End: 2022-02-17 | Stop reason: HOSPADM

## 2022-02-16 RX ORDER — AMOXICILLIN 250 MG
2 CAPSULE ORAL 2 TIMES DAILY
Status: DISCONTINUED | OUTPATIENT
Start: 2022-02-16 | End: 2022-02-17 | Stop reason: HOSPADM

## 2022-02-16 RX ORDER — SODIUM CHLORIDE, SODIUM LACTATE, POTASSIUM CHLORIDE, AND CALCIUM CHLORIDE .6; .31; .03; .02 G/100ML; G/100ML; G/100ML; G/100ML
1000 INJECTION, SOLUTION INTRAVENOUS ONCE
Status: COMPLETED | OUTPATIENT
Start: 2022-02-16 | End: 2022-02-16

## 2022-02-16 RX ORDER — POLYETHYLENE GLYCOL 3350 17 G/17G
1 POWDER, FOR SOLUTION ORAL
Status: DISCONTINUED | OUTPATIENT
Start: 2022-02-16 | End: 2022-02-17 | Stop reason: HOSPADM

## 2022-02-16 RX ORDER — PANTOPRAZOLE SODIUM 40 MG/10ML
40 INJECTION, POWDER, LYOPHILIZED, FOR SOLUTION INTRAVENOUS 2 TIMES DAILY
Status: DISCONTINUED | OUTPATIENT
Start: 2022-02-16 | End: 2022-02-17

## 2022-02-16 RX ORDER — CEFTRIAXONE 2 G/1
2 INJECTION, POWDER, FOR SOLUTION INTRAMUSCULAR; INTRAVENOUS ONCE
Status: COMPLETED | OUTPATIENT
Start: 2022-02-16 | End: 2022-02-16

## 2022-02-16 RX ORDER — ROCURONIUM BROMIDE 10 MG/ML
100 INJECTION, SOLUTION INTRAVENOUS ONCE
Status: COMPLETED | OUTPATIENT
Start: 2022-02-16 | End: 2022-02-16

## 2022-02-16 RX ORDER — POLYETHYLENE GLYCOL 3350 17 G/17G
1 POWDER, FOR SOLUTION ORAL
Status: DISCONTINUED | OUTPATIENT
Start: 2022-02-16 | End: 2022-02-16

## 2022-02-16 RX ORDER — BISACODYL 10 MG
10 SUPPOSITORY, RECTAL RECTAL
Status: DISCONTINUED | OUTPATIENT
Start: 2022-02-16 | End: 2022-02-16

## 2022-02-16 RX ORDER — SUCCINYLCHOLINE CHLORIDE 20 MG/ML
120 INJECTION INTRAMUSCULAR; INTRAVENOUS ONCE
Status: COMPLETED | OUTPATIENT
Start: 2022-02-16 | End: 2022-02-16

## 2022-02-16 RX ORDER — SODIUM CHLORIDE, SODIUM LACTATE, POTASSIUM CHLORIDE, AND CALCIUM CHLORIDE .6; .31; .03; .02 G/100ML; G/100ML; G/100ML; G/100ML
1000 INJECTION, SOLUTION INTRAVENOUS ONCE
Status: COMPLETED | OUTPATIENT
Start: 2022-02-17 | End: 2022-02-17

## 2022-02-16 RX ADMIN — ROCURONIUM BROMIDE 100 MG: 10 INJECTION, SOLUTION INTRAVENOUS at 18:05

## 2022-02-16 RX ADMIN — PROPOFOL: 10 INJECTION, EMULSION INTRAVENOUS at 18:25

## 2022-02-16 RX ADMIN — CEFTRIAXONE SODIUM 2 G: 2 INJECTION, POWDER, FOR SOLUTION INTRAMUSCULAR; INTRAVENOUS at 19:31

## 2022-02-16 RX ADMIN — PROPOFOL 20 MCG/KG/MIN: 10 INJECTION, EMULSION INTRAVENOUS at 19:15

## 2022-02-16 RX ADMIN — SODIUM CHLORIDE 1000 ML: 9 INJECTION, SOLUTION INTRAVENOUS at 18:04

## 2022-02-16 RX ADMIN — SODIUM CHLORIDE 80 MG: 9 INJECTION, SOLUTION INTRAVENOUS at 20:25

## 2022-02-16 RX ADMIN — SODIUM CHLORIDE, POTASSIUM CHLORIDE, SODIUM LACTATE AND CALCIUM CHLORIDE: 600; 310; 30; 20 INJECTION, SOLUTION INTRAVENOUS at 23:00

## 2022-02-16 RX ADMIN — PANTOPRAZOLE SODIUM 40 MG: 40 INJECTION, POWDER, LYOPHILIZED, FOR SOLUTION INTRAVENOUS at 22:40

## 2022-02-16 RX ADMIN — FENTANYL CITRATE 50 MCG: 50 INJECTION, SOLUTION INTRAMUSCULAR; INTRAVENOUS at 18:17

## 2022-02-16 RX ADMIN — SUCCINYLCHOLINE CHLORIDE 120 MG: 20 INJECTION, SOLUTION INTRAMUSCULAR; INTRAVENOUS at 18:05

## 2022-02-16 RX ADMIN — FENTANYL CITRATE 100 MCG: 50 INJECTION, SOLUTION INTRAMUSCULAR; INTRAVENOUS at 19:30

## 2022-02-16 RX ADMIN — SODIUM CHLORIDE 8 MG/HR: 9 INJECTION, SOLUTION INTRAVENOUS at 21:35

## 2022-02-16 RX ADMIN — SODIUM CHLORIDE, POTASSIUM CHLORIDE, SODIUM LACTATE AND CALCIUM CHLORIDE 1000 ML: 600; 310; 30; 20 INJECTION, SOLUTION INTRAVENOUS at 21:48

## 2022-02-16 ASSESSMENT — PAIN DESCRIPTION - PAIN TYPE: TYPE: ACUTE PAIN

## 2022-02-16 ASSESSMENT — FIBROSIS 4 INDEX
FIB4 SCORE: 0.64
FIB4 SCORE: 0.5

## 2022-02-17 ENCOUNTER — APPOINTMENT (OUTPATIENT)
Dept: RADIOLOGY | Facility: MEDICAL CENTER | Age: 27
DRG: 917 | End: 2022-02-17
Attending: STUDENT IN AN ORGANIZED HEALTH CARE EDUCATION/TRAINING PROGRAM
Payer: MEDICAID

## 2022-02-17 VITALS
HEART RATE: 97 BPM | HEIGHT: 66 IN | DIASTOLIC BLOOD PRESSURE: 71 MMHG | WEIGHT: 154.32 LBS | OXYGEN SATURATION: 96 % | RESPIRATION RATE: 14 BRPM | SYSTOLIC BLOOD PRESSURE: 108 MMHG | BODY MASS INDEX: 24.8 KG/M2 | TEMPERATURE: 100.6 F

## 2022-02-17 LAB
ALBUMIN SERPL BCP-MCNC: 3.4 G/DL (ref 3.2–4.9)
ALBUMIN SERPL BCP-MCNC: 3.5 G/DL (ref 3.2–4.9)
ALBUMIN/GLOB SERPL: 1.4 G/DL
ALBUMIN/GLOB SERPL: 1.4 G/DL
ALP SERPL-CCNC: 49 U/L (ref 30–99)
ALP SERPL-CCNC: 51 U/L (ref 30–99)
ALT SERPL-CCNC: 55 U/L (ref 2–50)
ALT SERPL-CCNC: 56 U/L (ref 2–50)
ANION GAP SERPL CALC-SCNC: 12 MMOL/L (ref 7–16)
ANION GAP SERPL CALC-SCNC: 13 MMOL/L (ref 7–16)
APPEARANCE UR: CLEAR
AST SERPL-CCNC: 63 U/L (ref 12–45)
AST SERPL-CCNC: 64 U/L (ref 12–45)
BACTERIA #/AREA URNS HPF: NEGATIVE /HPF
BASE EXCESS BLDA CALC-SCNC: 0 MMOL/L (ref -4–3)
BASOPHILS # BLD AUTO: 0.2 % (ref 0–1.8)
BASOPHILS # BLD AUTO: 0.2 % (ref 0–1.8)
BASOPHILS # BLD: 0.04 K/UL (ref 0–0.12)
BASOPHILS # BLD: 0.05 K/UL (ref 0–0.12)
BILIRUB SERPL-MCNC: 0.2 MG/DL (ref 0.1–1.5)
BILIRUB SERPL-MCNC: 0.2 MG/DL (ref 0.1–1.5)
BILIRUB UR QL STRIP.AUTO: NEGATIVE
BODY TEMPERATURE: ABNORMAL DEGREES
BREATHS SETTING VENT: 24
BUN SERPL-MCNC: 35 MG/DL (ref 8–22)
BUN SERPL-MCNC: 39 MG/DL (ref 8–22)
CALCIUM SERPL-MCNC: 9 MG/DL (ref 8.5–10.5)
CALCIUM SERPL-MCNC: 9.1 MG/DL (ref 8.5–10.5)
CHLORIDE SERPL-SCNC: 105 MMOL/L (ref 96–112)
CHLORIDE SERPL-SCNC: 107 MMOL/L (ref 96–112)
CO2 BLDA-SCNC: 22 MMOL/L (ref 20–33)
CO2 SERPL-SCNC: 20 MMOL/L (ref 20–33)
CO2 SERPL-SCNC: 20 MMOL/L (ref 20–33)
COLOR UR: YELLOW
CREAT SERPL-MCNC: 1.03 MG/DL (ref 0.5–1.4)
CREAT SERPL-MCNC: 1.08 MG/DL (ref 0.5–1.4)
DELSYS IDSYS: ABNORMAL
END TIDAL CARBON DIOXIDE IECO2: 20 MMHG
EOSINOPHIL # BLD AUTO: 0 K/UL (ref 0–0.51)
EOSINOPHIL # BLD AUTO: 0 K/UL (ref 0–0.51)
EOSINOPHIL NFR BLD: 0 % (ref 0–6.9)
EOSINOPHIL NFR BLD: 0 % (ref 0–6.9)
EPI CELLS #/AREA URNS HPF: NEGATIVE /HPF
ERYTHROCYTE [DISTWIDTH] IN BLOOD BY AUTOMATED COUNT: 43.8 FL (ref 35.9–50)
ERYTHROCYTE [DISTWIDTH] IN BLOOD BY AUTOMATED COUNT: 43.9 FL (ref 35.9–50)
GLOBULIN SER CALC-MCNC: 2.4 G/DL (ref 1.9–3.5)
GLOBULIN SER CALC-MCNC: 2.5 G/DL (ref 1.9–3.5)
GLUCOSE BLD-MCNC: 90 MG/DL (ref 65–99)
GLUCOSE SERPL-MCNC: 98 MG/DL (ref 65–99)
GLUCOSE SERPL-MCNC: 99 MG/DL (ref 65–99)
GLUCOSE UR STRIP.AUTO-MCNC: NEGATIVE MG/DL
HCO3 BLDA-SCNC: 21.3 MMOL/L (ref 17–25)
HCT VFR BLD AUTO: 29 % (ref 42–52)
HCT VFR BLD AUTO: 30.4 % (ref 42–52)
HGB BLD-MCNC: 10.2 G/DL (ref 14–18)
HGB BLD-MCNC: 9.7 G/DL (ref 14–18)
HOROWITZ INDEX BLDA+IHG-RTO: 386 MM[HG]
HYALINE CASTS #/AREA URNS LPF: ABNORMAL /LPF
IMM GRANULOCYTES # BLD AUTO: 0.13 K/UL (ref 0–0.11)
IMM GRANULOCYTES # BLD AUTO: 0.15 K/UL (ref 0–0.11)
IMM GRANULOCYTES NFR BLD AUTO: 0.7 % (ref 0–0.9)
IMM GRANULOCYTES NFR BLD AUTO: 0.7 % (ref 0–0.9)
KETONES UR STRIP.AUTO-MCNC: 15 MG/DL
LACTATE BLD-SCNC: 1.6 MMOL/L (ref 0.5–2)
LACTATE BLD-SCNC: 1.6 MMOL/L (ref 0.5–2)
LEUKOCYTE ESTERASE UR QL STRIP.AUTO: ABNORMAL
LYMPHOCYTES # BLD AUTO: 2.22 K/UL (ref 1–4.8)
LYMPHOCYTES # BLD AUTO: 2.29 K/UL (ref 1–4.8)
LYMPHOCYTES NFR BLD: 10.2 % (ref 22–41)
LYMPHOCYTES NFR BLD: 12.6 % (ref 22–41)
MAGNESIUM SERPL-MCNC: 1.7 MG/DL (ref 1.5–2.5)
MCH RBC QN AUTO: 29 PG (ref 27–33)
MCH RBC QN AUTO: 29.3 PG (ref 27–33)
MCHC RBC AUTO-ENTMCNC: 33.4 G/DL (ref 33.7–35.3)
MCHC RBC AUTO-ENTMCNC: 33.6 G/DL (ref 33.7–35.3)
MCV RBC AUTO: 86.6 FL (ref 81.4–97.8)
MCV RBC AUTO: 87.4 FL (ref 81.4–97.8)
MICRO URNS: ABNORMAL
MODE IMODE: ABNORMAL
MONOCYTES # BLD AUTO: 1.36 K/UL (ref 0–0.85)
MONOCYTES # BLD AUTO: 1.66 K/UL (ref 0–0.85)
MONOCYTES NFR BLD AUTO: 7.5 % (ref 0–13.4)
MONOCYTES NFR BLD AUTO: 7.6 % (ref 0–13.4)
NEUTROPHILS # BLD AUTO: 14.41 K/UL (ref 1.82–7.42)
NEUTROPHILS # BLD AUTO: 17.66 K/UL (ref 1.82–7.42)
NEUTROPHILS NFR BLD: 79 % (ref 44–72)
NEUTROPHILS NFR BLD: 81.3 % (ref 44–72)
NITRITE UR QL STRIP.AUTO: NEGATIVE
NRBC # BLD AUTO: 0 K/UL
NRBC # BLD AUTO: 0 K/UL
NRBC BLD-RTO: 0 /100 WBC
NRBC BLD-RTO: 0 /100 WBC
O2/TOTAL GAS SETTING VFR VENT: 50 %
PCO2 BLDA: 23.2 MMHG (ref 26–37)
PCO2 TEMP ADJ BLDA: 23.7 MMHG (ref 26–37)
PEEP END EXPIRATORY PRESSURE IPEEP: 8 CMH20
PH BLDA: 7.57 [PH] (ref 7.4–7.5)
PH TEMP ADJ BLDA: 7.56 [PH] (ref 7.4–7.5)
PH UR STRIP.AUTO: 5.5 [PH] (ref 5–8)
PHOSPHATE SERPL-MCNC: 1.9 MG/DL (ref 2.5–4.5)
PLATELET # BLD AUTO: 213 K/UL (ref 164–446)
PLATELET # BLD AUTO: 223 K/UL (ref 164–446)
PMV BLD AUTO: 11.4 FL (ref 9–12.9)
PMV BLD AUTO: 11.5 FL (ref 9–12.9)
PO2 BLDA: 193 MMHG (ref 64–87)
PO2 TEMP ADJ BLDA: 196 MMHG (ref 64–87)
POTASSIUM SERPL-SCNC: 4 MMOL/L (ref 3.6–5.5)
POTASSIUM SERPL-SCNC: 4.4 MMOL/L (ref 3.6–5.5)
PROT SERPL-MCNC: 5.8 G/DL (ref 6–8.2)
PROT SERPL-MCNC: 6 G/DL (ref 6–8.2)
PROT UR QL STRIP: NEGATIVE MG/DL
RBC # BLD AUTO: 3.35 M/UL (ref 4.7–6.1)
RBC # BLD AUTO: 3.48 M/UL (ref 4.7–6.1)
RBC # URNS HPF: ABNORMAL /HPF
RBC UR QL AUTO: NEGATIVE
SAO2 % BLDA: 100 % (ref 93–99)
SODIUM SERPL-SCNC: 137 MMOL/L (ref 135–145)
SODIUM SERPL-SCNC: 140 MMOL/L (ref 135–145)
SP GR UR STRIP.AUTO: 1.02
SPECIMEN DRAWN FROM PATIENT: ABNORMAL
TIDAL VOLUME IVT: 410 ML
UROBILINOGEN UR STRIP.AUTO-MCNC: 0.2 MG/DL
WBC # BLD AUTO: 18.2 K/UL (ref 4.8–10.8)
WBC # BLD AUTO: 21.7 K/UL (ref 4.8–10.8)
WBC #/AREA URNS HPF: ABNORMAL /HPF

## 2022-02-17 PROCEDURE — C9113 INJ PANTOPRAZOLE SODIUM, VIA: HCPCS | Performed by: INTERNAL MEDICINE

## 2022-02-17 PROCEDURE — 99291 CRITICAL CARE FIRST HOUR: CPT | Performed by: INTERNAL MEDICINE

## 2022-02-17 PROCEDURE — 700111 HCHG RX REV CODE 636 W/ 250 OVERRIDE (IP): Performed by: INTERNAL MEDICINE

## 2022-02-17 PROCEDURE — 94003 VENT MGMT INPAT SUBQ DAY: CPT

## 2022-02-17 PROCEDURE — 94799 UNLISTED PULMONARY SVC/PX: CPT

## 2022-02-17 PROCEDURE — 700111 HCHG RX REV CODE 636 W/ 250 OVERRIDE (IP): Performed by: EMERGENCY MEDICINE

## 2022-02-17 PROCEDURE — 85025 COMPLETE CBC W/AUTO DIFF WBC: CPT | Mod: 91

## 2022-02-17 PROCEDURE — 84100 ASSAY OF PHOSPHORUS: CPT

## 2022-02-17 PROCEDURE — 81001 URINALYSIS AUTO W/SCOPE: CPT

## 2022-02-17 PROCEDURE — 700105 HCHG RX REV CODE 258: Performed by: INTERNAL MEDICINE

## 2022-02-17 PROCEDURE — 82803 BLOOD GASES ANY COMBINATION: CPT

## 2022-02-17 PROCEDURE — 83605 ASSAY OF LACTIC ACID: CPT | Mod: 91

## 2022-02-17 PROCEDURE — 82962 GLUCOSE BLOOD TEST: CPT

## 2022-02-17 PROCEDURE — 83735 ASSAY OF MAGNESIUM: CPT

## 2022-02-17 PROCEDURE — 36600 WITHDRAWAL OF ARTERIAL BLOOD: CPT

## 2022-02-17 PROCEDURE — 700105 HCHG RX REV CODE 258: Performed by: STUDENT IN AN ORGANIZED HEALTH CARE EDUCATION/TRAINING PROGRAM

## 2022-02-17 PROCEDURE — A9270 NON-COVERED ITEM OR SERVICE: HCPCS | Performed by: INTERNAL MEDICINE

## 2022-02-17 PROCEDURE — 700111 HCHG RX REV CODE 636 W/ 250 OVERRIDE (IP): Performed by: STUDENT IN AN ORGANIZED HEALTH CARE EDUCATION/TRAINING PROGRAM

## 2022-02-17 PROCEDURE — 71045 X-RAY EXAM CHEST 1 VIEW: CPT

## 2022-02-17 PROCEDURE — 700102 HCHG RX REV CODE 250 W/ 637 OVERRIDE(OP): Performed by: INTERNAL MEDICINE

## 2022-02-17 PROCEDURE — 700101 HCHG RX REV CODE 250: Performed by: INTERNAL MEDICINE

## 2022-02-17 PROCEDURE — 80053 COMPREHEN METABOLIC PANEL: CPT

## 2022-02-17 RX ORDER — LIDOCAINE 50 MG/G
1 PATCH TOPICAL EVERY 24 HOURS
Status: DISCONTINUED | OUTPATIENT
Start: 2022-02-17 | End: 2022-02-17 | Stop reason: HOSPADM

## 2022-02-17 RX ORDER — MAGNESIUM SULFATE HEPTAHYDRATE 40 MG/ML
2 INJECTION, SOLUTION INTRAVENOUS ONCE
Status: COMPLETED | OUTPATIENT
Start: 2022-02-17 | End: 2022-02-17

## 2022-02-17 RX ORDER — TRAMADOL HYDROCHLORIDE 50 MG/1
50 TABLET ORAL EVERY 6 HOURS PRN
Status: DISCONTINUED | OUTPATIENT
Start: 2022-02-17 | End: 2022-02-17 | Stop reason: HOSPADM

## 2022-02-17 RX ADMIN — MAGNESIUM SULFATE HEPTAHYDRATE 2 G: 40 INJECTION, SOLUTION INTRAVENOUS at 07:49

## 2022-02-17 RX ADMIN — SODIUM CHLORIDE 3 G: 900 INJECTION INTRAVENOUS at 00:06

## 2022-02-17 RX ADMIN — SODIUM CHLORIDE 3 G: 900 INJECTION INTRAVENOUS at 12:55

## 2022-02-17 RX ADMIN — PROPOFOL 35 MCG/KG/MIN: 10 INJECTION, EMULSION INTRAVENOUS at 00:58

## 2022-02-17 RX ADMIN — PANTOPRAZOLE SODIUM 40 MG: 40 INJECTION, POWDER, LYOPHILIZED, FOR SOLUTION INTRAVENOUS at 05:08

## 2022-02-17 RX ADMIN — PROPOFOL 55 MCG/KG/MIN: 10 INJECTION, EMULSION INTRAVENOUS at 05:31

## 2022-02-17 RX ADMIN — SODIUM CHLORIDE, POTASSIUM CHLORIDE, SODIUM LACTATE AND CALCIUM CHLORIDE 1000 ML: 600; 310; 30; 20 INJECTION, SOLUTION INTRAVENOUS at 00:06

## 2022-02-17 RX ADMIN — SODIUM CHLORIDE 3 G: 900 INJECTION INTRAVENOUS at 05:06

## 2022-02-17 RX ADMIN — POTASSIUM PHOSPHATE, MONOBASIC AND POTASSIUM PHOSPHATE, DIBASIC 30 MMOL: 224; 236 INJECTION, SOLUTION, CONCENTRATE INTRAVENOUS at 07:49

## 2022-02-17 ASSESSMENT — PAIN DESCRIPTION - PAIN TYPE
TYPE: ACUTE PAIN

## 2022-02-17 ASSESSMENT — FIBROSIS 4 INDEX: FIB4 SCORE: 0.91

## 2022-02-17 NOTE — DISCHARGE SUMMARY
"Discharge Summary    CHIEF COMPLAINT ON ADMISSION  Chief Complaint   Patient presents with   • Unresponsive       Reason for Admission  EMS     Admission Date  2/16/2022    CODE STATUS  Full Code    HPI & HOSPITAL COURSE  Mr. Marv Diop is a 26 year old male with the past medical history significant for migraine headaches, substance abuse, and cyclic vomiting who was brought in the ER on 2/16 by his parents after he was found unresponsive.  There was an empty bottle of Mexican alprazolam nearby.  The patient was intubated for airway protection and admitted to the ICU.    The patient was suspected to have aspirated vomitus and was started on Unasyn as he had an elevated white blood cell count and procalcitonin.  There was also mention of possible coffee-ground emesis and questionable GI bleed.  The patient was started on protonix; however, there was never any evidence of GI bleeding.The patient remained intubated overnight and in the morning was waking to follow all commands.  He underwent an SBT and was successfully extubated.  The patient was sd to eat and his diet advanced.  He reported that he was not suicidal and only took \"one Xanax\" and thinks \"the Xanax could have been laced with something\".  The patient insisted to leave despite the intensivist coming to the bedside to explain that staying overnight to get antibiotics would be the safest.  The patient left AMA after his father came to pick him up.    Therefore, he is discharged in fair and stable condition against medical advice.    The patient recovered much more quickly than anticipated on admission.    Discharge Date  2/17/2022    FOLLOW UP ITEMS POST DISCHARGE  Should see and establish with a PCP to follow up with his aspiration pneumonia    DISCHARGE DIAGNOSES  Principal Problem:    Drug overdose POA: Unknown  Active Problems:    ERIN (acute kidney injury) (HCC) POA: Yes    Respiratory failure (HCC) POA: Yes    Sepsis (HCC) POA: Unknown    Coffee " "ground emesis POA: Unknown    Aspiration pneumonia (HCC) POA: Unknown  Resolved Problems:    * No resolved hospital problems. *      FOLLOW UP  No future appointments.  No follow-up provider specified.    MEDICATIONS ON DISCHARGE     Medication List      You have not been prescribed any medications.         Allergies  Allergies   Allergen Reactions   • Acetaminophen [Tylenol] Anaphylaxis and Nausea     Nausea, \"it just doesn't sit right\"  5/29/2021 patient states that his throat swells shut       DIET  Orders Placed This Encounter   Procedures   • Diet Order Diet: Regular     Standing Status:   Standing     Number of Occurrences:   1     Order Specific Question:   Diet:     Answer:   Regular [1]       ACTIVITY  As tolerated.  Weight bearing as tolerated    CONSULTATIONS  Critical care medicine    PROCEDURES  none    LABORATORY  Lab Results   Component Value Date    SODIUM 137 02/17/2022    POTASSIUM 4.0 02/17/2022    CHLORIDE 105 02/17/2022    CO2 20 02/17/2022    GLUCOSE 99 02/17/2022    BUN 35 (H) 02/17/2022    CREATININE 1.03 02/17/2022    CREATININE 0.4 04/10/2006        Lab Results   Component Value Date    WBC 18.2 (H) 02/17/2022    HEMOGLOBIN 9.7 (L) 02/17/2022    HEMATOCRIT 29.0 (L) 02/17/2022    PLATELETCT 223 02/17/2022        Total time of the discharge process exceeds <15 minutes.  "

## 2022-02-17 NOTE — PROGRESS NOTES
Critical Care Progress Note    Date of admission  2/16/2022    Chief Complaint  26 y.o. male admitted 2/16/2022 with respiratory failure after Xanax overdose    Hospital Course  Mr. Marv Diop is a 26 year old male with the past medical history significant for migraine headaches, substance abuse, and cyclic vomiting who was brought in the ER on 2/16 by his parents after he was found unresponsive.  There was an empty bottle of Mexican alprazolam nearby.  The patient was intubated for airway protection and admitted to the ICU.      Interval Problem Update  Reviewed last 24 hour events:   - no events overnight   - following all commands, wiritn gnoted   - SR 80-90s   - SBP 90-100s   - afebrile   - poole in place,   - PIVs   - prop off   - vent day #2   - CXR(reviewed): clera lung fields    - SBT for an hour, good FVC   - lovenox   - PPI   - day 2/5 of unasyn   - elevated of procalcitonin   - WBCs 18   - K 4   - Mg 1.7   - Phos 1.9    Review of Systems  Review of Systems   Unable to perform ROS: Intubated        Vital Signs for last 24 hours   Temp:  [38.1 °C (100.6 °F)-38.8 °C (101.8 °F)] 38.1 °C (100.6 °F)  Pulse:  [] 87  Resp:  [] 14  BP: (106-167)/() 107/78  SpO2:  [85 %-100 %] 100 %    Hemodynamic parameters for last 24 hours       Respiratory Information for the last 24 hours  Vent Mode: Spont  Rate (breaths/min): 20  Vt Target (mL): 410  PEEP/CPAP: 8  P Support: 5  MAP: 9.6  Control VTE (exp VT): 409    Physical Exam   Physical Exam  Vitals and nursing note reviewed.   Constitutional:       Appearance: He is ill-appearing.      Comments: Appears stated age, opens eyes to verbal    HENT:      Head: Normocephalic and atraumatic.      Right Ear: External ear normal.      Left Ear: External ear normal.      Nose: Nose normal. No rhinorrhea.      Mouth/Throat:      Mouth: Mucous membranes are moist.      Comments: ETT in place  Eyes:      General: No scleral icterus.     Conjunctiva/sclera:  Conjunctivae normal.      Pupils: Pupils are equal, round, and reactive to light.   Cardiovascular:      Rate and Rhythm: Normal rate and regular rhythm.      Pulses: Normal pulses.      Heart sounds: Normal heart sounds. No murmur heard.  Pulmonary:      Effort: No respiratory distress.      Comments: Breathing comfortably on the vent, coarse throughout  Chest:      Chest wall: No tenderness.   Abdominal:      General: There is no distension.      Palpations: Abdomen is soft.      Tenderness: There is no abdominal tenderness. There is no guarding or rebound.   Genitourinary:     Comments: Kaplan in place  Musculoskeletal:         General: Normal range of motion.      Cervical back: Normal range of motion and neck supple.      Right lower leg: No edema.      Left lower leg: No edema.   Lymphadenopathy:      Cervical: No cervical adenopathy.   Skin:     General: Skin is warm and dry.      Capillary Refill: Capillary refill takes less than 2 seconds.      Findings: No rash.   Neurological:      Cranial Nerves: No cranial nerve deficit.      Sensory: No sensory deficit.      Motor: No weakness.      Comments: Hard of hearing, following commands but he is sleepy   Psychiatric:      Comments: Unable to assess         Medications  Current Facility-Administered Medications   Medication Dose Route Frequency Provider Last Rate Last Admin   • magnesium sulfate IVPB premix 2 g  2 g Intravenous Once Thu Malone M.D.       • potassium phosphate IVPB 30 mmol in 500 mL D5W (premix)  30 mmol Intravenous Once Thu Malone M.D.       • propofol (DIPRIVAN) injection  0-80 mcg/kg/min Intravenous Continuous Marcus Ojeda M.D.   Stopped at 02/17/22 0615   • Respiratory Therapy Consult   Nebulization Continuous RT Peter Lema M.D.       • MD Alert...ICU Electrolyte Replacement per Pharmacy   Other PHARMACY TO DOSE Peter Lema M.D.       • lactated ringers infusion   Intravenous Continuous Peter Lema M.D. 100 mL/hr at 02/17/22  0512 Rate Change at 02/17/22 0512   • enoxaparin (LOVENOX) inj 40 mg  40 mg Subcutaneous Q EVENING Peter Lema M.D.       • ampicillin/sulbactam (UNASYN) 3 g in  mL IVPB  3 g Intravenous Q6HRS Peter Lema M.D.   Stopped at 02/17/22 0536   • senna-docusate (PERICOLACE or SENOKOT S) 8.6-50 MG per tablet 2 Tablet  2 Tablet Enteral Tube BID Peter Lema M.D.        And   • polyethylene glycol/lytes (MIRALAX) PACKET 1 Packet  1 Packet Enteral Tube QDAY PRN Peter Lema M.D.        And   • magnesium hydroxide (MILK OF MAGNESIA) suspension 30 mL  30 mL Enteral Tube QDAY PRN Peter Lema M.D.        And   • bisacodyl (DULCOLAX) suppository 10 mg  10 mg Rectal QDAY PRN Peter Lema M.D.       • lidocaine (XYLOCAINE) 1 % injection 2 mL  2 mL Tracheal Tube Q30 MIN PRN Peter Lema M.D.       • pantoprazole (Protonix) injection 40 mg  40 mg Intravenous BID Leonel Jenkins M.D.   40 mg at 02/17/22 0508   • fentaNYL (SUBLIMAZE) injection  mcg   mcg Intravenous Q2HRS PRN Leonel Jenkins M.D.           Fluids    Intake/Output Summary (Last 24 hours) at 2/17/2022 0722  Last data filed at 2/17/2022 0600  Gross per 24 hour   Intake 4356.72 ml   Output 1550 ml   Net 2806.72 ml       Laboratory  Recent Labs     02/17/22  0343   ISTATAPH 7.572*   ISTATAPCO2 23.2*   ISTATAPO2 193*   ISTATATCO2 22   SOLOOEE3EHT 100*   ISTATARTHCO3 21.3   ISTATARTBE 0   ISTATTEMP 37.5 C   ISTATFIO2 50   ISTATSPEC Arterial   ISTATAPHTC 7.564*   FVMMDFXW3OB 196*     Recent Labs     02/16/22  1844   CPKTOTAL 209*     Recent Labs     02/16/22  1844 02/17/22  0252 02/17/22  0438   SODIUM 139 140 137   POTASSIUM 6.0* 4.4 4.0   CHLORIDE 107 107 105   CO2 17* 20 20   BUN 46* 39* 35*   CREATININE 1.32 1.08 1.03   MAGNESIUM  --   --  1.7   PHOSPHORUS  --   --  1.9*   CALCIUM 8.9 9.0 9.1     Recent Labs     02/16/22  1844 02/17/22  0252 02/17/22  0438   ALTSGPT 30 56* 55*   ASTSGOT 44 64* 63*   ALKPHOSPHAT 65 51 49   TBILIRUBIN 0.2 0.2 0.2   LIPASE 13   --   --    GLUCOSE 77 98 99     Recent Labs     02/16/22  1844 02/17/22  0252 02/17/22  0438   WBC 26.6* 21.7* 18.2*   NEUTSPOLYS 86.90* 81.30* 79.00*   LYMPHOCYTES 3.50* 10.20* 12.60*   MONOCYTES 8.00 7.60 7.50   EOSINOPHILS 0.50 0.00 0.00   BASOPHILS 0.20 0.20 0.20   ASTSGOT 44 64* 63*   ALTSGPT 30 56* 55*   ALKPHOSPHAT 65 51 49   TBILIRUBIN 0.2 0.2 0.2     Recent Labs     02/16/22  1844 02/17/22  0252 02/17/22  0438   RBC 3.97* 3.48* 3.35*   HEMOGLOBIN 11.7* 10.2* 9.7*   HEMATOCRIT 36.1* 30.4* 29.0*   PLATELETCT 303 213 223       Imaging  Reviewed    Assessment/Plan  * Drug overdose  Assessment & Plan  Presumed accidental. Per parents patient has had one remote mention of SI in the past but was in his usual state of health without any warning signs recently. Hx of drug abuse.  UDS (+) for benzos/THC  Will discuss with patient once extubated    ERIN (acute kidney injury) (HCC)- (present on admission)  Assessment & Plan  Likely pre-renal  S/p IVF resuscitation  Improving     Aspiration pneumonia (HCC)  Assessment & Plan  Cont Unasyn  Follow CXR  RT/O2 protocols    Coffee ground emesis  Assessment & Plan  No evidence of GI bleed since admit  Hb unchanged  Stop PPI    Sepsis (HCC)  Assessment & Plan  This is Sepsis Present on admission  SIRS criteria identified on my evaluation include: Fever, with temperature greater than 101 deg F, Tachycardia, with heart rate greater than 90 BPM and Leukocytosis, with WBC greater than 12,000  Source is pulmonary  Sepsis protocol initiated  Fluid resuscitation ordered per protocol  IV antibiotics as appropriate for source of sepsis  While organ dysfunction may be noted elsewhere in this problem list or in the chart, degree of organ dysfunction does not meet CMS criteria for severe sepsis    Presumably due to vomiting/aspiration event due to drug overdose. Procal ++ with elevated WBC's. CXR without any focal consolidations.  Cont Unasyn for aspiration coverage  Follow BCs  Trend  lactate and UOP    Respiratory failure (HCC)- (present on admission)  Assessment & Plan  Due to presumed benzo (alprazolam) overdose.  Cont full vent support  RT/O2 protocols  Lung protective ventilator strategies  SAT/SBTs       VTE:  Lovenox  Ulcer: H2 Antagonist  Lines: Kaplan Catheter  Ongoing indication addressed and PIVs    I have performed a physical exam and reviewed and updated ROS and Plan today (2/17/2022). In review of yesterday's note (2/16/2022), there are no changes except as documented above.     Discussed patient condition and risk of morbidity and/or mortality with RN, RT, Pharmacy, , Charge nurse / hot rounds and Patient    The patient remains critically ill.  I have assessed and reassessed the respiratory status and made ventilator adjustments based upon arterial blood gas analysis, ventilator waveforms and airway mechanics.  I have assessed and reassessed the blood pressure, hemodynamics, cardiovascular status. This patient remains at high risk for worsening cardiopulmonary dysfunction and death without the above critical care interventions.    Critical care time = 60 minutes in directly providing and coordinating critical care and extensive data review.  No time overlap and excludes procedures.

## 2022-02-17 NOTE — PROGRESS NOTES
Patient extubated to 2L of O2 per NC without difficulty. Patient is awake, alert and oriented with no distress noted. He complains of throat and back pain. Restraints  Removed. VSS.

## 2022-02-17 NOTE — ED NOTES
Pt to red 07 at 1755.  ERP and multiple staff to room.  Oral suctioning per ERP.  O2 15L NRB applied.  Cardiac & VS monitoring equipment applied.  NS hung.

## 2022-02-17 NOTE — ASSESSMENT & PLAN NOTE
Presumed accidental. Per parents patient has had one remote mention of SI in the past but was in his usual state of health without any warning signs recently. Hx of drug abuse.  UDS (+) for benzos/THC  Will discuss with patient once extubated

## 2022-02-17 NOTE — PROGRESS NOTES
4 Eyes Skin Assessment Completed by SHAWNA Ivy and SHAWNA Chavarria.    Head Redness R Temple  Ears WDL  Nose WDL  Mouth Swollen Lips, Redness  Neck WDL  Breast/Chest WDL  Shoulder Blades WDL  Spine Scratches  (R) Arm/Elbow/Hand WDL  (L) Arm/Elbow/Hand WDL  Abdomen WDL  Groin WDL  Scrotum/Coccyx/Buttocks WDL  (R) Leg WDL  (L) Leg WDL  (R) Heel/Foot/Toe WDL  (L) Heel/Foot/Toe WDL          Devices In Places ECG, Blood Pressure Cuff, Pulse Ox, Kaplan, SCD's and OG/NG      Interventions In Place Pillows, heels floated, q2hr turns     Possible Skin Injury No    Pictures Uploaded Into Epic N/A  Wound Consult Placed N/A  RN Wound Prevention Protocol Ordered No

## 2022-02-17 NOTE — ED PROVIDER NOTES
"ED Provider Note    CHIEF COMPLAINT  Chief Complaint   Patient presents with   • Unresponsive       HPI  Patient presents emergency room, acutely brought back for altered mental status, decreased breathing, discoloration and was placed onto the bed.  Family members were not immediately available for initial review and there was discussion that maybe he had \"overdosed on something.\".  Patient was responsive only to painful stimuli, withdrew to pain, was having sonorous respirations, was initially having good oxygen saturations however it was noted that he was continued to deteriorate and required high level of care.    There is no immediate review available, see below for acute interventions and further discussion with family after they arrived.    PPE Note: I personally donned full PPE for all patient encounters during this visit, including being clean-shaven with an N95 respirator mask, gloves, and goggles.     Chart review shows patient has been seen in multiple emergency departments, last discharge note from hospitalist shows that the patient had been initially seen for agitation, agitation and required intubation for airway protection.  Within the last month he had a CT of the head that was performed along with a lumbar puncture for assessment of infectious disease work-up which was negative.  There is no notes of prior IV drug use, or other chronic medical illnesses.    REVIEW OF SYSTEMS  Was unable to be obtained due to the patient's acute condition    PAST MEDICAL HISTORY   has a past medical history of Abdominal migraine, Abdominal migraine, Colitis, and Gastritis.    SOCIAL HISTORY  Social History     Tobacco Use   • Smoking status: Never Smoker   • Smokeless tobacco: Never Used   Vaping Use   • Vaping Use: Some days   • Substances: THC   Substance and Sexual Activity   • Alcohol use: No   • Drug use: Yes     Types: Inhaled, Marijuana     Comment: THC daily   • Sexual activity: Never     SURGICAL HISTORY   " "has a past surgical history that includes gastroscopy (9/3/2014); gastroscopy-endo (11/15/2014); other; and other orthopedic surgery.    CURRENT MEDICATIONS  Home Medications     Reviewed by Corin Griffin (Pharmacy Tech) on 22 at 1907  Med List Status: Complete   Medication Last Dose Status        Patient Hesham Taking any Medications                     ALLERGIES  Allergies   Allergen Reactions   • Acetaminophen [Tylenol] Anaphylaxis and Nausea     Nausea, \"it just doesn't sit right\"  2021 patient states that his throat swells shut     PHYSICAL EXAM  VITAL SIGNS: 160/88, p138, RR28, o2 sat 78%  Pulse ox interpretation: I interpret this pulse ox as abnormal  Genl: Male, altered, appears in respiratory distress, minimal purposeful movement.    Head: NC/AT   ENT: Mucous membranes moist, posterior pharynx clear, uvula midline, nares patent bilaterally   Eyes: Normal sclera, pupils are pinpoint, sluggishly reactive to light.    Neck: atraumatic  Pulmonary: Lungs are notable for diffuse rhonchi, no wheezes or restricted air movement  Chest: No TTP  CV:  tachycardia (140), no murmur appreciated, pulses 2+ in both upper and lower extremities,  Abdomen: soft, NT/ND; no rebound/guarding, no masses palpated, no HSM   Musculoskeletal: Minimal purposeful movement of extremities.    Neuro: Minimally alert, not oriented, in extremis, unable to do formal motor/sensory/cerebellar exam at this time.  Responsive to pain.    Psych: unable to obtain  Skin: No rash or lesions.  no pallor    DIAGNOSTIC STUDIES / PROCEDURES    EKG  Results for orders placed or performed during the hospital encounter of 22   EKG (NOW)   Result Value Ref Range    Report       [facility] Emergency Dept.    Test Date:  2022  Pt Name:    GABRIEL CEBALLOS     Department: ER  MRN:        [id number]                      Room:       RD 07  Gender:     Male                         Technician: [id number]  :        " 1995                   Requested By:BARTOLOME MERCADO  Order #:    [id number]                    Reading MD: Rusty Velazquez MD    Measurements  Intervals                                Axis  Rate:       132                          P:          69  AR:         124                          QRS:        63  QRSD:       70                           T:          54  QT:         280  QTc:        415    Interpretive Statements  SINUS TACHYCARDIA  Compared to ECG 11/09/2021 14:02:45  Ventricular premature complex(es) no longer present  Atrial abnormality no longer present  Prolonged QT interval no longer present  Electronically Signed On 2- 19:40:37 PST by Rusty Velazquez MD       LABS  Labs Reviewed   CBC WITH DIFFERENTIAL - Abnormal; Notable for the following components:       Result Value    WBC 26.6 (*)     RBC 3.97 (*)     Hemoglobin 11.7 (*)     Hematocrit 36.1 (*)     MCHC 32.4 (*)     Neutrophils-Polys 86.90 (*)     Lymphocytes 3.50 (*)     Neutrophils (Absolute) 23.15 (*)     Lymphs (Absolute) 0.94 (*)     Monos (Absolute) 2.14 (*)     Immature Granulocytes (abs) 0.24 (*)     All other components within normal limits   LACTIC ACID - Abnormal; Notable for the following components:    Lactic Acid 2.6 (*)     All other components within normal limits   URINE DRUG SCREEN   DIAGNOSTIC ALCOHOL   COMP METABOLIC PANEL   LIPASE   ACETAMINOPHEN   SALICYLATE   COV-2, FLU A/B, AND RSV BY PCR (CEPHEID)   TRIGLYCERIDE   PROCALCITONIN   ARTERIAL BLOOD GAS   COD (ADULT)       RADIOLOGY  DX-CHEST-LIMITED (1 VIEW)   Final Result      Support devices present, satisfactory position.      CT-HEAD W/O    (Results Pending)       COURSE & MEDICAL DECISION MAKING  Pertinent Labs & Imaging studies reviewed. (See chart for details)    DDX:  Opioid overdose, benzodiazepine overdose, polysubstance ingestion, seizures, intracranial hemorrhage unlikely, hypoxic respiratory failure, electrolyte derangement, dehydration, kidney injury, alcohol  intoxication,    MDM  Number of Diagnoses or Management Options      Initial evaluation at 1810:  Patient was emergently evaluated as is called to the bedside for an unresponsive patient.  Patient appeared to have some cyanosis, was stimulated with painful stimuli, oxygen, monitoring and Mentor on the gurney along with establishment of IV access was started.  With stimulation and preoxygenation he was able to get up into the mid 90s though he was very quickly becoming somnolent and had several episodes of apparent gagging and I was concerned for protection of his airway.    Very quick discussion with family members elicited history of substance abuse and she had received some Narcan was noted prior to intubation is having dilated pupils subsequently, piloerection though he did not have overall improvement with protection of his airway.  He then became substantially worse with sonorous respirations and was bagged in preparation for intubation was started.    He has recent work-ups for nonspecific leukocytosis, was apparently worked up for infectious disease process with no clear etiology, has been active intubated multiple times for altered mental status and had metabolized and then left AMA.    At this time the patient's lab work shows a leukocyte count of 26, remains tachycardic, likely clinically dehydrated massive stress response but cannot exclude infectious source though he did not have any recent illness symptoms per his parents, has been afebrile and recent infectious work-up was unremarkable.  He has not had a positive blood cultures and is not an IV drug user.    Discussed with Intensivist, admitted to ICU in critical condition    HYDRATION: Based on the patient's presentation of Dehydration, Inability to take oral fluids, Sepsis and Tachycardia the patient was given IV fluids. IV Hydration was used because oral hydration was not adequate alone. Upon recheck following hydration, the patient was  john.    CRITICAL CARE:  I saw and evaluated this patient. I personally provided 40 minutes of critical care time to the patient excluding billable procedures and directly and personally provided the following treatment and critical care management:  Critical Care Interventions  Multispecialty coordination, Multiple bedside assessments, coordination of care with family and other historical sources, Continuous hemodynamic and respiratory monitoring, Serial neurologic exams, Serial airway observations, Fluid resuscitation, Accompany patient to the CT scan and Airway assessment and management    PROCEDURE  INTUBATION  Due to the patient’s clinical circumstances we elected to intubate the patient for protection of airway in the setting of sedation and likely hypoxic resp failure . The patient underwent RSI using Succand and rocuronium. Using a VL the patient was successfully intubated on the 1st attempt with a 8.0 tube. Tube placement was confirmed by end tidal CO2 and auscultation of breath sounds. The ETT was secured at 24cm at the teeth. The patient tolerated the procedure well and there no acute complications. A CXR was performed which revealed that the ETT was in appropriate position. The patient was placed on ventilator with the following settings: FiO2 100%, RR 22, TV8ml/kg, PEEP 5 cm.    FINAL IMPRESSION  Visit Diagnoses     ICD-10-CM   1. Altered mental status, unspecified altered mental status type  R41.82   2. Drug overdose, undetermined intent, initial encounter  T50.904A   3. Acute respiratory failure with hypoxia (HCC)  J96.01   4. Tachycardia  R00.0     Electronically signed by: Marcus Ojeda M.D., 2/16/2022 6:10 PM

## 2022-02-17 NOTE — DISCHARGE PLANNING
Care Transition Team Discharge Planning    Anticipated Discharge Disposition: LTAC vs home w/ DME if appropriate    Action: Lsw attended rounds, and Rn indicates pt is agitated, kicking, angry. Pt is writing notes. Pt admitted for possible xanax OD. Pt has hx of substance use disorder. Pt was admitted when his parents brought him into ER after being found unresponsive.     Barriers to Discharge: medical stability    Plan: Lsw will continue to follow, and assist w/ d/c planning.

## 2022-02-17 NOTE — CARE PLAN
Problem: Ventilation  Goal: Ability to achieve and maintain unassisted ventilation or tolerate decreased levels of ventilator support  Description: Target End Date:  4 days     Document on Vent flowsheet    1.  Support and monitor invasive and noninvasive mechanical ventilation  2.  Monitor ventilator weaning response  3.  Perform ventilator associated pneumonia prevention interventions  4.  Manage ventilation therapy by monitoring diagnostic test results  Outcome: Not Met     Vent day 2   ETT 8.0 @ 24  APVcmv 20 410 8 30%

## 2022-02-17 NOTE — ASSESSMENT & PLAN NOTE
This is Sepsis Present on admission  SIRS criteria identified on my evaluation include: Fever, with temperature greater than 101 deg F, Tachycardia, with heart rate greater than 90 BPM and Leukocytosis, with WBC greater than 12,000  Source is pulmonary  Sepsis protocol initiated  Fluid resuscitation ordered per protocol  IV antibiotics as appropriate for source of sepsis  While organ dysfunction may be noted elsewhere in this problem list or in the chart, degree of organ dysfunction does not meet CMS criteria for severe sepsis    Presumably due to vomiting/aspiration event due to drug overdose. Procal ++ with elevated WBC's. CXR without any focal consolidations.  Cont Unasyn for aspiration coverage  Follow BCs  Trend lactate and UOP

## 2022-02-17 NOTE — CONSULTS
"Pulmonary & Critical Care Consultation    Date of consult: 2/16/2022    Referring Physician  Dr. Ojeda    Reason for Consultation  Unresponsive      History of Present Illness  Gilbert Diop is a 26 y.o. male with a PMHx significant for cyclic vomiting syndrome, substance abuse with accidental overdose, who presents on 2/16/2022 brought by parents found unresponsive. History obtained from mother and father. Patient apparently called his mother at noon today with complaint of not feeling well, prompting his father to visit him around that time and he was well-appearing although also somnolent. When his mother returned home around 4 PM she found him to be unresponsive and not breathing, although not cyanotic. They loaded him into a vehicle and brought him to [facility]. Of note he was treated in [place] recently and a empty appearing bottle of [place] alprazolam was found nearby. Patient lives with parents and they deny any other prescription medications or illicits.     Code Status  Full    Past Medical History   has a past medical history of Abdominal migraine, Abdominal migraine, Colitis, and Gastritis.    Past Surgical History   has a past surgical history that includes gastroscopy (9/3/2014); gastroscopy-endo (11/15/2014); other; and other orthopedic surgery.    Medications  Home Medications     Reviewed by Corin Griffin (Pharmacy Tech) on 02/16/22 at 1907  Med List Status: Complete   Medication Last Dose Status        Patient Hesham Taking any Medications                       Allergies  Allergies   Allergen Reactions   • Acetaminophen [Tylenol] Anaphylaxis and Nausea     Nausea, \"it just doesn't sit right\"  5/29/2021 patient states that his throat swells shut       Social History   reports that he has never smoked. He has never used smokeless tobacco. He reports current drug use. Drugs: Inhaled and Marijuana. He reports that he does not drink alcohol.     Family History  family history is not on " file.    Review of Systems  Review of Systems   Unable to perform ROS: Intubated       Vital Signs last 24 hours  Temp:  [38.8 °C (101.8 °F)] 38.8 °C (101.8 °F)  Pulse:  [122-170] 122  Resp:  [] 16  BP: (110-167)/() 124/71  SpO2:  [85 %-100 %] 100 %  O2 therapy: Pulse Oximetry: 100 %, O2 (LPM): 15, O2 Delivery Device: Ventilator    Fluids    Intake/Output Summary (Last 24 hours) at 2/16/2022 2157  Last data filed at 2/16/2022 1931  Gross per 24 hour   Intake 1000 ml   Output --   Net 1000 ml       Physical Exam  Physical Exam  Constitutional:       Appearance: He is not toxic-appearing.      Comments: intubated   HENT:      Head: Normocephalic and atraumatic.      Right Ear: External ear normal.      Left Ear: External ear normal.      Nose: Nose normal. No congestion.      Mouth/Throat:      Mouth: Mucous membranes are moist.      Pharynx: Oropharynx is clear.   Eyes:      Comments: Constricted pupils, corneal reflex present   Cardiovascular:      Rate and Rhythm: Regular rhythm. Tachycardia present.      Pulses: Normal pulses.      Heart sounds: No murmur heard.  Pulmonary:      Breath sounds: No wheezing or rales.   Abdominal:      General: Abdomen is flat. There is no distension.      Palpations: Abdomen is soft.   Musculoskeletal:      Right lower leg: No edema.      Left lower leg: No edema.   Skin:     General: Skin is warm and dry.      Capillary Refill: Capillary refill takes less than 2 seconds.      Findings: No lesion.   Neurological:      Comments: Patellar reflex wnl bilat, corneal reflex bilateral, no facial droop, cough/gag reflex present, does not withdraw to pain         Laboratory  Recent Labs     02/16/22  1844   WBC 26.6*   NEUTSPOLYS 86.90*   LYMPHOCYTES 3.50*   MONOCYTES 8.00   EOSINOPHILS 0.50   BASOPHILS 0.20   ASTSGOT 44   ALTSGPT 30   ALKPHOSPHAT 65   TBILIRUBIN 0.2     Recent Labs     02/16/22 1844   SODIUM 139   POTASSIUM 6.0*   CHLORIDE 107   CO2 17*   BUN 46*   CREATININE  1.32   CALCIUM 8.9     Recent Labs     02/16/22  1844   ALTSGPT 30   ASTSGOT 44   ALKPHOSPHAT 65   TBILIRUBIN 0.2   LIPASE 13   GLUCOSE 77           Imaging  CT-HEAD W/O   Final Result      No acute intracranial abnormality is identified.      Mild sinus inflammatory disease      DX-CHEST-LIMITED (1 VIEW)   Final Result      Support devices present, satisfactory position.      DX-CHEST-PORTABLE (1 VIEW)    (Results Pending)   DX-CHEST-PORTABLE (1 VIEW)    (Results Pending)       Assessment and Plan  * Drug overdose  Assessment & Plan  Presumed accidental. Per parents patient has had one remote mention of SI in the past but was in his usual state of health without any warning signs recently. Hx of drug abuse.  -Ventilator support for acute overdose and respiratory failure  -UDS also positive for cannabis  -F/u tylenol and salicylate levels  -Negative for alcohol  -May need psych assessment when awake  -Check CPK    Respiratory failure (HCC)- (present on admission)  Assessment & Plan  Due to presumed benzo (alprazolam) overdose. Intubated on propofol presently  -Continue ventilator support  -SAT/SBT in AM  -Ventilator orderset deployed    Aspiration pneumonia (HCC)  Assessment & Plan  Due to inability to protect airway secondary to drug overdose. See above for plan    Coffee ground emesis  Assessment & Plan  Reported coffee ground emesis when found. Hx of cyclic vomiting. Possible UGIB I.e matt yoder. Hemodynamically stable.  -Trend H/H's  -Pantoprazole IV bid  -Fluids  -Ctm for signs of bleeding  -Pharm DVT ppx ordered for tomorrow evening, evaluate for appropriateness    Sepsis (HCC)  Assessment & Plan  This is Sepsis Present on admission  SIRS criteria identified on my evaluation include: Fever, with temperature greater than 101 deg F, Tachycardia, with heart rate greater than 90 BPM and Leukocytosis, with WBC greater than 12,000  Source is pulmonary  Sepsis protocol initiated  Fluid resuscitation ordered per  protocol  IV antibiotics as appropriate for source of sepsis  While organ dysfunction may be noted elsewhere in this problem list or in the chart, degree of organ dysfunction does not meet CMS criteria for severe sepsis    Presumably due to vomiting/aspiration event due to drug overdose. Procal ++ with elevated WBC's. CXR without any focal consolidations.  -Bolus and mIVF and trend lactate  -Unasyn for aspiration pna coverage    Acute renal failure (ARF) (HCC)  Assessment & Plan  -Fluids as above  -Monitor potassium, no EKG changes, likely to improve with fluid resuscitation and tx of sepsis  -Avoid nephrotoxins, renally dose meds

## 2022-02-17 NOTE — PROGRESS NOTES
Patient's father arrived and was very angry and unhappy with patient because of him wanting to leave the hospital. Discussed with patient why he should stay and his father tried to reason with him but he ultimately decided he wanted to leave AMA. Dr. Malone aware and says that we cannot keep patient against his will. Patient denies suicidal ideations and states that he only took one xanax yesterday to sleep. Patient states he doesn't want to stay in the hospital any longer and needs to leave. AMA paper signed after much discussion.

## 2022-02-17 NOTE — PROGRESS NOTES
"Patient requesting pain medications. Dr. Malone ordered Tramadol when this medication is offered to him he states \"that  Will not take my pain away\" and refused the medication. I asked him what he wants to take and he states \"oxy\". He said he takes oxy at home for pain. I asked him what he takes it for and he says\"pain\" but does not elaborate. I asked if he has a prescription for it and he states that he did but is currently out and doesn't have a prescription any longer. Patient denies taking more than one xanax yesterday and states that he takes it for sleep. He says that if he can't get something stronger for pain that he will just leave the hospital. He requests to speak to . Dr. Malone has been notified of this conversation.   "

## 2022-02-17 NOTE — CARE PLAN
The patient is Stable - Low risk of patient condition declining or worsening    Shift Goals  Clinical Goals: Stable Vitals, RASS -1 to +1  Patient Goals: Unable To Assess  Family Goals: Unable to Assess    Progress made toward(s) clinical / shift goals:    Problem: Pain - Standard  Goal: Alleviation of pain or a reduction in pain to the patient’s comfort goal  Outcome: Progressing     Problem: Skin Integrity  Goal: Skin integrity is maintained or improved  Outcome: Progressing       Patient is not progressing towards the following goals:  Problem: Knowledge Deficit - Standard  Goal: Patient and family/care givers will demonstrate understanding of plan of care, disease process/condition, diagnostic tests and medications  Outcome: Not Met

## 2022-02-17 NOTE — ED NOTES
Cardiac & VS monitoring equipment applied: oral suctioning per ERP, O2 15L NRB applied, PIV 20G RT wrist, labs drawn.  110/-67-85% NRB.

## 2022-02-17 NOTE — ED NOTES
ERP to BS, oral suction done, O2 15L NRB applied, cardiac & VS monitoring equipment applied. 20G RT inner wrist, labs drawn, NS hung.

## 2022-02-17 NOTE — HOSPITAL COURSE
Mr. Marv Diop is a 26 year old male with the past medical history significant for migraine headaches, substance abuse, and cyclic vomiting who was brought in the ER on 2/16 by his parents after he was found unresponsive.  There was an empty bottle of [place] alprazolam nearby.  The patient was intubated for airway protection and admitted to the ICU.

## 2022-02-17 NOTE — ED NOTES
Per ERP, family reports pt used street drugs, hx of drug rehab, no med hx, NKDA, scoliosis surgery.

## 2022-02-17 NOTE — ED NOTES
Pt returned from CT. Family brought back to room and questions answered. ICU contacted to let RN know of readiness for transfer

## 2022-02-17 NOTE — CARE PLAN
Problem: Ventilation  Goal: Ability to achieve and maintain unassisted ventilation or tolerate decreased levels of ventilator support  Description: Target End Date:  4 days     Document on Vent flowsheet    1.  Support and monitor invasive and noninvasive mechanical ventilation  2.  Monitor ventilator weaning response  3.  Perform ventilator associated pneumonia prevention interventions  4.  Manage ventilation therapy by monitoring diagnostic test results  Outcome: Met       Pt extubated to NC.  No resp distress noted.

## 2022-02-17 NOTE — RESPIRATORY CARE
Respiratory Therapy Update       Events/Summary/Plan: Patient emergently intubated by ERP with 8.0 ETT, 24 at the teeth, positive color change on flow cap, bilateral breath sounds heard.  Placed on vent. (02/16/22 8275)

## 2022-02-17 NOTE — PROGRESS NOTES
Patient transferred from R7 to T633 by 2 RNs and RT.  Arrived to unit at 2045.  Parents placed in waiting room while skin assessment was completed and patient was settled.  When I went to retrieve the parents they were no longer in the waiting room.   Assessment completed.  Patient has spontaneous, purposeful movements but does not follow.      Dr. Jenkins at bedside at 2155.  Updated him on neuro status and discussed plan of care.  Okay to SAT and SBT in am.  NPO.  NG to low intermittent suction.  Okay for low continuous until intermittent connection obtained.     2350  Updated  Dr. Jenkins on increase in [place] to 7.9.  Received orders for FSBS, 1L LR bolus, and lactate panel.  FSBS 90.      0016  Updated  Dr. Lema on Lactic increase and orders from Dr. Jenkins.

## 2022-02-17 NOTE — DISCHARGE PLANNING
Care Transition Team Discharge Planning    Anticipated Discharge Disposition: once medically stable assess for d/c needs Possible d/c LTAC vs home w/ DME    Action: Lsw spoke to PFA to discuss pt's d/c plan. She indicates it is important to determine which form of Medicaid to apply for.    Lsw explained pt is currently vented, and has been here for a brief time. Pt is 25 yo and may recover relatively better than someone older. However the d/c plan determines what pt is medically working w/ near d/c. It could be LTAC vs home w/ DME.      PFA will call pt's parents to complete a Medicaid screening.    Barriers to Discharge: no medical insurance, medical stability to assess for needs    Plan: Lsw will continue to follow, and assist w/ d/c planning.

## 2022-02-17 NOTE — ED NOTES
Propafol titrated; currently infusing at 15mcg/mg/kg via 18G RAC.  NS infusing via 20G RAC.  NG draining dark reddish-brown fluid.  Ventillator in use.  Side rails up x2, soft restraints on wrist bilat for ET tube safety.  Monitoring continuing.

## 2022-02-17 NOTE — ASSESSMENT & PLAN NOTE
Due to presumed benzo (alprazolam) overdose.  Cont full vent support  RT/O2 protocols  Lung protective ventilator strategies  SAT/SBTs

## 2022-02-18 NOTE — DOCUMENTATION QUERY
"                                                                         [facility]                                                                       Query Response Note      PATIENT:               GABRIEL ROJO  ACCT #:                  [id number]  MRN:                     [id number]  :                      1995  ADMIT DATE:       2022 6:05 PM  DISCH DATE:        2022 2:55 PM  RESPONDING  PROVIDER #:        [id number]           QUERY TEXT:    Altered mental status is documented in the Medical Record.  Can a more specific diagnosis be provided?    NOTE:  If the appropriate response is not listed below, please respond with a new note.    If you have any questions please contact:  Dyana Rosario RN CDI [facility]  [email or web address]  Dyana Rosario [address]      The patient's Clinical Indicators include:  26 year old male admitted for AMS, respiratory failure and drug overdose.     [facility] \" Altered mental status\"    Risk factors: aspiration pneumonia, overdose, sepsis  Treatment: ABX, CT, Vent, tele  Options provided:   -- Acute metabolic encephalopathy due to sepsis and aspiration pneumonia   -- Acute toxic encephalopathy due to Xanax overdose   -- Acute encephalopathy due to other medical condition, (please specify other medical condition)   -- Unable to determine      Query created by: Dyana Rosario on 2022 7:14 AM    RESPONSE TEXT:    Acute toxic encephalopathy due to Xanax overdose          Electronically signed by:  BRITTANIE SADLER MD 2022 7:26 PM              "

## 2022-05-15 ENCOUNTER — HOSPITAL ENCOUNTER (INPATIENT)
Facility: MEDICAL CENTER | Age: 27
LOS: 2 days | DRG: 368 | End: 2022-05-17
Attending: EMERGENCY MEDICINE | Admitting: STUDENT IN AN ORGANIZED HEALTH CARE EDUCATION/TRAINING PROGRAM
Payer: COMMERCIAL

## 2022-05-15 ENCOUNTER — APPOINTMENT (OUTPATIENT)
Dept: RADIOLOGY | Facility: MEDICAL CENTER | Age: 27
DRG: 368 | End: 2022-05-15
Attending: EMERGENCY MEDICINE
Payer: COMMERCIAL

## 2022-05-15 DIAGNOSIS — R00.0 SINUS TACHYCARDIA: ICD-10-CM

## 2022-05-15 DIAGNOSIS — R11.15 CYCLIC VOMITING SYNDROME: Primary | ICD-10-CM

## 2022-05-15 DIAGNOSIS — D72.829 LEUKOCYTOSIS, UNSPECIFIED TYPE: ICD-10-CM

## 2022-05-15 DIAGNOSIS — E86.0 DEHYDRATION: ICD-10-CM

## 2022-05-15 DIAGNOSIS — E87.6 HYPOKALEMIA: ICD-10-CM

## 2022-05-15 DIAGNOSIS — N17.9 AKI (ACUTE KIDNEY INJURY) (HCC): ICD-10-CM

## 2022-05-15 DIAGNOSIS — F13.10 BENZODIAZEPINE ABUSE (HCC): ICD-10-CM

## 2022-05-15 PROBLEM — R11.2 INTRACTABLE NAUSEA AND VOMITING: Status: ACTIVE | Noted: 2022-05-15

## 2022-05-15 LAB
ALBUMIN SERPL BCP-MCNC: 4.5 G/DL (ref 3.2–4.9)
ALBUMIN/GLOB SERPL: 1.3 G/DL
ALP SERPL-CCNC: 72 U/L (ref 30–99)
ALT SERPL-CCNC: 11 U/L (ref 2–50)
AMPHET UR QL SCN: NEGATIVE
ANION GAP SERPL CALC-SCNC: 17 MMOL/L (ref 7–16)
APPEARANCE UR: CLEAR
AST SERPL-CCNC: 26 U/L (ref 12–45)
BACTERIA #/AREA URNS HPF: NEGATIVE /HPF
BARBITURATES UR QL SCN: NEGATIVE
BASOPHILS # BLD AUTO: 0.2 % (ref 0–1.8)
BASOPHILS # BLD: 0.03 K/UL (ref 0–0.12)
BENZODIAZ UR QL SCN: NEGATIVE
BILIRUB SERPL-MCNC: 0.6 MG/DL (ref 0.1–1.5)
BILIRUB UR QL STRIP.AUTO: NEGATIVE
BUN SERPL-MCNC: 76 MG/DL (ref 8–22)
BZE UR QL SCN: NEGATIVE
CALCIUM SERPL-MCNC: 8.1 MG/DL (ref 8.5–10.5)
CANNABINOIDS UR QL SCN: POSITIVE
CHLORIDE SERPL-SCNC: 85 MMOL/L (ref 96–112)
CO2 SERPL-SCNC: 37 MMOL/L (ref 20–33)
COLOR UR: YELLOW
CREAT SERPL-MCNC: 2.76 MG/DL (ref 0.5–1.4)
EKG IMPRESSION: NORMAL
EOSINOPHIL # BLD AUTO: 0.01 K/UL (ref 0–0.51)
EOSINOPHIL NFR BLD: 0.1 % (ref 0–6.9)
EPI CELLS #/AREA URNS HPF: NEGATIVE /HPF
ERYTHROCYTE [DISTWIDTH] IN BLOOD BY AUTOMATED COUNT: 39 FL (ref 35.9–50)
ETHANOL BLD-MCNC: <10.1 MG/DL
GFR SERPLBLD CREATININE-BSD FMLA CKD-EPI: 32 ML/MIN/1.73 M 2
GLOBULIN SER CALC-MCNC: 3.6 G/DL (ref 1.9–3.5)
GLUCOSE SERPL-MCNC: 129 MG/DL (ref 65–99)
GLUCOSE UR STRIP.AUTO-MCNC: NEGATIVE MG/DL
HCT VFR BLD AUTO: 56.2 % (ref 42–52)
HGB BLD-MCNC: 18.7 G/DL (ref 14–18)
IMM GRANULOCYTES # BLD AUTO: 0.12 K/UL (ref 0–0.11)
IMM GRANULOCYTES NFR BLD AUTO: 0.6 % (ref 0–0.9)
KETONES UR STRIP.AUTO-MCNC: NEGATIVE MG/DL
LEUKOCYTE ESTERASE UR QL STRIP.AUTO: NEGATIVE
LIPASE SERPL-CCNC: 23 U/L (ref 11–82)
LYMPHOCYTES # BLD AUTO: 1.64 K/UL (ref 1–4.8)
LYMPHOCYTES NFR BLD: 8.6 % (ref 22–41)
MCH RBC QN AUTO: 27.7 PG (ref 27–33)
MCHC RBC AUTO-ENTMCNC: 33.3 G/DL (ref 33.7–35.3)
MCV RBC AUTO: 83.1 FL (ref 81.4–97.8)
METHADONE UR QL SCN: NEGATIVE
MICRO URNS: ABNORMAL
MONOCYTES # BLD AUTO: 2.22 K/UL (ref 0–0.85)
MONOCYTES NFR BLD AUTO: 11.7 % (ref 0–13.4)
NEUTROPHILS # BLD AUTO: 15.03 K/UL (ref 1.82–7.42)
NEUTROPHILS NFR BLD: 78.8 % (ref 44–72)
NITRITE UR QL STRIP.AUTO: NEGATIVE
NRBC # BLD AUTO: 0 K/UL
NRBC BLD-RTO: 0 /100 WBC
OPIATES UR QL SCN: NEGATIVE
OXYCODONE UR QL SCN: NEGATIVE
PCP UR QL SCN: NEGATIVE
PH UR STRIP.AUTO: 8 [PH] (ref 5–8)
PLATELET # BLD AUTO: 545 K/UL (ref 164–446)
PMV BLD AUTO: 11.9 FL (ref 9–12.9)
POTASSIUM SERPL-SCNC: 3.3 MMOL/L (ref 3.6–5.5)
PROPOXYPH UR QL SCN: NEGATIVE
PROT SERPL-MCNC: 8.1 G/DL (ref 6–8.2)
PROT UR QL STRIP: 30 MG/DL
RBC # BLD AUTO: 6.76 M/UL (ref 4.7–6.1)
RBC # URNS HPF: ABNORMAL /HPF
RBC UR QL AUTO: NEGATIVE
SODIUM SERPL-SCNC: 139 MMOL/L (ref 135–145)
SP GR UR STRIP.AUTO: 1.02
UROBILINOGEN UR STRIP.AUTO-MCNC: 0.2 MG/DL
WBC # BLD AUTO: 19.1 K/UL (ref 4.8–10.8)
WBC #/AREA URNS HPF: ABNORMAL /HPF

## 2022-05-15 PROCEDURE — 94760 N-INVAS EAR/PLS OXIMETRY 1: CPT

## 2022-05-15 PROCEDURE — 81001 URINALYSIS AUTO W/SCOPE: CPT

## 2022-05-15 PROCEDURE — 87040 BLOOD CULTURE FOR BACTERIA: CPT | Mod: 91

## 2022-05-15 PROCEDURE — 770020 HCHG ROOM/CARE - TELE (206)

## 2022-05-15 PROCEDURE — 85025 COMPLETE CBC W/AUTO DIFF WBC: CPT

## 2022-05-15 PROCEDURE — 83735 ASSAY OF MAGNESIUM: CPT

## 2022-05-15 PROCEDURE — 83605 ASSAY OF LACTIC ACID: CPT

## 2022-05-15 PROCEDURE — 84484 ASSAY OF TROPONIN QUANT: CPT

## 2022-05-15 PROCEDURE — 96374 THER/PROPH/DIAG INJ IV PUSH: CPT

## 2022-05-15 PROCEDURE — 93005 ELECTROCARDIOGRAM TRACING: CPT

## 2022-05-15 PROCEDURE — 700105 HCHG RX REV CODE 258: Performed by: EMERGENCY MEDICINE

## 2022-05-15 PROCEDURE — 700101 HCHG RX REV CODE 250: Performed by: EMERGENCY MEDICINE

## 2022-05-15 PROCEDURE — 700111 HCHG RX REV CODE 636 W/ 250 OVERRIDE (IP): Performed by: EMERGENCY MEDICINE

## 2022-05-15 PROCEDURE — 80307 DRUG TEST PRSMV CHEM ANLYZR: CPT

## 2022-05-15 PROCEDURE — 82077 ASSAY SPEC XCP UR&BREATH IA: CPT

## 2022-05-15 PROCEDURE — 36415 COLL VENOUS BLD VENIPUNCTURE: CPT

## 2022-05-15 PROCEDURE — 99285 EMERGENCY DEPT VISIT HI MDM: CPT

## 2022-05-15 PROCEDURE — 93005 ELECTROCARDIOGRAM TRACING: CPT | Performed by: EMERGENCY MEDICINE

## 2022-05-15 PROCEDURE — 83690 ASSAY OF LIPASE: CPT

## 2022-05-15 PROCEDURE — 80053 COMPREHEN METABOLIC PANEL: CPT

## 2022-05-15 PROCEDURE — 99223 1ST HOSP IP/OBS HIGH 75: CPT | Performed by: STUDENT IN AN ORGANIZED HEALTH CARE EDUCATION/TRAINING PROGRAM

## 2022-05-15 PROCEDURE — 71045 X-RAY EXAM CHEST 1 VIEW: CPT

## 2022-05-15 RX ORDER — SODIUM CHLORIDE 9 MG/ML
1000 INJECTION, SOLUTION INTRAVENOUS ONCE
Status: COMPLETED | OUTPATIENT
Start: 2022-05-15 | End: 2022-05-15

## 2022-05-15 RX ORDER — SODIUM CHLORIDE AND POTASSIUM CHLORIDE 150; 900 MG/100ML; MG/100ML
INJECTION, SOLUTION INTRAVENOUS CONTINUOUS
Status: DISCONTINUED | OUTPATIENT
Start: 2022-05-16 | End: 2022-05-16

## 2022-05-15 RX ORDER — POLYETHYLENE GLYCOL 3350 17 G/17G
1 POWDER, FOR SOLUTION ORAL
Status: DISCONTINUED | OUTPATIENT
Start: 2022-05-15 | End: 2022-05-17 | Stop reason: HOSPADM

## 2022-05-15 RX ORDER — HALOPERIDOL 5 MG/ML
5 INJECTION INTRAMUSCULAR ONCE
Status: DISCONTINUED | OUTPATIENT
Start: 2022-05-15 | End: 2022-05-15

## 2022-05-15 RX ORDER — ENOXAPARIN SODIUM 100 MG/ML
40 INJECTION SUBCUTANEOUS DAILY
Status: DISCONTINUED | OUTPATIENT
Start: 2022-05-16 | End: 2022-05-17 | Stop reason: HOSPADM

## 2022-05-15 RX ORDER — ALPRAZOLAM 0.25 MG/1
0.25 TABLET ORAL 2 TIMES DAILY PRN
Status: DISCONTINUED | OUTPATIENT
Start: 2022-05-15 | End: 2022-05-17

## 2022-05-15 RX ORDER — BISACODYL 10 MG
10 SUPPOSITORY, RECTAL RECTAL
Status: DISCONTINUED | OUTPATIENT
Start: 2022-05-15 | End: 2022-05-17 | Stop reason: HOSPADM

## 2022-05-15 RX ORDER — SODIUM CHLORIDE AND POTASSIUM CHLORIDE 150; 900 MG/100ML; MG/100ML
INJECTION, SOLUTION INTRAVENOUS CONTINUOUS
Status: DISCONTINUED | OUTPATIENT
Start: 2022-05-15 | End: 2022-05-16

## 2022-05-15 RX ORDER — AMOXICILLIN 250 MG
2 CAPSULE ORAL 2 TIMES DAILY
Status: DISCONTINUED | OUTPATIENT
Start: 2022-05-16 | End: 2022-05-17 | Stop reason: HOSPADM

## 2022-05-15 RX ORDER — ONDANSETRON 2 MG/ML
4 INJECTION INTRAMUSCULAR; INTRAVENOUS ONCE
Status: COMPLETED | OUTPATIENT
Start: 2022-05-15 | End: 2022-05-15

## 2022-05-15 RX ORDER — ONDANSETRON 2 MG/ML
4 INJECTION INTRAMUSCULAR; INTRAVENOUS EVERY 4 HOURS PRN
Status: DISCONTINUED | OUTPATIENT
Start: 2022-05-15 | End: 2022-05-16

## 2022-05-15 RX ORDER — PROCHLORPERAZINE EDISYLATE 5 MG/ML
5-10 INJECTION INTRAMUSCULAR; INTRAVENOUS EVERY 4 HOURS PRN
Status: DISCONTINUED | OUTPATIENT
Start: 2022-05-15 | End: 2022-05-17 | Stop reason: HOSPADM

## 2022-05-15 RX ORDER — CHOLECALCIFEROL (VITAMIN D3) 125 MCG
5 CAPSULE ORAL NIGHTLY
Status: DISCONTINUED | OUTPATIENT
Start: 2022-05-16 | End: 2022-05-17 | Stop reason: HOSPADM

## 2022-05-15 RX ORDER — DIPHENHYDRAMINE HYDROCHLORIDE 50 MG/ML
25 INJECTION INTRAMUSCULAR; INTRAVENOUS ONCE
Status: DISCONTINUED | OUTPATIENT
Start: 2022-05-15 | End: 2022-05-15

## 2022-05-15 RX ORDER — PROMETHAZINE HYDROCHLORIDE 25 MG/1
12.5-25 TABLET ORAL EVERY 4 HOURS PRN
Status: DISCONTINUED | OUTPATIENT
Start: 2022-05-15 | End: 2022-05-16

## 2022-05-15 RX ORDER — CALCIUM CARBONATE 500 MG/1
500 TABLET, CHEWABLE ORAL ONCE
Status: COMPLETED | OUTPATIENT
Start: 2022-05-16 | End: 2022-05-16

## 2022-05-15 RX ORDER — ONDANSETRON 4 MG/1
4 TABLET, ORALLY DISINTEGRATING ORAL EVERY 4 HOURS PRN
Status: DISCONTINUED | OUTPATIENT
Start: 2022-05-15 | End: 2022-05-17 | Stop reason: HOSPADM

## 2022-05-15 RX ORDER — LORAZEPAM 2 MG/ML
0.5 INJECTION INTRAMUSCULAR ONCE
Status: DISCONTINUED | OUTPATIENT
Start: 2022-05-15 | End: 2022-05-15

## 2022-05-15 RX ORDER — PROMETHAZINE HYDROCHLORIDE 25 MG/1
12.5-25 SUPPOSITORY RECTAL EVERY 4 HOURS PRN
Status: DISCONTINUED | OUTPATIENT
Start: 2022-05-15 | End: 2022-05-16

## 2022-05-15 RX ORDER — HYDRALAZINE HYDROCHLORIDE 20 MG/ML
10 INJECTION INTRAMUSCULAR; INTRAVENOUS EVERY 4 HOURS PRN
Status: DISCONTINUED | OUTPATIENT
Start: 2022-05-15 | End: 2022-05-17 | Stop reason: HOSPADM

## 2022-05-15 RX ADMIN — POTASSIUM CHLORIDE AND SODIUM CHLORIDE: 900; 150 INJECTION, SOLUTION INTRAVENOUS at 23:12

## 2022-05-15 RX ADMIN — SODIUM CHLORIDE 1000 ML: 9 INJECTION, SOLUTION INTRAVENOUS at 18:42

## 2022-05-15 RX ADMIN — SODIUM CHLORIDE 1000 ML: 9 INJECTION, SOLUTION INTRAVENOUS at 19:00

## 2022-05-15 RX ADMIN — ONDANSETRON 4 MG: 2 INJECTION INTRAMUSCULAR; INTRAVENOUS at 18:51

## 2022-05-15 ASSESSMENT — FIBROSIS 4 INDEX: FIB4 SCORE: 0.95

## 2022-05-16 ENCOUNTER — PATIENT OUTREACH (OUTPATIENT)
Dept: HEALTH INFORMATION MANAGEMENT | Facility: OTHER | Age: 27
End: 2022-05-16
Payer: COMMERCIAL

## 2022-05-16 PROBLEM — D72.823 LEUKEMOID REACTION: Status: ACTIVE | Noted: 2021-11-09

## 2022-05-16 PROBLEM — K92.2 GI BLEEDING: Status: ACTIVE | Noted: 2022-05-16

## 2022-05-16 PROBLEM — J96.01 ACUTE RESPIRATORY FAILURE WITH HYPOXIA (HCC): Status: ACTIVE | Noted: 2022-02-16

## 2022-05-16 PROBLEM — F13.10 BENZODIAZEPINE ABUSE (HCC): Status: ACTIVE | Noted: 2022-05-16

## 2022-05-16 PROBLEM — E83.51 HYPOCALCEMIA: Status: ACTIVE | Noted: 2022-05-16

## 2022-05-16 PROBLEM — E87.3 METABOLIC ALKALOSIS: Status: ACTIVE | Noted: 2022-05-16

## 2022-05-16 PROBLEM — G92.9 TOXIC ENCEPHALOPATHY: Status: ACTIVE | Noted: 2022-05-16

## 2022-05-16 LAB
ANION GAP SERPL CALC-SCNC: 11 MMOL/L (ref 7–16)
ANION GAP SERPL CALC-SCNC: 12 MMOL/L (ref 7–16)
BASOPHILS # BLD AUTO: 0.2 % (ref 0–1.8)
BASOPHILS # BLD: 0.03 K/UL (ref 0–0.12)
BUN SERPL-MCNC: 64 MG/DL (ref 8–22)
BUN SERPL-MCNC: 71 MG/DL (ref 8–22)
CALCIUM SERPL-MCNC: 8.3 MG/DL (ref 8.5–10.5)
CALCIUM SERPL-MCNC: 8.4 MG/DL (ref 8.5–10.5)
CHLORIDE SERPL-SCNC: 90 MMOL/L (ref 96–112)
CHLORIDE SERPL-SCNC: 93 MMOL/L (ref 96–112)
CO2 SERPL-SCNC: 32 MMOL/L (ref 20–33)
CO2 SERPL-SCNC: 35 MMOL/L (ref 20–33)
CREAT SERPL-MCNC: 1.76 MG/DL (ref 0.5–1.4)
CREAT SERPL-MCNC: 2.2 MG/DL (ref 0.5–1.4)
EKG IMPRESSION: NORMAL
EKG IMPRESSION: NORMAL
EOSINOPHIL # BLD AUTO: 0.01 K/UL (ref 0–0.51)
EOSINOPHIL NFR BLD: 0.1 % (ref 0–6.9)
ERYTHROCYTE [DISTWIDTH] IN BLOOD BY AUTOMATED COUNT: 39.4 FL (ref 35.9–50)
GFR SERPLBLD CREATININE-BSD FMLA CKD-EPI: 42 ML/MIN/1.73 M 2
GFR SERPLBLD CREATININE-BSD FMLA CKD-EPI: 54 ML/MIN/1.73 M 2
GLUCOSE SERPL-MCNC: 116 MG/DL (ref 65–99)
GLUCOSE SERPL-MCNC: 131 MG/DL (ref 65–99)
HCT VFR BLD AUTO: 40.9 % (ref 42–52)
HGB BLD-MCNC: 13.2 G/DL (ref 14–18)
IMM GRANULOCYTES # BLD AUTO: 0.08 K/UL (ref 0–0.11)
IMM GRANULOCYTES NFR BLD AUTO: 0.5 % (ref 0–0.9)
LACTATE BLD-SCNC: 1.1 MMOL/L (ref 0.5–2)
LACTATE BLD-SCNC: 2 MMOL/L (ref 0.5–2)
LYMPHOCYTES # BLD AUTO: 1.71 K/UL (ref 1–4.8)
LYMPHOCYTES NFR BLD: 10.1 % (ref 22–41)
MAGNESIUM SERPL-MCNC: 2.3 MG/DL (ref 1.5–2.5)
MAGNESIUM SERPL-MCNC: 2.4 MG/DL (ref 1.5–2.5)
MCH RBC QN AUTO: 27.2 PG (ref 27–33)
MCHC RBC AUTO-ENTMCNC: 32.3 G/DL (ref 33.7–35.3)
MCV RBC AUTO: 84.2 FL (ref 81.4–97.8)
MONOCYTES # BLD AUTO: 1.74 K/UL (ref 0–0.85)
MONOCYTES NFR BLD AUTO: 10.3 % (ref 0–13.4)
NEUTROPHILS # BLD AUTO: 13.31 K/UL (ref 1.82–7.42)
NEUTROPHILS NFR BLD: 78.8 % (ref 44–72)
NRBC # BLD AUTO: 0 K/UL
NRBC BLD-RTO: 0 /100 WBC
PLATELET # BLD AUTO: 378 K/UL (ref 164–446)
PMV BLD AUTO: 11.1 FL (ref 9–12.9)
POTASSIUM SERPL-SCNC: 3.6 MMOL/L (ref 3.6–5.5)
POTASSIUM SERPL-SCNC: 3.9 MMOL/L (ref 3.6–5.5)
PROCALCITONIN SERPL-MCNC: 0.06 NG/ML
PROCALCITONIN SERPL-MCNC: <0.05 NG/ML
RBC # BLD AUTO: 4.86 M/UL (ref 4.7–6.1)
SODIUM SERPL-SCNC: 136 MMOL/L (ref 135–145)
SODIUM SERPL-SCNC: 137 MMOL/L (ref 135–145)
TROPONIN T SERPL-MCNC: 17 NG/L (ref 6–19)
TROPONIN T SERPL-MCNC: 21 NG/L (ref 6–19)
WBC # BLD AUTO: 16.9 K/UL (ref 4.8–10.8)

## 2022-05-16 PROCEDURE — 84145 PROCALCITONIN (PCT): CPT | Mod: 91

## 2022-05-16 PROCEDURE — 93005 ELECTROCARDIOGRAM TRACING: CPT | Performed by: STUDENT IN AN ORGANIZED HEALTH CARE EDUCATION/TRAINING PROGRAM

## 2022-05-16 PROCEDURE — 99232 SBSQ HOSP IP/OBS MODERATE 35: CPT | Mod: GC | Performed by: HOSPITALIST

## 2022-05-16 PROCEDURE — 93010 ELECTROCARDIOGRAM REPORT: CPT | Performed by: INTERNAL MEDICINE

## 2022-05-16 PROCEDURE — 85025 COMPLETE CBC W/AUTO DIFF WBC: CPT

## 2022-05-16 PROCEDURE — 93010 ELECTROCARDIOGRAM REPORT: CPT | Mod: 76 | Performed by: INTERNAL MEDICINE

## 2022-05-16 PROCEDURE — 700111 HCHG RX REV CODE 636 W/ 250 OVERRIDE (IP): Performed by: STUDENT IN AN ORGANIZED HEALTH CARE EDUCATION/TRAINING PROGRAM

## 2022-05-16 PROCEDURE — 83735 ASSAY OF MAGNESIUM: CPT

## 2022-05-16 PROCEDURE — C9113 INJ PANTOPRAZOLE SODIUM, VIA: HCPCS | Performed by: STUDENT IN AN ORGANIZED HEALTH CARE EDUCATION/TRAINING PROGRAM

## 2022-05-16 PROCEDURE — A9270 NON-COVERED ITEM OR SERVICE: HCPCS | Performed by: STUDENT IN AN ORGANIZED HEALTH CARE EDUCATION/TRAINING PROGRAM

## 2022-05-16 PROCEDURE — 770020 HCHG ROOM/CARE - TELE (206)

## 2022-05-16 PROCEDURE — 700101 HCHG RX REV CODE 250: Performed by: STUDENT IN AN ORGANIZED HEALTH CARE EDUCATION/TRAINING PROGRAM

## 2022-05-16 PROCEDURE — 700102 HCHG RX REV CODE 250 W/ 637 OVERRIDE(OP): Performed by: STUDENT IN AN ORGANIZED HEALTH CARE EDUCATION/TRAINING PROGRAM

## 2022-05-16 PROCEDURE — 700105 HCHG RX REV CODE 258: Performed by: STUDENT IN AN ORGANIZED HEALTH CARE EDUCATION/TRAINING PROGRAM

## 2022-05-16 PROCEDURE — 36415 COLL VENOUS BLD VENIPUNCTURE: CPT

## 2022-05-16 PROCEDURE — 83605 ASSAY OF LACTIC ACID: CPT

## 2022-05-16 PROCEDURE — 84484 ASSAY OF TROPONIN QUANT: CPT

## 2022-05-16 PROCEDURE — 80048 BASIC METABOLIC PNL TOTAL CA: CPT

## 2022-05-16 RX ORDER — PANTOPRAZOLE SODIUM 40 MG/10ML
40 INJECTION, POWDER, LYOPHILIZED, FOR SOLUTION INTRAVENOUS DAILY
Status: DISCONTINUED | OUTPATIENT
Start: 2022-05-16 | End: 2022-05-17 | Stop reason: HOSPADM

## 2022-05-16 RX ORDER — MORPHINE SULFATE 4 MG/ML
1 INJECTION INTRAVENOUS ONCE
Status: DISCONTINUED | OUTPATIENT
Start: 2022-05-16 | End: 2022-05-16

## 2022-05-16 RX ORDER — POTASSIUM CHLORIDE 20 MEQ/1
40 TABLET, EXTENDED RELEASE ORAL ONCE
Status: COMPLETED | OUTPATIENT
Start: 2022-05-16 | End: 2022-05-16

## 2022-05-16 RX ORDER — TRAMADOL HYDROCHLORIDE 50 MG/1
50 TABLET ORAL EVERY 6 HOURS PRN
Status: DISCONTINUED | OUTPATIENT
Start: 2022-05-16 | End: 2022-05-16

## 2022-05-16 RX ORDER — ALPRAZOLAM 1 MG/1
2 TABLET ORAL NIGHTLY
Status: DISCONTINUED | OUTPATIENT
Start: 2022-05-16 | End: 2022-05-17 | Stop reason: HOSPADM

## 2022-05-16 RX ORDER — PANTOPRAZOLE SODIUM 40 MG/10ML
40 INJECTION, POWDER, LYOPHILIZED, FOR SOLUTION INTRAVENOUS ONCE
Status: DISCONTINUED | OUTPATIENT
Start: 2022-05-16 | End: 2022-05-16

## 2022-05-16 RX ORDER — LORAZEPAM 2 MG/ML
0.5 INJECTION INTRAMUSCULAR ONCE
Status: COMPLETED | OUTPATIENT
Start: 2022-05-16 | End: 2022-05-16

## 2022-05-16 RX ORDER — SODIUM CHLORIDE, SODIUM LACTATE, POTASSIUM CHLORIDE, CALCIUM CHLORIDE 600; 310; 30; 20 MG/100ML; MG/100ML; MG/100ML; MG/100ML
INJECTION, SOLUTION INTRAVENOUS CONTINUOUS
Status: DISCONTINUED | OUTPATIENT
Start: 2022-05-16 | End: 2022-05-17 | Stop reason: HOSPADM

## 2022-05-16 RX ADMIN — Medication 5 MG: at 20:26

## 2022-05-16 RX ADMIN — SODIUM CHLORIDE, POTASSIUM CHLORIDE, SODIUM LACTATE AND CALCIUM CHLORIDE: 600; 310; 30; 20 INJECTION, SOLUTION INTRAVENOUS at 18:16

## 2022-05-16 RX ADMIN — ONDANSETRON 4 MG: 2 INJECTION INTRAMUSCULAR; INTRAVENOUS at 05:31

## 2022-05-16 RX ADMIN — CALCIUM CARBONATE 500 MG: 500 TABLET, CHEWABLE ORAL at 01:17

## 2022-05-16 RX ADMIN — POTASSIUM CHLORIDE AND SODIUM CHLORIDE: 900; 150 INJECTION, SOLUTION INTRAVENOUS at 02:27

## 2022-05-16 RX ADMIN — PANTOPRAZOLE SODIUM 40 MG: 40 INJECTION, POWDER, FOR SOLUTION INTRAVENOUS at 09:59

## 2022-05-16 RX ADMIN — ALPRAZOLAM 0.25 MG: 0.25 TABLET ORAL at 17:18

## 2022-05-16 RX ADMIN — TRAMADOL HYDROCHLORIDE 50 MG: 50 TABLET, COATED ORAL at 05:30

## 2022-05-16 RX ADMIN — SODIUM CHLORIDE, POTASSIUM CHLORIDE, SODIUM LACTATE AND CALCIUM CHLORIDE: 600; 310; 30; 20 INJECTION, SOLUTION INTRAVENOUS at 10:01

## 2022-05-16 RX ADMIN — SENNOSIDES AND DOCUSATE SODIUM 2 TABLET: 50; 8.6 TABLET ORAL at 17:19

## 2022-05-16 RX ADMIN — POTASSIUM CHLORIDE AND SODIUM CHLORIDE: 900; 150 INJECTION, SOLUTION INTRAVENOUS at 00:15

## 2022-05-16 RX ADMIN — POTASSIUM CHLORIDE 40 MEQ: 1500 TABLET, EXTENDED RELEASE ORAL at 07:44

## 2022-05-16 RX ADMIN — ALPRAZOLAM 0.25 MG: 0.25 TABLET ORAL at 01:18

## 2022-05-16 RX ADMIN — Medication 5 MG: at 01:22

## 2022-05-16 RX ADMIN — POTASSIUM CHLORIDE AND SODIUM CHLORIDE: 900; 150 INJECTION, SOLUTION INTRAVENOUS at 01:16

## 2022-05-16 RX ADMIN — LORAZEPAM 0.5 MG: 2 INJECTION INTRAMUSCULAR; INTRAVENOUS at 09:59

## 2022-05-16 RX ADMIN — ALPRAZOLAM 2 MG: 1 TABLET ORAL at 20:26

## 2022-05-16 RX ADMIN — ONDANSETRON 4 MG: 2 INJECTION INTRAMUSCULAR; INTRAVENOUS at 01:18

## 2022-05-16 ASSESSMENT — ENCOUNTER SYMPTOMS
COUGH: 0
TINGLING: 0
PALPITATIONS: 0
ABDOMINAL PAIN: 1
CHILLS: 0
HEADACHES: 0
SHORTNESS OF BREATH: 0
DIZZINESS: 0
CHILLS: 1
FEVER: 0
VOMITING: 1
DIAPHORESIS: 1
BACK PAIN: 1
ABDOMINAL PAIN: 0
SORE THROAT: 0
FEVER: 1
WEAKNESS: 0
NAUSEA: 1
NERVOUS/ANXIOUS: 1
DIARRHEA: 0

## 2022-05-16 ASSESSMENT — COGNITIVE AND FUNCTIONAL STATUS - GENERAL
DAILY ACTIVITIY SCORE: 24
MOBILITY SCORE: 24
SUGGESTED CMS G CODE MODIFIER MOBILITY: CH
SUGGESTED CMS G CODE MODIFIER DAILY ACTIVITY: CH

## 2022-05-16 ASSESSMENT — PAIN DESCRIPTION - PAIN TYPE
TYPE: ACUTE PAIN

## 2022-05-16 ASSESSMENT — LIFESTYLE VARIABLES
TOTAL SCORE: 0
TOTAL SCORE: 0
HAVE YOU EVER FELT YOU SHOULD CUT DOWN ON YOUR DRINKING: NO
TOTAL SCORE: 0
ALCOHOL_USE: NO
ON A TYPICAL DAY WHEN YOU DRINK ALCOHOL HOW MANY DRINKS DO YOU HAVE: 0
HAVE PEOPLE ANNOYED YOU BY CRITICIZING YOUR DRINKING: NO
DOES PATIENT WANT TO STOP DRINKING: NO
EVER HAD A DRINK FIRST THING IN THE MORNING TO STEADY YOUR NERVES TO GET RID OF A HANGOVER: NO
HOW MANY TIMES IN THE PAST YEAR HAVE YOU HAD 5 OR MORE DRINKS IN A DAY: 0
AVERAGE NUMBER OF DAYS PER WEEK YOU HAVE A DRINK CONTAINING ALCOHOL: 0
CONSUMPTION TOTAL: NEGATIVE
EVER FELT BAD OR GUILTY ABOUT YOUR DRINKING: NO

## 2022-05-16 ASSESSMENT — PATIENT HEALTH QUESTIONNAIRE - PHQ9
7. TROUBLE CONCENTRATING ON THINGS, SUCH AS READING THE NEWSPAPER OR WATCHING TELEVISION: SEVERAL DAYS
SUM OF ALL RESPONSES TO PHQ9 QUESTIONS 1 AND 2: 1
9. THOUGHTS THAT YOU WOULD BE BETTER OFF DEAD, OR OF HURTING YOURSELF: NOT AT ALL
3. TROUBLE FALLING OR STAYING ASLEEP OR SLEEPING TOO MUCH: SEVERAL DAYS
SUM OF ALL RESPONSES TO PHQ QUESTIONS 1-9: 5
1. LITTLE INTEREST OR PLEASURE IN DOING THINGS: NOT AT ALL
5. POOR APPETITE OR OVEREATING: SEVERAL DAYS
8. MOVING OR SPEAKING SO SLOWLY THAT OTHER PEOPLE COULD HAVE NOTICED. OR THE OPPOSITE, BEING SO FIGETY OR RESTLESS THAT YOU HAVE BEEN MOVING AROUND A LOT MORE THAN USUAL: NOT AT ALL
2. FEELING DOWN, DEPRESSED, IRRITABLE, OR HOPELESS: SEVERAL DAYS
4. FEELING TIRED OR HAVING LITTLE ENERGY: SEVERAL DAYS
6. FEELING BAD ABOUT YOURSELF - OR THAT YOU ARE A FAILURE OR HAVE LET YOURSELF OR YOUR FAMILY DOWN: NOT AL ALL

## 2022-05-16 ASSESSMENT — FIBROSIS 4 INDEX: FIB4 SCORE: 0.36

## 2022-05-16 NOTE — ED NOTES
Report received from prior RN. Pt alert/awake. Resp normal/unlabored. Bed side rails up/in low position.    Dx at bedside.

## 2022-05-16 NOTE — PROGRESS NOTES
Bedside report received per night RN, pt had more bouts of n/v and abdominal pain. Patient A&O x 4 . Pt lethargic. Complains of 10/10 mid-abdominal pain . Will medicate per MAR. VS's have been stable aside from slight tachycardia in the low 100s. Currently on 1L via NC for comfort, will wean off as tolerated. BMP rechecked, results pending. IV fluid running at 100 ml/hr, NS with 20 meq K. POC discussed with patient. Patient verbalizes understanding. Call light and belongings within reach. Bed locked in lowest position, alarm and fall precautions in place.

## 2022-05-16 NOTE — H&P
Hospital Medicine History & Physical Note    Date of Service  5/15/2022    Primary Care Physician  Pcp Pt States None    Code Status  Full Code    Chief Complaint  Chief Complaint   Patient presents with   • N/V     3 days - denies fevers or sick contacts. Pt states he cant keep anything down. No diarrhea, or unusual foods. Pt denies drugs or etoh-        History of Presenting Illness  Gilbert Diop is a 27 y.o. male who presented 5/15/2022 with nausea/vomiting.  PMH of cyclical vomiting, substance abuse, anxiety, history of benzodiazepine overdose.  Comes in complaining of nausea/vomiting for the past 3 days, he is unable to tolerate foods and fluids.  Has some abdominal pain with vomiting.  Denies bowel or urinary changes, no chills/fevers.  Had some diaphoresis.  Patient says he quit smoking marijuana about a month ago but continues to use benzos, takes 2 mg alprazolam 3-4 times a day, last took today.    In the ED tachycardic, hypoxic requiring 1-2 L O2.  Labs showed WBC of 19.1, potassium 3.3, bicarb 37, anion gap 17, creatinine 2.76.    I discussed the plan of care with patient, bedside RN and edp.    Review of Systems  Review of Systems   Constitutional: Positive for diaphoresis. Negative for chills and fever.   HENT: Negative for sore throat.    Respiratory: Negative for cough and shortness of breath.    Cardiovascular: Negative for chest pain.   Gastrointestinal: Positive for nausea and vomiting. Negative for abdominal pain and diarrhea.   Genitourinary: Negative for dysuria and urgency.   Neurological: Negative for dizziness and headaches.   All other systems reviewed and are negative.      Past Medical History   has a past medical history of Abdominal migraine, Abdominal migraine, Colitis, and Gastritis.    Surgical History   has a past surgical history that includes gastroscopy (9/3/2014); gastroscopy-endo (11/15/2014); other; and other orthopedic surgery.     Family History  family history is  "not on file.   Family history reviewed with patient. There is no family history that is pertinent to the chief complaint.     Social History   reports that he has never smoked. He has never used smokeless tobacco. He reports current drug use. Drugs: Inhaled and Marijuana. He reports that he does not drink alcohol.    Allergies  Allergies   Allergen Reactions   • Acetaminophen [Tylenol] Anaphylaxis and Nausea     Nausea, \"it just doesn't sit right\"  5/29/2021 patient states that his throat swells shut       Medications  None       Physical Exam  Temp:  [36.2 °C (97.1 °F)-36.2 °C (97.2 °F)] 36.2 °C (97.2 °F)  Pulse:  [] 100  Resp:  [13-22] 16  BP: (108-143)/(73-94) 135/83  SpO2:  [76 %-100 %] 97 %  Blood Pressure: 130/82   Temperature: 36.2 °C (97.1 °F)   Pulse: 96   Respiration: 14   Pulse Oximetry: 97 %       Physical Exam  HENT:      Head: Normocephalic and atraumatic.      Mouth/Throat:      Mouth: Mucous membranes are moist.      Pharynx: Oropharynx is clear. No oropharyngeal exudate or posterior oropharyngeal erythema.   Eyes:      General: No scleral icterus.  Cardiovascular:      Rate and Rhythm: Regular rhythm. Tachycardia present.      Pulses: Normal pulses.      Heart sounds: Normal heart sounds. No murmur heard.  Pulmonary:      Effort: Pulmonary effort is normal. No respiratory distress.      Breath sounds: Normal breath sounds. No wheezing.   Abdominal:      Palpations: Abdomen is soft.      Tenderness: There is no abdominal tenderness.   Musculoskeletal:         General: No swelling or tenderness. Normal range of motion.      Cervical back: Normal range of motion.   Skin:     General: Skin is warm and dry.   Neurological:      General: No focal deficit present.      Mental Status: He is alert and oriented to person, place, and time.      Comments: AAO x3 and following commands but slow to respond   Psychiatric:         Mood and Affect: Mood normal.         Laboratory:  Recent Labs     " 05/15/22  1836   WBC 19.1*   RBC 6.76*   HEMOGLOBIN 18.7*   HEMATOCRIT 56.2*   MCV 83.1   MCH 27.7   MCHC 33.3*   RDW 39.0   PLATELETCT 545*   MPV 11.9     Recent Labs     05/15/22  2208   SODIUM 139   POTASSIUM 3.3*   CHLORIDE 85*   CO2 37*   GLUCOSE 129*   BUN 76*   CREATININE 2.76*   CALCIUM 8.1*     Recent Labs     05/15/22  2208   ALTSGPT 11   ASTSGOT 26   ALKPHOSPHAT 72   TBILIRUBIN 0.6   LIPASE 23   GLUCOSE 129*         No results for input(s): NTPROBNP in the last 72 hours.      Recent Labs     05/15/22  2354   TROPONINT 17       Imaging:  DX-CHEST-PORTABLE (1 VIEW)   Final Result      No acute cardiopulmonary disease.          X-Ray:  I have personally reviewed the images and compared with prior images. and My impression is: No acute abnormality  EKG:  I have personally reviewed the images and compared with prior images. and My impression is: Sinus tachycardia, ST depression in inferior lateral leads.  Prolonged QTC    Assessment/Plan:  Justification for Admission Status  I anticipate this patient will require at least two midnights for appropriate medical management, necessitating inpatient admission because Presenting with intractable nausea vomiting, reported history of cyclical vomiting possible cannabis hyperemesis syndrome.  Has electrolyte abnormalities and will need labs followed up as well significant metabolic derangements anion gap metabolic acidosis and metabolic alkalosis.  Will need continuous IV fluids and electrolyte replacement.  He also has EKG changes but negative troponin, likely demand due to tachycardia and dehydration.  He will need monitoring on telemetry for this.  This would require at least 2 midnight stay.    * Intractable nausea and vomiting- (present on admission)  Assessment & Plan  History of cyclical vomiting.  Significant substance abuse history including marijuana  Signs of vomiting dozens of times a day for the past 3 days and has been able to keep anything  down  Resulting in dehydration, ERIN, metabolic derangements  As needed antiemetics, IV fluids    Acute respiratory failure with hypoxia (HCC)- (present on admission)  Assessment & Plan  Saturating in the 70s-80s in the ED requiring O2  Likely aspiration pneumonitis due to vomiting and altered mental status due to benzo abuse  CXR negative for acute consolidation  Wean off oxygen as tolerated    ERIN (acute kidney injury) (HCC)- (present on admission)  Assessment & Plan  Creatinine elevated at 2.7 on presentation.  Likely due to dehydration due to nausea and vomiting  IV fluids    Toxic encephalopathy- (present on admission)  Assessment & Plan  Patient somnolent in the ED but responsive to commands and questions  Secondary to benzodiazepine abuse, says he took 4 mg today    Benzodiazepine abuse (HCC)- (present on admission)  Assessment & Plan  History of benzo abuse with benzo overdose and ICU stay for this  Continues to abuse benzodiazepines, does not have a prescription.  Says he takes 2 mg alprazolam 3-4 times daily  Continue as needed twice a day patient to avoid withdrawal    Leukocytosis (leucocytosis)- (present on admission)  Assessment & Plan  WBC elevated to 19 on presentation.  Afebrile, denies cough, shortness of breath, urinary symptoms  He does have nausea/vomiting which is consistent with his prior presentation of cyclical vomiting syndrome  Unlikely infectious and most likely reactive due to acute GI issues as well as hemoconcentrated due to dehydration  He was requiring oxygen in the ED, suspect that is due to aspiration pneumonitis.  No consolidation on CXR  Will not start on antibiotics.  Check procalcitonin    Metabolic alkalosis- (present on admission)  Assessment & Plan  Bicarb 37 on presentation, anion gap 17.  Alkalosis due to vomiting, hypokalemia  IV fluids and antiemetics    Hypocalcemia- (present on admission)  Assessment & Plan  Corrected calcium is 7.7.  Due to decreased p.o. intake and  vomiting  Replete as needed    Marijuana use- (present on admission)  Assessment & Plan  Since he stopped using a month ago, UDS positive for marijuana  Counseled on cessation    Hypokalemia- (present on admission)  Assessment & Plan  Due to vomiting, replete as needed      VTE prophylaxis: enoxaparin ppx

## 2022-05-16 NOTE — ASSESSMENT & PLAN NOTE
WBC elevated on admission, pt reports subjective fevers/chills, none recorded on VS.  No diarrhea.  Low concern for infection, but would consider further w/u of appendicitis if continues.  Likely reactive due to ongoing vomiting causing multiple other metabolic derangements.  -continue to monitor

## 2022-05-16 NOTE — PROGRESS NOTES
4 Eyes Skin Assessment Completed by SHAWNA Gandara and Nickie RN.    Head WDL  Ears WDL  Nose WDL  Mouth WDL  Neck WDL  Breast/Chest WDL  Shoulder Blades WDL  Spine WDL  (R) Arm/Elbow/Hand WDL  (L) Arm/Elbow/Hand WDL  Abdomen WDL  Groin WDL  Scrotum/Coccyx/Buttocks Scab  (R) Leg WDL  (L) Leg WDL  (R) Heel/Foot/Toe WDL  (L) Heel/Foot/Toe WDL          Devices In Places Nasal Cannula      Interventions In Place N/A    Possible Skin Injury No    Pictures Uploaded Into Epic N/A  Wound Consult Placed N/A  RN Wound Prevention Protocol Ordered No

## 2022-05-16 NOTE — DISCHARGE PLANNING
Case Management Discharge Planning    Admission Date: 5/15/2022  GMLOS: 4.3  ALOS: 1    6-Clicks ADL Score: 24  6-Clicks Mobility Score: 24      Anticipated Discharge Dispo: Discharge Disposition: Discharged to home/self care (01)    DME Needed: No    Action(s) Taken: Pt pending medical clearance, no case management needs identified.    Escalations Completed: None    Medically Clear: No    Next Steps: No case management needs, dc to home when cleared    Barriers to Discharge: Medical clearance

## 2022-05-16 NOTE — ED NOTES
Contacted phleb to draw new labs, pt comfortable on gurney, no signs of distress, unlabored resp with O2 admin

## 2022-05-16 NOTE — ASSESSMENT & PLAN NOTE
Initially requiring oxygen on admission, likely due to somnolence and toxic encephalopathy.  Lung sounds clear, no s/sx of aspiration.  Able to be weaned off O2 by PM of 5/16.  -continue to monitor, titrate O2 supplementation to maintain SpO2 >88%

## 2022-05-16 NOTE — ASSESSMENT & PLAN NOTE
Bicarb elevated on admission, now resolving, likely due to heavy vomiting.  -continue to monitor  -plan as above

## 2022-05-16 NOTE — CONSULTS
Gastroenterology Consult Note     Date of Consult: 5/16/2022    Requesting Physician: Primary team     Reason for consult: Abdominal pain, nausea, vomiting    History of Present Illness: This is a 27-year-old male with anxiety, polysubstance abuse and cyclical vomiting syndrome who presented with nausea, vomiting and epigastric pain for 2 days.  He reports that he has cyclical vomiting every few months.  Patient denies current use of marijuana and reports that his last use was several weeks ago.  He used to smoke marijuana frequently for many years.  He does not use aspirin or NSAIDs.  His last bowel was 2 days ago without melena or hematochezia.  Patient reports that he had an EGD about 3 years ago with [facility] and was diagnosed with gastritis.  Review of medical records show that he had an EGD in 2014 which was aborted prematurely due to coughing secondary to decreased oral secretions.  Findings were unremarkable to explain his abdominal pain.  Patient was admitted in February 2022 for respiratory failure secondary to benzodiazepine overdose.    During the course of his hospital stay, he was noted to have acute kidney injury and leukocytosis.  Per medical records, he takes alprazolam 2 mg 3-4 times a day.  He was asking for pain medication and benzodiazepine during the clinical encounter.       Past Medical History:  Past Medical History:   Diagnosis Date   • Abdominal migraine    • Abdominal migraine    • Colitis    • Gastritis         Past Surgical History:   Past Surgical History:   Procedure Laterality Date   • GASTROSCOPY-ENDO  11/15/2014    Performed by Robert Taylor M.D. at ENDOSCOPY [facility]   • GASTROSCOPY  9/3/2014    Performed by Robert aTylor M.D. at SURGERY [facility]   • OTHER      hernia   • OTHER ORTHOPEDIC SURGERY      Scoliosis        Allergies  Acetaminophen [tylenol]     Social History:   Social History     Socioeconomic History   •  Marital status: Single     Spouse name: Not on file   • Number of children: Not on file   • Years of education: Not on file   • Highest education level: Not on file   Occupational History   • Not on file   Tobacco Use   • Smoking status: Never Smoker   • Smokeless tobacco: Never Used   Vaping Use   • Vaping Use: Some days   • Substances: THC   Substance and Sexual Activity   • Alcohol use: No   • Drug use: Yes     Types: Inhaled, Marijuana     Comment: THC daily   • Sexual activity: Never   Other Topics Concern   • Not on file   Social History Narrative   • Not on file     Social Determinants of Health     Financial Resource Strain: Not on file   Food Insecurity: Not on file   Transportation Needs: Not on file   Physical Activity: Not on file   Stress: Not on file   Social Connections: Not on file   Intimate Partner Violence: Not on file   Housing Stability: Not on file        Family History:   No family history on file.    Home Medications:  Home Medications    Not on File         Review of Systems:  General: No fevers, chills, unintentional, weight loss.  HEENT: No scleral icterus, gum bleed, dysphagia, odynophagia.  Cardiology: No chest pain, palpitations, orthopnea.  Respiratory: No dyspnea, cough, wheezing.  Gastrointestinal:(+) abdominal pain, nausea, vomiting. No changes in bowel habits, rectal bleed.  Genitourinary: No hematuria, dysuria, urgency.  Neurologic: No changes in memory, confusion, gait instability.  Skin: No ecchymosis, jaundice, telangiectasia.    Physical Exam:  Vitals:    05/16/22 0100 05/16/22 0500 05/16/22 0836 05/16/22 1214   BP: 135/83 108/69 124/75 109/70   Pulse: 100 (!) 105 79 71   Resp: 16 18 17 15   Temp: 36.2 °C (97.2 °F) 37 °C (98.6 °F) 36.8 °C (98.2 °F) 36.6 °C (97.9 °F)   TempSrc: Tympanic Temporal Temporal Temporal   SpO2: 97% 99% 100% 95%   Weight: 63.1 kg (139 lb 1.8 oz)      Height:         General: No acute cardiopulmonary distress.  Head: Normocephalic.  EENT: Scleral  anicterus. Mucosa moist.   Respiratory: Breath sounds present. Symmetrical rise of anterior thorax.  Cardiovascular: Normal S1, S2.  Gastrointestinal: Soft, nondistended. Diffuse upper quadrant tenderness worse at epigastric region. Normoactive bowel sounds. No guarding.  Neurologic: Alert and oriented.  Skin: Warm and dry.      LABS:  Lab Results   Component Value Date/Time    SODIUM 136 05/16/2022 08:16 AM    POTASSIUM 3.9 05/16/2022 08:16 AM    CHLORIDE 93 (L) 05/16/2022 08:16 AM    CO2 32 05/16/2022 08:16 AM    GLUCOSE 116 (H) 05/16/2022 08:16 AM    BUN 64 (H) 05/16/2022 08:16 AM    CREATININE 1.76 (H) 05/16/2022 08:16 AM    CREATININE 0.4 04/10/2006 12:00 PM      Lab Results   Component Value Date/Time    WBC 16.9 (H) 05/16/2022 09:48 AM    RBC 4.86 05/16/2022 09:48 AM    HEMOGLOBIN 13.2 (L) 05/16/2022 09:48 AM    HEMATOCRIT 40.9 (L) 05/16/2022 09:48 AM    MCV 84.2 05/16/2022 09:48 AM    MCH 27.2 05/16/2022 09:48 AM    MCHC 32.3 (L) 05/16/2022 09:48 AM    MPV 11.1 05/16/2022 09:48 AM    NEUTSPOLYS 78.80 (H) 05/16/2022 09:48 AM    LYMPHOCYTES 10.10 (L) 05/16/2022 09:48 AM    MONOCYTES 10.30 05/16/2022 09:48 AM    EOSINOPHILS 0.10 05/16/2022 09:48 AM    BASOPHILS 0.20 05/16/2022 09:48 AM    ANISOCYTOSIS 1+ 01/14/2022 04:58 AM        Lab Results   Component Value Date/Time    PROTHROMBTM 14.6 07/05/2020 05:24 AM    INR 1.11 07/05/2020 05:24 AM      Recent Labs     05/15/22  2208   ASTSGOT 26   ALTSGPT 11   TBILIRUBIN 0.6   GLOBULIN 3.6*       IMAGING: Reviewed personally and summarized in HPI.        ASSESSMENT:   -Cyclical vomiting syndrome  -Migraine headaches  -Anxiety  -Polysubstance abuse  -Cannabinoid use       PLAN:   This is a 27-year-old male who presented with the aforementioned symptoms.  Cyclical vomiting syndrome can be due to multiple factors, including mental health, cannabinoid hyperemesis syndrome and migraines.  Better control of his anxiety and migraines will likely improve his GI symptoms.   Continue supportive care with antiemetics and IV fluids.     EGD is scheduled tomorrow with Dr. Chowdhury.  The risks, benefits and alternatives of the procedure were discussed with patient, including but not limited to bowel perforation, pain and anesthesia side effects.  He verbalized understanding and agreed to proceed.  All questions and concerns addressed.    Consider a gastric emptying study if EGD is unremarkable.        Thank you for the consultation. Please call with any questions or concerns.    Stephanie Hernandez D.O.  Gastroenterology  [facility]  [phone]

## 2022-05-16 NOTE — CARE PLAN
The patient is Stable - Low risk of patient condition declining or worsening    Shift Goals  Clinical Goals: Monitor labs  Patient Goals: Nausea control, anxiety reduction    Problem: Psychosocial  Goal: Patient's level of anxiety will decrease  Outcome: Progressing     Problem: Gastrointestinal Irritability  Goal: Nausea and vomiting will be absent or improve  Outcome: Progressing

## 2022-05-16 NOTE — ASSESSMENT & PLAN NOTE
Creatinine elevated to 2.7 on admission.  Baseline Cr 1.0-1.3.  Improving with fluids.  -continue IVF  -continue to monitor  -avoid nephrotoxic agents  -renally dose all medications

## 2022-05-16 NOTE — PROGRESS NOTES
Report received from Margaret MATOS. Updated on POC.  Assumed care of patient upon arrival to unit. Patient currently A & O x 4; on 1 L O2 NC; pt is having nausea. Pt placed on monitor, monitor room notified. Patient oriented to unit and to call light system. Call light within reach. Pt educated to fall risk. Fall precautions in place. Pt provided with personal grooming items. Bed locked and in lowest position. All questions answered. No other needs indicated at this time.

## 2022-05-16 NOTE — CARE PLAN
The patient is Watcher - Medium risk of patient condition declining or worsening    Shift Goals  Clinical Goals: Monitor labs  Patient Goals: Nausea control, anxiety reduction    Progress made toward(s) clinical / shift goals:  Pt is able to comfortably rest in bed, Pt's anxiety has decreased    Patient is not progressing towards the following goals: Pt has not verbalized his feelings about his condition, Pt does not show sings of an ability to adapt to his role responsibilities      Problem: Psychosocial  Goal: Patient's ability to verbalize feelings about condition will improve  Outcome: Not Met  Goal: Patient's ability to re-evaluate and adapt role responsibilities will improve  Outcome: Not Met

## 2022-05-16 NOTE — DIETARY
"Nutrition services: Day 1 of admit.  Gilbert Diop is a 27 y.o. male with admitting DX of Intractable N/V.     Consult received for MST 2 with weight loss and poor PO intake.    Assessment:  Height: 162.6 cm (5' 4\")  Weight: 69.4 kg (153 lb) - stand up scale  Body mass index is 23.88 kg/m²., BMI classification: normal  Diet/Intake: NPO    Evaluation:   1. PMH of vomiting, substance abuse, anxiety, hx of benzodiazepine overdose.  2. Pt admitted with N/V x 3 days. RN notes pt is A/O x 4, lethargic. 10/10 abdominal pain.   3. Per weight hx, pt last at 154 lbs on 2/17/22, first admit weight of 153 lbs is stable. 2nd measured weight done today of 139 lbs likely incorrect.   4. Skin: No wounds or edema  5. Labs: glucose 116, BUN 64, Creat 1.76, GFR 54  6. Meds: protonix, colace, bowel protocol, LR IV  7. Last BM: PTA    Malnutrition Risk: Malnutrition criteria not met.    Recommendations/Plan:  1. Advance diet when appropriate.  2. Encourage intake of meals.  3. Document intake of all meals as % taken in ADL's to provide interdisciplinary communication across all shifts.   4. Monitor weight.  5. Nutrition rep will continue to see patient for ongoing meal and snack preferences.     RD following to monitor PO intake.          "

## 2022-05-16 NOTE — PROGRESS NOTES
This RN spoke with Riley from [facility]. Per [facility] gastric emptying test must be done out-patient because it is a 4 hour test. Nuc Med also said that we can do a normal gastric emptying test tomorrow, but the patient must be able to eat for the test to be completed. Per Nuc Med the pt must be NPO for 4-6 hours, no narcotics, and no gastric meds. 10 am 5/17 is the soonest this test can be scheduled for these reasons.

## 2022-05-16 NOTE — ED NOTES
"Chief Complaint   Patient presents with   • N/V     3 days - denies fevers or sick contacts. Pt states he cant keep anything down. No diarrhea, or unusual foods. Pt denies drugs or etoh-      Pt very sedate. O2 sats down to 70%. Placed on 2L O2 NC.  Pt reports that he took \"some benzo's.\" when questioned he reports \"3 clonazepams.\"  PIV established, blood drawn and sent to lab. IVF infusing.   "

## 2022-05-16 NOTE — ASSESSMENT & PLAN NOTE
Pt w/ hx of gastritis, cyclical vomiting syndrome for about over 8 years, and marijuana use, denies any recent use, UDS positive for cannabinoids.  No hx of any abdominal surgeries.  Pt reports last marijuana use was 3 weeks ago.  DDx would include worsening gastritis, benzodiazepine withdrawal, PUD (concerns for ZES vs H. Pylori), cyclic vomiting syndrome due to marijuana use vs abdominal migraine, due to chronicity less likely to be infectious, but due to abdominal pain, possible concern for gastroenteritis or appendicitis.  -continue IVF @ 125 ml/hr  -h. Pylori, gastrin pending  -gastric emptying study  -Holding zofran, reglan in setting of elevated QTc, will use compazine PRN for nausea  -repeat EKG @1600, if continues to be elevated will hold compazine for low risk of QT prolongation  -holding home tramadol, pt reports makes him nauseous

## 2022-05-16 NOTE — PROGRESS NOTES
Monitor Summary:    Rhythm: SR    HR: 95    Measurements:14/07/48    Ectopy:               Normal Values  Rhythm SR  HR Range    Measurements 0.12-0.20 / 0.06-0.10  / 0.30-0.52

## 2022-05-16 NOTE — PROGRESS NOTES
Daily Progress Note:     Date of Service: 5/16/2022  Primary Team: [facility]   Attending: Nancy Smiley M.D.   Senior Resident: Dr. Ambriz  Intern: Dr. Michaels  Contact:  [phone]    Chief Complaint and pt ID:   Nausea, vomiting    Interval Update:  Pt admitted overnight with 3 days of nausea and vomiting.  Pt reports not being able to keep any solids or liquids down.  Also having associated abdominal pain which is described as a central, constant, burning pain, hasn't noticed anything that makes pain better or worse.  Pain has been persistent since start of nausea/vomiting.  No hx of abdominal surgeries, last BM was 2 days ago.  Reports subjective fevers/chills, denies CP, palpitations, SOB, dysuria.  Reports chronic back pain, would like something other than home tramadol stating it isn't good enough at the moment.  Pt denies any alcohol, RD or tobacco use.      Consultants/Specialty:  N/a    Review of Systems:    Review of Systems   Constitutional: Positive for chills and fever.   HENT: Negative for congestion and sore throat.    Respiratory: Negative for cough and shortness of breath.    Cardiovascular: Negative for chest pain and palpitations.   Gastrointestinal: Positive for abdominal pain, nausea and vomiting. Negative for diarrhea.   Genitourinary: Negative for dysuria.   Musculoskeletal: Positive for back pain.   Neurological: Negative for dizziness, tingling, weakness and headaches.   Psychiatric/Behavioral: The patient is nervous/anxious.        Objective Data:   Physical Exam:   Vitals:   Temp:  [36.2 °C (97.1 °F)-37 °C (98.6 °F)] 36.8 °C (98.2 °F)  Pulse:  [] 79  Resp:  [13-22] 17  BP: (108-143)/(69-94) 124/75  SpO2:  [76 %-100 %] 100 %    Physical Exam  Vitals and nursing note reviewed.   Constitutional:       General: He is in acute distress (mild acute distress).      Appearance: He is ill-appearing. He is not toxic-appearing.   HENT:      Head: Normocephalic and atraumatic.       Mouth/Throat:      Mouth: Mucous membranes are dry.      Pharynx: Oropharynx is clear.   Eyes:      Extraocular Movements: Extraocular movements intact.      Conjunctiva/sclera: Conjunctivae normal.   Cardiovascular:      Rate and Rhythm: Normal rate and regular rhythm.      Pulses: Normal pulses.      Heart sounds: Normal heart sounds. No murmur heard.    No friction rub. No gallop.   Pulmonary:      Effort: Pulmonary effort is normal. No respiratory distress.      Breath sounds: Normal breath sounds. No wheezing, rhonchi or rales.   Abdominal:      General: Abdomen is flat. Bowel sounds are normal. There is no distension.      Palpations: Abdomen is soft.      Tenderness: There is abdominal tenderness (Epigastric, RLQ). There is no guarding.   Musculoskeletal:         General: No swelling.      Right lower leg: No edema.      Left lower leg: No edema.   Skin:     General: Skin is warm and dry.   Neurological:      General: No focal deficit present.      Mental Status: He is alert and oriented to person, place, and time.      Comments: Moving all 4 extremities spontaneously           Labs:   HEMATOLOGY/ ONCOLOGY/ID:            Recent Labs     05/15/22  1836   WBC 19.1*   RBC 6.76*   HEMOGLOBIN 18.7*   HEMATOCRIT 56.2*   MCV 83.1   MCH 27.7   RDW 39.0   PLATELETCT 545*   MPV 11.9   NEUTSPOLYS 78.80*   LYMPHOCYTES 8.60*   MONOCYTES 11.70   EOSINOPHILS 0.10   BASOPHILS 0.20     No results found for: VSYDNMKF23, FOLATE, FERRITIN, IRON, TOTIRONBC    RENAL:        Estimated GFR/CRCL = Estimated Creatinine Clearance: 53.7 mL/min (A) (by C-G formula based on SCr of 1.76 mg/dL (H)).  Recent Labs     05/15/22  2208 05/15/22  2354 05/16/22  0358 05/16/22  0816   SODIUM 139  --  137 136   POTASSIUM 3.3*  --  3.6 3.9   CHLORIDE 85*  --  90* 93*   CO2 37*  --  35* 32   GLUCOSE 129*  --  131* 116*   BUN 76*  --  71* 64*   CREATININE 2.76*  --  2.20* 1.76*   CALCIUM 8.1*  --  8.3* 8.4*   MAGNESIUM  --  2.3 2.4  --    ALBUMIN 4.5   --   --   --        GASTROINTESTINAL/ HEPATIC:          Recent Labs     05/15/22  2208   ALTSGPT 11   ASTSGOT 26   ALKPHOSPHAT 72   TBILIRUBIN 0.6   LIPASE 23   ALBUMIN 4.5   GLOBULIN 3.6*     No results found for: AMMONIA    ENDOCRINE:              Recent Labs     05/15/22  2208 05/16/22  0358 05/16/22  0816   GLUCOSE 129* 131* 116*     Lab Results   Component Value Date    HBA1C 5.1 11/09/2021    CORTISOL 39.8 (H) 11/10/2021     Imaging:   CXR 5/15:  unremarkable      Problem Representation:  Gilbert Diop  is a 27 y.o. y/o man w/ PMHx of gastritis (seen on EGF 2014), cyclical vomiting syndrome, abdominal migraine, anxiety, recent benzodiazepine overdose (02/2022), hx of substance use who presented on 5/15/2022 with 3 days of nausea, vomiting and abdominal pain.      * Intractable nausea and vomiting- (present on admission)  Assessment & Plan  Pt w/ hx of gastritis, cyclical vomiting syndrome for about over 8 years, and marijuana use, denies any recent use, UDS positive for cannabinoids.  No hx of any abdominal surgeries.  Pt reports last marijuana use was 3 weeks ago.  DDx would include worsening gastritis, benzodiazepine withdrawal, PUD (concerns for ZES vs H. Pylori), cyclic vomiting syndrome due to marijuana use vs abdominal migraine, due to chronicity less likely to be infectious, but due to abdominal pain, possible concern for gastroenteritis or appendicitis.  -continue IVF @ 125 ml/hr  -h. Pylori, gastrin pending  -gastric emptying study  -Holding zofran, reglan in setting of elevated QTc, will use compazine PRN for nausea  -repeat EKG @1600, if continues to be elevated will hold compazine for low risk of QT prolongation  -holding home tramadol, pt reports makes him nauseous      GI bleeding  Assessment & Plan  Pt reporting melena and some hematemesis, pt has hx of gastritis.  DDx would include matt yoder tear, vs UGIB in the setting of of peptic ulcer disease vs gastritis.  -GI consulted,  appreciate assistance  -pantoprazole 40 mg IV daily    Metabolic alkalosis- (present on admission)  Assessment & Plan  Bicarb elevated on admission, now resolving, likely due to heavy vomiting.  -continue to monitor  -plan as above    Toxic encephalopathy- (present on admission)  Assessment & Plan  Pt initially somnolent this AM but woke up and was alert and oriented later in AM, and reported to be somnolent in ED, likely toxic metabolic encephalopathy from benzodiazepine use vs withdrawal.  -continue to monitor    Benzodiazepine use (HCC)- (present on admission)  Assessment & Plan  Chronic benzo use w/ previous overdose in 02/2022.  UDS negative for benzos, but pt reports taking 2mg of alprazolam nightly to assist with sleep and some PRN 0.5mg usage when having panic attacks.  Pt at high risk for withdrawals due to chronic usage.   -continue 2mg Alprazolam nightly  -recommend tapering as appropriate  -PRN alprazolam for panic attacks/anxiety    ERIN (acute kidney injury) (HCC)- (present on admission)  Assessment & Plan  Creatinine elevated to 2.7 on admission.  Baseline Cr 1.0-1.3.  Improving with fluids.  -continue IVF  -continue to monitor  -avoid nephrotoxic agents  -renally dose all medications    Leukocytosis (leucocytosis)- (present on admission)  Assessment & Plan  WBC elevated on admission, pt reports subjective fevers/chills, none recorded on VS.  No diarrhea.  Low concern for infection, but would consider further w/u of appendicitis if continues.  Likely reactive due to ongoing vomiting causing multiple other metabolic derangements.  -continue to monitor    Hypocalcemia- (present on admission)  Assessment & Plan  Likely due to decreased p.o. intake and vomiting  -Replete as needed    Acute respiratory failure with hypoxia (HCC)- (present on admission)  Assessment & Plan  Initially requiring oxygen on admission, likely due to somnolence and toxic encephalopathy.  Lung sounds clear, no s/sx of aspiration.   Able to be weaned off O2 by PM of 5/16.  -continue to monitor, titrate O2 supplementation to maintain SpO2 >88%    Marijuana use- (present on admission)  Assessment & Plan  Last reported use 3 weeks ago, UDS positive.      Hypokalemia- (present on admission)  Assessment & Plan  Likely due to vomiting.    -replete PRN      Code Status: Full code  DVT ppx: SCDs, holding chemical ppx in setting of possible GI bleed  Diet: NPO  GI: pantoprazole, Bowel regimen  T/L/D: pIV  Disposition: pending further w/u of possible GI bleed, nausea.        Gil Michaels DO  PGY-1, [facility]

## 2022-05-16 NOTE — ED TRIAGE NOTES
Chief Complaint   Patient presents with   • N/V     3 days - denies fevers or sick contacts. Pt states he cant keep anything down. No diarrhea, or unusual foods.

## 2022-05-16 NOTE — ASSESSMENT & PLAN NOTE
Chronic benzo use w/ previous overdose in 02/2022.  UDS negative for benzos, but pt reports taking 2mg of alprazolam nightly to assist with sleep and some PRN 0.5mg usage when having panic attacks.  Pt at high risk for withdrawals due to chronic usage.   -continue 2mg Alprazolam nightly  -recommend tapering as appropriate  -PRN alprazolam for panic attacks/anxiety

## 2022-05-16 NOTE — ED NOTES
SHAWNA Leon from [facility] given report and moved pt to receiving unit, all personal items taken with pt

## 2022-05-16 NOTE — ED TRIAGE NOTES
Chief Complaint   Patient presents with   • N/V     3 days - denies fevers or sick contacts. Pt states he cant keep anything down. No diarrhea, or unusual foods. Pt denies drugs or etoh-

## 2022-05-16 NOTE — ED PROVIDER NOTES
ED Provider Note    Scribed for Mekhi Cuenca by Carolee Oseguera. 5/15/2022  6:29 PM    Primary care provider: Pcp Pt States None  Means of arrival: walk in  History obtained from: Patient  History limited by: None    CHIEF COMPLAINT  Chief Complaint   Patient presents with   • N/V     3 days - denies fevers or sick contacts. Pt states he cant keep anything down. No diarrhea, or unusual foods. Pt denies drugs or etoh-      HPI  Gilbert Diop is a 27 y.o. male who presents to the Emergency Department with constant nausea and vomiting for 3 days. The patient states he has not been able to tolerate any foods or fluids. He endorses abdominal pain, but denies any diarrhea, fevers, or dysuria. He notes he has visited GI before, who diagnosed him with abdominal migraines. Patient reports he took 2 Benzodiazepine today. His history includes being treated at [facility] in January for benzodiazepine overdose. He adds he has a history of anxiety.     Quality:constant   Duration: 3 days  Severity: moderate  Associated sx: nausea, vomiting, and abdominal pain    REVIEW OF SYSTEMS  As above, all other systems reviewed and are negative.   See HPI for further details.     PAST MEDICAL HISTORY   has a past medical history of Abdominal migraine, Abdominal migraine, Colitis, and Gastritis.  SURGICAL HISTORY   has a past surgical history that includes gastroscopy (9/3/2014); gastroscopy-endo (11/15/2014); other; and other orthopedic surgery.  SOCIAL HISTORY  Social History     Tobacco Use   • Smoking status: Never Smoker   • Smokeless tobacco: Never Used   Vaping Use   • Vaping Use: Some days   • Substances: THC   Substance Use Topics   • Alcohol use: No   • Drug use: Yes     Types: Inhaled, Marijuana     Comment: THC daily      Social History     Substance and Sexual Activity   Drug Use Yes   • Types: Inhaled, Marijuana    Comment: THC daily     FAMILY HISTORY  No family history noted.     CURRENT MEDICATIONS  Home  "Medications    **Home medications have not yet been reviewed for this encounter**       ALLERGIES  Allergies   Allergen Reactions   • Acetaminophen [Tylenol] Anaphylaxis and Nausea     Nausea, \"it just doesn't sit right\"  5/29/2021 patient states that his throat swells shut     PHYSICAL EXAM    VITAL SIGNS:   Vitals:    05/15/22 2200 05/15/22 2230 05/15/22 2300 05/15/22 2305   BP: (!) 142/80 (!) 141/89 130/82    Pulse: (!) 103 95 (!) 103 96   Resp: 17 20 16 14   Temp:       SpO2: 98% 98% (!) 76% 97%   Weight:       Height:         Vitals: My interpretation: normotensive, tachycardic, afebrile, not hypoxic    Reinterpretation of vitals: Improving    Cardiac Monitor Interpretation: The cardiac monitor revealed normal Sinus Rhythm sinus tachycardia as interpreted by me. The cardiac monitor was ordered secondary to the patient's history of dehydration and to monitor for dysrhythmia and/or tachycardia.    PE:   Constitutional: Lethargic, chronically ill-appearing  HENT: Normocephalic, Atraumatic, Bilateral external ears normal, Oropharynx is clear mucous membranes are moist. No oral exudates or nasal discharge.   Eyes: Pupils are equal round and reactive, EOMI, Conjunctiva normal, No discharge.   Neck: Normal range of motion, No tenderness, Supple, No stridor. No meningismus.  Lymphatic: No lymphadenopathy noted.   Cardiovascular: Tachycardic, Regular rate and rhythm without murmur rub or gallop.  Thorax & Lungs: Clear breath sounds bilaterally without wheezes, rhonchi or rales. There is no chest wall tenderness.   Abdomen: Soft non-tender non-distended. There is no rebound or guarding. No organomegaly is appreciated. Bowel sounds are normal.  Skin: Normal without rash.   Back: No CVA or spinal tenderness.   Extremities: Intact distal pulses, No edema, No tenderness, No cyanosis, No clubbing. Capillary refill is less than 2 seconds.  Musculoskeletal: Good range of motion in all major joints. No tenderness to palpation or " major deformities noted.   Neurologic: Alert & oriented x 3, Normal motor function, Normal sensory function, No focal deficits noted. Reflexes are normal.  Psychiatric: Affect normal, Judgment normal, Mood normal. There is no suicidal ideation or patient reported hallucinations.     DIAGNOSTIC STUDIES / PROCEDURES    LABS  Results for orders placed or performed during the hospital encounter of 05/15/22   CBC WITH DIFFERENTIAL   Result Value Ref Range    WBC 19.1 (H) 4.8 - 10.8 K/uL    RBC 6.76 (H) 4.70 - 6.10 M/uL    Hemoglobin 18.7 (H) 14.0 - 18.0 g/dL    Hematocrit 56.2 (H) 42.0 - 52.0 %    MCV 83.1 81.4 - 97.8 fL    MCH 27.7 27.0 - 33.0 pg    MCHC 33.3 (L) 33.7 - 35.3 g/dL    RDW 39.0 35.9 - 50.0 fL    Platelet Count 545 (H) 164 - 446 K/uL    MPV 11.9 9.0 - 12.9 fL    Neutrophils-Polys 78.80 (H) 44.00 - 72.00 %    Lymphocytes 8.60 (L) 22.00 - 41.00 %    Monocytes 11.70 0.00 - 13.40 %    Eosinophils 0.10 0.00 - 6.90 %    Basophils 0.20 0.00 - 1.80 %    Immature Granulocytes 0.60 0.00 - 0.90 %    Nucleated RBC 0.00 /100 WBC    Neutrophils (Absolute) 15.03 (H) 1.82 - 7.42 K/uL    Lymphs (Absolute) 1.64 1.00 - 4.80 K/uL    Monos (Absolute) 2.22 (H) 0.00 - 0.85 K/uL    Eos (Absolute) 0.01 0.00 - 0.51 K/uL    Baso (Absolute) 0.03 0.00 - 0.12 K/uL    Immature Granulocytes (abs) 0.12 (H) 0.00 - 0.11 K/uL    NRBC (Absolute) 0.00 K/uL   URINE DRUG SCREEN   Result Value Ref Range    Amphetamines Urine Negative Negative    Barbiturates Negative Negative    Benzodiazepines Negative Negative    Cocaine Metabolite Negative Negative    Methadone Negative Negative    Opiates Negative Negative    Oxycodone Negative Negative    Phencyclidine -Pcp Negative Negative    Propoxyphene Negative Negative    Cannabinoid Metab Positive (A) Negative   URINALYSIS CULTURE, IF INDICATED    Specimen: Urine   Result Value Ref Range    Color Yellow     Character Clear     Specific Gravity 1.016 <1.035    Ph 8.0 5.0 - 8.0    Glucose Negative  Negative mg/dL    Ketones Negative Negative mg/dL    Protein 30 (A) Negative mg/dL    Bilirubin Negative Negative    Urobilinogen, Urine 0.2 Negative    Nitrite Negative Negative    Leukocyte Esterase Negative Negative    Occult Blood Negative Negative    Micro Urine Req Microscopic    URINE MICROSCOPIC (W/UA)   Result Value Ref Range    WBC 0-2 (A) /hpf    RBC 2-5 (A) /hpf    Bacteria Negative None /hpf    Epithelial Cells Negative /hpf   Comp Metabolic Panel   Result Value Ref Range    Sodium 139 135 - 145 mmol/L    Potassium 3.3 (L) 3.6 - 5.5 mmol/L    Chloride 85 (L) 96 - 112 mmol/L    Co2 37 (H) 20 - 33 mmol/L    Anion Gap 17.0 (H) 7.0 - 16.0    Glucose 129 (H) 65 - 99 mg/dL    Bun 76 (H) 8 - 22 mg/dL    Creatinine 2.76 (H) 0.50 - 1.40 mg/dL    Calcium 8.1 (L) 8.5 - 10.5 mg/dL    AST(SGOT) 26 12 - 45 U/L    ALT(SGPT) 11 2 - 50 U/L    Alkaline Phosphatase 72 30 - 99 U/L    Total Bilirubin 0.6 0.1 - 1.5 mg/dL    Albumin 4.5 3.2 - 4.9 g/dL    Total Protein 8.1 6.0 - 8.2 g/dL    Globulin 3.6 (H) 1.9 - 3.5 g/dL    A-G Ratio 1.3 g/dL   LIPASE   Result Value Ref Range    Lipase 23 11 - 82 U/L   DIAGNOSTIC ALCOHOL   Result Value Ref Range    Diagnostic Alcohol <10.1 <10.1 mg/dL   ESTIMATED GFR   Result Value Ref Range    GFR (CKD-EPI) 32 (A) >60 mL/min/1.73 m 2   EKG   Result Value Ref Range    Report       [facility] Emergency Dept.    Test Date:  2022-05-15  Pt Name:    GABRIEL CEBALLOS     Department: ER  MRN:        [id number]                      Room:  Gender:     Male                         Technician: [id number]  :        1995                   Requested By:ER TRIAGE PROTOCOL  Order #:    [id number]                    Massiel MD: Mekhi Cuenca    Measurements  Intervals                                Axis  Rate:       131                          P:          82  MN:         98                           QRS:        83  QRSD:       77                           T:          77  QT:          358  QTc:        530    Interpretive Statements  Sinus tachycardia  LAE, consider biatrial enlargement  ST depr, consider ischemia, inferior leads  Prolonged QT interval  Compared to ECG 02/16/2022 19:36:46  Possible ischemia now present  Prolonged QT interval now present  Electronically Signed On 5- 18:44:55 PDT by Mekhi Cuenca        All labs reviewed by me. Significant for leukocytosis of 19, hemoconcentration, urine drug screen negative other than cannabinoid, urine shows no ketones, blood or infection, severe electrolyte derangements hypochloremia, hypokalemia, severe ERIN with BUN elevation, normal liver enzymes, normal bilirubin, lipase normal, alcohol negative    RADIOLOGY  DX-CHEST-PORTABLE (1 VIEW)   Final Result      No acute cardiopulmonary disease.        The radiologist's interpretation of all radiological studies have been reviewed by me.    COURSE & MEDICAL DECISION MAKING  Nursing notes, VS, PMSFHx, labs, imaging, EKG reviewed in chart.    MDM: 6:29 PM Gilbert Diop is a 27 y.o. male who presented with history of substance abuse, primarily benzodiazepine secondary to severe anxiety.  He presented previously this year obtunded and was intubated and admitted.  He has a history of cyclic vomiting syndrome.  He reports persistent vomiting for 3 days, on IV able to tolerate oral intake.  States that he did smoke marijuana a few weeks ago but nothing recently.  After I left he did inform the nurse that he took 3 doses of Klonopin before arriving by EMS which explains likely his slight lethargy and mild obtunded nature.  He is alert and oriented though and able to answer questions, nonfocal neurological exam, abdomen is soft and nontender.  Chest x-ray unremarkable.  Labs do show leukocytosis, likely reactive in nature from his vomiting, multiple electrolyte derangements including hypochloremia, hypokalemia, elevated BUN and creatinine with severe ERIN for baseline.  Chest x-ray  unremarkable.  He was started on multiple boluses of IV fluid for dehydration and tachycardia secondary to his vomiting.  Considering his lab derangements, severe ERIN with creatinine of 2.76, he will need admission to the hospital for continued monitoring and rehydration. Lactic acid pending but hospitalist aware and is monitoring.  At this time and nothing the patient requires antibiotics as think dehydration is likely driving his lab derangements rather than infection but blood cultures were drawn and we will continue to monitor for signs of infection.  Chest x-ray negative.      CRITICAL CARE TIME 39 minutes: Sinus tachycardia, multiple liters of IV fluid rehydration, severe ERIN, dehydration  There was a very real possibility of deterioration of the patient's condition.  This patient required the highest level of care.  I provided critical care services which included: review of the medical record, treatment orders, ordering and reviewing test results, frequent reevaluation of the patient's condition and response to treatment, as well as discussing the case with appropriate personnel and various consultants. The critical care time associated with the care of this patient is exclusive of any procedures or specific interventions.    FINAL IMPRESSION  1. Cyclic vomiting syndrome Acute   2. Dehydration Acute   3. Benzodiazepine abuse (HCC) Acute   4. ERIN (acute kidney injury) (HCC) Acute   5. Hypokalemia Acute   6. Leukocytosis, unspecified type Acute   7. Sinus tachycardia Acute       ICarolee (Scribpadma), am scribing for, and in the presence of, Mekhi Cuenca.    Electronically signed by: Carolee Oseguera (Anabella), 5/15/2022    IMekhi personally performed the services described in this documentation, as scribed by Carolee Oseguera in my presence, and it is both accurate and complete. C    The note accurately reflects work and decisions made by me.  Mekhi Cuenca  5/15/2022  10:59  PM

## 2022-05-16 NOTE — ASSESSMENT & PLAN NOTE
Pt reporting melena and some hematemesis, pt has hx of gastritis.  DDx would include matt yoder tear, vs UGIB in the setting of of peptic ulcer disease vs gastritis.  -GI consulted, appreciate assistance  -pantoprazole 40 mg IV daily

## 2022-05-16 NOTE — ASSESSMENT & PLAN NOTE
Pt initially somnolent this AM but woke up and was alert and oriented later in AM, and reported to be somnolent in ED, likely toxic metabolic encephalopathy from benzodiazepine use vs withdrawal.  -continue to monitor

## 2022-05-17 ENCOUNTER — ANESTHESIA (OUTPATIENT)
Dept: SURGERY | Facility: MEDICAL CENTER | Age: 27
DRG: 368 | End: 2022-05-17
Payer: COMMERCIAL

## 2022-05-17 ENCOUNTER — APPOINTMENT (OUTPATIENT)
Dept: RADIOLOGY | Facility: MEDICAL CENTER | Age: 27
DRG: 368 | End: 2022-05-17
Attending: HOSPITALIST
Payer: COMMERCIAL

## 2022-05-17 ENCOUNTER — ANESTHESIA EVENT (OUTPATIENT)
Dept: SURGERY | Facility: MEDICAL CENTER | Age: 27
DRG: 368 | End: 2022-05-17
Payer: COMMERCIAL

## 2022-05-17 VITALS
SYSTOLIC BLOOD PRESSURE: 106 MMHG | OXYGEN SATURATION: 95 % | BODY MASS INDEX: 23.75 KG/M2 | HEIGHT: 64 IN | TEMPERATURE: 97.7 F | WEIGHT: 139.11 LBS | DIASTOLIC BLOOD PRESSURE: 70 MMHG | RESPIRATION RATE: 16 BRPM | HEART RATE: 64 BPM

## 2022-05-17 LAB
ALBUMIN SERPL BCP-MCNC: 3.8 G/DL (ref 3.2–4.9)
ALBUMIN/GLOB SERPL: 1.5 G/DL
ALP SERPL-CCNC: 58 U/L (ref 30–99)
ALT SERPL-CCNC: 7 U/L (ref 2–50)
ANION GAP SERPL CALC-SCNC: 10 MMOL/L (ref 7–16)
AST SERPL-CCNC: 27 U/L (ref 12–45)
BASOPHILS # BLD AUTO: 0.2 % (ref 0–1.8)
BASOPHILS # BLD: 0.03 K/UL (ref 0–0.12)
BILIRUB SERPL-MCNC: 0.7 MG/DL (ref 0.1–1.5)
BUN SERPL-MCNC: 40 MG/DL (ref 8–22)
CALCIUM SERPL-MCNC: 9 MG/DL (ref 8.5–10.5)
CHLORIDE SERPL-SCNC: 100 MMOL/L (ref 96–112)
CO2 SERPL-SCNC: 25 MMOL/L (ref 20–33)
CREAT SERPL-MCNC: 1.1 MG/DL (ref 0.5–1.4)
EOSINOPHIL # BLD AUTO: 0.04 K/UL (ref 0–0.51)
EOSINOPHIL NFR BLD: 0.3 % (ref 0–6.9)
ERYTHROCYTE [DISTWIDTH] IN BLOOD BY AUTOMATED COUNT: 36.5 FL (ref 35.9–50)
GFR SERPLBLD CREATININE-BSD FMLA CKD-EPI: 96 ML/MIN/1.73 M 2
GLOBULIN SER CALC-MCNC: 2.6 G/DL (ref 1.9–3.5)
GLUCOSE SERPL-MCNC: 103 MG/DL (ref 65–99)
HCT VFR BLD AUTO: 37.3 % (ref 42–52)
HGB BLD-MCNC: 12.8 G/DL (ref 14–18)
IMM GRANULOCYTES # BLD AUTO: 0.06 K/UL (ref 0–0.11)
IMM GRANULOCYTES NFR BLD AUTO: 0.5 % (ref 0–0.9)
LYMPHOCYTES # BLD AUTO: 1.63 K/UL (ref 1–4.8)
LYMPHOCYTES NFR BLD: 13.3 % (ref 22–41)
MAGNESIUM SERPL-MCNC: 2.1 MG/DL (ref 1.5–2.5)
MCH RBC QN AUTO: 27.9 PG (ref 27–33)
MCHC RBC AUTO-ENTMCNC: 34.3 G/DL (ref 33.7–35.3)
MCV RBC AUTO: 81.4 FL (ref 81.4–97.8)
MONOCYTES # BLD AUTO: 1.13 K/UL (ref 0–0.85)
MONOCYTES NFR BLD AUTO: 9.2 % (ref 0–13.4)
NEUTROPHILS # BLD AUTO: 9.37 K/UL (ref 1.82–7.42)
NEUTROPHILS NFR BLD: 76.5 % (ref 44–72)
NRBC # BLD AUTO: 0 K/UL
NRBC BLD-RTO: 0 /100 WBC
PATHOLOGY CONSULT NOTE: NORMAL
PLATELET # BLD AUTO: 309 K/UL (ref 164–446)
PMV BLD AUTO: 11.1 FL (ref 9–12.9)
POTASSIUM SERPL-SCNC: 3.6 MMOL/L (ref 3.6–5.5)
PROT SERPL-MCNC: 6.4 G/DL (ref 6–8.2)
RBC # BLD AUTO: 4.58 M/UL (ref 4.7–6.1)
SODIUM SERPL-SCNC: 135 MMOL/L (ref 135–145)
WBC # BLD AUTO: 12.3 K/UL (ref 4.8–10.8)

## 2022-05-17 PROCEDURE — 700102 HCHG RX REV CODE 250 W/ 637 OVERRIDE(OP): Performed by: INTERNAL MEDICINE

## 2022-05-17 PROCEDURE — 74018 RADEX ABDOMEN 1 VIEW: CPT

## 2022-05-17 PROCEDURE — 85025 COMPLETE CBC W/AUTO DIFF WBC: CPT

## 2022-05-17 PROCEDURE — 88305 TISSUE EXAM BY PATHOLOGIST: CPT | Mod: 59

## 2022-05-17 PROCEDURE — 160002 HCHG RECOVERY MINUTES (STAT): Performed by: INTERNAL MEDICINE

## 2022-05-17 PROCEDURE — 160048 HCHG OR STATISTICAL LEVEL 1-5: Performed by: INTERNAL MEDICINE

## 2022-05-17 PROCEDURE — 36415 COLL VENOUS BLD VENIPUNCTURE: CPT

## 2022-05-17 PROCEDURE — 700111 HCHG RX REV CODE 636 W/ 250 OVERRIDE (IP): Performed by: STUDENT IN AN ORGANIZED HEALTH CARE EDUCATION/TRAINING PROGRAM

## 2022-05-17 PROCEDURE — 83735 ASSAY OF MAGNESIUM: CPT

## 2022-05-17 PROCEDURE — 160201 HCHG ENDO MINUTES - 1ST 30 MINS LEVEL 2: Performed by: INTERNAL MEDICINE

## 2022-05-17 PROCEDURE — 99238 HOSP IP/OBS DSCHRG MGMT 30/<: CPT | Performed by: HOSPITALIST

## 2022-05-17 PROCEDURE — 700111 HCHG RX REV CODE 636 W/ 250 OVERRIDE (IP): Performed by: ANESTHESIOLOGY

## 2022-05-17 PROCEDURE — 160009 HCHG ANES TIME/MIN: Performed by: INTERNAL MEDICINE

## 2022-05-17 PROCEDURE — 80053 COMPREHEN METABOLIC PANEL: CPT

## 2022-05-17 PROCEDURE — 00731 ANES UPR GI NDSC PX NOS: CPT | Performed by: ANESTHESIOLOGY

## 2022-05-17 PROCEDURE — 82941 ASSAY OF GASTRIN: CPT

## 2022-05-17 PROCEDURE — 700102 HCHG RX REV CODE 250 W/ 637 OVERRIDE(OP): Performed by: STUDENT IN AN ORGANIZED HEALTH CARE EDUCATION/TRAINING PROGRAM

## 2022-05-17 PROCEDURE — 0DB78ZX EXCISION OF STOMACH, PYLORUS, VIA NATURAL OR ARTIFICIAL OPENING ENDOSCOPIC, DIAGNOSTIC: ICD-10-PCS | Performed by: INTERNAL MEDICINE

## 2022-05-17 PROCEDURE — A9270 NON-COVERED ITEM OR SERVICE: HCPCS | Performed by: HOSPITALIST

## 2022-05-17 PROCEDURE — 0DB38ZX EXCISION OF LOWER ESOPHAGUS, VIA NATURAL OR ARTIFICIAL OPENING ENDOSCOPIC, DIAGNOSTIC: ICD-10-PCS | Performed by: INTERNAL MEDICINE

## 2022-05-17 PROCEDURE — A9270 NON-COVERED ITEM OR SERVICE: HCPCS | Performed by: STUDENT IN AN ORGANIZED HEALTH CARE EDUCATION/TRAINING PROGRAM

## 2022-05-17 PROCEDURE — 160035 HCHG PACU - 1ST 60 MINS PHASE I: Performed by: INTERNAL MEDICINE

## 2022-05-17 PROCEDURE — 0DB98ZX EXCISION OF DUODENUM, VIA NATURAL OR ARTIFICIAL OPENING ENDOSCOPIC, DIAGNOSTIC: ICD-10-PCS | Performed by: INTERNAL MEDICINE

## 2022-05-17 PROCEDURE — 88312 SPECIAL STAINS GROUP 1: CPT | Mod: 59

## 2022-05-17 PROCEDURE — C9113 INJ PANTOPRAZOLE SODIUM, VIA: HCPCS | Performed by: STUDENT IN AN ORGANIZED HEALTH CARE EDUCATION/TRAINING PROGRAM

## 2022-05-17 PROCEDURE — A9270 NON-COVERED ITEM OR SERVICE: HCPCS | Performed by: INTERNAL MEDICINE

## 2022-05-17 PROCEDURE — 700102 HCHG RX REV CODE 250 W/ 637 OVERRIDE(OP): Performed by: HOSPITALIST

## 2022-05-17 RX ORDER — ALBUTEROL SULFATE 2.5 MG/3ML
2.5 SOLUTION RESPIRATORY (INHALATION)
Status: DISCONTINUED | OUTPATIENT
Start: 2022-05-17 | End: 2022-05-17 | Stop reason: HOSPADM

## 2022-05-17 RX ORDER — HALOPERIDOL 5 MG/ML
1 INJECTION INTRAMUSCULAR
Status: DISCONTINUED | OUTPATIENT
Start: 2022-05-17 | End: 2022-05-17 | Stop reason: HOSPADM

## 2022-05-17 RX ORDER — CITALOPRAM 20 MG/1
10 TABLET ORAL DAILY
Status: DISCONTINUED | OUTPATIENT
Start: 2022-05-17 | End: 2022-05-17 | Stop reason: HOSPADM

## 2022-05-17 RX ORDER — GABAPENTIN 300 MG/1
300 CAPSULE ORAL 3 TIMES DAILY
Status: DISCONTINUED | OUTPATIENT
Start: 2022-05-17 | End: 2022-05-17 | Stop reason: HOSPADM

## 2022-05-17 RX ORDER — MIRTAZAPINE 15 MG/1
7.5 TABLET, FILM COATED ORAL
Status: DISCONTINUED | OUTPATIENT
Start: 2022-05-17 | End: 2022-05-17 | Stop reason: HOSPADM

## 2022-05-17 RX ORDER — MORPHINE SULFATE 4 MG/ML
2 INJECTION INTRAVENOUS EVERY 4 HOURS PRN
Status: DISCONTINUED | OUTPATIENT
Start: 2022-05-17 | End: 2022-05-17 | Stop reason: HOSPADM

## 2022-05-17 RX ORDER — HYDRALAZINE HYDROCHLORIDE 20 MG/ML
5 INJECTION INTRAMUSCULAR; INTRAVENOUS
Status: DISCONTINUED | OUTPATIENT
Start: 2022-05-17 | End: 2022-05-17 | Stop reason: HOSPADM

## 2022-05-17 RX ORDER — SODIUM CHLORIDE, SODIUM LACTATE, POTASSIUM CHLORIDE, CALCIUM CHLORIDE 600; 310; 30; 20 MG/100ML; MG/100ML; MG/100ML; MG/100ML
INJECTION, SOLUTION INTRAVENOUS CONTINUOUS
Status: DISCONTINUED | OUTPATIENT
Start: 2022-05-17 | End: 2022-05-17 | Stop reason: HOSPADM

## 2022-05-17 RX ORDER — HYDROXYZINE HYDROCHLORIDE 10 MG/1
10 TABLET, FILM COATED ORAL 3 TIMES DAILY PRN
Status: DISCONTINUED | OUTPATIENT
Start: 2022-05-17 | End: 2022-05-17 | Stop reason: HOSPADM

## 2022-05-17 RX ORDER — OMEPRAZOLE 20 MG/1
20 CAPSULE, DELAYED RELEASE ORAL DAILY
Status: DISCONTINUED | OUTPATIENT
Start: 2022-05-17 | End: 2022-05-17 | Stop reason: HOSPADM

## 2022-05-17 RX ORDER — MIDAZOLAM HYDROCHLORIDE 1 MG/ML
1 INJECTION INTRAMUSCULAR; INTRAVENOUS
Status: DISCONTINUED | OUTPATIENT
Start: 2022-05-17 | End: 2022-05-17 | Stop reason: HOSPADM

## 2022-05-17 RX ORDER — ONDANSETRON 2 MG/ML
4 INJECTION INTRAMUSCULAR; INTRAVENOUS
Status: DISCONTINUED | OUTPATIENT
Start: 2022-05-17 | End: 2022-05-17 | Stop reason: HOSPADM

## 2022-05-17 RX ORDER — DIPHENHYDRAMINE HYDROCHLORIDE 50 MG/ML
12.5 INJECTION INTRAMUSCULAR; INTRAVENOUS
Status: DISCONTINUED | OUTPATIENT
Start: 2022-05-17 | End: 2022-05-17 | Stop reason: HOSPADM

## 2022-05-17 RX ORDER — ACETAMINOPHEN 325 MG/1
650 TABLET ORAL EVERY 6 HOURS
Status: DISCONTINUED | OUTPATIENT
Start: 2022-05-17 | End: 2022-05-17

## 2022-05-17 RX ORDER — OXYCODONE HYDROCHLORIDE 5 MG/1
5 TABLET ORAL EVERY 4 HOURS PRN
Status: DISCONTINUED | OUTPATIENT
Start: 2022-05-17 | End: 2022-05-17 | Stop reason: HOSPADM

## 2022-05-17 RX ORDER — LORAZEPAM 2 MG/ML
0.5 INJECTION INTRAMUSCULAR EVERY 4 HOURS PRN
Status: DISCONTINUED | OUTPATIENT
Start: 2022-05-17 | End: 2022-05-17

## 2022-05-17 RX ORDER — SUCRALFATE ORAL 1 G/10ML
1 SUSPENSION ORAL EVERY 6 HOURS
Status: DISCONTINUED | OUTPATIENT
Start: 2022-05-17 | End: 2022-05-17 | Stop reason: HOSPADM

## 2022-05-17 RX ADMIN — OMEPRAZOLE 20 MG: 20 CAPSULE, DELAYED RELEASE ORAL at 15:15

## 2022-05-17 RX ADMIN — PROCHLORPERAZINE EDISYLATE 5 MG: 5 INJECTION INTRAMUSCULAR; INTRAVENOUS at 01:53

## 2022-05-17 RX ADMIN — LORAZEPAM 0.5 MG: 2 INJECTION INTRAMUSCULAR; INTRAVENOUS at 02:11

## 2022-05-17 RX ADMIN — SUCRALFATE 1 G: 1 SUSPENSION ORAL at 15:15

## 2022-05-17 RX ADMIN — PANTOPRAZOLE SODIUM 40 MG: 40 INJECTION, POWDER, FOR SOLUTION INTRAVENOUS at 04:10

## 2022-05-17 RX ADMIN — PROPOFOL 100 MG: 10 INJECTION, EMULSION INTRAVENOUS at 13:34

## 2022-05-17 RX ADMIN — PROPOFOL 100 MG: 10 INJECTION, EMULSION INTRAVENOUS at 13:35

## 2022-05-17 RX ADMIN — CITALOPRAM HYDROBROMIDE 10 MG: 20 TABLET ORAL at 15:15

## 2022-05-17 RX ADMIN — HYDROXYZINE HYDROCHLORIDE 10 MG: 10 TABLET ORAL at 10:43

## 2022-05-17 RX ADMIN — MORPHINE SULFATE 2 MG: 4 INJECTION INTRAVENOUS at 15:47

## 2022-05-17 RX ADMIN — PROPOFOL 50 MG: 10 INJECTION, EMULSION INTRAVENOUS at 13:36

## 2022-05-17 ASSESSMENT — PAIN DESCRIPTION - PAIN TYPE: TYPE: ACUTE PAIN

## 2022-05-17 ASSESSMENT — PAIN SCALES - GENERAL: PAIN_LEVEL: 0

## 2022-05-17 NOTE — PROGRESS NOTES
"Pt states anxiety is high but lays in bed, comfortably, on his phone. Pt states that he wants to leave and wants \"10 benzos\" to be delivered to his room prior to discharge. Pt stated that he \"doesn't want to go into withdrawal\".   "

## 2022-05-17 NOTE — OR SURGEON
Post OP Note    PreOp Diagnosis: nausea and vomiting         PostOp Diagnosis:    1/grade 2 esophagitis, GE junction at 38 cm normal-appearing Z-line   2/normal-appearing gastric cavity antrum pylorus cardia incisura fundus   3/normal-appearing duodenal bulb second third portion of duodenum   4/biopsy taken of the distal esophagus, antrum and the small bowel for evaluation of nausea      Procedure(s):  GASTROSCOPY - Wound Class: Clean Contaminated    Surgeon(s):  Bud Chowdhury M.D.    Anesthesiologist/Type of Anesthesia:  Anesthesiologist: Spencer Del Real M.D./General    Surgical Staff:  Circulator: Zhang Yi R.N.  Endoscopy Technician: Fanny Pritchett; Karli Fernandez  Endoscopy Nurse: Eliane Taylor R.N.    Specimens removed if any:  ID Type Source Tests Collected by Time Destination   A :  Tissue Duodenum PATHOLOGY SPECIMEN Bud Chowdhury M.D. 5/17/2022  1:38 PM    B :  Tissue Antrum PATHOLOGY SPECIMEN Bud Chowdhury M.D. 5/17/2022  1:38 PM    C :  Tissue Esophagus PATHOLOGY SPECIMEN Bud Chowdhury M.D. 5/17/2022  1:39 PM        Estimated Blood Loss: None    Findings: Prior to the procedure the patient was informed the risks and benefits of the procedure and freely signed the consent form he was monitored standard monitoring blood pressure oxygen heart monitor no appreciable abnormalities noted during the procedure.  He was placed in the left lateral decubitus position bite-block was placed once adequate sedation was achieved the gastric was introduced under direct visualization through the oropharynx which appeared normal.  Intubation esophagus was achieved easily and the upper midportion esophagus appeared normal.  Distal portion esophagus shows signs of grade 2 esophagitis that extends to the GE junction at 38 cm.  The Z-line is appearing normal.  Intubation the gastric cavity allowed good insufflation for a panoramic view the entire gastric mucosa which appeared normal.   Intubation the pylorus and appreciation of duodenal bulb second third portion of duodenum all the structure appeared normal.  Extubation the pylorus allowed retroflexion to performed the antrum about the incisura body fundus cardia and again these structures appeared normal.  Biopsies were performed of the small bowel and antrum for further evaluation of patient's nausea.  The distal esophagus was also biopsied for further evaluation.  Gastric was then gradually withdrawn excess air was removed and patient tolerated the procedure well.    Complications: None    Recommendation    Carafate slurry 1 g 3 times a day for the next 3 weeks  Omeprazole 40 mg twice daily for the next 8 weeks  Antiemetics as needed  Follow-up in the outpatient setting with primary care physician  Follow-up in the outpatient setting with [facility] [phone]    Further evaluation with a gastric emptying study  Advance diet as tolerated        5/17/2022 1:45 PM Bud Chowdhury M.D.

## 2022-05-17 NOTE — ANESTHESIA TIME REPORT
Anesthesia Start and Stop Event Times     Date Time Event    5/17/2022 1315 Ready for Procedure     1330 Anesthesia Start     1344 Anesthesia Stop        Responsible Staff  05/17/22    Name Role Begin End    August W VERNON Del Real. Anesth 1330 1344        Overtime Reason:  no overtime (within assigned shift)    Comments:

## 2022-05-17 NOTE — PROGRESS NOTES
Patient agreeable to stay if he can receive something for anxiety.  However pt continues to refuse telemetry monitoring and bed alarm at this time. MD aware

## 2022-05-17 NOTE — PROGRESS NOTES
Bedside report received. Per night RN pt was requesting more ativan and pain medications. Pt frequently complains of severe anxiety, however is unable to keep eyes open and quickly drifts off to sleep while requesting more medications. Patient A&O x 4. VS'S. RA. NSR on telemetry monitor. NPO since midnight for EGD today. Hgb stable at 12.8 Complains of severe abdominal pain will medicate per MAR. POC discussed with patient. Pt verbalizes understanding. Call light and belongings with in reach. Bed locked and in lowest position, alarm and fall precautions in place.

## 2022-05-17 NOTE — OR NURSING
1344: Pt arrived from OR to bay #2. ID verified. Report received. Connected to monitor. 4L O2 via [place].     1420: telephone updates to [facility] Cordero. Transport requested. Pt to return to [facility], verified with RN that tele monitoring is not required.    1453: pt [place] the floor with [place]. No monitor required. No personal belongings. On room air.

## 2022-05-17 NOTE — CONSULTS
"[facility] INPATIENT PSYCHIATRIC CONSULT LIAISON NOTE: INTAKE     DOS: 05/17/2022    Physician Requesting Consult: Dr. Nancy Shannon     REASON FOR CONSULT:      CC: “I just feel like shit because they haven't given me my Benzos”     HPI:  From Previous provider notes:  \"Gilbert Diop is a 27 y.o. male who presented 5/15/2022 with nausea/vomiting.  PMH of cyclical vomiting, substance abuse, anxiety, history of benzodiazepine overdose.  Comes in complaining of nausea/vomiting for the past 3 days, he is unable to tolerate foods and fluids.  Has some abdominal pain with vomiting.  Denies bowel or urinary changes, no chills/fevers.  Had some diaphoresis.  Patient says he quit smoking marijuana about a month ago but continues to use benzos, takes 2 mg alprazolam 3-4 times a day, last took today. In the ED tachycardic, hypoxic requiring 1-2 L O2.  Labs showed WBC of 19.1, potassium 3.3, bicarb 37, anion gap 17, creatinine 2.76\"      Patient was seen this morning as an initial consult. Reports stomach pain, with NV for the past 4-5days. Denies SI/HI/AVH. States he had a SA in 2020 with trying to hang himself but stopped. He is somewhat restless, states \"I just feel like shit because they haven't given me my Benzo. I take it for extreme panic attack, anxiety and sleep. I started taking it 2 years ago.\" Feels his stressor is work. His symptoms include wanting to \"runaway, legs beginning to shake.\" States \"I have anxiety right now, I just want to run, do something, get out of here\" Reports his sleep as \"terrible\" He takes \"Alprazolam 2mg twice a day\" He has a very poor appetite. Discussed risks and benefits of Gabapentin for anxiety, Remeron 7.5mg for sleep and Citalopram 20mg for depression and Anxiety. Also discussed Tapering off \"Benzos' Patient is in agreement.      Discussed with Dr. Clay Corona via Voalte    LEGAL HOLD: Voluntary     ALLERGIES:       Allergen Reactions   • Acetaminophen " "[Tylenol] Anaphylaxis and Nausea       Nausea, \"it just doesn't sit right\"  5/29/2021 patient states that his throat swells shut     PAST MEDICAL HISTORY:  Abdominal Migraine, Colitis and Gastritis.       PAST PSYCHIATRIC HISTORY:  Thinks he was diagnosed with depression and anxiety in [place] but he is not sure.         LEGAL HISTORY:  Denies     SOCIAL HISTORY:  Lives with his parents.  . Employed. Does not drink or smoke, though he denies use of Marijuana, chart review shows he reported use of Marijuana. Uses \"benzos\" got prescription from his doctor in [place]. Has been using Xanax for the past 2 years,         CURRENT HOSPITAL PROBLEMS:  Intractable nausea and vomiting   Acute respiratory failure with hypoxia (HCC)   ERIN (acute kidney injury) (HCC)   Benzodiazepine use (HCC)   GI bleeding   Hypocalcemia   Hypokalemia   Leukocytosis (leucocytosis)   Marijuana use   Metabolic alkalosis   Toxic encephalopathy          PSYCHIATRIC EXAMNIATION:  VITALS:  Reviewed     MENTAL STATUS EXAM:  Appearance: Dressed in hospital gown, normal grooming and hygiene,   Attitude: Restles  Behavior: In bed, looks and feels restless  Musculoskeletal:  Unremarkable  Eye Contact: Adequate.  Speech:  coherent, adequate volume  Affect: look worried,  Mood: \"Anxious, I feel like just living this place,\"  Process: Goal-directed, organized. Linear  Content: Negative for Suicidal and Homicidal ideations.  Perception: Negative for auditory and visual hallucinations  Orientation: Oriented to Time, Place, Person and Self.  Memory: No significant deficits elicited  Insight: Fair     LABS:  Reviewed     CURRENT PSYCHIATRIC MEDICATIONS:   On Xanax from [place]     ASSESSMENT AND PLAN:   Major Depressive Disorder. Recurrent, Severe; without Psychotic Features   Generalized Anxiety Disorder   Panic Disorder   Psychoactiveactive Dependence (Benzodiazepine)      MEDICAL:  1. Cyclic vomiting syndrome Acute   2. Dehydration Acute   3. " Benzodiazepine abuse (HCC) Acute   4. ERIN (acute kidney injury) (HCC) Acute   5. Hypokalemia Acute   6. Leukocytosis, unspecified type Acute   7. Sinus tachycardia Acute      Legal Hold: Not applicable  -Recommend:   - Tapering off Xanax.    -Start Gabapentin 300mg QID for anxiety   -Start Remeron 7.5mg for sleep  -Start Citalopram 20mg P.O daily for depression and anxiety   -Psych CL will continue following patient while at [facility].  -Medication reconciliation was completed.  -Reviewed safety plan - 911, ER, PCM, MHC, Suicide Crisis Line, Nursing staff while    inpatient.  -Visitors: Yes  -Personal Belongings: Yes  -Observation Level: Tele monitor    -Instruction: Discharge recommendations per treatment team    Discussed with Dr. Clay Corona via Voalte

## 2022-05-17 NOTE — PROGRESS NOTES
".Daily Progress Note:     Date of Service: 5/17/2022  Primary Team: [facility] Team   Attending: Nancy Smiley M.D.   Senior Resident: Dr. Ambriz  Intern: Dr. Corona  Contact:  [phone]    Chief Complaint/ID:   26 yo male with PMH of gastritis (seen on EGF 2014), cyclical vomiting syndrome, abdominal migraine, anxiety, recent benzodiazepine overdose (02/2022), hx of substance use who presented on 5/15/2022 with 3 days of nausea, vomiting and abdominal pain.      Subjective:   1x emesis yesterday early afternoon and 1x small emesis overnight, continued feeling of nausea, severe anxiety. Still some complaint of abdominal discomfort without complaint of pain at this time. Overall still feels uncomfortable, \"sick\" feeling. Is unsure about getting EGD \"I had that before, if I don't have to I'd prefer not to.\" Discussed risks and benefits and patient willing to discuss with GI, overall patient seems willing to have this scope, but a little trepidation.    Additionally patient says last bowel movement is unsure, but thinks last one was three days ago. Thinks he is passing less flatus than normal.      Review of Systems:   ROS   Constitutional: Positive for chills and fever  before admission, no current complaint.   HENT: Negative for congestion and sore throat.    Respiratory: Negative for cough and shortness of breath.    Cardiovascular: Negative for chest pain and palpitations.   Gastrointestinal: Positive for abdominal pain now describes as discomfort, with + nausea and vomiting. Negative for diarrhea.   Genitourinary: Negative for dysuria.   Musculoskeletal: Positive for back pain.   Neurological: Negative for dizziness, tingling, weakness and headaches.   Psychiatric/Behavioral: The patient is nervous/anxious.      Objective Data:   Physical Exam:   Vitals:   Temp:  [36.1 °C (96.9 °F)-37.2 °C (99 °F)] 37.2 °C (99 °F)  Pulse:  [63-82] 68  Resp:  [15-17] 16  BP: (101-124)/(66-75) 110/68  SpO2:  [94 %-100 %] 96 %   "   Physical Exam  Physical Exam  Vitals and nursing note reviewed.   Constitutional:       General: He is uncomfortable appearing, no acute distress.      Appearance: He is ill-appearing. He is not toxic-appearing.   HENT:      Head: Normocephalic and atraumatic.      Mouth/Throat:      Mouth: Mucous membranes are dry.      Pharynx: Oropharynx is clear.   Eyes:      Extraocular Movements: Extraocular movements intact.      Conjunctiva/sclera: Conjunctivae normal.   Cardiovascular:      Rate and Rhythm: Normal rate and regular rhythm.      Pulses: Normal pulses.      Heart sounds: Normal heart sounds. No murmur heard.    No friction rub. No gallop.   Pulmonary:      Effort: Pulmonary effort is normal. No respiratory distress.      Breath sounds: Normal breath sounds. No wheezing, rhonchi or rales.   Abdominal:      General: Abdomen is flat. Bowel sounds are normal. There is no distension.      Palpations: Abdomen is soft.      Tenderness: There is abdominal tenderness (Epigastric, supra-umbilical, mild TTP). There is no guarding.   Musculoskeletal:         General: No swelling.      Right lower leg: No edema.      Left lower leg: No edema.   Skin:     General: Skin is warm and dry.   Neurological:      General: No focal deficit present.      Mental Status: He is alert and oriented to person, place, and time.      Comments: Moving all 4 extremities spontaneously     Labs:   Lab Results   Component Value Date/Time    WBC 12.3 (H) 05/17/2022 02:46 AM    RBC 4.58 (L) 05/17/2022 02:46 AM    HEMOGLOBIN 12.8 (L) 05/17/2022 02:46 AM    HEMATOCRIT 37.3 (L) 05/17/2022 02:46 AM    MCV 81.4 05/17/2022 02:46 AM    MCH 27.9 05/17/2022 02:46 AM    MCHC 34.3 05/17/2022 02:46 AM    MPV 11.1 05/17/2022 02:46 AM    NEUTSPOLYS 76.50 (H) 05/17/2022 02:46 AM    LYMPHOCYTES 13.30 (L) 05/17/2022 02:46 AM    MONOCYTES 9.20 05/17/2022 02:46 AM    EOSINOPHILS 0.30 05/17/2022 02:46 AM    BASOPHILS 0.20 05/17/2022 02:46 AM    ANISOCYTOSIS 1+  01/14/2022 04:58 AM        Lab Results   Component Value Date/Time    SODIUM 135 05/17/2022 02:46 AM    POTASSIUM 3.6 05/17/2022 02:46 AM    CHLORIDE 100 05/17/2022 02:46 AM    CO2 25 05/17/2022 02:46 AM    GLUCOSE 103 (H) 05/17/2022 02:46 AM    BUN 40 (H) 05/17/2022 02:46 AM    CREATININE 1.10 05/17/2022 02:46 AM    CREATININE 0.4 04/10/2006 12:00 PM        Imaging:   Follow gastric emptying, Follow possible EGD    Problem Representation:          Gilbert Diop  is a 27 y.o. y/o man w/ PMHx of gastritis (seen on EGF 2014), cyclical vomiting syndrome, abdominal migraine, anxiety, recent benzodiazepine overdose (02/2022), hx of substance use who presented on 5/15/2022 with 3 days of nausea, vomiting and abdominal pain.        * Intractable nausea and vomiting- (present on admission)  Assessment & Plan  Pt w/ hx of gastritis, cyclical vomiting syndrome for about over 8 years, and marijuana use, denies any recent use, UDS positive for cannabinoids.  No hx of any abdominal surgeries.  Pt reports last marijuana use was 3 weeks ago.  DDx would include worsening gastritis, benzodiazepine withdrawal, PUD (concerns for ZES vs H. Pylori), cyclic vomiting syndrome due to marijuana use vs abdominal migraine, due to chronicity less likely to be infectious, but due to abdominal pain, possible concern for gastroenteritis, with dark stools possible bleeding gastric ulcer.  -continue IVF @ 125 ml/hr  -EGD tentatively planned, appreciate further GI recs  -KUB to evaluate stool burden, patient refusing miralax and other stool medications currently.  -h. Pylori, gastrin pending  -gastric emptying study depending on EGD findings  -Holding zofran, reglan in setting of elevated QTc, will use compazine PRN for nausea  -repeat EKG @1600, if continues to be elevated will hold compazine for low risk of QT prolongation  -holding home tramadol, pt reports makes him nauseous        GI bleeding  Assessment & Plan  Pt reporting melena  and some hematemesis, pt has hx of gastritis.  DDx would include matt yoder tear, vs UGIB in the setting of of peptic ulcer disease vs gastritis.  -GI consulted, appreciate assistance  -pantoprazole 40 mg IV daily     Metabolic alkalosis- (present on admission)  Assessment & Plan  Bicarb elevated on admission, now resolved, likely due to heavy vomiting.  -continue to monitor  -plan as above     Toxic encephalopathy- (present on admission)  Assessment & Plan  -Symptoms resolved-   Possible encephalopathy from benzodiazepine use vs withdrawal, but much better symptomatically currently..  -continue to monitor     Benzodiazepine use (HCC)- (present on admission)  Assessment & Plan  Chronic benzo use w/ previous overdose in 02/2022.  UDS negative for benzos, but pt reports taking 2mg of alprazolam nightly to assist with sleep and some PRN 0.5mg usage when having panic attacks.  Pt at high risk for withdrawals due to chronic usage. However, has no recorded prescription and PDMP negative for benzo.  -continue 2mg Alprazolam nightly  -recommend tapering as appropriate  -PRN alprazolam for panic attacks/anxiety discontinued 5/17, hydroxyzine prn for anxiety added.  -Psych consulted, appreciate recs.     ERIN (acute kidney injury) (HCC)- (present on admission)  Assessment & Plan  Creatinine elevated to 2.7 on admission.  Baseline Cr 1.0-1.3, now resolved.  -continue IVF while NPO, can stop when diet resumed.  -continue to monitor       Leukocytosis (leucocytosis)- (present on admission)  Assessment & Plan  WBC elevated on admission, pt reports subjective fevers/chills, none recorded on VS.  No diarrhea.  Low concern for infection, elevated WBC so far resolving.  Likely reactive due to ongoing vomiting causing multiple other metabolic derangements.  -continue to monitor     Hypocalcemia- (present on admission)  Assessment & Plan  Likely due to decreased p.o. intake and vomiting  -Replete as needed     Acute respiratory  failure with hypoxia (HCC)- (present on admission)  Assessment & Plan    Resolved. Initially requiring oxygen on admission, likely due to somnolence and toxic encephalopathy.  Lung sounds clear, no s/sx of aspiration.  Able to be weaned off O2 by PM of 5/16.  -continue to monitor, titrate O2 supplementation to maintain SpO2 >88%     Marijuana use- (present on admission)  Assessment & Plan  Last reported use 3 weeks ago, UDS positive. Possible etiology of his cyclic vomiting, but unclear.       Hypokalemia- (present on admission)  Assessment & Plan  Resolved.  Likely due to vomiting.    -replete PRN        Code Status: Full code  DVT ppx: SCDs, holding chemical ppx in setting of possible GI bleed  Diet: NPO - possible EGD  GI: pantoprazole, Bowel regimen  T/L/D: pIV  Disposition: pending further w/u of possible GI bleed, nausea.

## 2022-05-17 NOTE — ANESTHESIA POSTPROCEDURE EVALUATION
Patient: Gilbert Diop    Procedure Summary     Date: 05/17/22 Room / Location: [facility] ROOM 26 / SURGERY SAME DAY [facility]    Anesthesia Start: 1330 Anesthesia Stop: 1344    Procedure: GASTROSCOPY (N/A Esophagus) Diagnosis: (esophagitis)    Surgeons: Bud Chowdhury M.D. Responsible Provider: Spencer Del Real M.D.    Anesthesia Type: MAC ASA Status: 2          Final Anesthesia Type: MAC  Last vitals  BP   Blood Pressure: (!) 144/84    Temp   36.7 °C (98.1 °F)    Pulse   88   Resp   14    SpO2   98 %      Anesthesia Post Evaluation    Patient location during evaluation: PACU  Patient participation: complete - patient participated  Level of consciousness: awake  Pain score: 0    Airway patency: patent  Anesthetic complications: no  Cardiovascular status: hemodynamically stable  Respiratory status: acceptable and nasal cannula  Hydration status: euvolemic    PONV: none          No complications documented.     Nurse Pain Score: 9 (NPRS)

## 2022-05-17 NOTE — PROGRESS NOTES
Patient removed telemetry monitor, and PIV. Demanding to speak with MD. Threatening to leave AMA. MD notified and will be at bedside shortly.

## 2022-05-17 NOTE — CARE PLAN
The patient is Stable - Low risk of patient condition declining or worsening    Shift Goals  Clinical Goals: Dx testing  Patient Goals: pain control    Progress made toward(s) clinical / shift goals:  Pt consented to EGD and is taking GI meds as ordered    Patient is not progressing towards the following goals: pt states that their anxiety is intolerable and that they wish to leave AMA, refusing Tele box and bed alarm      Problem: Knowledge Deficit - Standard  Goal: Patient and family/care givers will demonstrate understanding of plan of care, disease process/condition, diagnostic tests and medications  Outcome: Not Met     Problem: Pain - Standard  Goal: Alleviation of pain or a reduction in pain to the patient’s comfort goal  Outcome: Not Met     Problem: Psychosocial  Goal: Patient's ability to verbalize feelings about condition will improve  Outcome: Not Met  Goal: Patient's ability to re-evaluate and adapt role responsibilities will improve  Outcome: Not Met  Goal: Patient and family will demonstrate ability to cope with life altering diagnosis and/or procedure  Outcome: Not Met  Goal: Spiritual and cultural needs incorporated into hospitalization  Outcome: Not Met

## 2022-05-17 NOTE — ANESTHESIA PREPROCEDURE EVALUATION
Case: [id number] Date/Time: 05/17/22 1345    Procedure: GASTROSCOPY    Location: [facility] ROOM 26 / SURGERY SAME DAY [facility]    Surgeons: Bud Chowdhury M.D.          Relevant Problems   PULMONARY   (positive) Aspiration pneumonia (HCC)      CARDIAC   (positive) Abdominal migraine      GI   (positive) GERD (gastroesophageal reflux disease)         (positive) ERIN (acute kidney injury) (HCC)   (positive) Acute renal failure (HCC)       Physical Exam    Airway   Mallampati: II  TM distance: >3 FB  Neck ROM: full       Cardiovascular - normal exam  Rhythm: regular  Rate: normal  (-) murmur     Dental - normal exam           Pulmonary - normal exam  Breath sounds clear to auscultation     Abdominal    Neurological - normal exam                 Anesthesia Plan    ASA 2       Plan - MAC               Induction: intravenous    Postoperative Plan: Postoperative administration of opioids is intended.    Pertinent diagnostic labs and testing reviewed    Informed Consent:    Anesthetic plan and risks discussed with patient.

## 2022-05-17 NOTE — PROGRESS NOTES
IV placed right AC, transport at bedside to escort pt to pre-op for procedure. Pt no longer on telemetry monitor.

## 2022-05-17 NOTE — PROGRESS NOTES
Pt requesting PRN PO medications. Spoke with MD Waldron regarding procedures today, pt to be strict NPO for EGD and possible gastric emptying. Pt is resting in bed at this time.

## 2022-05-18 ENCOUNTER — PATIENT OUTREACH (OUTPATIENT)
Dept: HEALTH INFORMATION MANAGEMENT | Facility: OTHER | Age: 27
End: 2022-05-18
Payer: COMMERCIAL

## 2022-05-18 LAB — GASTRIN SERPL-MCNC: <10 PG/ML (ref 0–100)

## 2022-05-18 NOTE — DISCHARGE SUMMARY
Discharge Summary    CHIEF COMPLAINT ON ADMISSION  Chief Complaint   Patient presents with    N/V     3 days - denies fevers or sick contacts. Pt states he cant keep anything down. No diarrhea, or unusual foods. Pt denies drugs or etoh-        Reason for Admission  N/V     Admission Date  5/15/2022    CODE STATUS  Full Code    HPI & HOSPITAL COURSE  This is a 27 y.o. male here with cyclical vomiting x years, benzo dependence (xanax), self reported seizures (unclear if from benzo withdrawals) multiple admissions for cyclical vomiting with work up admitted for severe dehydration, ERIN in the setting of refractory nausea. History revealed black stools and hematemesis 2 days prior to admission. EGD this admission negative for ulceration. Psych consulted to help patient address underlying self reported PTSD, anxiety, and recommended starting medications however patient left AMA.     Online review of benzo prescription does now show any benzo prescription in [place]. Patient gets medications from [place], mentions he travels there every 6 months. Does have hx of overdose in past. This admission he denied any SI/HI.     Plan was to do a gastric emptying study, but he left AMA as he was requesting benzos throughout the day.   No notes on file      The patient recovered much more quickly than anticipated on admission.    Discharge Date  5/17/2022    FOLLOW UP ITEMS POST DISCHARGE  Psychiatry    DISCHARGE DIAGNOSES  Principal Problem:    Intractable nausea and vomiting POA: Yes  Active Problems:    Hypokalemia POA: Yes    Marijuana use POA: Yes    Leukocytosis (leucocytosis) POA: Yes    ERIN (acute kidney injury) (HCC) POA: Yes    Acute respiratory failure with hypoxia (HCC) POA: Yes    Hypocalcemia POA: Yes    Benzodiazepine use (HCC) POA: Yes    Toxic encephalopathy POA: Yes    Metabolic alkalosis POA: Yes    GI bleeding POA: Unknown  Resolved Problems:    * No resolved hospital problems. *      FOLLOW UP  No future  "appointments.  No follow-up provider specified.    MEDICATIONS ON DISCHARGE     Medication List      You have not been prescribed any medications.         Allergies  Allergies   Allergen Reactions    Acetaminophen [Tylenol] Anaphylaxis and Nausea     Nausea, \"it just doesn't sit right\"  5/29/2021 patient states that his throat swells shut       DIET  Orders Placed This Encounter   Procedures    Diet Order Diet: Clear Liquid (advanced to regular diet when tolerating)     Standing Status:   Standing     Number of Occurrences:   1     Order Specific Question:   Diet:     Answer:   Clear Liquid [10]     Comments:   advanced to regular diet when tolerating    Diet NPO Restrict to: Strict     Standing Status:   Standing     Number of Occurrences:   1     Order Specific Question:   Diet NPO Restrict to:     Answer:   Strict [1]       ACTIVITY  As tolerated.  Weight bearing as tolerated    CONSULTATIONS  Psychiatry  Gastroenterology    PROCEDURES  EGD: negative for active bleed    LABORATORY  Lab Results   Component Value Date    SODIUM 135 05/17/2022    POTASSIUM 3.6 05/17/2022    CHLORIDE 100 05/17/2022    CO2 25 05/17/2022    GLUCOSE 103 (H) 05/17/2022    BUN 40 (H) 05/17/2022    CREATININE 1.10 05/17/2022    CREATININE 0.4 04/10/2006        Lab Results   Component Value Date    WBC 12.3 (H) 05/17/2022    HEMOGLOBIN 12.8 (L) 05/17/2022    HEMATOCRIT 37.3 (L) 05/17/2022    PLATELETCT 309 05/17/2022        "

## 2022-05-20 NOTE — PROGRESS NOTES
5/20/2022  CHW attempted to contact patient 3 times and left voice messages with CCM contact information. CHW will no longer follow. CHW will discharge patient from CCM and remove from caseload and CCM master list.   I know she said that ibuprofen and acetaminophen weren't helpful but I recommend ibuprofen, up to 600mg every 6 hours as needed along with acetaminophen 1000mg when she takes it. Avoid taking more than 3,000mg acetaminophen in a 24 hour period. She may find that dosing and combination to be more helpful. Referral signed.

## 2022-05-28 ENCOUNTER — APPOINTMENT (OUTPATIENT)
Dept: RADIOLOGY | Facility: MEDICAL CENTER | Age: 27
DRG: 392 | End: 2022-05-28
Attending: EMERGENCY MEDICINE
Payer: COMMERCIAL

## 2022-05-28 ENCOUNTER — HOSPITAL ENCOUNTER (INPATIENT)
Facility: MEDICAL CENTER | Age: 27
LOS: 2 days | DRG: 392 | End: 2022-05-30
Attending: EMERGENCY MEDICINE | Admitting: INTERNAL MEDICINE
Payer: COMMERCIAL

## 2022-05-28 DIAGNOSIS — E86.0 DEHYDRATION: ICD-10-CM

## 2022-05-28 DIAGNOSIS — R79.89 ELEVATED LACTIC ACID LEVEL: ICD-10-CM

## 2022-05-28 DIAGNOSIS — R65.10 SIRS (SYSTEMIC INFLAMMATORY RESPONSE SYNDROME) (HCC): ICD-10-CM

## 2022-05-28 DIAGNOSIS — F13.931 BENZODIAZEPINE WITHDRAWAL WITH DELIRIUM (HCC): ICD-10-CM

## 2022-05-28 DIAGNOSIS — R11.2 NON-INTRACTABLE VOMITING WITH NAUSEA, UNSPECIFIED VOMITING TYPE: ICD-10-CM

## 2022-05-28 DIAGNOSIS — R10.84 GENERALIZED ABDOMINAL PAIN: ICD-10-CM

## 2022-05-28 DIAGNOSIS — F12.188 CANNABIS HYPEREMESIS SYNDROME CONCURRENT WITH AND DUE TO CANNABIS ABUSE (HCC): ICD-10-CM

## 2022-05-28 PROBLEM — F13.939 BENZODIAZEPINE WITHDRAWAL (HCC): Status: ACTIVE | Noted: 2022-05-28

## 2022-05-28 PROBLEM — E83.39 HYPOPHOSPHATEMIA: Status: ACTIVE | Noted: 2022-05-28

## 2022-05-28 PROBLEM — E86.1 INTRAVASCULAR VOLUME DEPLETION: Status: ACTIVE | Noted: 2022-05-28

## 2022-05-28 LAB
ALBUMIN SERPL BCP-MCNC: 5.2 G/DL (ref 3.2–4.9)
ALBUMIN/GLOB SERPL: 1.2 G/DL
ALP SERPL-CCNC: 84 U/L (ref 30–99)
ALT SERPL-CCNC: 18 U/L (ref 2–50)
AMPHET UR QL SCN: NEGATIVE
ANION GAP SERPL CALC-SCNC: 17 MMOL/L (ref 7–16)
ANION GAP SERPL CALC-SCNC: 26 MMOL/L (ref 7–16)
APPEARANCE UR: CLEAR
AST SERPL-CCNC: 35 U/L (ref 12–45)
B-OH-BUTYR SERPL-MCNC: 0.07 MMOL/L (ref 0.02–0.27)
BACTERIA #/AREA URNS HPF: NEGATIVE /HPF
BARBITURATES UR QL SCN: NEGATIVE
BASE EXCESS BLDV CALC-SCNC: 5 MMOL/L
BASOPHILS # BLD AUTO: 0.1 % (ref 0–1.8)
BASOPHILS # BLD: 0.03 K/UL (ref 0–0.12)
BENZODIAZ UR QL SCN: POSITIVE
BILIRUB SERPL-MCNC: 0.3 MG/DL (ref 0.1–1.5)
BILIRUB UR QL STRIP.AUTO: NEGATIVE
BODY TEMPERATURE: 36.5 CENTIGRADE
BODY TEMPERATURE: 37.9 CENTIGRADE
BUN SERPL-MCNC: 40 MG/DL (ref 8–22)
BUN SERPL-MCNC: 45 MG/DL (ref 8–22)
BZE UR QL SCN: NEGATIVE
CALCIUM SERPL-MCNC: 10.6 MG/DL (ref 8.5–10.5)
CALCIUM SERPL-MCNC: 9.8 MG/DL (ref 8.5–10.5)
CANNABINOIDS UR QL SCN: POSITIVE
CHLORIDE SERPL-SCNC: 83 MMOL/L (ref 96–112)
CHLORIDE SERPL-SCNC: 89 MMOL/L (ref 96–112)
CO2 SERPL-SCNC: 26 MMOL/L (ref 20–33)
CO2 SERPL-SCNC: 29 MMOL/L (ref 20–33)
COLOR UR: YELLOW
CREAT SERPL-MCNC: 1.83 MG/DL (ref 0.5–1.4)
CREAT SERPL-MCNC: 2.19 MG/DL (ref 0.5–1.4)
CRP SERPL HS-MCNC: 0.53 MG/DL (ref 0–0.75)
EKG IMPRESSION: NORMAL
EKG IMPRESSION: NORMAL
EOSINOPHIL # BLD AUTO: 0 K/UL (ref 0–0.51)
EOSINOPHIL NFR BLD: 0 % (ref 0–6.9)
EPI CELLS #/AREA URNS HPF: NEGATIVE /HPF
ERYTHROCYTE [DISTWIDTH] IN BLOOD BY AUTOMATED COUNT: 35.9 FL (ref 35.9–50)
FLUAV RNA SPEC QL NAA+PROBE: NEGATIVE
FLUBV RNA SPEC QL NAA+PROBE: NEGATIVE
GFR SERPLBLD CREATININE-BSD FMLA CKD-EPI: 42 ML/MIN/1.73 M 2
GFR SERPLBLD CREATININE-BSD FMLA CKD-EPI: 52 ML/MIN/1.73 M 2
GLOBULIN SER CALC-MCNC: 4.4 G/DL (ref 1.9–3.5)
GLUCOSE SERPL-MCNC: 156 MG/DL (ref 65–99)
GLUCOSE SERPL-MCNC: 193 MG/DL (ref 65–99)
GLUCOSE UR STRIP.AUTO-MCNC: NEGATIVE MG/DL
HAV IGM SERPL QL IA: NORMAL
HBV CORE IGM SER QL: NORMAL
HBV SURFACE AG SER QL: NORMAL
HCO3 BLDV-SCNC: 24 MMOL/L (ref 24–28)
HCT VFR BLD AUTO: 44.1 % (ref 42–52)
HCV AB SER QL: NORMAL
HGB BLD-MCNC: 15.6 G/DL (ref 14–18)
HIV 1+2 AB+HIV1 P24 AG SERPL QL IA: NORMAL
IMM GRANULOCYTES # BLD AUTO: 0.09 K/UL (ref 0–0.11)
IMM GRANULOCYTES NFR BLD AUTO: 0.4 % (ref 0–0.9)
KETONES UR STRIP.AUTO-MCNC: NEGATIVE MG/DL
LACTATE BLD-SCNC: 3.3 MMOL/L (ref 0.5–2)
LACTATE BLD-SCNC: 7.7 MMOL/L (ref 0.5–2)
LEUKOCYTE ESTERASE UR QL STRIP.AUTO: NEGATIVE
LIPASE SERPL-CCNC: 81 U/L (ref 11–82)
LYMPHOCYTES # BLD AUTO: 1.14 K/UL (ref 1–4.8)
LYMPHOCYTES NFR BLD: 5.3 % (ref 22–41)
MAGNESIUM SERPL-MCNC: 2.4 MG/DL (ref 1.5–2.5)
MCH RBC QN AUTO: 27.2 PG (ref 27–33)
MCHC RBC AUTO-ENTMCNC: 35.4 G/DL (ref 33.7–35.3)
MCV RBC AUTO: 76.8 FL (ref 81.4–97.8)
METHADONE UR QL SCN: NEGATIVE
MICRO URNS: ABNORMAL
MONOCYTES # BLD AUTO: 1.67 K/UL (ref 0–0.85)
MONOCYTES NFR BLD AUTO: 7.7 % (ref 0–13.4)
NEUTROPHILS # BLD AUTO: 18.67 K/UL (ref 1.82–7.42)
NEUTROPHILS NFR BLD: 86.5 % (ref 44–72)
NITRITE UR QL STRIP.AUTO: NEGATIVE
NRBC # BLD AUTO: 0 K/UL
NRBC BLD-RTO: 0 /100 WBC
OPIATES UR QL SCN: POSITIVE
OSMOLALITY SERPL: 299 MOSM/KG H2O (ref 278–298)
OXYCODONE UR QL SCN: NEGATIVE
PCO2 BLDV: 23.5 MMHG (ref 41–51)
PCP UR QL SCN: NEGATIVE
PH BLDV: 7.63 [PH] (ref 7.31–7.45)
PH UR STRIP.AUTO: >=9 [PH] (ref 5–8)
PHOSPHATE SERPL-MCNC: 1.8 MG/DL (ref 2.5–4.5)
PLATELET # BLD AUTO: 530 K/UL (ref 164–446)
PMV BLD AUTO: 11.7 FL (ref 9–12.9)
PO2 BLDV: 37 MMHG (ref 25–40)
POTASSIUM SERPL-SCNC: 2.9 MMOL/L (ref 3.6–5.5)
POTASSIUM SERPL-SCNC: 3.8 MMOL/L (ref 3.6–5.5)
PROCALCITONIN SERPL-MCNC: 0.14 NG/ML
PROPOXYPH UR QL SCN: NEGATIVE
PROT SERPL-MCNC: 9.6 G/DL (ref 6–8.2)
PROT UR QL STRIP: 30 MG/DL
RBC # BLD AUTO: 5.74 M/UL (ref 4.7–6.1)
RBC # URNS HPF: ABNORMAL /HPF
RBC UR QL AUTO: NEGATIVE
RSV RNA SPEC QL NAA+PROBE: NEGATIVE
SAO2 % BLDV: 75.7 %
SARS-COV-2 RNA RESP QL NAA+PROBE: NOTDETECTED
SODIUM SERPL-SCNC: 135 MMOL/L (ref 135–145)
SODIUM SERPL-SCNC: 135 MMOL/L (ref 135–145)
SP GR UR STRIP.AUTO: 1.03
SPECIMEN SOURCE: NORMAL
T PALLIDUM AB SER QL IA: NORMAL
UROBILINOGEN UR STRIP.AUTO-MCNC: 0.2 MG/DL
WBC # BLD AUTO: 21.6 K/UL (ref 4.8–10.8)
WBC #/AREA URNS HPF: ABNORMAL /HPF

## 2022-05-28 PROCEDURE — 82010 KETONE BODYS QUAN: CPT

## 2022-05-28 PROCEDURE — A9270 NON-COVERED ITEM OR SERVICE: HCPCS | Performed by: INTERNAL MEDICINE

## 2022-05-28 PROCEDURE — 83930 ASSAY OF BLOOD OSMOLALITY: CPT

## 2022-05-28 PROCEDURE — 85025 COMPLETE CBC W/AUTO DIFF WBC: CPT

## 2022-05-28 PROCEDURE — 700102 HCHG RX REV CODE 250 W/ 637 OVERRIDE(OP): Performed by: INTERNAL MEDICINE

## 2022-05-28 PROCEDURE — 80307 DRUG TEST PRSMV CHEM ANLYZR: CPT

## 2022-05-28 PROCEDURE — 83690 ASSAY OF LIPASE: CPT

## 2022-05-28 PROCEDURE — 82803 BLOOD GASES ANY COMBINATION: CPT

## 2022-05-28 PROCEDURE — 99285 EMERGENCY DEPT VISIT HI MDM: CPT

## 2022-05-28 PROCEDURE — 96366 THER/PROPH/DIAG IV INF ADDON: CPT

## 2022-05-28 PROCEDURE — 74177 CT ABD & PELVIS W/CONTRAST: CPT

## 2022-05-28 PROCEDURE — 36415 COLL VENOUS BLD VENIPUNCTURE: CPT

## 2022-05-28 PROCEDURE — 87389 HIV-1 AG W/HIV-1&-2 AB AG IA: CPT

## 2022-05-28 PROCEDURE — 80053 COMPREHEN METABOLIC PANEL: CPT

## 2022-05-28 PROCEDURE — 83605 ASSAY OF LACTIC ACID: CPT | Mod: 91

## 2022-05-28 PROCEDURE — 83735 ASSAY OF MAGNESIUM: CPT

## 2022-05-28 PROCEDURE — 700105 HCHG RX REV CODE 258: Performed by: EMERGENCY MEDICINE

## 2022-05-28 PROCEDURE — 700105 HCHG RX REV CODE 258: Performed by: INTERNAL MEDICINE

## 2022-05-28 PROCEDURE — 96368 THER/DIAG CONCURRENT INF: CPT

## 2022-05-28 PROCEDURE — C9803 HOPD COVID-19 SPEC COLLECT: HCPCS | Performed by: EMERGENCY MEDICINE

## 2022-05-28 PROCEDURE — 96376 TX/PRO/DX INJ SAME DRUG ADON: CPT

## 2022-05-28 PROCEDURE — 81001 URINALYSIS AUTO W/SCOPE: CPT

## 2022-05-28 PROCEDURE — 0241U HCHG SARS-COV-2 COVID-19 NFCT DS RESP RNA 4 TRGT MIC: CPT

## 2022-05-28 PROCEDURE — 770000 HCHG ROOM/CARE - INTERMEDIATE ICU *

## 2022-05-28 PROCEDURE — 700111 HCHG RX REV CODE 636 W/ 250 OVERRIDE (IP): Performed by: INTERNAL MEDICINE

## 2022-05-28 PROCEDURE — 71045 X-RAY EXAM CHEST 1 VIEW: CPT

## 2022-05-28 PROCEDURE — 93005 ELECTROCARDIOGRAM TRACING: CPT | Performed by: EMERGENCY MEDICINE

## 2022-05-28 PROCEDURE — 80048 BASIC METABOLIC PNL TOTAL CA: CPT

## 2022-05-28 PROCEDURE — 86780 TREPONEMA PALLIDUM: CPT

## 2022-05-28 PROCEDURE — 96367 TX/PROPH/DG ADDL SEQ IV INF: CPT

## 2022-05-28 PROCEDURE — 86140 C-REACTIVE PROTEIN: CPT

## 2022-05-28 PROCEDURE — C9113 INJ PANTOPRAZOLE SODIUM, VIA: HCPCS | Performed by: INTERNAL MEDICINE

## 2022-05-28 PROCEDURE — 96375 TX/PRO/DX INJ NEW DRUG ADDON: CPT

## 2022-05-28 PROCEDURE — 84100 ASSAY OF PHOSPHORUS: CPT

## 2022-05-28 PROCEDURE — 87040 BLOOD CULTURE FOR BACTERIA: CPT

## 2022-05-28 PROCEDURE — 80074 ACUTE HEPATITIS PANEL: CPT

## 2022-05-28 PROCEDURE — 99223 1ST HOSP IP/OBS HIGH 75: CPT | Performed by: INTERNAL MEDICINE

## 2022-05-28 PROCEDURE — 700111 HCHG RX REV CODE 636 W/ 250 OVERRIDE (IP): Performed by: EMERGENCY MEDICINE

## 2022-05-28 PROCEDURE — 99253 IP/OBS CNSLTJ NEW/EST LOW 45: CPT | Performed by: INTERNAL MEDICINE

## 2022-05-28 PROCEDURE — 700117 HCHG RX CONTRAST REV CODE 255: Performed by: EMERGENCY MEDICINE

## 2022-05-28 PROCEDURE — 84145 PROCALCITONIN (PCT): CPT

## 2022-05-28 PROCEDURE — 96365 THER/PROPH/DIAG IV INF INIT: CPT

## 2022-05-28 RX ORDER — LACTULOSE 20 G/30ML
30 SOLUTION ORAL ONCE
Status: COMPLETED | OUTPATIENT
Start: 2022-05-28 | End: 2022-05-28

## 2022-05-28 RX ORDER — BISACODYL 10 MG
10 SUPPOSITORY, RECTAL RECTAL
Status: DISCONTINUED | OUTPATIENT
Start: 2022-05-28 | End: 2022-05-30 | Stop reason: HOSPADM

## 2022-05-28 RX ORDER — MAGNESIUM SULFATE HEPTAHYDRATE 40 MG/ML
2 INJECTION, SOLUTION INTRAVENOUS ONCE
Status: COMPLETED | OUTPATIENT
Start: 2022-05-28 | End: 2022-05-29

## 2022-05-28 RX ORDER — POLYETHYLENE GLYCOL 3350 17 G/17G
1 POWDER, FOR SOLUTION ORAL
Status: DISCONTINUED | OUTPATIENT
Start: 2022-05-28 | End: 2022-05-30 | Stop reason: HOSPADM

## 2022-05-28 RX ORDER — PROCHLORPERAZINE EDISYLATE 5 MG/ML
5-10 INJECTION INTRAMUSCULAR; INTRAVENOUS EVERY 4 HOURS PRN
Status: DISCONTINUED | OUTPATIENT
Start: 2022-05-28 | End: 2022-05-29

## 2022-05-28 RX ORDER — PANTOPRAZOLE SODIUM 40 MG/10ML
40 INJECTION, POWDER, LYOPHILIZED, FOR SOLUTION INTRAVENOUS 2 TIMES DAILY
Status: DISCONTINUED | OUTPATIENT
Start: 2022-05-28 | End: 2022-05-28

## 2022-05-28 RX ORDER — PROMETHAZINE HYDROCHLORIDE 25 MG/1
12.5-25 TABLET ORAL EVERY 4 HOURS PRN
Status: DISCONTINUED | OUTPATIENT
Start: 2022-05-28 | End: 2022-05-29

## 2022-05-28 RX ORDER — DIAZEPAM 5 MG/ML
5 INJECTION, SOLUTION INTRAMUSCULAR; INTRAVENOUS ONCE
Status: COMPLETED | OUTPATIENT
Start: 2022-05-28 | End: 2022-05-28

## 2022-05-28 RX ORDER — SODIUM CHLORIDE, SODIUM LACTATE, POTASSIUM CHLORIDE, AND CALCIUM CHLORIDE .6; .31; .03; .02 G/100ML; G/100ML; G/100ML; G/100ML
30 INJECTION, SOLUTION INTRAVENOUS ONCE
Status: COMPLETED | OUTPATIENT
Start: 2022-05-28 | End: 2022-05-28

## 2022-05-28 RX ORDER — AMOXICILLIN 250 MG
2 CAPSULE ORAL 2 TIMES DAILY
Status: DISCONTINUED | OUTPATIENT
Start: 2022-05-28 | End: 2022-05-30 | Stop reason: HOSPADM

## 2022-05-28 RX ORDER — PANTOPRAZOLE SODIUM 40 MG/10ML
40 INJECTION, POWDER, LYOPHILIZED, FOR SOLUTION INTRAVENOUS 2 TIMES DAILY
Status: DISCONTINUED | OUTPATIENT
Start: 2022-05-29 | End: 2022-05-30 | Stop reason: HOSPADM

## 2022-05-28 RX ORDER — ALPRAZOLAM 0.5 MG/1
0.5 TABLET ORAL 3 TIMES DAILY PRN
Status: ON HOLD | COMMUNITY
End: 2022-05-30

## 2022-05-28 RX ORDER — SODIUM CHLORIDE, SODIUM LACTATE, POTASSIUM CHLORIDE, AND CALCIUM CHLORIDE .6; .31; .03; .02 G/100ML; G/100ML; G/100ML; G/100ML
1000 INJECTION, SOLUTION INTRAVENOUS ONCE
Status: COMPLETED | OUTPATIENT
Start: 2022-05-28 | End: 2022-05-28

## 2022-05-28 RX ORDER — MAGNESIUM SULFATE HEPTAHYDRATE 40 MG/ML
2 INJECTION, SOLUTION INTRAVENOUS ONCE
Status: COMPLETED | OUTPATIENT
Start: 2022-05-28 | End: 2022-05-28

## 2022-05-28 RX ORDER — HYDROMORPHONE HYDROCHLORIDE 1 MG/ML
0.25 INJECTION, SOLUTION INTRAMUSCULAR; INTRAVENOUS; SUBCUTANEOUS
Status: DISCONTINUED | OUTPATIENT
Start: 2022-05-28 | End: 2022-05-29

## 2022-05-28 RX ORDER — ONDANSETRON 2 MG/ML
4 INJECTION INTRAMUSCULAR; INTRAVENOUS ONCE
Status: COMPLETED | OUTPATIENT
Start: 2022-05-28 | End: 2022-05-28

## 2022-05-28 RX ORDER — PROMETHAZINE HYDROCHLORIDE 12.5 MG/1
12.5-25 SUPPOSITORY RECTAL EVERY 4 HOURS PRN
Status: DISCONTINUED | OUTPATIENT
Start: 2022-05-28 | End: 2022-05-29

## 2022-05-28 RX ORDER — ONDANSETRON 4 MG/1
4 TABLET, ORALLY DISINTEGRATING ORAL EVERY 4 HOURS PRN
Status: DISCONTINUED | OUTPATIENT
Start: 2022-05-28 | End: 2022-05-30 | Stop reason: HOSPADM

## 2022-05-28 RX ORDER — OXYCODONE HYDROCHLORIDE 5 MG/1
5 TABLET ORAL
Status: DISCONTINUED | OUTPATIENT
Start: 2022-05-28 | End: 2022-05-29

## 2022-05-28 RX ORDER — MORPHINE SULFATE 4 MG/ML
4 INJECTION INTRAVENOUS ONCE
Status: COMPLETED | OUTPATIENT
Start: 2022-05-28 | End: 2022-05-28

## 2022-05-28 RX ORDER — OXYCODONE HYDROCHLORIDE 5 MG/1
2.5 TABLET ORAL
Status: DISCONTINUED | OUTPATIENT
Start: 2022-05-28 | End: 2022-05-29

## 2022-05-28 RX ORDER — ENEMA 19; 7 G/133ML; G/133ML
1 ENEMA RECTAL ONCE
Status: DISPENSED | OUTPATIENT
Start: 2022-05-28 | End: 2022-05-29

## 2022-05-28 RX ORDER — HYDRALAZINE HYDROCHLORIDE 20 MG/ML
10 INJECTION INTRAMUSCULAR; INTRAVENOUS EVERY 4 HOURS PRN
Status: DISCONTINUED | OUTPATIENT
Start: 2022-05-28 | End: 2022-05-30 | Stop reason: HOSPADM

## 2022-05-28 RX ORDER — POTASSIUM CHLORIDE 7.45 MG/ML
10 INJECTION INTRAVENOUS
Status: DISPENSED | OUTPATIENT
Start: 2022-05-28 | End: 2022-05-29

## 2022-05-28 RX ORDER — ONDANSETRON 2 MG/ML
4 INJECTION INTRAMUSCULAR; INTRAVENOUS EVERY 4 HOURS PRN
Status: DISCONTINUED | OUTPATIENT
Start: 2022-05-28 | End: 2022-05-30 | Stop reason: HOSPADM

## 2022-05-28 RX ORDER — SODIUM CHLORIDE, SODIUM LACTATE, POTASSIUM CHLORIDE, CALCIUM CHLORIDE 600; 310; 30; 20 MG/100ML; MG/100ML; MG/100ML; MG/100ML
INJECTION, SOLUTION INTRAVENOUS CONTINUOUS
Status: ACTIVE | OUTPATIENT
Start: 2022-05-28 | End: 2022-05-29

## 2022-05-28 RX ADMIN — IOHEXOL 70 ML: 350 INJECTION, SOLUTION INTRAVENOUS at 18:24

## 2022-05-28 RX ADMIN — SODIUM CHLORIDE, POTASSIUM CHLORIDE, SODIUM LACTATE AND CALCIUM CHLORIDE: 600; 310; 30; 20 INJECTION, SOLUTION INTRAVENOUS at 20:48

## 2022-05-28 RX ADMIN — LACTULOSE 30 ML: 20 SOLUTION ORAL at 20:45

## 2022-05-28 RX ADMIN — SODIUM CHLORIDE, POTASSIUM CHLORIDE, SODIUM LACTATE AND CALCIUM CHLORIDE 1905 ML: 600; 310; 30; 20 INJECTION, SOLUTION INTRAVENOUS at 17:07

## 2022-05-28 RX ADMIN — PANTOPRAZOLE SODIUM 40 MG: 40 INJECTION, POWDER, FOR SOLUTION INTRAVENOUS at 20:41

## 2022-05-28 RX ADMIN — PIPERACILLIN AND TAZOBACTAM 4.5 G: 4; .5 INJECTION, POWDER, FOR SOLUTION INTRAVENOUS at 22:11

## 2022-05-28 RX ADMIN — FENTANYL CITRATE 50 MCG: 50 INJECTION, SOLUTION INTRAMUSCULAR; INTRAVENOUS at 19:15

## 2022-05-28 RX ADMIN — DIAZEPAM 5 MG: 5 INJECTION, SOLUTION INTRAMUSCULAR; INTRAVENOUS at 20:46

## 2022-05-28 RX ADMIN — MAGNESIUM SULFATE HEPTAHYDRATE 2 G: 40 INJECTION, SOLUTION INTRAVENOUS at 22:51

## 2022-05-28 RX ADMIN — MORPHINE SULFATE 4 MG: 4 INJECTION INTRAVENOUS at 17:08

## 2022-05-28 RX ADMIN — SODIUM CHLORIDE, POTASSIUM CHLORIDE, SODIUM LACTATE AND CALCIUM CHLORIDE 1000 ML: 600; 310; 30; 20 INJECTION, SOLUTION INTRAVENOUS at 20:44

## 2022-05-28 RX ADMIN — MAGNESIUM SULFATE HEPTAHYDRATE 2 G: 40 INJECTION, SOLUTION INTRAVENOUS at 17:44

## 2022-05-28 RX ADMIN — ONDANSETRON HYDROCHLORIDE 4 MG: 2 SOLUTION INTRAMUSCULAR; INTRAVENOUS at 17:08

## 2022-05-28 RX ADMIN — FENTANYL CITRATE 50 MCG: 50 INJECTION, SOLUTION INTRAMUSCULAR; INTRAVENOUS at 21:58

## 2022-05-28 RX ADMIN — POTASSIUM CHLORIDE 10 MEQ: 7.46 INJECTION, SOLUTION INTRAVENOUS at 22:24

## 2022-05-28 RX ADMIN — FENTANYL CITRATE 50 MCG: 50 INJECTION, SOLUTION INTRAMUSCULAR; INTRAVENOUS at 17:43

## 2022-05-28 ASSESSMENT — ENCOUNTER SYMPTOMS
ORTHOPNEA: 0
VOMITING: 1
DIARRHEA: 0
ABDOMINAL PAIN: 1
SHORTNESS OF BREATH: 0
FEVER: 0
WEAKNESS: 0
HEADACHES: 0
COUGH: 0
CHILLS: 0
NAUSEA: 1
SEIZURES: 0
NERVOUS/ANXIOUS: 0
BACK PAIN: 1
PALPITATIONS: 0

## 2022-05-28 ASSESSMENT — FIBROSIS 4 INDEX: FIB4 SCORE: 0.83

## 2022-05-28 NOTE — ED PROVIDER NOTES
"ED Provider Note    CHIEF COMPLAINT  Chief Complaint   Patient presents with   • Abdominal Pain     For 10 days. Seen here previously for same symptoms       HPI  Patient is a 27-year-old male who presents emergency room for severe abdominal pain.  Patient states that he was recently in the hospital and was discharged after having severe amounts of repeated nausea and vomiting.  He says he has been dealing with this constantly, and since he was discharged she has had continued nausea and vomiting saying that he has been vomiting up to 20 times per day.  He states he was told that his endoscopy was \"normal.\"  And that he is unsure of any imaging he had before and he has not been prescribed any nausea medications for gastrointestinal prophylaxis medications.  He is denying any regular NSAID use, regular alcohol use or any regular marijuana use.  Upon entering the room he is stating that he needs to have fentanyl and Ativan immediately.  He was noted by nursing staff that he was tachycardic, he was retching and very clear liquids and had a measured fever of 38.2.  Is otherwise a somewhat poor historian about recent events but just states that the \"pain is getting substantially worse.\"    Denying any dark stools today, no bloody vomitus today.    CHART REVIEW: Patient was discharged on 5/17 after 3 days of nausea vomiting.  He has a history of cyclical vomiting and was admitted for severe dehydration.  He had ERIN in setting of refractory nausea.  During this admission he had some black stools and hematic emesis for 2 days and then an EGD showing no signs of acute ulceration or other acute pathology.  He does have chronic issues with anxiety and was seen and evaluated for these complications and eventually left AGAINST MEDICAL ADVICE.    Review of the notes shows that the patient had grade 2 esophagitis.  Further notes show that the patient has a history of substance abuse, anxiety and a history of benzodiazepine " "overdose.    PPE Note: I personally donned full PPE for all patient encounters during this visit, including being clean-shaven with an N95 respirator mask, gloves, and goggles.     REVIEW OF SYSTEMS  See HPI for further details. All other systems are negative.     PAST MEDICAL HISTORY   has a past medical history of Abdominal migraine, Abdominal migraine, Colitis, and Gastritis.    SOCIAL HISTORY  Social History     Tobacco Use   • Smoking status: Never Smoker   • Smokeless tobacco: Never Used   Vaping Use   • Vaping Use: Some days   • Substances: THC   Substance and Sexual Activity   • Alcohol use: No   • Drug use: Yes     Types: Inhaled, Marijuana     Comment: THC daily   • Sexual activity: Never       SURGICAL HISTORY   has a past surgical history that includes gastroscopy (9/3/2014); gastroscopy-endo (11/15/2014); other; other orthopedic surgery; and upper gi endoscopy,diagnosis (N/A, 5/17/2022).    CURRENT MEDICATIONS  Home Medications     Reviewed by Corin Odom (Pharmacy Tech) on 05/28/22 at 1907  Med List Status: Complete   Medication Last Dose Status   ALPRAZolam (XANAX) 0.5 MG Tab 5/28/2022 Active                ALLERGIES  Allergies   Allergen Reactions   • Acetaminophen [Tylenol] Anaphylaxis and Nausea     Nausea, \"it just doesn't sit right\"  5/29/2021 patient states that his throat swells shut       PHYSICAL EXAM  VITAL SIGNS: /75   Pulse (!) 129   Temp (!) 38.2 °C (100.7 °F) (Oral)   Resp 12   Ht 1.626 m (5' 4\") Comment: Simultaneous filing. User may not have seen previous data.  Wt 63.5 kg (140 lb)   SpO2 99%   BMI 24.03 kg/m²   Pulse ox interpretation: I interpret this pulse ox as normal.  Genl: M sitting in gurney uncomfortably, speaking clearly, appears in moderate distress   Head: NC/AT   ENT: Mucous membranes very dry, posterior pharynx clear, uvula midline, nares patent bilaterally  Eyes: Normal sclera, pupils equal round reactive to light  Neck: Supple, FROM, no LAD " appreciated  Pulmonary: Lungs are clear to auscultation bilaterally  Chest: No TTP  CV:  RRR, no murmur appreciated, pulses 2+ in both upper and lower extremities,  Abdomen: soft, nonspecific and inconsistent periumbilical and upper abdominal tenderness.  ND; no rebound/guarding, no masses palpated, no HSM  : no CVA tenderness   Musculoskeletal: Pain free ROM of the neck. Moving upper and lower extremities and spontaneous in coordinated fashion  Neuro: A&Ox4 (person, place, time, situation), speech fluent, gait steady, no focal deficits appreciated, No cerebellar signs. Sensation is grossly intact in the distal upper and lower extremities  5/5 strength in  and dorsiflexion/plantar flexion of the ankles  Psych: Patient has an appropriate affect and behavior.   Skin: No rash or lesions.  No pallor or jaundice.  No cyanosis.  Warm and dry.     DIAGNOSTIC STUDIES / PROCEDURES    EKG  Results for orders placed or performed during the hospital encounter of 22   EKG   Result Value Ref Range    Report       [facility] Emergency Dept.    Test Date:  2022  Pt Name:    GABRIEL CEBALLOS     Department: ER  MRN:        [id number]                      Room:       [facility]  Gender:     Male                         Technician: [id number]  :        1995                   Requested By:BARTOLOME MERCADO  Order #:    [id number]                    Reading MD: Rusty Velazquez MD    Measurements  Intervals                                Axis  Rate:       148                          P:  CA:                                      QRS:        98  QRSD:       104                          T:          -5  QT:         328  QTc:        515    Interpretive Statements  Motion artifact is present due to the patient's acuity, appears to be a sinus  tachycardic rate at a rate of 145, no prolonged CA interval, QTC calculated  being long at 515 ms daily rate dependent, narrow complex, nonspecific ST  segment depressions  are globally present though somewhat obscured by rate  irregularities.  Techni donya poor tracing, prior EKG is sinus rhythm with  respiratory arrhythmia, this is a abnormal EKG.  Repeat is ordered.  Electronically Signed On 2022 18:03:48 PDT by Rusty Velazquez MD     EKG (NOW)   Result Value Ref Range    Report       [facility] Emergency Dept.    Test Date:  2022  Pt Name:    GABRIEL CEBALLOS     Department: ER  MRN:        [id number]                      Room:       [facility]  Gender:     Male                         Technician: [id number]  :        1995                   Requested By:BARTOLOME MERCADO  Order #:    [id number]                    Reading MD: Rusty Velazquez MD    Measurements  Intervals                                Axis  Rate:       129                          P:          0  IN:         83                           QRS:        84  QRSD:       96                           T:  QT:         340  QTc:        499    Interpretive Statements  SINUS TACHYCARDIA, rate of 129, normal IN, QTC prolongation at 499 ms, normal  axis, narrow complex, nonspecific repull abnormalities that could suggest  underlying ischemia, no true reciprocal changes or acute ST segment  elevations  are noted.  Improved from prior EKG at 1644 tonight.  Electronically Signed On 2022 18:33: 53 PDT by Rusty Velazquez MD       LABS  Labs Reviewed   CBC WITH DIFFERENTIAL - Abnormal; Notable for the following components:       Result Value    WBC 21.6 (*)     MCV 76.8 (*)     MCHC 35.4 (*)     Platelet Count 530 (*)     Neutrophils-Polys 86.50 (*)     Lymphocytes 5.30 (*)     Neutrophils (Absolute) 18.67 (*)     Monos (Absolute) 1.67 (*)     All other components within normal limits   COMP METABOLIC PANEL - Abnormal; Notable for the following components:    Chloride 83 (*)     Anion Gap 26.0 (*)     Glucose 193 (*)     Bun 45 (*)     Creatinine 2.19 (*)     Calcium 10.6 (*)     Albumin 5.2 (*)     Total Protein  "9.6 (*)     Globulin 4.4 (*)     All other components within normal limits   LACTIC ACID - Abnormal; Notable for the following components:    Lactic Acid 7.7 (*)     All other components within normal limits   ESTIMATED GFR - Abnormal; Notable for the following components:    GFR (CKD-EPI) 42 (*)     All other components within normal limits   VENOUS BLOOD GAS - Abnormal; Notable for the following components:    Venous Bg Ph 7.63 (*)     Venous Bg Pco2 23.5 (*)     All other components within normal limits   LACTIC ACID - Abnormal; Notable for the following components:    Lactic Acid 3.3 (*)     All other components within normal limits   OSMOLALITY SERUM - Abnormal; Notable for the following components:    Osmolality Serum 299 (*)     All other components within normal limits   LIPASE   PROCALCITONIN   CRP QUANTITIVE (NON-CARDIAC)   MAGNESIUM   COV-2, FLU A/B, AND RSV BY PCR ("[employer]   BETA-HYDROXYBUTYRIC ACID   URINALYSIS   BLOOD CULTURE    Narrative:     1 of 2 for Blood Culture x 2 sites order. Per Hospital  Policy: Only change Specimen Src: to \"Line\" if specified by  physician order.   BLOOD CULTURE    Narrative:     2 of 2 blood culture x2  Sites order. Per Hospital Policy:  Only change Specimen Src: to \"Line\" if specified by physician  order.     RADIOLOGY  CT-ABDOMEN-PELVIS WITH   Final Result      1.  No evidence of bowel obstruction or focal inflammatory change.      2.  Extensive pedicular screw and mague fixation involving the thoracolumbar spine.      3.  Mild thickening of the wall of the lower esophagus likely related to gastroesophageal reflux.      DX-CHEST-PORTABLE (1 VIEW)   Final Result      No evidence of acute cardiopulmonary process.          COURSE & MEDICAL DECISION MAKING  Pertinent Labs & Imaging studies reviewed. (See chart for details)    DDX: Viscus rupture, viral syndrome, sepsis, esophagitis PUD, pancreatitis, gastritis, GERD, cholelithiasis, cholecystitis, choledocolithiasis, urinary " tract infection    MDM    Initial evaluation at 1636:  Patient was seen and evaluated emergently as there was concerns regarding his substantial tachycardia and ongoing nausea vomiting.  Patient has had prior admissions for dehydration and intractable nausea vomiting and acutely do not have signs of acute peritonitis of the abdomen nor did he have an area that was truly localizing.  He has clear clinical evidence of dehydration and rapid fluid resuscitation was initiated.  His initial tachycardia of 160 did not truly indicate ischemic disease nor is it reflective of a acute arrhythmia.  He likely has underlying metabolic changes that were not because this and as fluids are resuscitated lab work was obtained for the broad differential as noted above.  Thankfully chest x-ray does not show subdiaphragmatic free air and I believe a peritoneal rupture is unlikely.  His tachycardia is consistently improving going down to the 140s and then 120s.  He is in substantial pain from his retching and both GI cocktail and treatment for likely esophagitis is initiated in addition to pain medications.  Patient does request multiple narcotics and benzodiazepines by name and I do have some underlying suspicion for secondary gain though is very clear that there is some underlying metabolic derangements and underlying pathology aggravating his current pain complaints.  He has no evidence of acute infectious or stone pathology in the urine, he has a substantial leukocytosis greater than 20 with a leftward shift.  His electrolyte abnormalities are noted to be with increased glucose, elevated anion gap with normal bicarb and decreased potassium.  Likely leukocytosis is secondary to his leftward shift and as there is no focal consolidations or acute evidence of localizing infectious etiology I initially held off on antibiosis as there was no acute source identified.  He has a ERIN that I believe is because of his dehydrated state,  CRP/procalcitonin are not elevated, further pointing towards metabolic derangement dehydration being the source of his leukocytosis and other CMP abnormalities.  Following fluid resuscitation his lactate went down from 7.7-3.  I discussed the case with the hospitalist who requested a discussion with the intensivist.  Intensivist recommends further fluid resuscitation, initial dose of broad-spectrum antibiotics of this will be followed with cultures and any further source identification.    Serial exam showed no hypotension, interval improvement of his tachycardia and no further changes to his abdominal exam.  He is requiring admission to stepdown unit because of his metabolic derangements and has increased risk factors for decompensation.  Spoke with the hospitalist will admit the patient in guarded condition.    CRITICAL CARE:  I saw and evaluated this patient. I personally provided 40 minutes of critical care time to the patient excluding billable procedures and directly and personally provided the following treatment and critical care management:  Critical Care Interventions  Multispecialty coordination, Multiple bedside assessments, coordination of care with family and other historical sources, Continuous hemodynamic and respiratory monitoring and Fluid resuscitation      HYDRATION: Based on the patient's presentation of Acute Vomiting, Sepsis and Tachycardia the patient was given IV fluids. IV Hydration was used because oral hydration was not adequate alone. Upon recheck following hydration, the patient was improved.    FINAL IMPRESSION  Visit Diagnoses     ICD-10-CM   1. Non-intractable vomiting with nausea, unspecified vomiting type  R11.2   2. Dehydration  E86.0   3. Elevated lactic acid level  R79.89   4. Generalized abdominal pain  R10.84   5. SIRS (systemic inflammatory response syndrome) ([facility])  R65.10     Electronically signed by: Marcus Ojeda M.D., 5/28/2022 4:36 PM

## 2022-05-28 NOTE — ED TRIAGE NOTES
"Chief Complaint   Patient presents with   • Abdominal Pain     For 10 days. Seen here previously for same symptoms     Pt ambulated across triage lobby to get wheelchair, then wheeled self into triage room for above. Pt is spitting into zip loc bag with dry heaves noted. Pt reports throwing up \" a lot\".   "

## 2022-05-29 LAB
ALBUMIN SERPL BCP-MCNC: 4.3 G/DL (ref 3.2–4.9)
ALBUMIN/GLOB SERPL: 1.3 G/DL
ALP SERPL-CCNC: 68 U/L (ref 30–99)
ALT SERPL-CCNC: 11 U/L (ref 2–50)
ANION GAP SERPL CALC-SCNC: 13 MMOL/L (ref 7–16)
ANION GAP SERPL CALC-SCNC: 16 MMOL/L (ref 7–16)
AST SERPL-CCNC: 22 U/L (ref 12–45)
BILIRUB SERPL-MCNC: 0.8 MG/DL (ref 0.1–1.5)
BUN SERPL-MCNC: 24 MG/DL (ref 8–22)
BUN SERPL-MCNC: 32 MG/DL (ref 8–22)
CALCIUM SERPL-MCNC: 9.4 MG/DL (ref 8.5–10.5)
CALCIUM SERPL-MCNC: 9.4 MG/DL (ref 8.5–10.5)
CHLORIDE SERPL-SCNC: 100 MMOL/L (ref 96–112)
CHLORIDE SERPL-SCNC: 95 MMOL/L (ref 96–112)
CO2 SERPL-SCNC: 22 MMOL/L (ref 20–33)
CO2 SERPL-SCNC: 23 MMOL/L (ref 20–33)
CREAT SERPL-MCNC: 1.17 MG/DL (ref 0.5–1.4)
CREAT SERPL-MCNC: 1.58 MG/DL (ref 0.5–1.4)
GFR SERPLBLD CREATININE-BSD FMLA CKD-EPI: 62 ML/MIN/1.73 M 2
GFR SERPLBLD CREATININE-BSD FMLA CKD-EPI: 89 ML/MIN/1.73 M 2
GLOBULIN SER CALC-MCNC: 3.2 G/DL (ref 1.9–3.5)
GLUCOSE SERPL-MCNC: 136 MG/DL (ref 65–99)
GLUCOSE SERPL-MCNC: 136 MG/DL (ref 65–99)
LACTATE BLD-SCNC: 1.1 MMOL/L (ref 0.5–2)
MAGNESIUM SERPL-MCNC: 2.3 MG/DL (ref 1.5–2.5)
MAGNESIUM SERPL-MCNC: 2.9 MG/DL (ref 1.5–2.5)
PHOSPHATE SERPL-MCNC: 3.4 MG/DL (ref 2.5–4.5)
POTASSIUM SERPL-SCNC: 3.1 MMOL/L (ref 3.6–5.5)
POTASSIUM SERPL-SCNC: 3.5 MMOL/L (ref 3.6–5.5)
PROT SERPL-MCNC: 7.5 G/DL (ref 6–8.2)
SODIUM SERPL-SCNC: 134 MMOL/L (ref 135–145)
SODIUM SERPL-SCNC: 135 MMOL/L (ref 135–145)

## 2022-05-29 PROCEDURE — A9270 NON-COVERED ITEM OR SERVICE: HCPCS | Performed by: HOSPITALIST

## 2022-05-29 PROCEDURE — C9113 INJ PANTOPRAZOLE SODIUM, VIA: HCPCS | Performed by: INTERNAL MEDICINE

## 2022-05-29 PROCEDURE — 700101 HCHG RX REV CODE 250: Performed by: INTERNAL MEDICINE

## 2022-05-29 PROCEDURE — 700111 HCHG RX REV CODE 636 W/ 250 OVERRIDE (IP): Performed by: STUDENT IN AN ORGANIZED HEALTH CARE EDUCATION/TRAINING PROGRAM

## 2022-05-29 PROCEDURE — 700105 HCHG RX REV CODE 258: Performed by: INTERNAL MEDICINE

## 2022-05-29 PROCEDURE — 700102 HCHG RX REV CODE 250 W/ 637 OVERRIDE(OP): Performed by: HOSPITALIST

## 2022-05-29 PROCEDURE — 83735 ASSAY OF MAGNESIUM: CPT

## 2022-05-29 PROCEDURE — 80053 COMPREHEN METABOLIC PANEL: CPT

## 2022-05-29 PROCEDURE — 700111 HCHG RX REV CODE 636 W/ 250 OVERRIDE (IP): Performed by: HOSPITALIST

## 2022-05-29 PROCEDURE — 700105 HCHG RX REV CODE 258: Performed by: HOSPITALIST

## 2022-05-29 PROCEDURE — 700102 HCHG RX REV CODE 250 W/ 637 OVERRIDE(OP): Performed by: INTERNAL MEDICINE

## 2022-05-29 PROCEDURE — 84100 ASSAY OF PHOSPHORUS: CPT

## 2022-05-29 PROCEDURE — A9270 NON-COVERED ITEM OR SERVICE: HCPCS | Performed by: STUDENT IN AN ORGANIZED HEALTH CARE EDUCATION/TRAINING PROGRAM

## 2022-05-29 PROCEDURE — 83605 ASSAY OF LACTIC ACID: CPT

## 2022-05-29 PROCEDURE — 99291 CRITICAL CARE FIRST HOUR: CPT | Performed by: STUDENT IN AN ORGANIZED HEALTH CARE EDUCATION/TRAINING PROGRAM

## 2022-05-29 PROCEDURE — 80048 BASIC METABOLIC PNL TOTAL CA: CPT

## 2022-05-29 PROCEDURE — 700102 HCHG RX REV CODE 250 W/ 637 OVERRIDE(OP): Performed by: STUDENT IN AN ORGANIZED HEALTH CARE EDUCATION/TRAINING PROGRAM

## 2022-05-29 PROCEDURE — 700111 HCHG RX REV CODE 636 W/ 250 OVERRIDE (IP): Performed by: INTERNAL MEDICINE

## 2022-05-29 PROCEDURE — 770000 HCHG ROOM/CARE - INTERMEDIATE ICU *

## 2022-05-29 PROCEDURE — A9270 NON-COVERED ITEM OR SERVICE: HCPCS | Performed by: INTERNAL MEDICINE

## 2022-05-29 PROCEDURE — 99233 SBSQ HOSP IP/OBS HIGH 50: CPT | Mod: 25 | Performed by: HOSPITALIST

## 2022-05-29 PROCEDURE — 700101 HCHG RX REV CODE 250: Performed by: STUDENT IN AN ORGANIZED HEALTH CARE EDUCATION/TRAINING PROGRAM

## 2022-05-29 RX ORDER — LIDOCAINE 50 MG/G
1 PATCH TOPICAL EVERY 24 HOURS
Status: DISCONTINUED | OUTPATIENT
Start: 2022-05-29 | End: 2022-05-30 | Stop reason: HOSPADM

## 2022-05-29 RX ORDER — CAPSAICIN 0.025 %
CREAM (GRAM) TOPICAL 3 TIMES DAILY
Status: DISCONTINUED | OUTPATIENT
Start: 2022-05-29 | End: 2022-05-30 | Stop reason: HOSPADM

## 2022-05-29 RX ORDER — CHOLECALCIFEROL (VITAMIN D3) 125 MCG
5 CAPSULE ORAL NIGHTLY
Status: DISCONTINUED | OUTPATIENT
Start: 2022-05-29 | End: 2022-05-30 | Stop reason: HOSPADM

## 2022-05-29 RX ORDER — LORAZEPAM 2 MG/ML
1 INJECTION INTRAMUSCULAR
Status: DISCONTINUED | OUTPATIENT
Start: 2022-05-29 | End: 2022-05-29

## 2022-05-29 RX ORDER — METOCLOPRAMIDE HYDROCHLORIDE 5 MG/ML
10 INJECTION INTRAMUSCULAR; INTRAVENOUS EVERY 6 HOURS
Status: DISCONTINUED | OUTPATIENT
Start: 2022-05-29 | End: 2022-05-30 | Stop reason: HOSPADM

## 2022-05-29 RX ORDER — IBUPROFEN 400 MG/1
400 TABLET ORAL EVERY 6 HOURS PRN
Status: DISCONTINUED | OUTPATIENT
Start: 2022-05-29 | End: 2022-05-30 | Stop reason: HOSPADM

## 2022-05-29 RX ORDER — HYDROMORPHONE HYDROCHLORIDE 1 MG/ML
1 INJECTION, SOLUTION INTRAMUSCULAR; INTRAVENOUS; SUBCUTANEOUS
Status: DISCONTINUED | OUTPATIENT
Start: 2022-05-29 | End: 2022-05-29

## 2022-05-29 RX ORDER — SUCRALFATE ORAL 1 G/10ML
1 SUSPENSION ORAL EVERY 6 HOURS
Status: DISCONTINUED | OUTPATIENT
Start: 2022-05-29 | End: 2022-05-30 | Stop reason: HOSPADM

## 2022-05-29 RX ORDER — DIAZEPAM 5 MG/ML
5 INJECTION, SOLUTION INTRAMUSCULAR; INTRAVENOUS ONCE
Status: COMPLETED | OUTPATIENT
Start: 2022-05-29 | End: 2022-05-29

## 2022-05-29 RX ORDER — LORAZEPAM 2 MG/ML
2 INJECTION INTRAMUSCULAR EVERY 4 HOURS PRN
Status: DISCONTINUED | OUTPATIENT
Start: 2022-05-29 | End: 2022-05-30 | Stop reason: HOSPADM

## 2022-05-29 RX ORDER — SODIUM CHLORIDE, SODIUM LACTATE, POTASSIUM CHLORIDE, CALCIUM CHLORIDE 600; 310; 30; 20 MG/100ML; MG/100ML; MG/100ML; MG/100ML
INJECTION, SOLUTION INTRAVENOUS CONTINUOUS
Status: DISCONTINUED | OUTPATIENT
Start: 2022-05-29 | End: 2022-05-30

## 2022-05-29 RX ORDER — BUSPIRONE HYDROCHLORIDE 10 MG/1
5 TABLET ORAL 3 TIMES DAILY
Status: DISCONTINUED | OUTPATIENT
Start: 2022-05-29 | End: 2022-05-29

## 2022-05-29 RX ORDER — METOCLOPRAMIDE HYDROCHLORIDE 5 MG/ML
10 INJECTION INTRAMUSCULAR; INTRAVENOUS EVERY 6 HOURS PRN
Status: DISCONTINUED | OUTPATIENT
Start: 2022-05-29 | End: 2022-05-29

## 2022-05-29 RX ORDER — POTASSIUM CHLORIDE 20 MEQ/1
40 TABLET, EXTENDED RELEASE ORAL ONCE
Status: COMPLETED | OUTPATIENT
Start: 2022-05-29 | End: 2022-05-29

## 2022-05-29 RX ORDER — HYDROMORPHONE HYDROCHLORIDE 1 MG/ML
1 INJECTION, SOLUTION INTRAMUSCULAR; INTRAVENOUS; SUBCUTANEOUS EVERY 4 HOURS PRN
Status: DISCONTINUED | OUTPATIENT
Start: 2022-05-29 | End: 2022-05-30 | Stop reason: HOSPADM

## 2022-05-29 RX ORDER — LORAZEPAM 1 MG/1
1 TABLET ORAL EVERY 4 HOURS PRN
Status: DISCONTINUED | OUTPATIENT
Start: 2022-05-29 | End: 2022-05-29

## 2022-05-29 RX ORDER — ALUMINA, MAGNESIA, AND SIMETHICONE 2400; 2400; 240 MG/30ML; MG/30ML; MG/30ML
10 SUSPENSION ORAL ONCE
Status: COMPLETED | OUTPATIENT
Start: 2022-05-29 | End: 2022-05-29

## 2022-05-29 RX ADMIN — BUSPIRONE HYDROCHLORIDE 5 MG: 10 TABLET ORAL at 05:10

## 2022-05-29 RX ADMIN — POTASSIUM CHLORIDE 40 MEQ: 1500 TABLET, EXTENDED RELEASE ORAL at 05:10

## 2022-05-29 RX ADMIN — ALUMINUM HYDROXIDE, MAGNESIUM HYDROXIDE, AND DIMETHICONE 10 ML: 400; 400; 40 SUSPENSION ORAL at 02:15

## 2022-05-29 RX ADMIN — LORAZEPAM 2 MG: 2 INJECTION INTRAMUSCULAR; INTRAVENOUS at 19:49

## 2022-05-29 RX ADMIN — HYDROMORPHONE HYDROCHLORIDE 1 MG: 1 INJECTION, SOLUTION INTRAMUSCULAR; INTRAVENOUS; SUBCUTANEOUS at 14:19

## 2022-05-29 RX ADMIN — PANTOPRAZOLE SODIUM 40 MG: 40 INJECTION, POWDER, FOR SOLUTION INTRAVENOUS at 05:10

## 2022-05-29 RX ADMIN — Medication 5 MG: at 04:23

## 2022-05-29 RX ADMIN — HYDROMORPHONE HYDROCHLORIDE 1 MG: 1 INJECTION, SOLUTION INTRAMUSCULAR; INTRAVENOUS; SUBCUTANEOUS at 10:59

## 2022-05-29 RX ADMIN — LORAZEPAM 2 MG: 2 INJECTION INTRAMUSCULAR; INTRAVENOUS at 15:39

## 2022-05-29 RX ADMIN — PANTOPRAZOLE SODIUM 40 MG: 40 INJECTION, POWDER, FOR SOLUTION INTRAVENOUS at 17:41

## 2022-05-29 RX ADMIN — CAPSAICIN: 0.25 CREAM TOPICAL at 14:23

## 2022-05-29 RX ADMIN — LORAZEPAM 1 MG: 1 TABLET ORAL at 14:20

## 2022-05-29 RX ADMIN — CAPSAICIN: 0.25 CREAM TOPICAL at 18:16

## 2022-05-29 RX ADMIN — HYDROMORPHONE HYDROCHLORIDE 1 MG: 1 INJECTION, SOLUTION INTRAMUSCULAR; INTRAVENOUS; SUBCUTANEOUS at 18:18

## 2022-05-29 RX ADMIN — LIDOCAINE 1 PATCH: 50 PATCH TOPICAL at 01:53

## 2022-05-29 RX ADMIN — POTASSIUM PHOSPHATE, MONOBASIC AND POTASSIUM PHOSPHATE, DIBASIC 30 MMOL: 224; 236 INJECTION, SOLUTION, CONCENTRATE INTRAVENOUS at 00:35

## 2022-05-29 RX ADMIN — SUCRALFATE 1 G: 1 SUSPENSION ORAL at 10:59

## 2022-05-29 RX ADMIN — Medication 5 MG: at 21:09

## 2022-05-29 RX ADMIN — DIAZEPAM 5 MG: 5 INJECTION, SOLUTION INTRAMUSCULAR; INTRAVENOUS at 14:27

## 2022-05-29 RX ADMIN — DIAZEPAM 5 MG: 5 INJECTION, SOLUTION INTRAMUSCULAR; INTRAVENOUS at 01:52

## 2022-05-29 RX ADMIN — SUCRALFATE 1 G: 1 SUSPENSION ORAL at 08:58

## 2022-05-29 RX ADMIN — SODIUM CHLORIDE, POTASSIUM CHLORIDE, SODIUM LACTATE AND CALCIUM CHLORIDE: 600; 310; 30; 20 INJECTION, SOLUTION INTRAVENOUS at 08:59

## 2022-05-29 RX ADMIN — SODIUM CHLORIDE, POTASSIUM CHLORIDE, SODIUM LACTATE AND CALCIUM CHLORIDE: 600; 310; 30; 20 INJECTION, SOLUTION INTRAVENOUS at 21:13

## 2022-05-29 RX ADMIN — HYDROMORPHONE HYDROCHLORIDE 1 MG: 1 INJECTION, SOLUTION INTRAMUSCULAR; INTRAVENOUS; SUBCUTANEOUS at 22:35

## 2022-05-29 RX ADMIN — BUSPIRONE HYDROCHLORIDE 5 MG: 10 TABLET ORAL at 11:00

## 2022-05-29 RX ADMIN — HYDROMORPHONE HYDROCHLORIDE 1 MG: 1 INJECTION, SOLUTION INTRAMUSCULAR; INTRAVENOUS; SUBCUTANEOUS at 08:59

## 2022-05-29 RX ADMIN — METOCLOPRAMIDE 10 MG: 5 INJECTION, SOLUTION INTRAMUSCULAR; INTRAVENOUS at 17:41

## 2022-05-29 RX ADMIN — POTASSIUM CHLORIDE 10 MEQ: 7.46 INJECTION, SOLUTION INTRAVENOUS at 01:02

## 2022-05-29 RX ADMIN — PIPERACILLIN AND TAZOBACTAM 4.5 G: 4; .5 INJECTION, POWDER, FOR SOLUTION INTRAVENOUS at 01:03

## 2022-05-29 ASSESSMENT — ENCOUNTER SYMPTOMS
NERVOUS/ANXIOUS: 1
SPUTUM PRODUCTION: 0
HEMOPTYSIS: 0
COUGH: 0
VOMITING: 1
CHILLS: 0
NAUSEA: 1
PALPITATIONS: 0
ABDOMINAL PAIN: 1
INSOMNIA: 1
ORTHOPNEA: 0
DIZZINESS: 0

## 2022-05-29 ASSESSMENT — PAIN DESCRIPTION - PAIN TYPE
TYPE: ACUTE PAIN

## 2022-05-29 ASSESSMENT — PATIENT HEALTH QUESTIONNAIRE - PHQ9
2. FEELING DOWN, DEPRESSED, IRRITABLE, OR HOPELESS: SEVERAL DAYS
3. TROUBLE FALLING OR STAYING ASLEEP OR SLEEPING TOO MUCH: NEARLY EVERY DAY
4. FEELING TIRED OR HAVING LITTLE ENERGY: NEARLY EVERY DAY
8. MOVING OR SPEAKING SO SLOWLY THAT OTHER PEOPLE COULD HAVE NOTICED. OR THE OPPOSITE, BEING SO FIGETY OR RESTLESS THAT YOU HAVE BEEN MOVING AROUND A LOT MORE THAN USUAL: NOT AT ALL
1. LITTLE INTEREST OR PLEASURE IN DOING THINGS: NOT AT ALL
9. THOUGHTS THAT YOU WOULD BE BETTER OFF DEAD, OR OF HURTING YOURSELF: NOT AT ALL
6. FEELING BAD ABOUT YOURSELF - OR THAT YOU ARE A FAILURE OR HAVE LET YOURSELF OR YOUR FAMILY DOWN: MORE THAN HALF THE DAYS
SUM OF ALL RESPONSES TO PHQ QUESTIONS 1-9: 13
7. TROUBLE CONCENTRATING ON THINGS, SUCH AS READING THE NEWSPAPER OR WATCHING TELEVISION: MORE THAN HALF THE DAYS
5. POOR APPETITE OR OVEREATING: MORE THAN HALF THE DAYS
SUM OF ALL RESPONSES TO PHQ9 QUESTIONS 1 AND 2: 1

## 2022-05-29 ASSESSMENT — LIFESTYLE VARIABLES
EVER HAD A DRINK FIRST THING IN THE MORNING TO STEADY YOUR NERVES TO GET RID OF A HANGOVER: NO
EVER FELT BAD OR GUILTY ABOUT YOUR DRINKING: NO
TOTAL SCORE: 0
TOTAL SCORE: 0
HAVE PEOPLE ANNOYED YOU BY CRITICIZING YOUR DRINKING: NO
ON A TYPICAL DAY WHEN YOU DRINK ALCOHOL HOW MANY DRINKS DO YOU HAVE: 0
HOW MANY TIMES IN THE PAST YEAR HAVE YOU HAD 5 OR MORE DRINKS IN A DAY: 0
ALCOHOL_USE: NO
AVERAGE NUMBER OF DAYS PER WEEK YOU HAVE A DRINK CONTAINING ALCOHOL: 0
CONSUMPTION TOTAL: NEGATIVE
HAVE YOU EVER FELT YOU SHOULD CUT DOWN ON YOUR DRINKING: NO
SUBSTANCE_ABUSE: 1
TOTAL SCORE: 0

## 2022-05-29 ASSESSMENT — FIBROSIS 4 INDEX
FIB4 SCORE: 0.39
FIB4 SCORE: 0.31
FIB4 SCORE: 0.31
FIB4 SCORE: 0.39

## 2022-05-29 NOTE — ASSESSMENT & PLAN NOTE
Critically low potassium level, down to 2.9  Due to intractable emesis  Replace, replace phos, replace magnesium

## 2022-05-29 NOTE — CARE PLAN
The patient is Watcher - Medium risk of patient condition declining or worsening         Progress made toward(s) clinical / shift goals:  progressing      Problem: Pain - Standard  Goal: Alleviation of pain or a reduction in pain to the patient’s comfort goal  Outcome: Progressing     Problem: Knowledge Deficit - Standard  Goal: Patient and family/care givers will demonstrate understanding of plan of care, disease process/condition, diagnostic tests and medications  Outcome: Progressing     Problem: Fall Risk  Goal: Patient will remain free from falls  Outcome: Progressing     Problem: Depression  Goal: Patient and family/caregiver will verbalize accurate information about at least two of the possible causes of depression, three-four of the signs and symptoms of depression  Outcome: Progressing

## 2022-05-29 NOTE — PROGRESS NOTES
[facility] hospitalist interim summary:    Patient seen and evaluated at bedside.  Immediately upon presentation patient was requesting fentanyl and 10 mg of IV Valium and has significant drug-seeking behaviors.  I had mentioned to patient I will not be administering both Valium and opiates as this can have risk of respiratory compromise and asked the patient if his pain or anxiety/nausea vomiting is worse and he agrees that his nausea morning/anxiety is worse than his pain currently right now.  He is in agreement with not receiving any opioid analgesics for pain control as he will be on Valium with his moderately prolonged QTC and episodes of nausea and vomiting.  He does admit to hematemesis and states he has had hematemesis in the past and has had endoscopy stating he has hiatal hernia otherwise unremarkable for any bleeds in the past.  He complains of significant odynophagia with swallowing and we will make patient n.p.o. currently and start Protonix IV twice daily, provide Maalox for symptomatic relief and recommend GI consultation in a.m. for GI bleed.  He states he receives opiates and Xanax from his doctor in [place] and gave myself a false name for prescriber that he receives these medications from [facility] as his  aware does not reveal any narcotics/analgesics or benzodiazepines prescribed to patient.  Has significant drug-seeking behavior and would benefit from further counseling prior to discharge and possibly follow-up in the outpatient setting.  Patient agreeable to n.p.o. status and GI consultation in a.m. for his concerns of hematemesis and odynophagia concerning for Yasemin-Leonard tear.     Physical examination:     General: No acute distress, drug-seeking behavior, alert and oriented x4, appears age as stated.  HEENT: Normocephalic atraumatic, PERRLA, EOMI, moist mucous membranes, no oral lesions.  Neck: Supple, trachea midline, no adenopathy or thyromegaly.  Cardiac: Tachycardic accentuated S1 and  S2 without rubs clicks gallops or murmurs.  Respiratory: Clear to auscultation bilaterally, no rhonchi wheezing or rales.  Abdomen: NABS x4 quadrants, no tenderness to palpation or rebound tenderness, no organomegaly, no abdominal masses appreciated.  Extremities: +2 bilateral radial pulses, +2 bilateral dorsal pedal pulses, no clubbing, cyanosis or edema appreciated.  Neurologic: Alert and oriented x4, moves all 4 extremities, cranial nerves II through XII intact, no acute focal deficits appreciated at this time.    Electronically signed:  DO MAYO Dowd spent greater than 35 minutes of critical care time evaluating and treating patient excluding any procedures.

## 2022-05-29 NOTE — ASSESSMENT & PLAN NOTE
Unclear etiology leading to metabolic alkalosis and intravascular depletion  Fluid resuscitated, anti nausea  CT A/P with some evidence of possible GERD.   Will start PPI

## 2022-05-29 NOTE — ASSESSMENT & PLAN NOTE
Patient presents with recurrent episodes of severe nausea vomiting  Patient had a EGD 2 weeks ago demonstrating esophagitis  Continue PPI, continue Carafate, continue supportive care with fluids, replacing severe electrolyte derangements  Encourage cannabis abstinence.  Trial of scheduled reglan  Outpatient follow-up with GI unless there is any change in clinical status or signs of severe GI bleeding

## 2022-05-29 NOTE — ASSESSMENT & PLAN NOTE
SIRS criteria identified on my evaluation include:  Tachycardia, with heart rate greater than 90 BPM and Leukocytosis, with WBC greater than 12,000  SIRS is non-infectious, the patient does not have sepsis

## 2022-05-29 NOTE — PROGRESS NOTES
[facility] Medicine Daily Progress Note    Date of Service  5/29/2022    Chief Complaint  Gilbert Diop is a 27 y.o. male admitted 5/28/2022 with nausea, vomiting, and abdominal pain    Hospital Course  Gilbert Diop has past medical history of chronic/recurrent abdominal pain associated with episodes of severe nausea vomiting resulting in dehydration.  Patient presented to the emergency room on 5/28/2022 with ongoing symptoms reported since his last hospitalization.  Patient was noted to be severely dehydrated with lactic acidosis.  He was admitted to the IMCU for fluid resuscitation.  It is noted that the patient had an EGD on 5/17/2022 demonstrating grade 2 esophagitis.  Multiple urine drug screens have been positive for cannabis.    Interval Problem Update  Patient seen and examined in the IMCU, discussed with bedside RN  Patient is awake, agitated, his heart rate is bouncing up to the 150s  Repeatedly requesting fentanyl and IV Ativan, appears to be in too much discomfort to have a conversation  We discussed that pain and anxiety medications will be administered based on physician assessment both subjective and objective findings  Continues to have emesis, clear yellow fluid  Complains of tachycardia and abdominal pain    I have personally seen and examined the patient at bedside. I discussed the plan of care with patient, bedside RN and pharmacy.    Consultants/Specialty  none    Code Status  Full Code    Disposition  Patient is not medically cleared for discharge.   Anticipate discharge to to home with close outpatient follow-up.  I have placed the appropriate orders for post-discharge needs.    Review of Systems  Review of Systems   Constitutional: Negative for chills and malaise/fatigue.   Respiratory: Negative for cough, hemoptysis and sputum production.    Cardiovascular: Negative for chest pain, palpitations and orthopnea.   Gastrointestinal: Positive for abdominal pain, nausea and  vomiting.   Skin: Negative for itching and rash.   Neurological: Negative for dizziness.   Psychiatric/Behavioral: Positive for substance abuse. The patient is nervous/anxious and has insomnia.    All other systems reviewed and are negative.       Physical Exam  Temp:  [36.7 °C (98 °F)-38.2 °C (100.8 °F)] 37 °C (98.6 °F)  Pulse:  [] 119  Resp:  [9-49] 49  BP: ()/() 108/63  SpO2:  [87 %-99 %] 92 %    Physical Exam  Constitutional:       General: He is not in acute distress.     Appearance: Normal appearance. He is normal weight.   HENT:      Head: Normocephalic and atraumatic.      Right Ear: External ear normal.      Left Ear: External ear normal.      Nose: Nose normal.      Mouth/Throat:      Mouth: Mucous membranes are moist.      Pharynx: Oropharynx is clear.   Eyes:      Extraocular Movements: Extraocular movements intact.      Conjunctiva/sclera: Conjunctivae normal.      Pupils: Pupils are equal, round, and reactive to light.   Cardiovascular:      Rate and Rhythm: Regular rhythm. Tachycardia present.      Pulses: Normal pulses.   Pulmonary:      Effort: Pulmonary effort is normal. No respiratory distress.      Breath sounds: Normal breath sounds. No wheezing.   Abdominal:      General: Abdomen is flat. Bowel sounds are normal.      Palpations: Abdomen is soft.      Tenderness: There is abdominal tenderness.      Comments: Abdomen is soft, there is no rebound or guarding  Bowel sounds are appreciated   Musculoskeletal:         General: Normal range of motion.      Cervical back: Normal range of motion and neck supple.   Skin:     General: Skin is warm and dry.      Capillary Refill: Capillary refill takes less than 2 seconds.      Coloration: Skin is not jaundiced.   Neurological:      General: No focal deficit present.      Mental Status: He is alert and oriented to person, place, and time.      Cranial Nerves: No cranial nerve deficit.      Gait: Gait normal.   Psychiatric:      Comments:  Anxious, unreasonable         Fluids    Intake/Output Summary (Last 24 hours) at 5/29/2022 1513  Last data filed at 5/29/2022 0800  Gross per 24 hour   Intake 3990 ml   Output 502 ml   Net 3488 ml       Laboratory  Recent Labs     05/28/22  1644   WBC 21.6*   RBC 5.74   HEMOGLOBIN 15.6   HEMATOCRIT 44.1   MCV 76.8*   MCH 27.2   MCHC 35.4*   RDW 35.9   PLATELETCT 530*   MPV 11.7     Recent Labs     05/28/22  2041 05/29/22  0214 05/29/22  1152   SODIUM 135 134* 135   POTASSIUM 2.9* 3.1* 3.5*   CHLORIDE 89* 95* 100   CO2 29 23 22   GLUCOSE 156* 136* 136*   BUN 40* 32* 24*   CREATININE 1.83* 1.58* 1.17   CALCIUM 9.8 9.4 9.4                   Imaging  CT-ABDOMEN-PELVIS WITH   Final Result      1.  No evidence of bowel obstruction or focal inflammatory change.      2.  Extensive pedicular screw and mague fixation involving the thoracolumbar spine.      3.  Mild thickening of the wall of the lower esophagus likely related to gastroesophageal reflux.      DX-CHEST-PORTABLE (1 VIEW)   Final Result      No evidence of acute cardiopulmonary process.           Assessment/Plan  * Cannabis hyperemesis syndrome concurrent with and due to cannabis abuse (HCC)- (present on admission)  Assessment & Plan  Patient presents with recurrent episodes of severe nausea vomiting  Patient had a EGD 2 weeks ago demonstrating esophagitis  Continue PPI, continue Carafate, continue supportive care with fluids, replacing severe electrolyte derangements  Encourage cannabis abstinence.  Trial of scheduled reglan  Outpatient follow-up with GI unless there is any change in clinical status or signs of severe GI bleeding      Hypophosphatemia- (present on admission)  Assessment & Plan  Replace    ERIN (acute kidney injury) (HCC)- (present on admission)  Assessment & Plan  Due to severe volume depletion  Continue fluid resuscitation, follow repeat labs    Hypokalemia- (present on admission)  Assessment & Plan  Critically low potassium level, down to 2.9  Due  to intractable emesis  Replace, replace phos, replace magnesium    Benzodiazepine withdrawal (HCC)- (present on admission)  Assessment & Plan  Patient reports daily benzodiazepine use, his prescription drug history from [place] does not confirm any prescriptions obtained here   He does appear to be withdrawing from some substance and is hemodynamically and metabolically compromised with a heart rate to the 150s  Responded well to IV Valium  I have ordered IV Ativan for suspected acute benzodiazepine withdrawal, will transition to p.o. soon as possible  Consideration to outpatient program for benzodiazepine withdrawal    SIRS (systemic inflammatory response syndrome) (HCC)- (present on admission)  Assessment & Plan  SIRS criteria identified on my evaluation include:  Tachycardia, with heart rate greater than 90 BPM and Leukocytosis, with WBC greater than 12,000  SIRS is non-infectious, the patient does not have sepsis      Intravascular volume depletion- (present on admission)  Assessment & Plan  Fluid resuscitaion    Intractable nausea and vomiting- (present on admission)  Assessment & Plan  As above    Lactic acid acidosis- (present on admission)  Assessment & Plan  Due to severe volume depletion and poor PO intake  Fluid resuscitation        VTE prophylaxis: SCDs/TEDs    I have performed a physical exam and reviewed and updated ROS and Plan today (5/29/2022). In review of yesterday's note (5/28/2022), there are no changes except as documented above.

## 2022-05-29 NOTE — ASSESSMENT & PLAN NOTE
Patient reports daily benzodiazepine use, his prescription drug history from [place] does not confirm any prescriptions obtained here   He does appear to be withdrawing from some substance and is hemodynamically and metabolically compromised with a heart rate to the 150s  Responded well to IV Valium  I have ordered IV Ativan for suspected acute benzodiazepine withdrawal, will transition to p.o. soon as possible  Consideration to outpatient program for benzodiazepine withdrawal

## 2022-05-29 NOTE — ASSESSMENT & PLAN NOTE
SIRS criteria identified on my evaluation include:  Fever, with temperature greater than 101 deg F, Tachycardia, with heart rate greater than 90 BPM and Leukocytosis, with WBC greater than 12,000  SIRS is non-infectious, the patient does not have sepsis    Likely combination of intravascular depletion from vomiting, ?infection given fever and leukocytosis, or drug withdrawal. Pt has hx of xanax overdose.Lactate was 7 at admission   CT A/P without obvious intraabomdinal abnormalities.   Pt received 2L fluid resuscitation, will give another liter of LR then fluid maintenance 150cc/hr  Start on piptazo, blood cultures.   Monitor UOP closely   Check HIV, RPR, hepaitits, chlamydia/GC. Pt had sexual intercourse with a woman whom he reported was sick with HIV.

## 2022-05-29 NOTE — PROGRESS NOTES
SINDHU Nickerson alerted RN at approximately 1200pm that she smelled a strong odor of marijuana in pt's room.     I immediately entered room and smelled a strong skunky odor and asked the patient if he was smoking pot.     Pt denied smoking pot. I then asked if there was any marijuana in the room and he stared at me blankly without answering.     I informed JENNY Crawley about it and she contacted security.     Please see security notes for their account.     Pt has continually been getting up out of bed without calling for assistance and is an extreme falls risk d/t his ekg and IV lines and heavy doses of dilaudid for his pain control. I have asked him more than 7 times to call for help and to keep his curtain open so we can prevent him from falling when he gets out of bed.     He has completely disregarded all of our requests to do so.     I am sitting outside of patient's room in my free moments to prevent him from falling.

## 2022-05-29 NOTE — ED NOTES
Med rec updated and complete. Allergies reviewed. Confirmed name and date of birth.   Pt denies antibiotic use in last 30 days        Home pharmacy [facility] [phone]

## 2022-05-29 NOTE — HOSPITAL COURSE
Gilbert Diop has past medical history of chronic/recurrent abdominal pain associated with episodes of severe nausea vomiting resulting in dehydration.  Patient presented to the emergency room on 5/28/2022 with ongoing symptoms reported since his last hospitalization.  Patient was noted to be severely dehydrated with lactic acidosis.  He was admitted to the IMCU for fluid resuscitation.  It is noted that the patient had an EGD on 5/17/2022 demonstrating grade 2 esophagitis.  Multiple urine drug screens have been positive for cannabis.

## 2022-05-29 NOTE — CONSULTS
"Critical Care Consultation    Date of consult: 5/28/2022    Referring Physician  Marcus Ojeda M.D.    Reason for Consultation  Tachycardia, ERIN    History of Presenting Illness  27 y.o. male with hx of ?anxiety (on xanax) who presented to ED for abdominal pain and vomiting. He reported that he has had abdominal pain for 10 years, and 5 day hx of nausea/vomiting. Denies diarrhea. Also c/o \"chills\", no cough, chest pain, SOB. Rated abd pain 10/10. He's not on any chronic opioid.     Upon ED arrival, his HR in 140-160s, NSR, SBP in 110-130s. Pt alert, oriented and appropriate. On room air, sat >92%. Initial lactate 7.7. Creatinine 2.19.. Pt was given 2L fluid boluses. CT A/P did not show any intraabdominal abnormalities, only mild thickening of distal esophagus. UDS with +benzos, opioid and cannabinoids. Pt was also febrile 38.2. WBC 21K. PH 7.63  Repeat lactate was 3.3 and creatinine improving with fluid resuscitation. Making good UOP. .       Code Status  Full Code    Review of Systems  Review of Systems   Constitutional: Negative for chills, fever and malaise/fatigue.   Respiratory: Negative for cough and shortness of breath.    Cardiovascular: Negative for chest pain, palpitations, orthopnea and leg swelling.   Gastrointestinal: Positive for abdominal pain, nausea and vomiting. Negative for diarrhea.   Genitourinary: Negative for dysuria and urgency.   Musculoskeletal: Positive for back pain.   Skin: Negative for rash.   Neurological: Negative for seizures, weakness and headaches.   Psychiatric/Behavioral: The patient is not nervous/anxious.    All other systems reviewed and are negative.      Past Medical History   has a past medical history of Abdominal migraine, Abdominal migraine, Colitis, and Gastritis.    Surgical History   has a past surgical history that includes gastroscopy (9/3/2014); gastroscopy-endo (11/15/2014); other; other orthopedic surgery; and pr upper gi endoscopy,diagnosis (N/A, " "5/17/2022).    Family History  family history is not on file.    Social History   reports that he has never smoked. He has never used smokeless tobacco. He reports current drug use. Drugs: Inhaled and Marijuana. He reports that he does not drink alcohol.    Medications  Home Medications     Reviewed by Corin Odom (Pharmacy Tech) on 05/28/22 at 1907  Med List Status: Complete   Medication Last Dose Status   ALPRAZolam (XANAX) 0.5 MG Tab 5/28/2022 Active              Current Facility-Administered Medications   Medication Dose Route Frequency Provider Last Rate Last Admin   • fentaNYL (SUBLIMAZE) injection 50 mcg  50 mcg Intravenous Q HOUR PRN Marcus Ojeda M.D.   50 mcg at 05/28/22 1915   • hyoscyamine-lidocaine-Maalox (GI Cocktail) oral susp cup 30 mL  30 mL Oral Once Marcus Ojeda M.D.         Current Outpatient Medications   Medication Sig Dispense Refill   • ALPRAZolam (XANAX) 0.5 MG Tab Take 0.5 mg by mouth 3 times a day as needed for Sleep or Anxiety.         Allergies  Allergies   Allergen Reactions   • Acetaminophen [Tylenol] Anaphylaxis and Nausea     Nausea, \"it just doesn't sit right\"  5/29/2021 patient states that his throat swells shut       Vital Signs last 24 hours  Temp:  [36.7 °C (98 °F)-38.2 °C (100.8 °F)] 38.2 °C (100.7 °F)  Pulse:  [120-137] 129  Resp:  [12-21] 12  BP: (121-167)/() 121/75  SpO2:  [96 %-99 %] 99 %    Physical Exam  Physical Exam  Vitals and nursing note reviewed.   Constitutional:       General: He is not in acute distress.     Appearance: He is ill-appearing. He is not toxic-appearing or diaphoretic.   HENT:      Head: Normocephalic and atraumatic.      Mouth/Throat:      Mouth: Mucous membranes are dry.   Cardiovascular:      Rate and Rhythm: Tachycardia present.      Pulses: Normal pulses.      Heart sounds: No murmur heard.  Pulmonary:      Effort: Pulmonary effort is normal. No respiratory distress.      Breath sounds: Normal breath sounds. No wheezing, rhonchi " or rales.   Abdominal:      General: There is no distension.      Palpations: Abdomen is soft.      Tenderness: There is abdominal tenderness. There is no guarding.   Musculoskeletal:      Right lower leg: No edema.      Left lower leg: No edema.      Comments: Surgical incision in thoracolumbar spine. Clean and dry   Skin:     General: Skin is warm and dry.      Capillary Refill: Capillary refill takes less than 2 seconds.      Coloration: Skin is not jaundiced.   Neurological:      General: No focal deficit present.      Mental Status: He is alert and oriented to person, place, and time.      Cranial Nerves: No cranial nerve deficit.   Psychiatric:         Mood and Affect: Mood normal.         Fluids    Intake/Output Summary (Last 24 hours) at 5/28/2022 1948  Last data filed at 5/28/2022 1748  Gross per 24 hour   Intake 2000 ml   Output --   Net 2000 ml       Laboratory  Recent Results (from the past 48 hour(s))   CBC WITH DIFFERENTIAL    Collection Time: 05/28/22  4:44 PM   Result Value Ref Range    WBC 21.6 (H) 4.8 - 10.8 K/uL    RBC 5.74 4.70 - 6.10 M/uL    Hemoglobin 15.6 14.0 - 18.0 g/dL    Hematocrit 44.1 42.0 - 52.0 %    MCV 76.8 (L) 81.4 - 97.8 fL    MCH 27.2 27.0 - 33.0 pg    MCHC 35.4 (H) 33.7 - 35.3 g/dL    RDW 35.9 35.9 - 50.0 fL    Platelet Count 530 (H) 164 - 446 K/uL    MPV 11.7 9.0 - 12.9 fL    Neutrophils-Polys 86.50 (H) 44.00 - 72.00 %    Lymphocytes 5.30 (L) 22.00 - 41.00 %    Monocytes 7.70 0.00 - 13.40 %    Eosinophils 0.00 0.00 - 6.90 %    Basophils 0.10 0.00 - 1.80 %    Immature Granulocytes 0.40 0.00 - 0.90 %    Nucleated RBC 0.00 /100 WBC    Neutrophils (Absolute) 18.67 (H) 1.82 - 7.42 K/uL    Lymphs (Absolute) 1.14 1.00 - 4.80 K/uL    Monos (Absolute) 1.67 (H) 0.00 - 0.85 K/uL    Eos (Absolute) 0.00 0.00 - 0.51 K/uL    Baso (Absolute) 0.03 0.00 - 0.12 K/uL    Immature Granulocytes (abs) 0.09 0.00 - 0.11 K/uL    NRBC (Absolute) 0.00 K/uL   COMP METABOLIC PANEL    Collection Time: 05/28/22   4:44 PM   Result Value Ref Range    Sodium 135 135 - 145 mmol/L    Potassium 3.8 3.6 - 5.5 mmol/L    Chloride 83 (L) 96 - 112 mmol/L    Co2 26 20 - 33 mmol/L    Anion Gap 26.0 (H) 7.0 - 16.0    Glucose 193 (H) 65 - 99 mg/dL    Bun 45 (H) 8 - 22 mg/dL    Creatinine 2.19 (H) 0.50 - 1.40 mg/dL    Calcium 10.6 (H) 8.5 - 10.5 mg/dL    AST(SGOT) 35 12 - 45 U/L    ALT(SGPT) 18 2 - 50 U/L    Alkaline Phosphatase 84 30 - 99 U/L    Total Bilirubin 0.3 0.1 - 1.5 mg/dL    Albumin 5.2 (H) 3.2 - 4.9 g/dL    Total Protein 9.6 (H) 6.0 - 8.2 g/dL    Globulin 4.4 (H) 1.9 - 3.5 g/dL    A-G Ratio 1.2 g/dL   LIPASE    Collection Time: 22  4:44 PM   Result Value Ref Range    Lipase 81 11 - 82 U/L   Lactic Acid    Collection Time: 22  4:44 PM   Result Value Ref Range    Lactic Acid 7.7 (HH) 0.5 - 2.0 mmol/L   PROCALCITONIN    Collection Time: 22  4:44 PM   Result Value Ref Range    Procalcitonin 0.14 <0.25 ng/mL   CRP QUANTITIVE (NON-CARDIAC)    Collection Time: 22  4:44 PM   Result Value Ref Range    Stat C-Reactive Protein 0.53 0.00 - 0.75 mg/dL   MAGNESIUM    Collection Time: 22  4:44 PM   Result Value Ref Range    Magnesium 2.4 1.5 - 2.5 mg/dL   ESTIMATED GFR    Collection Time: 22  4:44 PM   Result Value Ref Range    GFR (CKD-EPI) 42 (A) >60 mL/min/1.73 m 2   BETA-HYDROXYBUTYRIC ACID    Collection Time: 22  4:44 PM   Result Value Ref Range    beta-Hydroxybutyric Acid 0.07 0.02 - 0.27 mmol/L   OSMOLALITY SERUM    Collection Time: 22  4:44 PM   Result Value Ref Range    Osmolality Serum 299 (H) 278 - 298 mOsm/kg H2O   EKG    Collection Time: 22  4:44 PM   Result Value Ref Range    Report       [facility]    Test Date:  2022  Pt Name:    GABRIEL CEBALLOS     Department: ER  MRN:        [id number]                      Room:       [facility]  Gender:     Male                         Technician: [id number]  :        1995                    Requested By:BARTOLOME MERCADO  Order #:    [id number]                    Reading MD: Rusty Velazquez MD    Measurements  Intervals                                Axis  Rate:       148                          P:  CO:                                      QRS:        98  QRSD:       104                          T:          -5  QT:         328  QTc:        515    Interpretive Statements  Motion artifact is present due to the patient's acuity, appears to be a sinus  tachycardic rate at a rate of 145, no prolonged CO interval, QTC calculated  being long at 515 ms daily rate dependent, narrow complex, nonspecific ST  segment depressions are globally present though somewhat obscured by rate  irregularities.  Techni donya poor tracing, prior EKG is sinus rhythm with  respiratory arrhythmia, this is a abnormal EKG.  Repeat is ordered.  Electronically Signed On 2022 18:03:48 PDT by Rusty Velazquez MD     COV-2, FLU A/B, AND RSV BY PCR (2-4 HOURS CEPHEID): Collect NP swab in VTM    Collection Time: 22  5:30 PM    Specimen: Nasopharyngeal; Respirate   Result Value Ref Range    Influenza virus A RNA Negative Negative    Influenza virus B, PCR Negative Negative    RSV, PCR Negative Negative    SARS-CoV-2 by PCR NotDetected     SARS-CoV-2 Source NP Swab    VENOUS BLOOD GAS    Collection Time: 22  6:00 PM   Result Value Ref Range    Venous Bg Ph 7.63 (HH) 7.31 - 7.45    Venous Bg Pco2 23.5 (L) 41.0 - 51.0 mmHg    Venous Bg Po2 37.0 25.0 - 40.0 mmHg    Venous Bg O2 Saturation 75.7 %    Venous Bg Hco3 24 24 - 28 mmol/L    Venous Bg Base Excess 5 mmol/L    Body Temp 36.5 Centigrade   EKG (NOW)    Collection Time: 22  6:30 PM   Result Value Ref Range    Report       [facility] Emergency Dept.    Test Date:  2022  Pt Name:    GABRIEL CEBALLOS     Department: ER  MRN:        [id number]                      Room:       [facility]  Gender:     Male                         Technician: [id number]  :         1995                   Requested By:BARTOLOME MERCADO  Order #:    [id number]                    Reading MD: Rusty Velazquez MD    Measurements  Intervals                                Axis  Rate:       129                          P:          0  SD:         83                           QRS:        84  QRSD:       96                           T:  QT:         340  QTc:        499    Interpretive Statements  SINUS TACHYCARDIA, rate of 129, normal SD, QTC prolongation at 499 ms, normal  axis, narrow complex, nonspecific repull abnormalities that could suggest  underlying ischemia, no true reciprocal changes or acute ST segment  elevations  are noted.  Improved from prior EKG at 1644 tonight.  Electronically Signed On 5- 18:33: 53 PDT by Rusty Velazquez MD     LACTIC ACID    Collection Time: 05/28/22  7:20 PM   Result Value Ref Range    Lactic Acid 3.3 (H) 0.5 - 2.0 mmol/L       Imaging  CT-ABDOMEN-PELVIS WITH   Final Result      1.  No evidence of bowel obstruction or focal inflammatory change.      2.  Extensive pedicular screw and mague fixation involving the thoracolumbar spine.      3.  Mild thickening of the wall of the lower esophagus likely related to gastroesophageal reflux.      DX-CHEST-PORTABLE (1 VIEW)   Final Result      No evidence of acute cardiopulmonary process.          Assessment/Plan  * Intravascular volume depletion- (present on admission)  Assessment & Plan  From vomiting  Fluid resuscitate    SIRS (systemic inflammatory response syndrome) (HCC)  Assessment & Plan  SIRS criteria identified on my evaluation include:  Fever, with temperature greater than 101 deg F, Tachycardia, with heart rate greater than 90 BPM and Leukocytosis, with WBC greater than 12,000  SIRS is non-infectious, the patient does not have sepsis    Likely combination of intravascular depletion from vomiting, ?infection given fever and leukocytosis, or drug withdrawal. Pt has hx of xanax overdose.Lactate was 7 at admission   CT A/P  without obvious intraabomdinal abnormalities.   Pt received 2L fluid resuscitation, will give another liter of LR then fluid maintenance 150cc/hr  Start on piptazo, blood cultures.   Monitor UOP closely   Check HIV, RPR, hepaitits, chlamydia/GC. Pt had sexual intercourse with a woman whom he reported was sick with HIV.      Intractable nausea and vomiting- (present on admission)  Assessment & Plan  Unclear etiology leading to metabolic alkalosis and intravascular depletion  Fluid resuscitated, anti nausea  CT A/P with some evidence of possible GERD.   Will start PPI    ERIN (acute kidney injury) (HCC)- (present on admission)  Assessment & Plan  Due to intrasvascular depletion. Fluid resuscitate  Creatinine improving   Monitor UOP closely     Lactic acid acidosis- (present on admission)  Assessment & Plan  Likely due to intravascular depletion +/- infection       Hypokalemia- (present on admission)  Assessment & Plan  replete      Discussed patient condition and risk of morbidity and/or mortality with RN, RT, Pharmacy, Charge nurse / hot rounds and Patient.      Can be admitted to IMCU  D/w Dr. Tirado [facility] Medicine and Dr. Ojeda, ED  Will sign off, please call with questions.

## 2022-05-29 NOTE — ED NOTES
Bedside report given to rn   Pt transferred to floor with rn  Pt belongings with him during transport

## 2022-05-29 NOTE — PROGRESS NOTES
Patient has called 8 times within the last hour and requesting pain meds.     Patient received 1mg of IV dilaudid at 0859 and was told that he can have it every 2 hours as the order is written.    Patient has been exiting bed without supervision and taking his ekg wires off and removing his IV tubing even after  Being asked not to do so.     I have repeatedly asked patient to call for assistance and to keep his curtain open so staff can monitor him and prevent him from falling when he exits the bed and he has ignored my requests and continues to shut his curtains.

## 2022-05-30 ENCOUNTER — PHARMACY VISIT (OUTPATIENT)
Dept: PHARMACY | Facility: MEDICAL CENTER | Age: 27
End: 2022-05-30
Payer: MEDICARE

## 2022-05-30 VITALS
SYSTOLIC BLOOD PRESSURE: 123 MMHG | HEART RATE: 100 BPM | HEIGHT: 63 IN | OXYGEN SATURATION: 97 % | RESPIRATION RATE: 18 BRPM | DIASTOLIC BLOOD PRESSURE: 91 MMHG | TEMPERATURE: 97.7 F | WEIGHT: 145.28 LBS | BODY MASS INDEX: 25.74 KG/M2

## 2022-05-30 PROBLEM — N17.9 AKI (ACUTE KIDNEY INJURY) (HCC): Status: RESOLVED | Noted: 2022-01-14 | Resolved: 2022-05-30

## 2022-05-30 PROBLEM — R11.2 INTRACTABLE NAUSEA AND VOMITING: Status: RESOLVED | Noted: 2022-05-15 | Resolved: 2022-05-30

## 2022-05-30 PROBLEM — F13.939 BENZODIAZEPINE WITHDRAWAL (HCC): Status: RESOLVED | Noted: 2022-05-28 | Resolved: 2022-05-30

## 2022-05-30 PROBLEM — E83.39 HYPOPHOSPHATEMIA: Status: RESOLVED | Noted: 2022-05-28 | Resolved: 2022-05-30

## 2022-05-30 PROBLEM — E86.1 INTRAVASCULAR VOLUME DEPLETION: Status: RESOLVED | Noted: 2022-05-28 | Resolved: 2022-05-30

## 2022-05-30 PROBLEM — R65.10 SIRS (SYSTEMIC INFLAMMATORY RESPONSE SYNDROME) (HCC): Status: RESOLVED | Noted: 2022-05-28 | Resolved: 2022-05-30

## 2022-05-30 PROBLEM — E87.20 LACTIC ACID ACIDOSIS: Status: RESOLVED | Noted: 2022-01-14 | Resolved: 2022-05-30

## 2022-05-30 LAB
ANION GAP SERPL CALC-SCNC: 15 MMOL/L (ref 7–16)
BASOPHILS # BLD AUTO: 0.3 % (ref 0–1.8)
BASOPHILS # BLD: 0.04 K/UL (ref 0–0.12)
BUN SERPL-MCNC: 21 MG/DL (ref 8–22)
CALCIUM SERPL-MCNC: 9.4 MG/DL (ref 8.5–10.5)
CHLORIDE SERPL-SCNC: 104 MMOL/L (ref 96–112)
CO2 SERPL-SCNC: 18 MMOL/L (ref 20–33)
CREAT SERPL-MCNC: 1.04 MG/DL (ref 0.5–1.4)
EKG IMPRESSION: NORMAL
EOSINOPHIL # BLD AUTO: 0.01 K/UL (ref 0–0.51)
EOSINOPHIL NFR BLD: 0.1 % (ref 0–6.9)
ERYTHROCYTE [DISTWIDTH] IN BLOOD BY AUTOMATED COUNT: 38.8 FL (ref 35.9–50)
GFR SERPLBLD CREATININE-BSD FMLA CKD-EPI: 102 ML/MIN/1.73 M 2
GLUCOSE SERPL-MCNC: 95 MG/DL (ref 65–99)
HCT VFR BLD AUTO: 35.4 % (ref 42–52)
HGB BLD-MCNC: 12.1 G/DL (ref 14–18)
IMM GRANULOCYTES # BLD AUTO: 0.07 K/UL (ref 0–0.11)
IMM GRANULOCYTES NFR BLD AUTO: 0.5 % (ref 0–0.9)
LYMPHOCYTES # BLD AUTO: 1.95 K/UL (ref 1–4.8)
LYMPHOCYTES NFR BLD: 12.9 % (ref 22–41)
MAGNESIUM SERPL-MCNC: 2.3 MG/DL (ref 1.5–2.5)
MCH RBC QN AUTO: 27.4 PG (ref 27–33)
MCHC RBC AUTO-ENTMCNC: 34.2 G/DL (ref 33.7–35.3)
MCV RBC AUTO: 80.3 FL (ref 81.4–97.8)
MONOCYTES # BLD AUTO: 1.03 K/UL (ref 0–0.85)
MONOCYTES NFR BLD AUTO: 6.8 % (ref 0–13.4)
NEUTROPHILS # BLD AUTO: 11.96 K/UL (ref 1.82–7.42)
NEUTROPHILS NFR BLD: 79.4 % (ref 44–72)
NRBC # BLD AUTO: 0 K/UL
NRBC BLD-RTO: 0 /100 WBC
PHOSPHATE SERPL-MCNC: 3.7 MG/DL (ref 2.5–4.5)
PLATELET # BLD AUTO: 278 K/UL (ref 164–446)
PMV BLD AUTO: 10.8 FL (ref 9–12.9)
POTASSIUM SERPL-SCNC: 4.2 MMOL/L (ref 3.6–5.5)
RBC # BLD AUTO: 4.41 M/UL (ref 4.7–6.1)
SODIUM SERPL-SCNC: 137 MMOL/L (ref 135–145)
WBC # BLD AUTO: 15.1 K/UL (ref 4.8–10.8)

## 2022-05-30 PROCEDURE — 84100 ASSAY OF PHOSPHORUS: CPT

## 2022-05-30 PROCEDURE — 93005 ELECTROCARDIOGRAM TRACING: CPT | Performed by: HOSPITALIST

## 2022-05-30 PROCEDURE — 99239 HOSP IP/OBS DSCHRG MGMT >30: CPT | Performed by: HOSPITALIST

## 2022-05-30 PROCEDURE — 93010 ELECTROCARDIOGRAM REPORT: CPT | Performed by: INTERNAL MEDICINE

## 2022-05-30 PROCEDURE — 80048 BASIC METABOLIC PNL TOTAL CA: CPT

## 2022-05-30 PROCEDURE — RXMED WILLOW AMBULATORY MEDICATION CHARGE: Performed by: HOSPITALIST

## 2022-05-30 PROCEDURE — RXMED WILLOW AMBULATORY MEDICATION CHARGE: Performed by: EMERGENCY MEDICINE

## 2022-05-30 PROCEDURE — 700102 HCHG RX REV CODE 250 W/ 637 OVERRIDE(OP): Performed by: INTERNAL MEDICINE

## 2022-05-30 PROCEDURE — 700111 HCHG RX REV CODE 636 W/ 250 OVERRIDE (IP): Performed by: INTERNAL MEDICINE

## 2022-05-30 PROCEDURE — A9270 NON-COVERED ITEM OR SERVICE: HCPCS | Performed by: INTERNAL MEDICINE

## 2022-05-30 PROCEDURE — C9113 INJ PANTOPRAZOLE SODIUM, VIA: HCPCS | Performed by: INTERNAL MEDICINE

## 2022-05-30 PROCEDURE — 85025 COMPLETE CBC W/AUTO DIFF WBC: CPT

## 2022-05-30 PROCEDURE — 700105 HCHG RX REV CODE 258: Performed by: HOSPITALIST

## 2022-05-30 PROCEDURE — 83735 ASSAY OF MAGNESIUM: CPT

## 2022-05-30 PROCEDURE — 700102 HCHG RX REV CODE 250 W/ 637 OVERRIDE(OP): Performed by: HOSPITALIST

## 2022-05-30 PROCEDURE — 700101 HCHG RX REV CODE 250: Performed by: STUDENT IN AN ORGANIZED HEALTH CARE EDUCATION/TRAINING PROGRAM

## 2022-05-30 PROCEDURE — A9270 NON-COVERED ITEM OR SERVICE: HCPCS | Performed by: HOSPITALIST

## 2022-05-30 PROCEDURE — 700111 HCHG RX REV CODE 636 W/ 250 OVERRIDE (IP): Performed by: HOSPITALIST

## 2022-05-30 RX ORDER — CHLORDIAZEPOXIDE HYDROCHLORIDE 10 MG/1
10 CAPSULE, GELATIN COATED ORAL 3 TIMES DAILY PRN
Qty: 3 CAPSULE | Refills: 0 | Status: SHIPPED | OUTPATIENT
Start: 2022-05-30 | End: 2022-05-30 | Stop reason: SDUPTHER

## 2022-05-30 RX ORDER — OMEPRAZOLE 20 MG/1
20 CAPSULE, DELAYED RELEASE ORAL DAILY
Qty: 30 CAPSULE | Refills: 0 | Status: SHIPPED | OUTPATIENT
Start: 2022-05-30 | End: 2022-06-05

## 2022-05-30 RX ORDER — ONDANSETRON 4 MG/1
4 TABLET, FILM COATED ORAL EVERY 4 HOURS PRN
Qty: 20 TABLET | Refills: 0 | Status: SHIPPED | OUTPATIENT
Start: 2022-05-30 | End: 2022-06-05

## 2022-05-30 RX ORDER — CHLORDIAZEPOXIDE HYDROCHLORIDE 10 MG/1
10 CAPSULE, GELATIN COATED ORAL 3 TIMES DAILY PRN
Qty: 3 CAPSULE | Refills: 0 | Status: SHIPPED | OUTPATIENT
Start: 2022-05-30 | End: 2022-05-31

## 2022-05-30 RX ORDER — NALOXONE HYDROCHLORIDE 4 MG/.1ML
SPRAY NASAL
Qty: 2 EACH | Refills: 0 | Status: SHIPPED | OUTPATIENT
Start: 2022-05-30 | End: 2022-09-06

## 2022-05-30 RX ADMIN — LORAZEPAM 2 MG: 2 INJECTION INTRAMUSCULAR; INTRAVENOUS at 00:07

## 2022-05-30 RX ADMIN — LIDOCAINE 1 PATCH: 50 PATCH TOPICAL at 02:39

## 2022-05-30 RX ADMIN — PANTOPRAZOLE SODIUM 40 MG: 40 INJECTION, POWDER, FOR SOLUTION INTRAVENOUS at 05:33

## 2022-05-30 RX ADMIN — METOCLOPRAMIDE 10 MG: 5 INJECTION, SOLUTION INTRAMUSCULAR; INTRAVENOUS at 00:06

## 2022-05-30 RX ADMIN — SUCRALFATE 1 G: 1 SUSPENSION ORAL at 00:06

## 2022-05-30 RX ADMIN — SUCRALFATE 1 G: 1 SUSPENSION ORAL at 05:32

## 2022-05-30 RX ADMIN — CAPSAICIN: 0.25 CREAM TOPICAL at 05:32

## 2022-05-30 RX ADMIN — LORAZEPAM 2 MG: 2 INJECTION INTRAMUSCULAR; INTRAVENOUS at 09:44

## 2022-05-30 RX ADMIN — LORAZEPAM 2 MG: 2 INJECTION INTRAMUSCULAR; INTRAVENOUS at 04:16

## 2022-05-30 RX ADMIN — HYDROMORPHONE HYDROCHLORIDE 1 MG: 1 INJECTION, SOLUTION INTRAMUSCULAR; INTRAVENOUS; SUBCUTANEOUS at 08:38

## 2022-05-30 RX ADMIN — HYDROMORPHONE HYDROCHLORIDE 1 MG: 1 INJECTION, SOLUTION INTRAMUSCULAR; INTRAVENOUS; SUBCUTANEOUS at 02:36

## 2022-05-30 RX ADMIN — METOCLOPRAMIDE 10 MG: 5 INJECTION, SOLUTION INTRAMUSCULAR; INTRAVENOUS at 06:00

## 2022-05-30 RX ADMIN — SENNOSIDES AND DOCUSATE SODIUM 2 TABLET: 50; 8.6 TABLET ORAL at 05:32

## 2022-05-30 RX ADMIN — SODIUM CHLORIDE, POTASSIUM CHLORIDE, SODIUM LACTATE AND CALCIUM CHLORIDE: 600; 310; 30; 20 INJECTION, SOLUTION INTRAVENOUS at 06:47

## 2022-05-30 ASSESSMENT — FIBROSIS 4 INDEX: FIB4 SCORE: 0.31

## 2022-05-30 ASSESSMENT — PAIN DESCRIPTION - PAIN TYPE
TYPE: ACUTE PAIN
TYPE: ACUTE PAIN

## 2022-05-30 NOTE — PROGRESS NOTES
Discharge paperwork given to patient. Reviewed all content, results, discharge plan, follow up appointments, and medications. Lidocaine medication patch removed. Meds delivered from [facility]. Narcan delivered by ER Pharmacist. ER pharmacist reviewed medications use and administration. All belongings returned to patient and all questions answered. Tele box and PIV removed. Patient discharged to round about with care aide.

## 2022-05-30 NOTE — PROGRESS NOTES
"Monitor tech called to let me know patient was off monitor. Checked in on patient and he had removed himself from the monitor, changed into his personal clothes, and stated that he \"felt better and wanted to go home.\" I informed him the doctor has not signed off on his discharge yet, so he would be leaving against medical advice (AMA). Educated patient on AMA and that it is his choice. Patient stated he wanted to speak with the doctor. Dr. Aguilar notified and came to patient's bedside. Dr. Aguilar talked with patient. Will continue to monitor.   "

## 2022-05-30 NOTE — ED NOTES
Naloxone 4 mg/0.1 mL nasal spray (2 pack) was dispensed to the patient for prevention of potential opioid related overdose per protocol.     Counseling was provided regarding:  - Information relating to the recognition, prevention and responses to opioid-related drug overdoses  - How to safely administer the naloxone nasal spray  - Potential side effects and adverse events related to the naloxone nasal spray  - The importance of seeking immediate emergency medical assistance for a person experiencing an opioid-related drug overdose, even after the administration of naloxone  - The immunity from certain civil or criminal liabilities for seeking medical assistance for a person experiencing an opioid-related overdose    Naloxone was given to the patient.     Aleena Al, PharmD

## 2022-05-30 NOTE — DISCHARGE INSTRUCTIONS
Discharge Instructions    Discharged to home by car with friend. Discharged via walking, hospital escort: Yes.  Special equipment needed: Not Applicable    Be sure to schedule a follow-up appointment with your primary care doctor or any specialists as instructed.     Discharge Plan:   Diet Plan: Discussed  Activity Level: Discussed  Confirmed Follow up Appointment: Patient to Call and Schedule Appointment  Confirmed Symptoms Management: Discussed  Medication Reconciliation Updated: Yes    I understand that a diet low in cholesterol, fat, and sodium is recommended for good health. Unless I have been given specific instructions below for another diet, I accept this instruction as my diet prescription.   Other diet: regular diet as tollerated    Special Instructions: None    Is patient discharged on Warfarin / Coumadin?   No     Depression / Suicide Risk    As you are discharged from [facility] facility, it is important to learn how to keep safe from harming yourself.    Recognize the warning signs:  Abrupt changes in personality, positive or negative- including increase in energy   Giving away possessions  Change in eating patterns- significant weight changes-  positive or negative  Change in sleeping patterns- unable to sleep or sleeping all the time   Unwillingness or inability to communicate  Depression  Unusual sadness, discouragement and loneliness  Talk of wanting to die  Neglect of personal appearance   Rebelliousness- reckless behavior  Withdrawal from people/activities they love  Confusion- inability to concentrate     If you or a loved one observes any of these behaviors or has concerns about self-harm, here's what you can do:  Talk about it- your feelings and reasons for harming yourself  Remove any means that you might use to hurt yourself (examples: pills, rope, extension cords, firearm)  Get professional help from the community (Mental Health, Substance Abuse, psychological counseling)  Do not be  alone:Call your Safe Contact- someone whom you trust who will be there for you.  Call your local CRISIS HOTLINE 281-8995 or 504-555-0041  Call your local Children's Mobile Crisis Response Team [place] (571) 349-4869 or www.Snacksquare  Call the toll free National Suicide Prevention Hotlines   National Suicide Prevention Lifeline 847-929-WYRW (6600)  [place] Line Network 800-SUICIDE (228-0408)

## 2022-05-30 NOTE — DISCHARGE SUMMARY
Discharge Summary    CHIEF COMPLAINT ON ADMISSION  Chief Complaint   Patient presents with   • Abdominal Pain     For 10 days. Seen here previously for same symptoms       Reason for Admission  abd pain, nausea      Admission Date  5/28/2022    CODE STATUS  Full Code    HPI & HOSPITAL COURSE  This is a 27 y.o. male here with intractable nausea and vomiting.     Gilbert Diop has past medical history of chronic/recurrent abdominal pain associated with episodes of severe nausea vomiting resulting in dehydration.  Patient presented to the emergency room on 5/28/2022 with ongoing symptoms reported since his last hospitalization.  Patient was noted to be severely dehydrated with lactic acidosis.  He was admitted to the IM for fluid resuscitation.  It is noted that the patient had an EGD on 5/17/2022 demonstrating grade 2 esophagitis.  Multiple urine drug screens have been positive for cannabis.    The patient was admitted to the IMCU, he received fluid resuscitation and his multiple electrolyte derangement were corrected.  Throughout his hospitalization the patient constantly requested unsafe and unreasonable doses of IV pain medications and benzodiazepines.  He reported that he is withdrawing from xanax and his clinical response to benzodiazepines in the hospital suggests this is true.    The patient is tolerating his diet, his metabolic panel this morning is normal, his heart rate is in the 90's and 100's and he is ambulatory with a steady gait.  He requests to go home.     I have prescribed zofran, omeprazole and a short course of librium for this patient.  I do believe that he is dependent on benzodiazepines and he has been prescribed a short course.  I have recommended that he establish care with an outpatient provider immediately, he was given instructions to report tomorrow to [facility] or [facility] to discuss a structure benzodiazepine taper and plan. We discussed the risk  "associated with benzodiazepines     He has been encouraged to stop using marijuana.    He will be provided with narcan.    Therefore, he is discharged in fair and stable condition to home with close outpatient follow-up.    The patient met 2-midnight criteria for an inpatient stay at the time of discharge.    Discharge Date  5/30/2022    FOLLOW UP ITEMS POST DISCHARGE  Follow up tomorrow to establish care with a PCP    DISCHARGE DIAGNOSES  Principal Problem:    Cannabis hyperemesis syndrome concurrent with and due to cannabis abuse (HCC) POA: Yes  Resolved Problems:    Hypokalemia POA: Yes    ERIN (acute kidney injury) (HCC) POA: Yes    Hypophosphatemia POA: Yes    Lactic acid acidosis POA: Yes    Intractable nausea and vomiting POA: Yes    Intravascular volume depletion POA: Yes    SIRS (systemic inflammatory response syndrome) (HCC) POA: Yes    Benzodiazepine withdrawal (HCC) POA: Yes      FOLLOW UP  No future appointments.  [address] [facility] [address]  [phone]        [facility] ([facility]  [facility] [address]  [phone]  Follow up        MEDICATIONS ON DISCHARGE     Medication List      START taking these medications      Instructions   chlordiazepoxide 10 MG capsule  Commonly known as: LIBRIUM   Take 1 Capsule by mouth 3 times a day as needed for Anxiety for up to 1 day.  Dose: 10 mg     omeprazole 20 MG delayed-release capsule  Commonly known as: PRILOSEC   Take 1 Capsule by mouth every day.  Dose: 20 mg     ondansetron 4 MG Tabs tablet  Commonly known as: Zofran   Take 1 Tablet by mouth every four hours as needed for Nausea/Vomiting.  Dose: 4 mg        STOP taking these medications    ALPRAZolam 0.5 MG Tabs  Commonly known as: XANAX            Allergies  Allergies   Allergen Reactions   • Acetaminophen [Tylenol] Anaphylaxis and Nausea     Nausea, \"it just doesn't sit right\"  5/29/2021 patient states that his throat swells shut "       DIET  Orders Placed This Encounter   Procedures   • Diet Order Diet: Regular     Standing Status:   Standing     Number of Occurrences:   1     Order Specific Question:   Diet:     Answer:   Regular [1]       ACTIVITY  As tolerated.  Weight bearing as tolerated    CONSULTATIONS  None    PROCEDURES  CT abdomen and pelvis:  1.  No evidence of bowel obstruction or focal inflammatory change.     2.  Extensive pedicular screw and mague fixation involving the thoracolumbar spine.     3.  Mild thickening of the wall of the lower esophagus likely related to gastroesophageal reflux.    LABORATORY  Lab Results   Component Value Date    SODIUM 137 05/30/2022    POTASSIUM 4.2 05/30/2022    CHLORIDE 104 05/30/2022    CO2 18 (L) 05/30/2022    GLUCOSE 95 05/30/2022    BUN 21 05/30/2022    CREATININE 1.04 05/30/2022    CREATININE 0.4 04/10/2006        Lab Results   Component Value Date    WBC 15.1 (H) 05/30/2022    HEMOGLOBIN 12.1 (L) 05/30/2022    HEMATOCRIT 35.4 (L) 05/30/2022    PLATELETCT 278 05/30/2022        Total time of the discharge process exceeds 45 minutes.

## 2022-05-30 NOTE — CARE PLAN
The patient is Stable - Low risk of patient condition declining or worsening    Shift Goals  Clinical Goals: Improve fluid balance and diagnostic test, decrease nausea, manage pain and anxiety, promote rest  Patient Goals: Pain meds, Ativan, Sleep  Family Goals: MARKELL. No family present.    Progress made toward(s) clinical / shift goals:    Problem: Pain - Standard  Goal: Alleviation of pain or a reduction in pain to the patient’s comfort goal  Outcome: Progressing     Problem: Fall Risk  Goal: Patient will remain free from falls  Outcome: Progressing     Problem: Depression  Goal: Patient and family/caregiver will verbalize accurate information about at least two of the possible causes of depression, three-four of the signs and symptoms of depression  Outcome: Progressing       Patient is not progressing towards the following goals:      Problem: Knowledge Deficit - Standard  Goal: Patient and family/care givers will demonstrate understanding of plan of care, disease process/condition, diagnostic tests and medications  Outcome: Not Progressing

## 2022-05-30 NOTE — PROGRESS NOTES
"Patient found ambulating out of bed to drink out of sink faucet when diet order is NPO. Patient educated on diet and reasoning for diet order. Patient states he is agreeable but has done this multiple times throughout the last two shifts. Patient is laying comfortably in bed, with no signs of distress or discomfort. Patient also requesting ativan \"for his anxiety.\" However, patient was just given 1mg IVP of dilaudid at 0838. Patient educated on IV medicines and administration. Informed him that these medicines need to be dosed appropriately from each other to prevent respiratory depression or/or respiratory arrest. Will continue to monitor.       "

## 2022-05-30 NOTE — CARE PLAN
The patient is Watcher - Medium risk of patient condition declining or worsening    Shift Goals  Clinical Goals: Improve fluid balance and diagnostic test, decrease nausea, manage pain and anxiety, promote rest  Patient Goals: Pain meds, Ativan, Sleep  Family Goals: MARKELL. No family present.    Progress made toward(s) clinical / shift goals:      Problem: Pain - Standard  Goal: Alleviation of pain or a reduction in pain to the patient’s comfort goal  5/29/2022 2355 by Susan Lucas R.N.  Outcome: Progressing    Problem: Fall Risk  Goal: Patient will remain free from falls  5/29/2022 2355 by Susan Lucas R.N.  Outcome: Progressing       Patient is not progressing towards the following goals:      Problem: Knowledge Deficit - Standard  Goal: Patient and family/care givers will demonstrate understanding of plan of care, disease process/condition, diagnostic tests and medications  Outcome: Not Progressing

## 2022-05-30 NOTE — PROGRESS NOTES
Bedside report received and patient care assumed. Pt is resting in bed, A&Ox4, with complaints of lower abdominal pain 9/10, and is on RA. Tele monitoring in place. All fall precautions are in place, belongings at bedside table.  Pt was updated on POC, no questions or concerns. Pt educated on use of call light for assistance.

## 2022-06-03 ENCOUNTER — OFFICE VISIT (OUTPATIENT)
Dept: URGENT CARE | Facility: CLINIC | Age: 27
End: 2022-06-03
Payer: COMMERCIAL

## 2022-06-03 ENCOUNTER — APPOINTMENT (OUTPATIENT)
Dept: URGENT CARE | Facility: PHYSICIAN GROUP | Age: 27
End: 2022-06-03
Payer: COMMERCIAL

## 2022-06-05 ENCOUNTER — APPOINTMENT (OUTPATIENT)
Dept: RADIOLOGY | Facility: MEDICAL CENTER | Age: 27
DRG: 897 | End: 2022-06-05
Attending: EMERGENCY MEDICINE
Payer: COMMERCIAL

## 2022-06-05 ENCOUNTER — HOSPITAL ENCOUNTER (INPATIENT)
Facility: MEDICAL CENTER | Age: 27
LOS: 4 days | DRG: 897 | End: 2022-06-09
Attending: EMERGENCY MEDICINE | Admitting: STUDENT IN AN ORGANIZED HEALTH CARE EDUCATION/TRAINING PROGRAM
Payer: COMMERCIAL

## 2022-06-05 DIAGNOSIS — F13.930 BENZODIAZEPINE WITHDRAWAL WITHOUT COMPLICATION (HCC): ICD-10-CM

## 2022-06-05 DIAGNOSIS — F13.20 BENZODIAZEPINE DEPENDENCE (HCC): ICD-10-CM

## 2022-06-05 DIAGNOSIS — F13.939 BENZODIAZEPINE WITHDRAWAL WITH COMPLICATION (HCC): ICD-10-CM

## 2022-06-05 DIAGNOSIS — F41.9 ANXIETY: ICD-10-CM

## 2022-06-05 PROBLEM — F13.931: Status: ACTIVE | Noted: 2022-06-05

## 2022-06-05 LAB
ALBUMIN SERPL BCP-MCNC: 4.6 G/DL (ref 3.2–4.9)
ALBUMIN/GLOB SERPL: 1.4 G/DL
ALP SERPL-CCNC: 70 U/L (ref 30–99)
ALT SERPL-CCNC: 14 U/L (ref 2–50)
AMPHET UR QL SCN: NEGATIVE
ANION GAP SERPL CALC-SCNC: 16 MMOL/L (ref 7–16)
APPEARANCE UR: CLEAR
AST SERPL-CCNC: 21 U/L (ref 12–45)
BACTERIA #/AREA URNS HPF: NEGATIVE /HPF
BARBITURATES UR QL SCN: NEGATIVE
BASOPHILS # BLD AUTO: 0.3 % (ref 0–1.8)
BASOPHILS # BLD: 0.03 K/UL (ref 0–0.12)
BENZODIAZ UR QL SCN: POSITIVE
BILIRUB SERPL-MCNC: 0.4 MG/DL (ref 0.1–1.5)
BILIRUB UR QL STRIP.AUTO: NEGATIVE
BUN SERPL-MCNC: 25 MG/DL (ref 8–22)
BZE UR QL SCN: NEGATIVE
CALCIUM SERPL-MCNC: 10 MG/DL (ref 8.5–10.5)
CANNABINOIDS UR QL SCN: POSITIVE
CHLORIDE SERPL-SCNC: 106 MMOL/L (ref 96–112)
CK SERPL-CCNC: 55 U/L (ref 0–154)
CO2 SERPL-SCNC: 15 MMOL/L (ref 20–33)
COLOR UR: YELLOW
CREAT SERPL-MCNC: 1.02 MG/DL (ref 0.5–1.4)
EKG IMPRESSION: NORMAL
EOSINOPHIL # BLD AUTO: 0.01 K/UL (ref 0–0.51)
EOSINOPHIL NFR BLD: 0.1 % (ref 0–6.9)
EPI CELLS #/AREA URNS HPF: NEGATIVE /HPF
ERYTHROCYTE [DISTWIDTH] IN BLOOD BY AUTOMATED COUNT: 36.2 FL (ref 35.9–50)
ETHANOL BLD-MCNC: <10.1 MG/DL
GFR SERPLBLD CREATININE-BSD FMLA CKD-EPI: 105 ML/MIN/1.73 M 2
GLOBULIN SER CALC-MCNC: 3.4 G/DL (ref 1.9–3.5)
GLUCOSE SERPL-MCNC: 142 MG/DL (ref 65–99)
GLUCOSE UR STRIP.AUTO-MCNC: NEGATIVE MG/DL
HCT VFR BLD AUTO: 41.8 % (ref 42–52)
HGB BLD-MCNC: 14.4 G/DL (ref 14–18)
IMM GRANULOCYTES # BLD AUTO: 0.05 K/UL (ref 0–0.11)
IMM GRANULOCYTES NFR BLD AUTO: 0.4 % (ref 0–0.9)
KETONES UR STRIP.AUTO-MCNC: ABNORMAL MG/DL
LACTATE BLD-SCNC: 1 MMOL/L (ref 0.5–2)
LACTATE BLD-SCNC: 4.5 MMOL/L (ref 0.5–2)
LEUKOCYTE ESTERASE UR QL STRIP.AUTO: ABNORMAL
LIPASE SERPL-CCNC: 29 U/L (ref 11–82)
LYMPHOCYTES # BLD AUTO: 2.04 K/UL (ref 1–4.8)
LYMPHOCYTES NFR BLD: 18.2 % (ref 22–41)
MCH RBC QN AUTO: 27.4 PG (ref 27–33)
MCHC RBC AUTO-ENTMCNC: 34.4 G/DL (ref 33.7–35.3)
MCV RBC AUTO: 79.5 FL (ref 81.4–97.8)
METHADONE UR QL SCN: NEGATIVE
MICRO URNS: ABNORMAL
MONOCYTES # BLD AUTO: 0.49 K/UL (ref 0–0.85)
MONOCYTES NFR BLD AUTO: 4.4 % (ref 0–13.4)
NEUTROPHILS # BLD AUTO: 8.61 K/UL (ref 1.82–7.42)
NEUTROPHILS NFR BLD: 76.6 % (ref 44–72)
NITRITE UR QL STRIP.AUTO: NEGATIVE
NRBC # BLD AUTO: 0 K/UL
NRBC BLD-RTO: 0 /100 WBC
OPIATES UR QL SCN: NEGATIVE
OXYCODONE UR QL SCN: NEGATIVE
PCP UR QL SCN: NEGATIVE
PH UR STRIP.AUTO: 6.5 [PH] (ref 5–8)
PLATELET # BLD AUTO: 414 K/UL (ref 164–446)
PMV BLD AUTO: 11.2 FL (ref 9–12.9)
POTASSIUM SERPL-SCNC: 4.1 MMOL/L (ref 3.6–5.5)
PROPOXYPH UR QL SCN: NEGATIVE
PROT SERPL-MCNC: 8 G/DL (ref 6–8.2)
PROT UR QL STRIP: 30 MG/DL
RBC # BLD AUTO: 5.26 M/UL (ref 4.7–6.1)
RBC # URNS HPF: ABNORMAL /HPF
RBC UR QL AUTO: NEGATIVE
SODIUM SERPL-SCNC: 137 MMOL/L (ref 135–145)
SP GR UR STRIP.AUTO: 1.03
TROPONIN T SERPL-MCNC: <6 NG/L (ref 6–19)
UROBILINOGEN UR STRIP.AUTO-MCNC: 1 MG/DL
WBC # BLD AUTO: 11.2 K/UL (ref 4.8–10.8)
WBC #/AREA URNS HPF: ABNORMAL /HPF

## 2022-06-05 PROCEDURE — 96374 THER/PROPH/DIAG INJ IV PUSH: CPT

## 2022-06-05 PROCEDURE — 36415 COLL VENOUS BLD VENIPUNCTURE: CPT

## 2022-06-05 PROCEDURE — 83690 ASSAY OF LIPASE: CPT

## 2022-06-05 PROCEDURE — 700105 HCHG RX REV CODE 258: Performed by: EMERGENCY MEDICINE

## 2022-06-05 PROCEDURE — 85025 COMPLETE CBC W/AUTO DIFF WBC: CPT

## 2022-06-05 PROCEDURE — 93005 ELECTROCARDIOGRAM TRACING: CPT | Performed by: EMERGENCY MEDICINE

## 2022-06-05 PROCEDURE — A9270 NON-COVERED ITEM OR SERVICE: HCPCS | Performed by: EMERGENCY MEDICINE

## 2022-06-05 PROCEDURE — 96375 TX/PRO/DX INJ NEW DRUG ADDON: CPT

## 2022-06-05 PROCEDURE — 700102 HCHG RX REV CODE 250 W/ 637 OVERRIDE(OP): Performed by: EMERGENCY MEDICINE

## 2022-06-05 PROCEDURE — 770020 HCHG ROOM/CARE - TELE (206)

## 2022-06-05 PROCEDURE — 87086 URINE CULTURE/COLONY COUNT: CPT

## 2022-06-05 PROCEDURE — 80307 DRUG TEST PRSMV CHEM ANLYZR: CPT

## 2022-06-05 PROCEDURE — 82550 ASSAY OF CK (CPK): CPT

## 2022-06-05 PROCEDURE — 700102 HCHG RX REV CODE 250 W/ 637 OVERRIDE(OP): Performed by: STUDENT IN AN ORGANIZED HEALTH CARE EDUCATION/TRAINING PROGRAM

## 2022-06-05 PROCEDURE — 99223 1ST HOSP IP/OBS HIGH 75: CPT | Performed by: STUDENT IN AN ORGANIZED HEALTH CARE EDUCATION/TRAINING PROGRAM

## 2022-06-05 PROCEDURE — A9270 NON-COVERED ITEM OR SERVICE: HCPCS | Performed by: STUDENT IN AN ORGANIZED HEALTH CARE EDUCATION/TRAINING PROGRAM

## 2022-06-05 PROCEDURE — HZ2ZZZZ DETOXIFICATION SERVICES FOR SUBSTANCE ABUSE TREATMENT: ICD-10-PCS | Performed by: STUDENT IN AN ORGANIZED HEALTH CARE EDUCATION/TRAINING PROGRAM

## 2022-06-05 PROCEDURE — 80053 COMPREHEN METABOLIC PANEL: CPT

## 2022-06-05 PROCEDURE — 84484 ASSAY OF TROPONIN QUANT: CPT

## 2022-06-05 PROCEDURE — 87040 BLOOD CULTURE FOR BACTERIA: CPT | Mod: 91

## 2022-06-05 PROCEDURE — 99285 EMERGENCY DEPT VISIT HI MDM: CPT

## 2022-06-05 PROCEDURE — 82077 ASSAY SPEC XCP UR&BREATH IA: CPT

## 2022-06-05 PROCEDURE — 83605 ASSAY OF LACTIC ACID: CPT | Mod: 91

## 2022-06-05 PROCEDURE — 71045 X-RAY EXAM CHEST 1 VIEW: CPT

## 2022-06-05 PROCEDURE — 81001 URINALYSIS AUTO W/SCOPE: CPT

## 2022-06-05 PROCEDURE — 700111 HCHG RX REV CODE 636 W/ 250 OVERRIDE (IP): Performed by: EMERGENCY MEDICINE

## 2022-06-05 PROCEDURE — 87077 CULTURE AEROBIC IDENTIFY: CPT

## 2022-06-05 PROCEDURE — 96376 TX/PRO/DX INJ SAME DRUG ADON: CPT

## 2022-06-05 RX ORDER — ACETAMINOPHEN 325 MG/1
650 TABLET ORAL EVERY 6 HOURS PRN
Status: DISCONTINUED | OUTPATIENT
Start: 2022-06-05 | End: 2022-06-05

## 2022-06-05 RX ORDER — DIPHENHYDRAMINE HYDROCHLORIDE 50 MG/ML
25 INJECTION INTRAMUSCULAR; INTRAVENOUS ONCE
Status: COMPLETED | OUTPATIENT
Start: 2022-06-05 | End: 2022-06-05

## 2022-06-05 RX ORDER — ENOXAPARIN SODIUM 100 MG/ML
40 INJECTION SUBCUTANEOUS DAILY
Status: DISCONTINUED | OUTPATIENT
Start: 2022-06-06 | End: 2022-06-09 | Stop reason: HOSPADM

## 2022-06-05 RX ORDER — LORAZEPAM 2 MG/1
4 TABLET ORAL
Status: DISCONTINUED | OUTPATIENT
Start: 2022-06-05 | End: 2022-06-07

## 2022-06-05 RX ORDER — LORAZEPAM 2 MG/ML
1.5 INJECTION INTRAMUSCULAR
Status: DISCONTINUED | OUTPATIENT
Start: 2022-06-05 | End: 2022-06-07

## 2022-06-05 RX ORDER — GAUZE BANDAGE 2" X 2"
100 BANDAGE TOPICAL EVERY EVENING
Status: DISCONTINUED | OUTPATIENT
Start: 2022-06-05 | End: 2022-06-09 | Stop reason: HOSPADM

## 2022-06-05 RX ORDER — ONDANSETRON 2 MG/ML
4 INJECTION INTRAMUSCULAR; INTRAVENOUS EVERY 4 HOURS PRN
Status: DISCONTINUED | OUTPATIENT
Start: 2022-06-05 | End: 2022-06-08

## 2022-06-05 RX ORDER — BISACODYL 10 MG
10 SUPPOSITORY, RECTAL RECTAL
Status: DISCONTINUED | OUTPATIENT
Start: 2022-06-05 | End: 2022-06-08

## 2022-06-05 RX ORDER — GAUZE BANDAGE 2" X 2"
100 BANDAGE TOPICAL DAILY
Status: DISCONTINUED | OUTPATIENT
Start: 2022-06-06 | End: 2022-06-05

## 2022-06-05 RX ORDER — SODIUM CHLORIDE, SODIUM LACTATE, POTASSIUM CHLORIDE, CALCIUM CHLORIDE 600; 310; 30; 20 MG/100ML; MG/100ML; MG/100ML; MG/100ML
1000 INJECTION, SOLUTION INTRAVENOUS ONCE
Status: COMPLETED | OUTPATIENT
Start: 2022-06-05 | End: 2022-06-05

## 2022-06-05 RX ORDER — LORAZEPAM 2 MG/ML
INJECTION INTRAMUSCULAR
Status: DISCONTINUED
Start: 2022-06-05 | End: 2022-06-05

## 2022-06-05 RX ORDER — SODIUM CHLORIDE 9 MG/ML
1000 INJECTION, SOLUTION INTRAVENOUS ONCE
Status: DISCONTINUED | OUTPATIENT
Start: 2022-06-05 | End: 2022-06-05 | Stop reason: SDUPTHER

## 2022-06-05 RX ORDER — LORAZEPAM 0.5 MG/1
0.5 TABLET ORAL EVERY 4 HOURS PRN
Status: DISCONTINUED | OUTPATIENT
Start: 2022-06-05 | End: 2022-06-07

## 2022-06-05 RX ORDER — LORAZEPAM 2 MG/ML
1 INJECTION INTRAMUSCULAR ONCE
Status: COMPLETED | OUTPATIENT
Start: 2022-06-05 | End: 2022-06-05

## 2022-06-05 RX ORDER — DIAZEPAM 5 MG/1
10 TABLET ORAL EVERY 6 HOURS
Status: DISCONTINUED | OUTPATIENT
Start: 2022-06-05 | End: 2022-06-06

## 2022-06-05 RX ORDER — DIAZEPAM 5 MG/1
5 TABLET ORAL EVERY 6 HOURS
Status: DISCONTINUED | OUTPATIENT
Start: 2022-06-06 | End: 2022-06-06

## 2022-06-05 RX ORDER — LORAZEPAM 2 MG/ML
1 INJECTION INTRAMUSCULAR
Status: DISCONTINUED | OUTPATIENT
Start: 2022-06-05 | End: 2022-06-07

## 2022-06-05 RX ORDER — LORAZEPAM 2 MG/1
2 TABLET ORAL
Status: DISCONTINUED | OUTPATIENT
Start: 2022-06-05 | End: 2022-06-07

## 2022-06-05 RX ORDER — POLYETHYLENE GLYCOL 3350 17 G/17G
1 POWDER, FOR SOLUTION ORAL
Status: DISCONTINUED | OUTPATIENT
Start: 2022-06-05 | End: 2022-06-08

## 2022-06-05 RX ORDER — LORAZEPAM 2 MG/ML
1 INJECTION INTRAMUSCULAR ONCE
Status: ACTIVE | OUTPATIENT
Start: 2022-06-05 | End: 2022-06-06

## 2022-06-05 RX ORDER — HYDRALAZINE HYDROCHLORIDE 20 MG/ML
10 INJECTION INTRAMUSCULAR; INTRAVENOUS EVERY 4 HOURS PRN
Status: DISCONTINUED | OUTPATIENT
Start: 2022-06-05 | End: 2022-06-09 | Stop reason: HOSPADM

## 2022-06-05 RX ORDER — CLONIDINE HYDROCHLORIDE 0.1 MG/1
0.1 TABLET ORAL
Status: DISCONTINUED | OUTPATIENT
Start: 2022-06-05 | End: 2022-06-08

## 2022-06-05 RX ORDER — FOLIC ACID 1 MG/1
1 TABLET ORAL DAILY
Status: COMPLETED | OUTPATIENT
Start: 2022-06-06 | End: 2022-06-09

## 2022-06-05 RX ORDER — ONDANSETRON 4 MG/1
4 TABLET, ORALLY DISINTEGRATING ORAL EVERY 4 HOURS PRN
Status: DISCONTINUED | OUTPATIENT
Start: 2022-06-05 | End: 2022-06-09 | Stop reason: HOSPADM

## 2022-06-05 RX ORDER — SODIUM CHLORIDE, SODIUM LACTATE, POTASSIUM CHLORIDE, AND CALCIUM CHLORIDE .6; .31; .03; .02 G/100ML; G/100ML; G/100ML; G/100ML
500 INJECTION, SOLUTION INTRAVENOUS
Status: DISCONTINUED | OUTPATIENT
Start: 2022-06-05 | End: 2022-06-09 | Stop reason: HOSPADM

## 2022-06-05 RX ORDER — PROMETHAZINE HYDROCHLORIDE 25 MG/1
12.5-25 TABLET ORAL EVERY 4 HOURS PRN
Status: DISCONTINUED | OUTPATIENT
Start: 2022-06-05 | End: 2022-06-08

## 2022-06-05 RX ORDER — PROMETHAZINE HYDROCHLORIDE 25 MG/1
12.5-25 SUPPOSITORY RECTAL EVERY 4 HOURS PRN
Status: DISCONTINUED | OUTPATIENT
Start: 2022-06-05 | End: 2022-06-08

## 2022-06-05 RX ORDER — LORAZEPAM 2 MG/ML
2 INJECTION INTRAMUSCULAR
Status: DISCONTINUED | OUTPATIENT
Start: 2022-06-05 | End: 2022-06-07

## 2022-06-05 RX ORDER — OMEPRAZOLE 20 MG/1
20 CAPSULE, DELAYED RELEASE ORAL DAILY
Status: DISCONTINUED | OUTPATIENT
Start: 2022-06-05 | End: 2022-06-06

## 2022-06-05 RX ORDER — PROCHLORPERAZINE EDISYLATE 5 MG/ML
5-10 INJECTION INTRAMUSCULAR; INTRAVENOUS EVERY 4 HOURS PRN
Status: DISCONTINUED | OUTPATIENT
Start: 2022-06-05 | End: 2022-06-08

## 2022-06-05 RX ORDER — GUAIFENESIN/DEXTROMETHORPHAN 100-10MG/5
10 SYRUP ORAL EVERY 6 HOURS PRN
Status: DISCONTINUED | OUTPATIENT
Start: 2022-06-05 | End: 2022-06-09 | Stop reason: HOSPADM

## 2022-06-05 RX ORDER — ALPRAZOLAM 1 MG/1
1 TABLET ORAL ONCE
Status: COMPLETED | OUTPATIENT
Start: 2022-06-05 | End: 2022-06-05

## 2022-06-05 RX ORDER — LORAZEPAM 2 MG/ML
0.5 INJECTION INTRAMUSCULAR EVERY 4 HOURS PRN
Status: DISCONTINUED | OUTPATIENT
Start: 2022-06-05 | End: 2022-06-07

## 2022-06-05 RX ORDER — SODIUM CHLORIDE 9 MG/ML
INJECTION, SOLUTION INTRAVENOUS CONTINUOUS
Status: DISCONTINUED | OUTPATIENT
Start: 2022-06-05 | End: 2022-06-06

## 2022-06-05 RX ORDER — LORAZEPAM 1 MG/1
1 TABLET ORAL EVERY 4 HOURS PRN
Status: DISCONTINUED | OUTPATIENT
Start: 2022-06-05 | End: 2022-06-07

## 2022-06-05 RX ORDER — AMOXICILLIN 250 MG
2 CAPSULE ORAL 2 TIMES DAILY
Status: DISCONTINUED | OUTPATIENT
Start: 2022-06-05 | End: 2022-06-08

## 2022-06-05 RX ADMIN — SODIUM CHLORIDE, POTASSIUM CHLORIDE, SODIUM LACTATE AND CALCIUM CHLORIDE 1000 ML: 600; 310; 30; 20 INJECTION, SOLUTION INTRAVENOUS at 16:39

## 2022-06-05 RX ADMIN — DIAZEPAM 10 MG: 5 TABLET ORAL at 22:00

## 2022-06-05 RX ADMIN — DIPHENHYDRAMINE HYDROCHLORIDE 25 MG: 50 INJECTION, SOLUTION INTRAMUSCULAR; INTRAVENOUS at 16:37

## 2022-06-05 RX ADMIN — LORAZEPAM 1 MG: 2 INJECTION INTRAMUSCULAR; INTRAVENOUS at 18:00

## 2022-06-05 RX ADMIN — ALPRAZOLAM 1 MG: 1 TABLET ORAL at 21:00

## 2022-06-05 RX ADMIN — SODIUM CHLORIDE, POTASSIUM CHLORIDE, SODIUM LACTATE AND CALCIUM CHLORIDE 1000 ML: 600; 310; 30; 20 INJECTION, SOLUTION INTRAVENOUS at 20:15

## 2022-06-05 RX ADMIN — Medication 100 MG: at 21:45

## 2022-06-05 RX ADMIN — LORAZEPAM 1 MG: 2 INJECTION INTRAMUSCULAR; INTRAVENOUS at 16:23

## 2022-06-05 RX ADMIN — SENNOSIDES AND DOCUSATE SODIUM 2 TABLET: 50; 8.6 TABLET ORAL at 21:45

## 2022-06-05 ASSESSMENT — ENCOUNTER SYMPTOMS
COUGH: 0
FOCAL WEAKNESS: 0
WHEEZING: 0
DOUBLE VISION: 0
MYALGIAS: 1
BLURRED VISION: 0
FLANK PAIN: 0
ABDOMINAL PAIN: 1
CHILLS: 0
SPEECH CHANGE: 0
DIZZINESS: 1
HEARTBURN: 1
FEVER: 0
BRUISES/BLEEDS EASILY: 0
PALPITATIONS: 1
HEADACHES: 1
VOMITING: 1
DEPRESSION: 0
FALLS: 0
NAUSEA: 1

## 2022-06-05 ASSESSMENT — LIFESTYLE VARIABLES: SUBSTANCE_ABUSE: 1

## 2022-06-05 ASSESSMENT — FIBROSIS 4 INDEX: FIB4 SCORE: 0.6

## 2022-06-05 NOTE — ED PROVIDER NOTES
ED Provider Note    Scribed for Renate Bhatt M.D. by Laurel Silvestre. 6/5/2022  4:13 PM    Primary care provider: Pcp Pt States None  Means of arrival: Walk in  History obtained from: Patient, family member  History limited by: None    CHIEF COMPLAINT  Chief Complaint   Patient presents with   • Other       HPI  Gilbert Diop is a 27 y.o. male who presents to the ED accompanied by a family member for a possible seizure with an onset of 10 minutes ago. Family member denies any history of seizures. However, patient states he has a history of seizures and experienced with similar symptoms when he ran out of his Xanax. He notes he ran out of his Xanax two days ago.  He says that he typically takes 4 mg of Xanax a day.  He attempted to get a prescription from [facility], but left before being evaluated.  He says he typically gets his Xanax in [place].  Since the age of 12 patient reports he takes Xanax for his history of anxiety and panic attacks. Patient also reportedly takes Clonazepam and Diazepam but states those do not work nearly as well as the Xanax does. He reports associated insomnia, and anxiety. He denies any associated loss of consciousness, shortness of breath, chest pain, fever, or chills. No alleviating or exacerbating factors were identified.  The patient is intermittently shaking as he is talking to me and answering all of my questions appropriately.  He says he has not been sick or ill lately.  He believes he is shaking at this time because he ran out of his Xanax and thinks he is in Xanax withdrawal.  He smokes marijuana but denies using any other drugs.    REVIEW OF SYSTEMS  Pertinent positives include seizure, anxiety, and insominia.   Pertinent negatives include no loss of consciousness, shortness of breath, chest pain, fever, or chills.   See HPI for further details. All other systems are negative.    PAST MEDICAL HISTORY  Past Medical History:   Diagnosis Date   •  "Abdominal migraine    • Abdominal migraine    • Anxiety    • Benzodiazepine dependence (HCC)    • Colitis    • Cyclic vomiting syndrome     History of   • Gastritis    • Scoliosis        FAMILY HISTORY  None pertinent.     SOCIAL HISTORY  Social History     Tobacco Use   • Smoking status: Never Smoker   • Smokeless tobacco: Never Used   Vaping Use   • Vaping Use: Some days   • Substances: THC   Substance Use Topics   • Alcohol use: No   • Drug use: Yes     Types: Inhaled, Marijuana     Comment: THC daily      Social History     Substance and Sexual Activity   Drug Use Yes   • Types: Inhaled, Marijuana    Comment: THC daily       SURGICAL HISTORY  Past Surgical History:   Procedure Laterality Date   • TN UPPER GI ENDOSCOPY,DIAGNOSIS N/A 5/17/2022    Procedure: GASTROSCOPY;  Surgeon: Bud Chowdhury M.D.;  Location: SURGERY SAME DAY [facility];  Service: Gastroenterology   • GASTROSCOPY-ENDO  11/15/2014    Performed by Robert Taylor M.D. at ENDOSCOPY [facility]   • GASTROSCOPY  9/3/2014    Performed by Robert Taylor M.D. at SURGERY [facility]   • OTHER      hernia   • OTHER ORTHOPEDIC SURGERY      Scoliosis       CURRENT MEDICATIONS  Current Outpatient Medications   Medication Instructions   • Naloxone (NARCAN) 4 MG/0.1ML Liquid Administer a single spray of NARCAN Nasal [place] 4 mg/0.1 mL intranasally into one nostril, call 911 for emergency medical assistance.   • omeprazole (PRILOSEC) 20 mg, Oral, DAILY   • ondansetron (ZOFRAN) 4 mg, Oral, EVERY 4 HOURS PRN     ALLERGIES  Allergies   Allergen Reactions   • Acetaminophen [Tylenol] Anaphylaxis and Nausea     Nausea, \"it just doesn't sit right\"  5/29/2021 patient states that his throat swells shut       PHYSICAL EXAM  VITAL SIGNS: BP (!) 169/83   Pulse (!) 128   Temp 37 °C (98.6 °F) (Temporal)   Resp 12   Ht 1.6 m (5' 3\")   Wt 63.5 kg (140 lb)   SpO2 97%   BMI 24.80 kg/m²      Constitutional: Well developed, well nourished; Fixated on getting " Xanax prescription to go home with, repeatedly asks for Xanax prescription during several minutes I was with him. Smells of marijuana.   HENT: Normocephalic, atraumatic; Bilateral external ears normal; Oropharynx dry.    Eyes: PERRL, EOMI, Conjunctiva normal. No discharge.   Neck:  Supple, nontender midline; No stridor; No nuchal rigidity.   Lymphatic: No cervical lymphadenopathy noted.   Cardiovascular: Tachycardic, Regular rhythm without murmurs, rubs, or gallop.   Thorax & Lungs: No respiratory distress, breath sounds clear to auscultation bilaterally without wheezing, rales or rhonchi. Nontender chest wall. No crepitus or subcutaneous air  Abdomen: Soft, nontender, No obvious masses; No pulsatile masses; no rebound, guarding, or peritoneal signs.   Skin: Slightly diaphoretic, Good color; warm, without rash or petechia.  Extremities: Distal radial, dorsalis pedis, posterior tibial pulses are equal bilaterally; No edema; Nontender calves or saphenous, No cyanosis, No clubbing.   Musculoskeletal: Good range of motion in all major joints. No tenderness to palpation or major deformities noted.   Neurologic: Alert & oriented x 4, clear speech. Moves all extremities, occasionally does intentional shaking behaviors as he is speaking.speech is articulate and clear, and he is speaking in full sentences.  No facial asymmetry.    EKG  12 Lead EKG interpreted by me as shown below.     LABS/RADIOLOGY/PROCEDURES  Results for orders placed or performed during the hospital encounter of 06/05/22   CBC WITH DIFFERENTIAL   Result Value Ref Range    WBC 11.2 (H) 4.8 - 10.8 K/uL    RBC 5.26 4.70 - 6.10 M/uL    Hemoglobin 14.4 14.0 - 18.0 g/dL    Hematocrit 41.8 (L) 42.0 - 52.0 %    MCV 79.5 (L) 81.4 - 97.8 fL    MCH 27.4 27.0 - 33.0 pg    MCHC 34.4 33.7 - 35.3 g/dL    RDW 36.2 35.9 - 50.0 fL    Platelet Count 414 164 - 446 K/uL    MPV 11.2 9.0 - 12.9 fL    Neutrophils-Polys 76.60 (H) 44.00 - 72.00 %    Lymphocytes 18.20 (L) 22.00 -  41.00 %    Monocytes 4.40 0.00 - 13.40 %    Eosinophils 0.10 0.00 - 6.90 %    Basophils 0.30 0.00 - 1.80 %    Immature Granulocytes 0.40 0.00 - 0.90 %    Nucleated RBC 0.00 /100 WBC    Neutrophils (Absolute) 8.61 (H) 1.82 - 7.42 K/uL    Lymphs (Absolute) 2.04 1.00 - 4.80 K/uL    Monos (Absolute) 0.49 0.00 - 0.85 K/uL    Eos (Absolute) 0.01 0.00 - 0.51 K/uL    Baso (Absolute) 0.03 0.00 - 0.12 K/uL    Immature Granulocytes (abs) 0.05 0.00 - 0.11 K/uL    NRBC (Absolute) 0.00 K/uL   COMP METABOLIC PANEL   Result Value Ref Range    Sodium 137 135 - 145 mmol/L    Potassium 4.1 3.6 - 5.5 mmol/L    Chloride 106 96 - 112 mmol/L    Co2 15 (L) 20 - 33 mmol/L    Anion Gap 16.0 7.0 - 16.0    Glucose 142 (H) 65 - 99 mg/dL    Bun 25 (H) 8 - 22 mg/dL    Creatinine 1.02 0.50 - 1.40 mg/dL    Calcium 10.0 8.5 - 10.5 mg/dL    AST(SGOT) 21 12 - 45 U/L    ALT(SGPT) 14 2 - 50 U/L    Alkaline Phosphatase 70 30 - 99 U/L    Total Bilirubin 0.4 0.1 - 1.5 mg/dL    Albumin 4.6 3.2 - 4.9 g/dL    Total Protein 8.0 6.0 - 8.2 g/dL    Globulin 3.4 1.9 - 3.5 g/dL    A-G Ratio 1.4 g/dL   LACTIC ACID   Result Value Ref Range    Lactic Acid 4.5 (HH) 0.5 - 2.0 mmol/L   URINALYSIS (UA)    Specimen: Urine   Result Value Ref Range    Color Yellow     Character Clear     Specific Gravity 1.032 <1.035    Ph 6.5 5.0 - 8.0    Glucose Negative Negative mg/dL    Ketones Trace (A) Negative mg/dL    Protein 30 (A) Negative mg/dL    Bilirubin Negative Negative    Urobilinogen, Urine 1.0 Negative    Nitrite Negative Negative    Leukocyte Esterase Trace (A) Negative    Occult Blood Negative Negative    Micro Urine Req Microscopic    CREATINE KINASE   Result Value Ref Range    CPK Total 55 0 - 154 U/L   TROPONIN   Result Value Ref Range    Troponin T <6 6 - 19 ng/L   LIPASE   Result Value Ref Range    Lipase 29 11 - 82 U/L   DIAGNOSTIC ALCOHOL   Result Value Ref Range    Diagnostic Alcohol <10.1 <10.1 mg/dL   URINE DRUG SCREEN   Result Value Ref Range    Amphetamines  Urine Negative Negative    Barbiturates Negative Negative    Benzodiazepines Positive (A) Negative    Cocaine Metabolite Negative Negative    Methadone Negative Negative    Opiates Negative Negative    Oxycodone Negative Negative    Phencyclidine -Pcp Negative Negative    Propoxyphene Negative Negative    Cannabinoid Metab Positive (A) Negative   ESTIMATED GFR   Result Value Ref Range    GFR (CKD-EPI) 105 >60 mL/min/1.73 m 2   Lactic Acid   Result Value Ref Range    Lactic Acid 1.0 0.5 - 2.0 mmol/L   URINE MICROSCOPIC (W/UA)   Result Value Ref Range    WBC 2-5 (A) /hpf    RBC 0-2 (A) /hpf    Bacteria Negative None /hpf    Epithelial Cells Negative /hpf   EKG (NOW)   Result Value Ref Range    Report       [facility] Emergency Dept.    Test Date:  2022  Pt Name:    TOMÁS CEBALLOS         Department: ER  MRN:        [id number]                      Room:       [facility]  Gender:     M                            Technician: [id number]  :        1995                   Requested By:YUN BHATT  Order #:    [id number]                    Reading MD: Yun Bhatt    Measurements  Intervals                                Axis  Rate:       129                          P:          74  DE:         108                          QRS:        78  QRSD:       82                           T:          38  QT:         312  QTc:        458    Interpretive Statements  SINUS TACHYCARDIA rate 129  Normal axis  Normal intervals  No ST elevation  Minimal ST depression II, III, AVF, V3-V6 (possibly rate related)  CONSIDER RIGHT ATRIAL ABNORMALITY  MINIMAL ST DEPRESSION  Compared to ECG 2022 08:12:44  ST (T wave) deviation now present  Sinus rhythm no longer present  Electronically S igned On 2022 18:25:50 PDT by Yun Bhatt       All labs reviewed by me.    DX-CHEST-PORTABLE (1 VIEW)   Final Result      No acute cardiopulmonary abnormality.           The radiologist's interpretations of all radiological  studies have been reviewed by me.        COURSE & MEDICAL DECISION MAKING  Pertinent Labs & Imaging studies reviewed. (See chart for details)    Reviewed patient's old medical records which showed he was just admitted to the ICU here in February after presenting unresponsive with an empty bottle of empty and was suspected to have aspirated. He reported when he woke up he thought the Xanax was laced because he only took one. Left AMA from the unit then he was back again in mid May for nausea and vomiting with history of cyclic vomiting syndrome, self reported seizure which were thought to be benzo withdrawal. Psychiatry was consulted for reported PTSD and anxiety. They recommended for him to start on medications but he left AMA. He came back May 28 for abdominal pain and nausea and was noted to be dehydrated with lactic acidosis. He consisted requested unsafe and unresonable doses of IV pain medications and benzos. He was given a dose of librium. It was recommended that he establish care at [facility] or [facility]. He went to the [facility] urgent care on June 3rd, but left before he was seen. He has had other admission for recreational narcotic and benzo overdose. He had a recent EGD on June 17 which showed grade 2 esophagitis. He has a history of smoking marijuana.  He was admitted to the ICU at [facility] and intubated in January after narcotic withdrawal and left AMA. He has a history of violence and agitation in the ER    4:10 PM - Patient seen and examined at bedside. Discussed plan of care, including labs and imaging studies to evaluate his symptoms. Patient agrees to the plan of care. The patient will be resuscitated with LR infusion bolus for dehydration and medicated with Benadryl 25 mg injection, Ativan 1 mg for his symptoms. Ordered for EKG, labs, and imaging to evaluate his symptoms.     4:25 PM - Patient was reevaluated at bedside. Patient is resting in bed without any seizures at this time. I had an  extensive discussion with the patient about seeking psychiatric help for his benzo dependence and anxiety. I reviewed his long history of benzo and narcotic medication abuse. I advised him he needs long term care for his anxiety and benzodiazepine addiction and needs to establish care with [facility] or [facility] clinic. Patient verbalizes understanding and agreement to this plan of care.       5:58 PM - Patient was reevaluated at bedside. He is more calm and his heart rate has improved to 104 bpm after one Ativan and Benadryl 25 mg.      7:58 PM - Patient will be resuscitated with another LR infusion bolus and treated with Ativan 1 mg injection for his anxiety.     8:20 PM - Patient was reevaluated. He is persistently tachycardic in the 120-130s when I am talking with him. Otherwise he says he feels better, but still feels very anxious and panicked. I will speak with the hospitalist for hospitalization due to his persistent tachycardia.     8:41 PM - Patient will be medicated with xanax 1 mg tablet for his symptoms.     8:46 PM - Paged Hospitalist.     8:58 PM - I discussed the patient's case and the above findings with Dr. Wynn (Hospitalist) who will evaluate the patient for hospitalization.       Patient presents to ER bed 3 from triage.  An ER physician was summoned to the room immediately for possible seizure activity.  When I walked in the room the patient was shaking, but he was awake and alert and answering questions.  He tells me he has a seizure history but that his seizures are due to benzodiazepine withdrawal.  He tells me he gets his Xanax from [place].  He ran out of his Xanax 2 days ago.  As I am trying to ask him questions, he is repeatedly asking me if I can write him for a Xanax prescription to go.  He is very redirectable and occasionally will stop shaking.  However, he then starts shaking again when I am trying to question him further about his benzodiazepine withdrawal.  Again, he answers all questions  appropriately as he is performing these shaking behaviors.  He is tachycardic with heart rates in the 120-130 range.  He was given Ativan 1 mg IV upon arrival.  Review of the records reveal that he also has had some dystonic reactions due to ingestion of other drugs in the past.  For this reason I gave him a dose of Benadryl to see if perhaps that helped with some of these shaking and stiffening behaviors.  Patient was hydrated with IV fluids.  He does have a history of lactic acidosis and acute kidney injury from significant dehydration.  He did have an elevated lactic acid level here in the ER today.  It came down nicely with only 1 L of fluids.  He was given additional milligram of Ativan for persistent tachycardia.  He was also given p.o. Xanax.  I went back into evaluate the patient, heart rate while sleeping was in the low 100s.  When he started talking to me, heart rate was right back up in the 120s again.  He is still kind of clammy looking.  He still complains of significant anxiety and feeling panicky.  Patient has a rather dangerous benzodiazepine dependence.  He is clearly in benzodiazepine withdrawal.  I had a very long discussion with him regarding getting psychiatric care for his anxiety and panic attacks and told him that being dependent on benzodiazepines is not good for him long-term.  He asked me to write him a prescription for Xanax at least a dozen times.  He has been fixating and perseverating on getting a Xanax prescription from me the entire time he has been here in the ER.  Obviously patient needs some psychiatric care.  It looks like from old records the last time we tried to get him psychiatric consultation inpatient, he left AMA.  This is unfortunate.  Given patient's persistent tachycardia and benzodiazepine withdrawal, I think he needs to be hospitalized overnight for further evaluation and management of his benzo withdrawal.  Think it also may benefit him to get a psychiatric  evaluation as inpatient.  I spoke with the hospitalist on-call and he will come evaluate the patient hospitalization.    HYDRATION: Based on the patient's presentation of Dehydration and Tachycardia the patient was given IV fluids. IV Hydration was used because oral hydration was not adequate alone. Upon recheck following hydration, the patient was improved.     CRITICAL CARE  The very real possibilty of a deterioration of this patient's condition required the highest level of my preparedness for sudden, emergent intervention.  I provided critical care services, which included medication orders, frequent reevaluations of the patient's condition and response to treatment, ordering and reviewing test results, and discussing the case with various consultants.  The critical care time associated with the care of the patient was 35 minutes. Review chart for interventions. This time is exclusive of any other billable procedures.       DISPOSITION:  Patient will be hospitalized by Dr. Wynn in guarded condition.    FINAL IMPRESSION  1. Benzodiazepine withdrawal with complication (HCC) Acute   2. Benzodiazepine dependence (HCC) Acute   3. Anxiety Acute     The critical care time associated with the care of the patient was 35 minutes.      Laurel HUBER (Anabella), am scribing for, and in the presence of, Renate Bhatt M.D..    Electronically signed by: Laurel Silvestre (Anabella), 6/5/2022    Renate HUBER M.D. personally performed the services described in this documentation, as scribed by Laurel Silvestre in my presence, and it is both accurate and complete.    This dictation has been created using voice recognition software. The accuracy of the dictation is limited by the abilities of the software. I expect there may be some errors of grammar and possibly content. I made every attempt to manually correct the errors within my dictation. However, errors related to voice recognition software may still exist and should be  interpreted within the appropriate context.    C    The note accurately reflects work and decisions made by me.  Renate Bhatt M.D.  6/6/2022  12:45 AM

## 2022-06-06 PROBLEM — F44.5 PSYCHOGENIC NONEPILEPTIC SEIZURE: Status: ACTIVE | Noted: 2022-06-06

## 2022-06-06 PROBLEM — F13.930 BENZODIAZEPINE WITHDRAWAL WITHOUT COMPLICATION (HCC): Status: ACTIVE | Noted: 2022-06-05

## 2022-06-06 PROBLEM — R19.7 DIARRHEA: Status: ACTIVE | Noted: 2022-06-06

## 2022-06-06 LAB
ALBUMIN SERPL BCP-MCNC: 4.2 G/DL (ref 3.2–4.9)
ALBUMIN SERPL BCP-MCNC: 4.2 G/DL (ref 3.2–4.9)
ALBUMIN SERPL BCP-MCNC: 4.3 G/DL (ref 3.2–4.9)
ALBUMIN/GLOB SERPL: 1.4 G/DL
ALBUMIN/GLOB SERPL: 1.6 G/DL
ALP SERPL-CCNC: 64 U/L (ref 30–99)
ALP SERPL-CCNC: 65 U/L (ref 30–99)
ALT SERPL-CCNC: 10 U/L (ref 2–50)
ALT SERPL-CCNC: 9 U/L (ref 2–50)
ANION GAP SERPL CALC-SCNC: 11 MMOL/L (ref 7–16)
ANION GAP SERPL CALC-SCNC: 15 MMOL/L (ref 7–16)
AST SERPL-CCNC: 20 U/L (ref 12–45)
AST SERPL-CCNC: 23 U/L (ref 12–45)
BASOPHILS # BLD AUTO: 0.3 % (ref 0–1.8)
BASOPHILS # BLD AUTO: 0.3 % (ref 0–1.8)
BASOPHILS # BLD: 0.03 K/UL (ref 0–0.12)
BASOPHILS # BLD: 0.03 K/UL (ref 0–0.12)
BILIRUB SERPL-MCNC: 0.4 MG/DL (ref 0.1–1.5)
BILIRUB SERPL-MCNC: 0.5 MG/DL (ref 0.1–1.5)
BUN SERPL-MCNC: 19 MG/DL (ref 8–22)
BUN SERPL-MCNC: 19 MG/DL (ref 8–22)
BUN SERPL-MCNC: 20 MG/DL (ref 8–22)
CALCIUM SERPL-MCNC: 9.3 MG/DL (ref 8.5–10.5)
CALCIUM SERPL-MCNC: 9.4 MG/DL (ref 8.5–10.5)
CALCIUM SERPL-MCNC: 9.4 MG/DL (ref 8.5–10.5)
CHLORIDE SERPL-SCNC: 107 MMOL/L (ref 96–112)
CHLORIDE SERPL-SCNC: 107 MMOL/L (ref 96–112)
CHLORIDE SERPL-SCNC: 108 MMOL/L (ref 96–112)
CK SERPL-CCNC: 54 U/L (ref 0–154)
CO2 SERPL-SCNC: 15 MMOL/L (ref 20–33)
CO2 SERPL-SCNC: 18 MMOL/L (ref 20–33)
CO2 SERPL-SCNC: 18 MMOL/L (ref 20–33)
CREAT SERPL-MCNC: 0.83 MG/DL (ref 0.5–1.4)
CREAT SERPL-MCNC: 0.87 MG/DL (ref 0.5–1.4)
CREAT SERPL-MCNC: 0.89 MG/DL (ref 0.5–1.4)
EOSINOPHIL # BLD AUTO: 0 K/UL (ref 0–0.51)
EOSINOPHIL # BLD AUTO: 0.02 K/UL (ref 0–0.51)
EOSINOPHIL NFR BLD: 0 % (ref 0–6.9)
EOSINOPHIL NFR BLD: 0.2 % (ref 0–6.9)
ERYTHROCYTE [DISTWIDTH] IN BLOOD BY AUTOMATED COUNT: 37.4 FL (ref 35.9–50)
ERYTHROCYTE [DISTWIDTH] IN BLOOD BY AUTOMATED COUNT: 37.4 FL (ref 35.9–50)
GFR SERPLBLD CREATININE-BSD FMLA CKD-EPI: 122 ML/MIN/1.73 M 2
GFR SERPLBLD CREATININE-BSD FMLA CKD-EPI: 123 ML/MIN/1.73 M 2
GFR SERPLBLD CREATININE-BSD FMLA CKD-EPI: 124 ML/MIN/1.73 M 2
GLOBULIN SER CALC-MCNC: 2.7 G/DL (ref 1.9–3.5)
GLOBULIN SER CALC-MCNC: 2.9 G/DL (ref 1.9–3.5)
GLUCOSE BLD STRIP.AUTO-MCNC: 104 MG/DL (ref 65–99)
GLUCOSE SERPL-MCNC: 103 MG/DL (ref 65–99)
GLUCOSE SERPL-MCNC: 117 MG/DL (ref 65–99)
GLUCOSE SERPL-MCNC: 124 MG/DL (ref 65–99)
HCT VFR BLD AUTO: 34.6 % (ref 42–52)
HCT VFR BLD AUTO: 35.4 % (ref 42–52)
HGB BLD-MCNC: 11.9 G/DL (ref 14–18)
HGB BLD-MCNC: 12.2 G/DL (ref 14–18)
IMM GRANULOCYTES # BLD AUTO: 0.05 K/UL (ref 0–0.11)
IMM GRANULOCYTES # BLD AUTO: 0.05 K/UL (ref 0–0.11)
IMM GRANULOCYTES NFR BLD AUTO: 0.5 % (ref 0–0.9)
IMM GRANULOCYTES NFR BLD AUTO: 0.5 % (ref 0–0.9)
LACTATE BLD-SCNC: 1 MMOL/L (ref 0.5–2)
LACTATE BLD-SCNC: 1.3 MMOL/L (ref 0.5–2)
LACTATE BLD-SCNC: 1.3 MMOL/L (ref 0.5–2)
LACTATE BLD-SCNC: 1.4 MMOL/L (ref 0.5–2)
LACTATE BLD-SCNC: 2 MMOL/L (ref 0.5–2)
LYMPHOCYTES # BLD AUTO: 1.32 K/UL (ref 1–4.8)
LYMPHOCYTES # BLD AUTO: 2.05 K/UL (ref 1–4.8)
LYMPHOCYTES NFR BLD: 12.1 % (ref 22–41)
LYMPHOCYTES NFR BLD: 20.9 % (ref 22–41)
MAGNESIUM SERPL-MCNC: 1.7 MG/DL (ref 1.5–2.5)
MAGNESIUM SERPL-MCNC: 1.7 MG/DL (ref 1.5–2.5)
MAGNESIUM SERPL-MCNC: 1.8 MG/DL (ref 1.5–2.5)
MCH RBC QN AUTO: 26.9 PG (ref 27–33)
MCH RBC QN AUTO: 27.1 PG (ref 27–33)
MCHC RBC AUTO-ENTMCNC: 34.4 G/DL (ref 33.7–35.3)
MCHC RBC AUTO-ENTMCNC: 34.5 G/DL (ref 33.7–35.3)
MCV RBC AUTO: 78.3 FL (ref 81.4–97.8)
MCV RBC AUTO: 78.5 FL (ref 81.4–97.8)
MONOCYTES # BLD AUTO: 0.45 K/UL (ref 0–0.85)
MONOCYTES # BLD AUTO: 0.5 K/UL (ref 0–0.85)
MONOCYTES NFR BLD AUTO: 4.6 % (ref 0–13.4)
MONOCYTES NFR BLD AUTO: 4.6 % (ref 0–13.4)
NEUTROPHILS # BLD AUTO: 7.23 K/UL (ref 1.82–7.42)
NEUTROPHILS # BLD AUTO: 8.99 K/UL (ref 1.82–7.42)
NEUTROPHILS NFR BLD: 73.5 % (ref 44–72)
NEUTROPHILS NFR BLD: 82.5 % (ref 44–72)
NRBC # BLD AUTO: 0 K/UL
NRBC # BLD AUTO: 0 K/UL
NRBC BLD-RTO: 0 /100 WBC
NRBC BLD-RTO: 0 /100 WBC
PHOSPHATE SERPL-MCNC: 1.6 MG/DL (ref 2.5–4.5)
PHOSPHATE SERPL-MCNC: 1.9 MG/DL (ref 2.5–4.5)
PLATELET # BLD AUTO: 326 K/UL (ref 164–446)
PLATELET # BLD AUTO: 344 K/UL (ref 164–446)
PMV BLD AUTO: 11.1 FL (ref 9–12.9)
PMV BLD AUTO: 11.2 FL (ref 9–12.9)
POTASSIUM SERPL-SCNC: 3.5 MMOL/L (ref 3.6–5.5)
POTASSIUM SERPL-SCNC: 3.5 MMOL/L (ref 3.6–5.5)
POTASSIUM SERPL-SCNC: 3.7 MMOL/L (ref 3.6–5.5)
PROT SERPL-MCNC: 6.9 G/DL (ref 6–8.2)
PROT SERPL-MCNC: 7.1 G/DL (ref 6–8.2)
RBC # BLD AUTO: 4.42 M/UL (ref 4.7–6.1)
RBC # BLD AUTO: 4.51 M/UL (ref 4.7–6.1)
SODIUM SERPL-SCNC: 136 MMOL/L (ref 135–145)
SODIUM SERPL-SCNC: 137 MMOL/L (ref 135–145)
SODIUM SERPL-SCNC: 137 MMOL/L (ref 135–145)
WBC # BLD AUTO: 10.9 K/UL (ref 4.8–10.8)
WBC # BLD AUTO: 9.8 K/UL (ref 4.8–10.8)

## 2022-06-06 PROCEDURE — 83605 ASSAY OF LACTIC ACID: CPT | Mod: 91

## 2022-06-06 PROCEDURE — 83735 ASSAY OF MAGNESIUM: CPT | Mod: 91

## 2022-06-06 PROCEDURE — 700102 HCHG RX REV CODE 250 W/ 637 OVERRIDE(OP)

## 2022-06-06 PROCEDURE — 99356 PR PROLONGED SVC I/P OR OBS SETTING 1ST HOUR: CPT | Performed by: HOSPITALIST

## 2022-06-06 PROCEDURE — 80069 RENAL FUNCTION PANEL: CPT

## 2022-06-06 PROCEDURE — 99357 PR PROLONGED SERV,INPATIENT,EA ADD 1/2: CPT | Performed by: HOSPITALIST

## 2022-06-06 PROCEDURE — 99233 SBSQ HOSP IP/OBS HIGH 50: CPT | Mod: GC,25 | Performed by: HOSPITALIST

## 2022-06-06 PROCEDURE — 82962 GLUCOSE BLOOD TEST: CPT

## 2022-06-06 PROCEDURE — 85025 COMPLETE CBC W/AUTO DIFF WBC: CPT

## 2022-06-06 PROCEDURE — 95816 EEG AWAKE AND DROWSY: CPT | Mod: 26 | Performed by: STUDENT IN AN ORGANIZED HEALTH CARE EDUCATION/TRAINING PROGRAM

## 2022-06-06 PROCEDURE — A9270 NON-COVERED ITEM OR SERVICE: HCPCS

## 2022-06-06 PROCEDURE — 700111 HCHG RX REV CODE 636 W/ 250 OVERRIDE (IP)

## 2022-06-06 PROCEDURE — 84100 ASSAY OF PHOSPHORUS: CPT

## 2022-06-06 PROCEDURE — A9270 NON-COVERED ITEM OR SERVICE: HCPCS | Performed by: HOSPITALIST

## 2022-06-06 PROCEDURE — 700101 HCHG RX REV CODE 250

## 2022-06-06 PROCEDURE — 36415 COLL VENOUS BLD VENIPUNCTURE: CPT

## 2022-06-06 PROCEDURE — 700102 HCHG RX REV CODE 250 W/ 637 OVERRIDE(OP): Performed by: STUDENT IN AN ORGANIZED HEALTH CARE EDUCATION/TRAINING PROGRAM

## 2022-06-06 PROCEDURE — 700105 HCHG RX REV CODE 258

## 2022-06-06 PROCEDURE — A9270 NON-COVERED ITEM OR SERVICE: HCPCS | Performed by: STUDENT IN AN ORGANIZED HEALTH CARE EDUCATION/TRAINING PROGRAM

## 2022-06-06 PROCEDURE — 82550 ASSAY OF CK (CPK): CPT

## 2022-06-06 PROCEDURE — 700102 HCHG RX REV CODE 250 W/ 637 OVERRIDE(OP): Performed by: HOSPITALIST

## 2022-06-06 PROCEDURE — 700111 HCHG RX REV CODE 636 W/ 250 OVERRIDE (IP): Performed by: STUDENT IN AN ORGANIZED HEALTH CARE EDUCATION/TRAINING PROGRAM

## 2022-06-06 PROCEDURE — 95816 EEG AWAKE AND DROWSY: CPT | Performed by: STUDENT IN AN ORGANIZED HEALTH CARE EDUCATION/TRAINING PROGRAM

## 2022-06-06 PROCEDURE — 700105 HCHG RX REV CODE 258: Performed by: HOSPITALIST

## 2022-06-06 PROCEDURE — 770020 HCHG ROOM/CARE - TELE (206)

## 2022-06-06 PROCEDURE — 80053 COMPREHEN METABOLIC PANEL: CPT | Mod: 91

## 2022-06-06 RX ORDER — SUCRALFATE ORAL 1 G/10ML
1 SUSPENSION ORAL 3 TIMES DAILY
Status: DISCONTINUED | OUTPATIENT
Start: 2022-06-06 | End: 2022-06-09 | Stop reason: HOSPADM

## 2022-06-06 RX ORDER — LORAZEPAM 2 MG/ML
1 INJECTION INTRAMUSCULAR EVERY 4 HOURS PRN
Status: DISCONTINUED | OUTPATIENT
Start: 2022-06-06 | End: 2022-06-07

## 2022-06-06 RX ORDER — LORAZEPAM 2 MG/ML
0.5 INJECTION INTRAMUSCULAR ONCE
Status: COMPLETED | OUTPATIENT
Start: 2022-06-06 | End: 2022-06-06

## 2022-06-06 RX ORDER — SODIUM CHLORIDE, SODIUM LACTATE, POTASSIUM CHLORIDE, CALCIUM CHLORIDE 600; 310; 30; 20 MG/100ML; MG/100ML; MG/100ML; MG/100ML
INJECTION, SOLUTION INTRAVENOUS CONTINUOUS
Status: DISCONTINUED | OUTPATIENT
Start: 2022-06-06 | End: 2022-06-07

## 2022-06-06 RX ORDER — POTASSIUM CHLORIDE 20 MEQ/1
40 TABLET, EXTENDED RELEASE ORAL ONCE
Status: COMPLETED | OUTPATIENT
Start: 2022-06-06 | End: 2022-06-06

## 2022-06-06 RX ORDER — OMEPRAZOLE 20 MG/1
40 CAPSULE, DELAYED RELEASE ORAL 2 TIMES DAILY
Status: DISCONTINUED | OUTPATIENT
Start: 2022-06-06 | End: 2022-06-09 | Stop reason: HOSPADM

## 2022-06-06 RX ORDER — ALPRAZOLAM 0.5 MG/1
0.5 TABLET ORAL EVERY 8 HOURS
Status: DISCONTINUED | OUTPATIENT
Start: 2022-06-06 | End: 2022-06-07

## 2022-06-06 RX ORDER — LORAZEPAM 2 MG/ML
1 INJECTION INTRAMUSCULAR
Status: COMPLETED | OUTPATIENT
Start: 2022-06-06 | End: 2022-06-06

## 2022-06-06 RX ADMIN — ONDANSETRON HYDROCHLORIDE 4 MG: 2 SOLUTION INTRAMUSCULAR; INTRAVENOUS at 13:39

## 2022-06-06 RX ADMIN — LORAZEPAM 2 MG: 2 INJECTION INTRAMUSCULAR; INTRAVENOUS at 04:39

## 2022-06-06 RX ADMIN — DIBASIC SODIUM PHOSPHATE, MONOBASIC POTASSIUM PHOSPHATE AND MONOBASIC SODIUM PHOSPHATE 500 MG: 852; 155; 130 TABLET ORAL at 12:14

## 2022-06-06 RX ADMIN — SUCRALFATE 1 G: 1 SUSPENSION ORAL at 16:09

## 2022-06-06 RX ADMIN — DIAZEPAM 10 MG: 5 TABLET ORAL at 03:43

## 2022-06-06 RX ADMIN — LORAZEPAM 0.5 MG: 2 INJECTION INTRAMUSCULAR; INTRAVENOUS at 14:33

## 2022-06-06 RX ADMIN — FOLIC ACID 1 MG: 1 TABLET ORAL at 05:11

## 2022-06-06 RX ADMIN — LORAZEPAM 1 MG: 2 INJECTION INTRAMUSCULAR; INTRAVENOUS at 17:21

## 2022-06-06 RX ADMIN — LORAZEPAM 1 MG: 2 INJECTION INTRAMUSCULAR; INTRAVENOUS at 20:31

## 2022-06-06 RX ADMIN — POTASSIUM CHLORIDE 40 MEQ: 1500 TABLET, EXTENDED RELEASE ORAL at 12:14

## 2022-06-06 RX ADMIN — ALPRAZOLAM 0.5 MG: 0.5 TABLET ORAL at 21:31

## 2022-06-06 RX ADMIN — OMEPRAZOLE 20 MG: 20 CAPSULE, DELAYED RELEASE ORAL at 00:19

## 2022-06-06 RX ADMIN — SODIUM CHLORIDE, POTASSIUM CHLORIDE, SODIUM LACTATE AND CALCIUM CHLORIDE: 600; 310; 30; 20 INJECTION, SOLUTION INTRAVENOUS at 09:36

## 2022-06-06 RX ADMIN — ENOXAPARIN SODIUM 40 MG: 40 INJECTION SUBCUTANEOUS at 17:49

## 2022-06-06 RX ADMIN — THERA TABS 1 TABLET: TAB at 05:11

## 2022-06-06 RX ADMIN — POTASSIUM PHOSPHATE, MONOBASIC AND POTASSIUM PHOSPHATE, DIBASIC 30 MMOL: 224; 236 INJECTION, SOLUTION, CONCENTRATE INTRAVENOUS at 16:09

## 2022-06-06 RX ADMIN — ALPRAZOLAM 0.5 MG: 0.5 TABLET ORAL at 09:35

## 2022-06-06 RX ADMIN — LORAZEPAM 2 MG: 2 INJECTION INTRAMUSCULAR; INTRAVENOUS at 08:13

## 2022-06-06 ASSESSMENT — LIFESTYLE VARIABLES
NAUSEA AND VOMITING: MILD NAUSEA WITH NO VOMITING
NAUSEA AND VOMITING: NO NAUSEA AND NO VOMITING
HEADACHE, FULLNESS IN HEAD: NOT PRESENT
PAROXYSMAL SWEATS: *
HAVE YOU EVER FELT YOU SHOULD CUT DOWN ON YOUR DRINKING: NO
ORIENTATION AND CLOUDING OF SENSORIUM: ORIENTED AND CAN DO SERIAL ADDITIONS
TOTAL SCORE: 4
AUDITORY DISTURBANCES: NOT PRESENT
NAUSEA AND VOMITING: NO NAUSEA AND NO VOMITING
VISUAL DISTURBANCES: NOT PRESENT
ANXIETY: MILDLY ANXIOUS
VISUAL DISTURBANCES: NOT PRESENT
AGITATION: NORMAL ACTIVITY
TOTAL SCORE: 0
VISUAL DISTURBANCES: NOT PRESENT
HEADACHE, FULLNESS IN HEAD: NOT PRESENT
AUDITORY DISTURBANCES: NOT PRESENT
ORIENTATION AND CLOUDING OF SENSORIUM: ORIENTED AND CAN DO SERIAL ADDITIONS
ORIENTATION AND CLOUDING OF SENSORIUM: ORIENTED AND CAN DO SERIAL ADDITIONS
SUBSTANCE_ABUSE: 1
TREMOR: NO TREMOR
AGITATION: NORMAL ACTIVITY
TREMOR: NO TREMOR
TREMOR: NO TREMOR
VISUAL DISTURBANCES: NOT PRESENT
PAROXYSMAL SWEATS: NO SWEAT VISIBLE
NAUSEA AND VOMITING: MILD NAUSEA WITH NO VOMITING
AUDITORY DISTURBANCES: NOT PRESENT
AGITATION: NORMAL ACTIVITY
TOTAL SCORE: 1
TREMOR: NO TREMOR
DOES PATIENT WANT TO STOP DRINKING: NO
PAROXYSMAL SWEATS: BARELY PERCEPTIBLE SWEATING. PALMS MOIST
AUDITORY DISTURBANCES: NOT PRESENT
PAROXYSMAL SWEATS: NO SWEAT VISIBLE
ORIENTATION AND CLOUDING OF SENSORIUM: ORIENTED AND CAN DO SERIAL ADDITIONS
TREMOR: NO TREMOR
HEADACHE, FULLNESS IN HEAD: NOT PRESENT
TOTAL SCORE: 4
TOTAL SCORE: 0
EVER FELT BAD OR GUILTY ABOUT YOUR DRINKING: NO
ANXIETY: *
ORIENTATION AND CLOUDING OF SENSORIUM: ORIENTED AND CAN DO SERIAL ADDITIONS
ANXIETY: *
ORIENTATION AND CLOUDING OF SENSORIUM: ORIENTED AND CAN DO SERIAL ADDITIONS
ANXIETY: MILDLY ANXIOUS
ALCOHOL_USE: NO
PAROXYSMAL SWEATS: *
NAUSEA AND VOMITING: NO NAUSEA AND NO VOMITING
AGITATION: NORMAL ACTIVITY
ANXIETY: MILDLY ANXIOUS
NAUSEA AND VOMITING: NO NAUSEA AND NO VOMITING
AUDITORY DISTURBANCES: NOT PRESENT
HEADACHE, FULLNESS IN HEAD: NOT PRESENT
AGITATION: NORMAL ACTIVITY
PAROXYSMAL SWEATS: *
HAVE PEOPLE ANNOYED YOU BY CRITICIZING YOUR DRINKING: NO
TOTAL SCORE: 1
ANXIETY: *
TOTAL SCORE: 6
TOTAL SCORE: 4
ANXIETY: *
AUDITORY DISTURBANCES: NOT PRESENT
TREMOR: NO TREMOR
PAROXYSMAL SWEATS: BARELY PERCEPTIBLE SWEATING. PALMS MOIST
CONSUMPTION TOTAL: INCOMPLETE
EVER HAD A DRINK FIRST THING IN THE MORNING TO STEADY YOUR NERVES TO GET RID OF A HANGOVER: NO
VISUAL DISTURBANCES: NOT PRESENT
TOTAL SCORE: 0
TOTAL SCORE: 4
VISUAL DISTURBANCES: NOT PRESENT
ORIENTATION AND CLOUDING OF SENSORIUM: ORIENTED AND CAN DO SERIAL ADDITIONS
TREMOR: NO TREMOR
AUDITORY DISTURBANCES: NOT PRESENT
HEADACHE, FULLNESS IN HEAD: NOT PRESENT
NAUSEA AND VOMITING: NO NAUSEA AND NO VOMITING
VISUAL DISTURBANCES: NOT PRESENT
AGITATION: NORMAL ACTIVITY
AGITATION: NORMAL ACTIVITY

## 2022-06-06 ASSESSMENT — PATIENT HEALTH QUESTIONNAIRE - PHQ9
SUM OF ALL RESPONSES TO PHQ9 QUESTIONS 1 AND 2: 1
2. FEELING DOWN, DEPRESSED, IRRITABLE, OR HOPELESS: SEVERAL DAYS
3. TROUBLE FALLING OR STAYING ASLEEP OR SLEEPING TOO MUCH: SEVERAL DAYS
1. LITTLE INTEREST OR PLEASURE IN DOING THINGS: NOT AT ALL
6. FEELING BAD ABOUT YOURSELF - OR THAT YOU ARE A FAILURE OR HAVE LET YOURSELF OR YOUR FAMILY DOWN: MORE THAN HALF THE DAYS
8. MOVING OR SPEAKING SO SLOWLY THAT OTHER PEOPLE COULD HAVE NOTICED. OR THE OPPOSITE, BEING SO FIGETY OR RESTLESS THAT YOU HAVE BEEN MOVING AROUND A LOT MORE THAN USUAL: NOT AT ALL
5. POOR APPETITE OR OVEREATING: NOT AT ALL
7. TROUBLE CONCENTRATING ON THINGS, SUCH AS READING THE NEWSPAPER OR WATCHING TELEVISION: MORE THAN HALF THE DAYS
9. THOUGHTS THAT YOU WOULD BE BETTER OFF DEAD, OR OF HURTING YOURSELF: NOT AT ALL

## 2022-06-06 ASSESSMENT — ENCOUNTER SYMPTOMS
SHORTNESS OF BREATH: 0
NERVOUS/ANXIOUS: 1
ABDOMINAL PAIN: 1
DIARRHEA: 1
MYALGIAS: 0
VOMITING: 0
ORTHOPNEA: 0
NAUSEA: 1
DIZZINESS: 0
FEVER: 0
HEADACHES: 0
VOMITING: 1
INSOMNIA: 1
SEIZURES: 1
WHEEZING: 0
CHILLS: 0

## 2022-06-06 ASSESSMENT — COGNITIVE AND FUNCTIONAL STATUS - GENERAL
SUGGESTED CMS G CODE MODIFIER DAILY ACTIVITY: CH
SUGGESTED CMS G CODE MODIFIER MOBILITY: CH
DAILY ACTIVITIY SCORE: 24
MOBILITY SCORE: 24

## 2022-06-06 ASSESSMENT — FIBROSIS 4 INDEX: FIB4 SCORE: 0.34

## 2022-06-06 ASSESSMENT — PAIN DESCRIPTION - PAIN TYPE
TYPE: ACUTE PAIN

## 2022-06-06 NOTE — ASSESSMENT & PLAN NOTE
Patient declines marijuana use for the past month. UDS was positive for cannabinoids, though can test positive for up to 2 months in previously chronic users, though ER physical exam noted patient smelled of recent marijuana use.   Plan:  -Encourage marijuana cessation.

## 2022-06-06 NOTE — ED NOTES
Med rec completed per patient  Allergies reviewed  No PO Antibiotics in the last 30 days     Patient states the only prescription medication he takes is Xanax from [place], though he has been out of the medication for 2 days.

## 2022-06-06 NOTE — H&P
Hospital Medicine History & Physical Note    Date of Service  6/5/2022    Primary Care Physician  Pcp Pt States None    Consultants  None    Code Status  Full Code    Chief Complaint  Chief Complaint   Patient presents with   • Other       History of Presenting Illness  Grey Diop is a 27 y.o. male with h/o marijuana use, anxiety and benzodiazepine dependence who presented 6/5/2022 with seizure-like activity per family.  Patient has multiple hospitalizations for benzodiazepine withdrawal, he has left AMA multiple times in the past.  He stated he received Xanax refill a provider in [place], reported taking 2mg to 4mg daily.  Patient usually comes to ER whenever he is out of Xanax.  No seizure activity seen in ER.  His CBC and CMP are fairly benign, did have lactic acid of 4.5, resolved with hydration.  I am being asked to admit patient for benzodiazepine withdrawal. Admitted to medicine service.    I discussed the plan of care with patient and bedside RN.    Review of Systems  Review of Systems   Constitutional: Positive for malaise/fatigue. Negative for chills and fever.   HENT: Negative for hearing loss and tinnitus.    Eyes: Negative for blurred vision and double vision.   Respiratory: Negative for cough and wheezing.    Cardiovascular: Positive for palpitations. Negative for chest pain.   Gastrointestinal: Positive for abdominal pain, heartburn, nausea and vomiting.   Genitourinary: Negative for dysuria and flank pain.   Musculoskeletal: Positive for myalgias. Negative for falls.   Skin: Negative for itching and rash.   Neurological: Positive for dizziness and headaches. Negative for speech change and focal weakness.   Endo/Heme/Allergies: Negative for environmental allergies. Does not bruise/bleed easily.   Psychiatric/Behavioral: Positive for substance abuse. Negative for depression.   All other systems reviewed and are negative.      Past Medical History   has a past medical history of Abdominal  "migraine, Abdominal migraine, Anxiety, Benzodiazepine dependence (HCC), Colitis, Cyclic vomiting syndrome, Gastritis, and Scoliosis.    Surgical History   has a past surgical history that includes gastroscopy (9/3/2014); gastroscopy-endo (11/15/2014); other; other orthopedic surgery; and pr upper gi endoscopy,diagnosis (N/A, 5/17/2022).     Family History  family history is not on file.   Family history reviewed with patient. There is no family history that is pertinent to the chief complaint.     Social History   reports that he has never smoked. He has never used smokeless tobacco. He reports current drug use. Drugs: Inhaled and Marijuana. He reports that he does not drink alcohol.    Allergies  Allergies   Allergen Reactions   • Acetaminophen [Tylenol] Anaphylaxis and Nausea     Nausea, \"it just doesn't sit right\"  5/29/2021 patient states that his throat swells shut       Medications  Prior to Admission Medications   Prescriptions Last Dose Informant Patient Reported? Taking?   Naloxone (NARCAN) 4 MG/0.1ML Liquid   No No   Sig: Administer a single spray of NARCAN Nasal [place] 4 mg/0.1 mL intranasally into one nostril, call 911 for emergency medical assistance.   omeprazole (PRILOSEC) 20 MG delayed-release capsule   No No   Sig: Take 1 Capsule by mouth every day.   ondansetron (ZOFRAN) 4 MG Tab tablet   No No   Sig: Take 1 Tablet by mouth every four hours as needed for Nausea/Vomiting.      Facility-Administered Medications: None       Physical Exam  Temp:  [36.8 °C (98.2 °F)-37 °C (98.6 °F)] 36.8 °C (98.2 °F)  Pulse:  [] 93  Resp:  [12-24] 18  BP: (111-169)/(57-83) 115/67  SpO2:  [97 %-98 %] 97 %  Blood Pressure: 111/57   Temperature: 37 °C (98.6 °F)   Pulse: (!) 113   Respiration: (!) 24   Pulse Oximetry: 97 %       Physical Exam  Vitals and nursing note reviewed.   Constitutional:       Appearance: Normal appearance.   HENT:      Head: Normocephalic and atraumatic.      Mouth/Throat:      Mouth: Mucous " membranes are dry.      Pharynx: Oropharynx is clear.   Eyes:      General: No scleral icterus.     Extraocular Movements: Extraocular movements intact.   Cardiovascular:      Rate and Rhythm: Regular rhythm. Tachycardia present.      Pulses: Normal pulses.      Heart sounds:     No friction rub.   Pulmonary:      Effort: Pulmonary effort is normal. No respiratory distress.      Breath sounds: No stridor. No wheezing or rales.   Chest:      Chest wall: No tenderness.   Abdominal:      General: Bowel sounds are normal. There is no distension.      Palpations: Abdomen is soft.      Tenderness: There is no abdominal tenderness. There is no guarding or rebound.   Musculoskeletal:         General: No swelling or tenderness. Normal range of motion.      Cervical back: Neck supple. No tenderness.   Skin:     General: Skin is warm and dry.      Capillary Refill: Capillary refill takes less than 2 seconds.   Neurological:      General: No focal deficit present.      Mental Status: He is alert and oriented to person, place, and time.   Psychiatric:         Mood and Affect: Mood normal.         Laboratory:  Recent Labs     06/05/22  1614   WBC 11.2*   RBC 5.26   HEMOGLOBIN 14.4   HEMATOCRIT 41.8*   MCV 79.5*   MCH 27.4   MCHC 34.4   RDW 36.2   PLATELETCT 414   MPV 11.2     Recent Labs     06/05/22  1614   SODIUM 137   POTASSIUM 4.1   CHLORIDE 106   CO2 15*   GLUCOSE 142*   BUN 25*   CREATININE 1.02   CALCIUM 10.0     Recent Labs     06/05/22  1614   ALTSGPT 14   ASTSGOT 21   ALKPHOSPHAT 70   TBILIRUBIN 0.4   LIPASE 29   GLUCOSE 142*         No results for input(s): NTPROBNP in the last 72 hours.      Recent Labs     06/05/22  1614   TROPONINT <6       Imaging:  DX-CHEST-PORTABLE (1 VIEW)   Final Result      No acute cardiopulmonary abnormality.             X-Ray:  I have personally reviewed the images and compared with prior images.  EKG:  I have personally reviewed the images and compared with prior  images.    Assessment/Plan:  Justification for Admission Status  I anticipate this patient is appropriate for inpatient status    * Withdrawal from benzodiazepine, with delirium (HCC)- (present on admission)  Assessment & Plan  Dependent on Xanax -- no provider script but pt states he receives refill from provider in [place]    Scheduled valium  PRN ativan per CIWA score  CIWA    Cyclical vomiting- (present on admission)  Assessment & Plan  IVF  Supportive antiemetic    Lactic acidosis  Assessment & Plan  Resolved, likely secondary to dehydration    Marijuana use- (present on admission)  Assessment & Plan  Cessation advised    GERD (gastroesophageal reflux disease)- (present on admission)  Assessment & Plan  PPI    Dehydration  Assessment & Plan  IVF      VTE prophylaxis: enoxaparin ppx

## 2022-06-06 NOTE — PROCEDURES
INPATIENT ROUTINE VIDEO ELECTROENCEPHALOGRAM REPORT      Referring provider:   Dr. Guadalupe    DOS: 06/06/22 (0 hours and 24 minutes of total recording time).     INDICATION:  Grey Diop 27 y.o. male presenting with seizure(s)    CURRENT ANTIEPILEPTIC AND/OR SEDATING REGIMEN:   Ativan (2mg @0439/0813)  Xanax (0.5mg TID)    TECHNIQUE: Routine VEEG was set up by a Neurodiagnostic technologist who performed education to the patient and staff. A minimum of 23 electrodes and 23 channel recording was setup and performed by Neurodiagnostic technologist, in accordance with the international 10-20 system. The study was reviewed in bipolar and referential montages. The recording examined the patient in the  awake and drowsy state(s).     DESCRIPTION OF THE RECORD:  During maximal wakefulness, the background was continuous and showed a 10-11 Hz posterior dominant rhythm, with a mixture of alpha and overriding diffuse beta activity.  Reactivity and state changes were present .  During drowsiness, theta/delta frequencies were seen.     EEG Sleep: Sleep was not captured.    ACTIVATION PROCEDURES:   Hyperventilation was performed by the patient for a total of 3 minutes. The technician performing the test noted good effort. This technique did not elicited any abnormalities on EEG.  and Intermittent Photic stimulation was performed in a stepwise fashion from 1 to 30 Hz and did not elicited any abnormalities on EEG.     ICTAL AND INTERICTAL FINDINGS:   No focal or generalized epileptiform activity noted.     No regional slowing was seen during this routine study.      No definite electrographic or electroclinical seizures.     EKG: Sampling of the EKG recording showed sinus rhythm and frequent tachycardia      EVENTS:  None    INTERPRETATION:   Normal video EEG recording in the awake and drowsy state(s):  - No persistent focal asymmetries seen.  - No definitive epileptiform discharges or other epileptiform phenomena seen   -  No seizures. Clinical correlation is recommended.      Note: A normal EEG does not rule out epilepsy.  If the clinical suspicion remains high for seizures, a prolonged recording to capture clinical or subclinical events may be helpful.        Emmanuel Chase MD  Epilepsy and General Neurology  Department of Neurology  Instructor of Clinical Neurology [facility].   Office: [phone]  Fax: [phone]

## 2022-06-06 NOTE — ASSESSMENT & PLAN NOTE
Patient reports chronically using benzodiazepines for the past 10 years, which he obtains through a psychiatrist in [place], which he visits every 6 months.   Patient declines desire to stop using benzodiazepines,

## 2022-06-06 NOTE — PROGRESS NOTES
Patient has pushed call light 9 times in the last hour and continues to ask for IV Xanax and IV Ativan. Patient's CIWA score has been <5 the two times the patient has been screened. Patient does not meet criteria to receive Ativan for CIWA. Scheduled Diazepam 10 mg PO was given to patient, but prior to taking it, patient asked if it could be switched to IV. Patient proceeded to take it. Patient is currently resting comfortably in bed. Will continue to monitor.

## 2022-06-06 NOTE — PROGRESS NOTES
Monitor summary:  SR 74-94  Patient had an episode where HR was in 150's sinus tach.  .14/.06/.38

## 2022-06-06 NOTE — ASSESSMENT & PLAN NOTE
Patient reports using benzodiazepines for the past 10 years. Reports using 2 to 4mg of Xanax daily.  Pt reports getting xanax prescription from [place], PDMP negative for [place].  -Start Librium taper 50 mg Q8h

## 2022-06-06 NOTE — PROGRESS NOTES
Daily Progress Note:     Date of Service: 6/6/2022  Primary Team: [facility] Team   Attending: MARYAM Fuentes M.D.   Senior Resident: Dr. Gildardo Hills  Intern: Dr. Mekhi Michael  Contact:  [phone]    Chief Complaint:   Seizure-like activity    ID:  27 year old male with past medical history of marijuana use, anxiety and benzodiazepine dependence with multiple hospitalizations for benzodiazepine withdrawal, leaving against medical advice multiple times in the past who presented to the ER for seizure-like activity per family.    Subjective:  Called to bedside this morning by nursing who noted patient was having seizure like activity. His eyes were closed and patient had a rhythmic shaking, but was responsive to sternal rub and was protective of his face from a drop arm test. When patient woke up from seizure-like activity without any post-ictal confusion, he initially requested dilaudid for abdominal pain and Xanax or alprazolam for his seizures, noting that he normally takes 2-4 mg of Xanax daily that he gets through a psychiatrist in [place]. He also noted an allergic reaction of his throat feeling like it will close up with Tylenol. In regards to his abdominal pain, he did have an EGD on 05/17/22 showing grade 2 esophagitis.   Later in the morning, patient had an unwitnessed fall. Patient did not have any tenderness to palpation or signs of bruising on his head without any changes in mental status exam. His mattress was placed on the floor and a sitter was placed in his room.   Throughout the day, patient continued to ask for increasing amounts of Xanax, noting that ativan or valium would not do anything for him.  Patient also had another episode of seizure-like activity in the afternoon that immediately stopped after it was discussed with the nurse who was in the room that we would have to stop Xanax and give another benzodiazepine as it didn't seem to be working.   He did have one episode of  vomiting after taking oral potassium and phosphate repletion. He otherwise denies any fever, chills, headaches, chest pain or tachycardia.     Consultants/Specialty:  Psychiatry     Review of Systems:  Review of Systems   Constitutional: Negative for chills and fever.   Respiratory: Negative for shortness of breath and wheezing.    Cardiovascular: Negative for chest pain and orthopnea.   Gastrointestinal: Positive for abdominal pain, diarrhea, nausea and vomiting.   Neurological: Positive for seizures (Shaking like activity with closed eyes and failing drop arm test). Negative for headaches.   Psychiatric/Behavioral: Positive for substance abuse. The patient is nervous/anxious.        Objective Data:   Physical Exam:   Vitals:   Temp:  [36.5 °C (97.7 °F)-37.2 °C (99 °F)] 37 °C (98.6 °F)  Pulse:  [] 103  Resp:  [12-24] 16  BP: (102-169)/() 131/88  SpO2:  [96 %-100 %] 96 %    Physical Exam  Constitutional:       General: He is not in acute distress.     Appearance: Normal appearance. He is not toxic-appearing.   HENT:      Head: Normocephalic and atraumatic.   Eyes:      General: No scleral icterus.     Pupils: Pupils are equal, round, and reactive to light.   Cardiovascular:      Rate and Rhythm: Normal rate and regular rhythm.      Heart sounds: No murmur heard.  Pulmonary:      Effort: Pulmonary effort is normal.      Breath sounds: Normal breath sounds. No wheezing.   Abdominal:      General: Abdomen is flat.      Palpations: Abdomen is soft.      Tenderness: There is no abdominal tenderness.   Musculoskeletal:      Right lower leg: No edema.      Left lower leg: No edema.   Skin:     General: Skin is warm and dry.      Capillary Refill: Capillary refill takes less than 2 seconds.   Neurological:      General: No focal deficit present.      Mental Status: He is alert and oriented to person, place, and time.       Labs:   Recent Labs     06/05/22  1614 06/06/22  0830 06/06/22  1156   WBC 11.2* 9.8 10.9*    RBC 5.26 4.42* 4.51*   HEMOGLOBIN 14.4 11.9* 12.2*   HEMATOCRIT 41.8* 34.6* 35.4*   MCV 79.5* 78.3* 78.5*   MCH 27.4 26.9* 27.1   RDW 36.2 37.4 37.4   PLATELETCT 414 344 326   MPV 11.2 11.1 11.2   NEUTSPOLYS 76.60* 73.50* 82.50*   LYMPHOCYTES 18.20* 20.90* 12.10*   MONOCYTES 4.40 4.60 4.60   EOSINOPHILS 0.10 0.20 0.00   BASOPHILS 0.30 0.30 0.30     Lab Results   Component Value Date/Time    SODIUM 137 06/06/2022 11:56 AM    POTASSIUM 3.5 (L) 06/06/2022 11:56 AM    CHLORIDE 108 06/06/2022 11:56 AM    CO2 18 (L) 06/06/2022 11:56 AM    GLUCOSE 103 (H) 06/06/2022 11:56 AM    BUN 19 06/06/2022 11:56 AM    CREATININE 0.83 06/06/2022 11:56 AM    CREATININE 0.4 04/10/2006 12:00 PM      EEG   Normal video EEG recording in the awake and drowsy state(s):  - No persistent focal asymmetries seen.  - No definitive epileptiform discharges or other epileptiform phenomena seen   - No seizures. Clinical correlation is recommended.    Problem Representation:   27 year old male with past medical history of marijuana use, anxiety and benzodiazepine dependence with multiple hospitalizations for benzodiazepine withdrawal, leaving against medical advice multiple times in the past who presented to the ER for seizure-like activity per family.    * Benzodiazepine withdrawal without complication (HCC)- (present on admission)  Assessment & Plan  Patient reports chronically using benzodiazepines for the past 10 years, which he obtains through a psychiatrist in [place], whom he visits every 6 months. Patient declines desire to stop using benzodiazepines. Patient has been displaying seizure-like activity, though it appears to be psychogenic non-epileptic seizures as supported by normal EEG, protecting face from hand drop, closed eyes during seizure, response to sternal rub during seizure, no post-ictal confusion and immediate seizure cessation when discussed in room that Xanax would have to be switched to another medication as it did not seem to  "be controlling his seizure-like activity. Patient does have nausea, vomiting and anxiety related to benzodiazepine withdrawal.   Plan:  -Scheduled Xanax 0.5mg Q 8 hours to help prevent benzodiazepine withdrawal  -Lorazepam 1 mg PRN once for epileptic seizure with notification to MD when occurs  -Supportive blood pressure control with clonidine 0.1mg 1 hour PRN for blood pressure >180/125 mmHg.    Marijuana use- (present on admission)  Assessment & Plan  Patient declines marijuana use for the past month. UDS was positive for cannabinoids, though can test positive for up to 2 months in previously chronic users, though ER physical exam noted patient smelled of recent marijuana use.   Plan:  -Encourage marijuana cessation.    Cyclical vomiting- (present on admission)  Assessment & Plan  EGD from 05/17/22 showed grade 2 esophagitis, but otherwise unremarkable.   Likely due to cannabinoid hyperemesis syndrome. Patient declines any improvement with hot showers, but also reports inconsistent history about never having been hospitalized related to benzodiazepine use.     Epigastric abdominal pain- (present on admission)  Assessment & Plan  Patient has chronic epigastric pain with history of recurrent vomiting/cannabinoid hyperemesis syndrome.   Grade 2 esophagitis on EGD from 05/17/22. GI recommendations at that time were sucralfate 1g TID and PPI 40 mg BID.  Plan:  -1g sucralfate TID and omeprazole 40mg BID    Diarrhea  Assessment & Plan  Nurse reports 3 loose watery stools. Patient had no recent antibiotic use.   No leukocytosis on labs. Low suspicion for C. Diff at this point.   Likely related to benzodiazepine withdrawal, reportedly common side effect as \"Benzo belly\" through rehabilitation programs online. Minimal medical literature on diarrhea with benzodiazepine withdrawal.   -Re-evaluate for C diff if wbc increases tomorrow and diarrhea persists.    Psychogenic nonepileptic seizure  Assessment & Plan  Patient " displaying seizure-like activity in the setting of benzodiazepine withdrawal. EEG was negative for seizure. Patient protecting face from drop arm test, eyes closed during seizure, response to sternal rub and cessation of seizure when discussed that Xanax will be stopped as seizure-like activity persists. Lactic acid mildly incrased, which can be seen in convulsive non-epileptic seizures, but CPK was otherwise normal.     Hypophosphatemia  Assessment & Plan  Low phosphate at 1.6. Repleted orally with 2 tabs of K-phos, though patient vomited 30 minutes later. Likely related to recent diarrhea.   Plan:  -30 mm IV K-phos ordered.  -Recheck in morning    Lactic acidosis  Assessment & Plan  Resolved.   Lactic acid high at 4.5, which trended down to 1.0 on repeat lab.   Likely due to volume depletion and psychogenic seizure have been shown to cause elevated lactic acid levels. Repeat lactic acid levels today have been in the 1.3 to 2.0 range.

## 2022-06-06 NOTE — PROGRESS NOTES
"0803: RN responded to bed alarm and found pt walking to bathroom. RN educated pt on risk for falls and need to call for assistance with ambulating. Pt stated \"it was an emergency\", this RN reinforced need to wait for assistance in the future due to fall and subsequent seizure activity this morning. Pt seated on toilet with stand-by assist from this RN. While on toilet, pt slumped over and was assisted to ground by this RN. Pt began shaking. This RN called staff assist and turned pt on side while protecting head. Multiple staff RNs arrived to assist. Vitals taken were stable other than SBP being elevated in the 150s and HR between 150-170s while actively shaking. Pt able to protect airway, SpO2 98-99%. Pt was observed stopping shaking breifly to swallow. Pt had pupillary and pain responses. Blood glucose: 104. Pt given 2mg IV Ativan (see MAR). Pt moved back into bed. Dr. Michael to bedside. Pt alert and respnsive after it was stated that pt received 2mg IV Ativan. A&Ox4 after resolution of event. All fall and seizure precautions in place.    0900: This RN was standing outside of pt's room charting. Bed alarm sounded and RN responded immediately. Pt was found on the floor having similar seizure activity to prior event. MD notified. No new orders at this time. CNA sitting on pt for safety. A&Ox4 after resolution of event. All fall and seizure precautions in place.      0928: Pt had assisted fall with sitter. Sitter reported pt slumped over and began shaking. No injuries apparent. Seizure activity continued. Pt A&Ox4 after resolution of event, asking for IV Xanax as it is \"the only thing that works\". MD notified. Order received for scheduled PO Xanax. See MAR for details. Pt unhappy with 0.5mg dose, stated he needed 2mg \"for it to work.\"    0950: Pt had assisted fall with sitter. Sitter reported pt slumped over and began shaking. No injuries apparent. Seizure activity continued. MD notified. Pt's mattress moved to floor " Cardiac for safety.     1125: Pt had assisted fall with sitter. Sitter reported that pt stood up impulsively. Seizure activity continued. No apparent injuries. MD notified. MD to see patient and order psych consult. Per MD, if episodes continue PO Xanax will be discontinued.

## 2022-06-06 NOTE — ASSESSMENT & PLAN NOTE
EGD on 05/17/22 showed grade 2 esophagitis, but otherwise unremarkable.  Etiology likely due to gastritis vs psychogenic vs cyclic vomiting syndrome.  -PRN zofran  -sucralfate  -omeprazole 40 mg BID

## 2022-06-06 NOTE — ASSESSMENT & PLAN NOTE
Patient has chronic epigastric pain with history of recurrent vomiting/cannabinoid hyperemesis syndrome.   Grade 2 esophagitis on EGD from 05/17/22. GI recommendations at that time were sucralfate 1g TID and PPI 40 mg BID.  Plan:  -1g sucralfate TID and omeprazole 40mg BID

## 2022-06-06 NOTE — NON-PROVIDER
"Daily Progress Note:     Date of Service: 6/6/2022  Primary Team: [facility] Orange/white Team        Attending: MARYAM Fuentes M.D.   Senior Resident: Dr. Hills  Intern: Dr. Mock  Contact:  [phone]    Reason for admission:   Treatment of benzodiazepine withdrawal    Subjective: Per nurses, last night pt went into the hallway, fell to the ground and began shaking. He was asking for Xanax because \"that's the only thing that stops the seizures\" after getting Ativan. Pt was alert and orientated at the time. CIWA score increased to 4 and 5 this morning from 1. He reports 3 episodes of diarrhea overnight. He reports 10/10, acute umbilical abdominal pain that started this morning. Over an hour later, he had another episode of tremulousness, while being alert and oriented.    Consultants/Specialty:  None    Review of Systems:    Review of Systems   Cardiovascular: Negative for chest pain.   Gastrointestinal: Positive for abdominal pain, diarrhea and nausea. Negative for vomiting.   Musculoskeletal: Negative for myalgias.   Neurological: Negative for dizziness and headaches.   Psychiatric/Behavioral: The patient is nervous/anxious and has insomnia.      Objective Data:   Physical Exam:   Vitals:   Temp:  [36.7 °C (98 °F)-37 °C (98.6 °F)] 37 °C (98.6 °F)  Pulse:  [] 101  Resp:  [12-24] 18  BP: (111-169)/() 139/81  SpO2:  [97 %-100 %] 98 %    Physical Exam  Constitutional:       General: He is in acute distress.   Cardiovascular:      Rate and Rhythm: Regular rhythm. Tachycardia present.      Heart sounds: Normal heart sounds.   Pulmonary:      Breath sounds: Normal breath sounds.   Abdominal:      Tenderness: There is abdominal tenderness. There is guarding. There is no rebound.   Musculoskeletal:         General: No swelling.   Psychiatric:         Mood and Affect: Mood normal.         Thought Content: Thought content normal.       Labs:   Renal Function Panel [[id number]] (Abnormal) Collected: 06/06/22 " 1156   Order Status: Completed Specimen: Blood Updated: 06/06/22 1319    Sodium 137 mmol/L     Potassium 3.5 Low  mmol/L     Chloride 108 mmol/L     Co2 18 Low  mmol/L     Glucose 103 High  mg/dL     Creatinine 0.83 mg/dL     Bun 19 mg/dL     Calcium 9.4 mg/dL     Phosphorus 1.9 Low  mg/dL     Albumin 4.3 g/dL      MAGNESIUM [[id number]] Collected: 06/06/22 1156   Order Status: Completed Specimen: Blood Updated: 06/06/22 1319    Magnesium 1.8 mg/dL      Magnesium 1.8 mg/dL     LACTIC ACID [[id number]] Collected: 06/06/22 1156   Order Status: Completed Specimen: Blood Updated: 06/06/22 1242    Lactic Acid 1.3 mmol/L      Recent Labs     06/05/22  1614 06/06/22  0830 06/06/22  1156   WBC 11.2* 9.8 10.9*   RBC 5.26 4.42* 4.51*   HEMOGLOBIN 14.4 11.9* 12.2*   HEMATOCRIT 41.8* 34.6* 35.4*   MCV 79.5* 78.3* 78.5*   MCH 27.4 26.9* 27.1   RDW 36.2 37.4 37.4   PLATELETCT 414 344 326   MPV 11.2 11.1 11.2   NEUTSPOLYS 76.60* 73.50* 82.50*   LYMPHOCYTES 18.20* 20.90* 12.10*   MONOCYTES 4.40 4.60 4.60   EOSINOPHILS 0.10 0.20 0.00   BASOPHILS 0.30 0.30 0.30     PHOSPHORUS [[id number]] (Abnormal) Collected: 06/06/22 1010   Order Status: Completed Specimen: Blood Updated: 06/06/22 1057    Phosphorus 1.6 Low  mg/dL      CREATINE KINASE [[id number]] Collected: 06/06/22 1010   Order Status: Completed Specimen: Blood Updated: 06/06/22 1057    CPK Total 54 U/L      Recent Labs     06/05/22  1614 06/06/22  0830 06/06/22  1010 06/06/22  1156   SODIUM 137 137 136 137   POTASSIUM 4.1 3.7 3.5* 3.5*   CHLORIDE 106 107 107 108   CO2 15* 15* 18* 18*   GLUCOSE 142* 124* 117* 103*   BUN 25* 20 19 19   CPKTOTAL 55  --  54  --      Imaging:   No new imaging    Problem Representation: 26 y/o male w/ hx of multiple hospitalizations for narcotic and benzodiazepine abuse admitted for benzodiazepine withdrawal. Pt's CIWA score increased this morning. Pt developed acute abdominal pain this morning.    Benzodiazepine withdrawal   Assessment & Plan  Hx of  hospitalizations for benzodiazepine dependence. Pt states he normally gets refills from provider in [place]. Currently on scheduled diazepam and ativan. Currently on scheduled valium and PRN ativan per CIWA score. CIWA score 1 last night and 5 this morning. Pt had a fall w/ psychogenic seizure last night and this morning.  -Switch Valium to PO Xanax 0.5 mg q8h  -Continue Ativan 2 mg PRN  -Psychiatry consult placed  -Sitter in place in case of additional psychogenic seizures  -EEG performed and did not capture any seizures    Periumbilical abdominal pain  Assessment & Plan  Sudden acute umbilical pain onset this morning. Possibly psychogenic secondary to benzo withdrawal.   -Continue to monitor    Diarrhea  Assessment & Plan  Pt has 3 episodes of watery diarrhea overnight. Pt continued to have 3 more throughout the day. Likely secondary to benzo withdrawal. Less likely due to C diff as pt has not recently taken antibiotics.  -Continue to monitor  -Continuous IV LR  -Hold bowel regimen    Nausea and vomiting  Assessment & Plan  Pt has hx of cannabis hyperemesis syndrome. Vomiting could be secondary to marijuana abuse and benzo withdrawal. Pt has positive urine cannabinoid on admission.  -Continue zofran, compazine and phenergan PRN for nausea/vomiting    Lactic acidosis  Assessment & Plan  Resolved in ER. Secondary to psychogenic seizure combined w/ dehydration. Lactic acid is more often elevated in pseudoseizures but not CPK. Both are often elevated in epileptic seizures. Pt most likely having pseudoseizures.  -Repeat lactic acid is 1.3  -Repeat CPK is 54     GERD (gastroesophageal reflux disease)- (present on admission)  Assessment & Plan  Continue omeprazole

## 2022-06-06 NOTE — PROGRESS NOTES
Report given to day shift nurse. Nurse will assume care of patient. Patient is currently in bed with call light within reach. Fall precautions in place.

## 2022-06-06 NOTE — PROGRESS NOTES
Report received from night shift RN, assumed care of pt. Pt A&Ox4, anxious. Plan of care discussed with pt, labs and chart reviewed. All needs met at this time. Tele box on. On RA. Call light within reach, bed locked and in lowest position. All fall precautions and hourly rounding in place. Patient reporting 10/10 abdominal pain; senior resident Dr. Wynn notified. Will continue to monitor.

## 2022-06-06 NOTE — ASSESSMENT & PLAN NOTE
Dependent on Xanax -- no provider script but pt states he receives refill from provider in [place]    Scheduled valium  PRN ativan per CIWA score  CIWA

## 2022-06-06 NOTE — PROGRESS NOTES
"Bedside report received from nurse. Assume care of patient. Patient brought up from ED to room 713 with [facility]. Tele monitor hooked up to patient and monitors called by charge nurse. Patient stood at bedside and transferred to bed. Patient's vitals stable. Patient up to bathroom independently. When arriving to patient's room, patient immediately asked for IV Valium and IV Ativan. Patient stated that he prefers IV medication over anything oral because \"it doesn't work as well for me\". Patient resting comfortably in bed. Admission assessment in process. Will continue to monitor.   "

## 2022-06-06 NOTE — PROGRESS NOTES
"[id number]: Patient got out of bed and opened door and walked out of room, closed the door behind him, and fell to the ground. SHAWNA Edmonds called out and staff assisted. Patient was shaking on the ground. Rapid was called by charge RNEmber. Patient opened eyes and primary RN asked patient orientation questions and patient answered them all correctly. When the doctor and APRN showed up, as well as the rapid team, patient began to shake again. When the patient received Ativan and the doctor spoke with patient, the patient asked if he could have 2 mg of IV Xanax \"because that's the only thing that stops the seizures\". 2 mg of Ativan IV was given at that time and patient received a total of 2 mg Ativan IV. Patient sat up and answered all questions correctly and appropriately for the doctor and nurse. Bed was brought out to [place] because patient stated that he wasn't able to get up. Patient was put back into his bed with assistance. Patient was then taken back into his room and the doctor assessed patient. Patient followed all of the commands and responded appropriately. Once the physician left the room, the patient immediately called the primary RN into the room and said \"Can you please ask the doctor if I can have the IV Xanax 2 mg, because that's the only thing that will stop the seizures\". Primary RN explained to patient that he had just received 2 mg IV Ativan and that was what he had been asking for. The patient then said \"But now I want the IV Xanax.\"   "

## 2022-06-06 NOTE — ED NOTES
PT medicated as ordered and is awaiting admit to hospital VSS and PT has stopped his shaking behavior   not examined

## 2022-06-06 NOTE — PROGRESS NOTES
"0500: When patient arrived to floor, fall risk assessment was charted and patient received a 6 which is 'no risk'. Post fall, patient was screened at an 11 which is 'moderate risk'. Patient was then placed on fall precautions. Bed alarm on, fall risk bracelet placed on patient, yellow socks on patient, fall risk sign placed on door, bed locked in lowest position, call light within reach. RN reiterated the importance of using the call light, rather than getting up on own.     0530: Post fall, patient continuously called the RN into the room and stated \"I can feel another seizure coming on.\"  \"Have you talked to the doctor yet about the IV Xanax.\"  \"I might have another seizure.\"  Patient called several times to tell the nurse these things.     Patient was laying in bed and did not seem to be in any distress or show signs or symptoms of seizures. Will continue to monitor patient.     "

## 2022-06-06 NOTE — PROGRESS NOTES
Pt has had 3 loose, watery BMs this shift. Per CNA, stools look characteristic of c.diff. Notified Dr. Michael and asked if he would like to collect stool sample. Per MD, diarrhea likely related to benzodiazepine withdrawal. No new orders at this time.

## 2022-06-06 NOTE — ED NOTES
Assumed care for this PT Mother at bedside and urine sample sent to lab and collected.  PT asking for more ativan after being medicated at 1800.

## 2022-06-06 NOTE — ASSESSMENT & PLAN NOTE
Resolved.   Lactic acid high at 4.5, which trended down to 1.0 on repeat labs.  Likely due to volume depletion. Repeat lactic acid levels in the 1.3 to 2.0 range.

## 2022-06-07 LAB
25(OH)D3 SERPL-MCNC: 13 NG/ML (ref 30–100)
ALBUMIN SERPL BCP-MCNC: 4 G/DL (ref 3.2–4.9)
ALBUMIN/GLOB SERPL: 1.5 G/DL
ALP SERPL-CCNC: 61 U/L (ref 30–99)
ALT SERPL-CCNC: 10 U/L (ref 2–50)
ANION GAP SERPL CALC-SCNC: 13 MMOL/L (ref 7–16)
AST SERPL-CCNC: 16 U/L (ref 12–45)
BASOPHILS # BLD AUTO: 0.2 % (ref 0–1.8)
BASOPHILS # BLD: 0.02 K/UL (ref 0–0.12)
BILIRUB SERPL-MCNC: 0.4 MG/DL (ref 0.1–1.5)
BUN SERPL-MCNC: 17 MG/DL (ref 8–22)
CALCIUM SERPL-MCNC: 8.8 MG/DL (ref 8.5–10.5)
CHLORIDE SERPL-SCNC: 107 MMOL/L (ref 96–112)
CO2 SERPL-SCNC: 18 MMOL/L (ref 20–33)
CREAT SERPL-MCNC: 0.83 MG/DL (ref 0.5–1.4)
EOSINOPHIL # BLD AUTO: 0.02 K/UL (ref 0–0.51)
EOSINOPHIL NFR BLD: 0.2 % (ref 0–6.9)
ERYTHROCYTE [DISTWIDTH] IN BLOOD BY AUTOMATED COUNT: 37.2 FL (ref 35.9–50)
GFR SERPLBLD CREATININE-BSD FMLA CKD-EPI: 124 ML/MIN/1.73 M 2
GLOBULIN SER CALC-MCNC: 2.6 G/DL (ref 1.9–3.5)
GLUCOSE SERPL-MCNC: 103 MG/DL (ref 65–99)
HCT VFR BLD AUTO: 32.8 % (ref 42–52)
HGB BLD-MCNC: 11.4 G/DL (ref 14–18)
IMM GRANULOCYTES # BLD AUTO: 0.07 K/UL (ref 0–0.11)
IMM GRANULOCYTES NFR BLD AUTO: 0.7 % (ref 0–0.9)
LYMPHOCYTES # BLD AUTO: 1.84 K/UL (ref 1–4.8)
LYMPHOCYTES NFR BLD: 17.6 % (ref 22–41)
MCH RBC QN AUTO: 27.2 PG (ref 27–33)
MCHC RBC AUTO-ENTMCNC: 34.8 G/DL (ref 33.7–35.3)
MCV RBC AUTO: 78.3 FL (ref 81.4–97.8)
MONOCYTES # BLD AUTO: 0.82 K/UL (ref 0–0.85)
MONOCYTES NFR BLD AUTO: 7.9 % (ref 0–13.4)
NEUTROPHILS # BLD AUTO: 7.67 K/UL (ref 1.82–7.42)
NEUTROPHILS NFR BLD: 73.4 % (ref 44–72)
NRBC # BLD AUTO: 0 K/UL
NRBC BLD-RTO: 0 /100 WBC
PHOSPHATE SERPL-MCNC: 4.1 MG/DL (ref 2.5–4.5)
PLATELET # BLD AUTO: 331 K/UL (ref 164–446)
PMV BLD AUTO: 10.9 FL (ref 9–12.9)
POTASSIUM SERPL-SCNC: 3.3 MMOL/L (ref 3.6–5.5)
PROT SERPL-MCNC: 6.6 G/DL (ref 6–8.2)
RBC # BLD AUTO: 4.19 M/UL (ref 4.7–6.1)
SODIUM SERPL-SCNC: 138 MMOL/L (ref 135–145)
WBC # BLD AUTO: 10.4 K/UL (ref 4.8–10.8)

## 2022-06-07 PROCEDURE — 770020 HCHG ROOM/CARE - TELE (206)

## 2022-06-07 PROCEDURE — 700111 HCHG RX REV CODE 636 W/ 250 OVERRIDE (IP): Performed by: STUDENT IN AN ORGANIZED HEALTH CARE EDUCATION/TRAINING PROGRAM

## 2022-06-07 PROCEDURE — 80053 COMPREHEN METABOLIC PANEL: CPT

## 2022-06-07 PROCEDURE — 84439 ASSAY OF FREE THYROXINE: CPT

## 2022-06-07 PROCEDURE — 84443 ASSAY THYROID STIM HORMONE: CPT

## 2022-06-07 PROCEDURE — 700102 HCHG RX REV CODE 250 W/ 637 OVERRIDE(OP): Performed by: HOSPITALIST

## 2022-06-07 PROCEDURE — 85025 COMPLETE CBC W/AUTO DIFF WBC: CPT

## 2022-06-07 PROCEDURE — A9270 NON-COVERED ITEM OR SERVICE: HCPCS | Performed by: HOSPITALIST

## 2022-06-07 PROCEDURE — 99253 IP/OBS CNSLTJ NEW/EST LOW 45: CPT | Mod: 25 | Performed by: PSYCHIATRY & NEUROLOGY

## 2022-06-07 PROCEDURE — 84100 ASSAY OF PHOSPHORUS: CPT

## 2022-06-07 PROCEDURE — 95700 EEG CONT REC W/VID EEG TECH: CPT | Performed by: STUDENT IN AN ORGANIZED HEALTH CARE EDUCATION/TRAINING PROGRAM

## 2022-06-07 PROCEDURE — 700105 HCHG RX REV CODE 258: Performed by: HOSPITALIST

## 2022-06-07 PROCEDURE — 700102 HCHG RX REV CODE 250 W/ 637 OVERRIDE(OP)

## 2022-06-07 PROCEDURE — A9270 NON-COVERED ITEM OR SERVICE: HCPCS

## 2022-06-07 PROCEDURE — 99233 SBSQ HOSP IP/OBS HIGH 50: CPT | Mod: GC | Performed by: HOSPITALIST

## 2022-06-07 PROCEDURE — 95718 EEG PHYS/QHP 2-12 HR W/VEEG: CPT | Performed by: STUDENT IN AN ORGANIZED HEALTH CARE EDUCATION/TRAINING PROGRAM

## 2022-06-07 PROCEDURE — 36415 COLL VENOUS BLD VENIPUNCTURE: CPT

## 2022-06-07 PROCEDURE — 95711 VEEG 2-12 HR UNMONITORED: CPT | Performed by: STUDENT IN AN ORGANIZED HEALTH CARE EDUCATION/TRAINING PROGRAM

## 2022-06-07 PROCEDURE — 95714 VEEG EA 12-26 HR UNMNTR: CPT | Performed by: STUDENT IN AN ORGANIZED HEALTH CARE EDUCATION/TRAINING PROGRAM

## 2022-06-07 PROCEDURE — 700102 HCHG RX REV CODE 250 W/ 637 OVERRIDE(OP): Performed by: STUDENT IN AN ORGANIZED HEALTH CARE EDUCATION/TRAINING PROGRAM

## 2022-06-07 PROCEDURE — A9270 NON-COVERED ITEM OR SERVICE: HCPCS | Performed by: STUDENT IN AN ORGANIZED HEALTH CARE EDUCATION/TRAINING PROGRAM

## 2022-06-07 PROCEDURE — 700111 HCHG RX REV CODE 636 W/ 250 OVERRIDE (IP)

## 2022-06-07 PROCEDURE — 700111 HCHG RX REV CODE 636 W/ 250 OVERRIDE (IP): Performed by: HOSPITALIST

## 2022-06-07 PROCEDURE — 82306 VITAMIN D 25 HYDROXY: CPT

## 2022-06-07 RX ORDER — CHLORDIAZEPOXIDE HYDROCHLORIDE 25 MG/1
25 CAPSULE, GELATIN COATED ORAL EVERY 8 HOURS
Status: DISCONTINUED | OUTPATIENT
Start: 2022-06-07 | End: 2022-06-08

## 2022-06-07 RX ORDER — DIAZEPAM 5 MG/ML
2.5 INJECTION, SOLUTION INTRAMUSCULAR; INTRAVENOUS EVERY 6 HOURS
Status: DISCONTINUED | OUTPATIENT
Start: 2022-06-07 | End: 2022-06-07

## 2022-06-07 RX ORDER — DIPHENHYDRAMINE HYDROCHLORIDE 50 MG/ML
12.5 INJECTION INTRAMUSCULAR; INTRAVENOUS ONCE
Status: COMPLETED | OUTPATIENT
Start: 2022-06-07 | End: 2022-06-07

## 2022-06-07 RX ORDER — VITAMIN B COMPLEX
1000 TABLET ORAL DAILY
Status: DISCONTINUED | OUTPATIENT
Start: 2022-06-07 | End: 2022-06-09 | Stop reason: HOSPADM

## 2022-06-07 RX ORDER — DIAZEPAM 5 MG/ML
5 INJECTION, SOLUTION INTRAMUSCULAR; INTRAVENOUS EVERY 4 HOURS PRN
Status: DISCONTINUED | OUTPATIENT
Start: 2022-06-07 | End: 2022-06-08

## 2022-06-07 RX ORDER — DIAZEPAM 5 MG/ML
2.5 INJECTION, SOLUTION INTRAMUSCULAR; INTRAVENOUS EVERY 8 HOURS
Status: DISCONTINUED | OUTPATIENT
Start: 2022-06-07 | End: 2022-06-07

## 2022-06-07 RX ORDER — POTASSIUM CHLORIDE 20 MEQ/1
40 TABLET, EXTENDED RELEASE ORAL ONCE
Status: DISCONTINUED | OUTPATIENT
Start: 2022-06-07 | End: 2022-06-08

## 2022-06-07 RX ADMIN — SUCRALFATE 1 G: 1 SUSPENSION ORAL at 16:51

## 2022-06-07 RX ADMIN — DIAZEPAM 2.5 MG: 5 INJECTION, SOLUTION INTRAMUSCULAR; INTRAVENOUS at 13:18

## 2022-06-07 RX ADMIN — THERA TABS 1 TABLET: TAB at 05:50

## 2022-06-07 RX ADMIN — SODIUM CHLORIDE, POTASSIUM CHLORIDE, SODIUM LACTATE AND CALCIUM CHLORIDE: 600; 310; 30; 20 INJECTION, SOLUTION INTRAVENOUS at 06:00

## 2022-06-07 RX ADMIN — OMEPRAZOLE 40 MG: 20 CAPSULE, DELAYED RELEASE ORAL at 16:51

## 2022-06-07 RX ADMIN — ENOXAPARIN SODIUM 40 MG: 40 INJECTION SUBCUTANEOUS at 16:51

## 2022-06-07 RX ADMIN — Medication 100 MG: at 16:51

## 2022-06-07 RX ADMIN — LORAZEPAM 1 MG: 2 INJECTION INTRAMUSCULAR; INTRAVENOUS at 08:18

## 2022-06-07 RX ADMIN — OMEPRAZOLE 40 MG: 20 CAPSULE, DELAYED RELEASE ORAL at 05:50

## 2022-06-07 RX ADMIN — ALPRAZOLAM 0.5 MG: 0.5 TABLET ORAL at 05:50

## 2022-06-07 RX ADMIN — CHLORDIAZEPOXIDE HYDROCHLORIDE 25 MG: 25 CAPSULE ORAL at 22:18

## 2022-06-07 RX ADMIN — SUCRALFATE 1 G: 1 SUSPENSION ORAL at 22:18

## 2022-06-07 RX ADMIN — LORAZEPAM 1 MG: 2 INJECTION INTRAMUSCULAR; INTRAVENOUS at 06:34

## 2022-06-07 RX ADMIN — FOLIC ACID 1 MG: 1 TABLET ORAL at 05:50

## 2022-06-07 RX ADMIN — DIPHENHYDRAMINE HYDROCHLORIDE 12.5 MG: 50 INJECTION INTRAMUSCULAR; INTRAVENOUS at 00:29

## 2022-06-07 ASSESSMENT — LIFESTYLE VARIABLES
NAUSEA AND VOMITING: MILD NAUSEA WITH NO VOMITING
HEADACHE, FULLNESS IN HEAD: NOT PRESENT
PAROXYSMAL SWEATS: BARELY PERCEPTIBLE SWEATING. PALMS MOIST
TOTAL SCORE: 4
ANXIETY: *
SUBSTANCE_ABUSE: 1
VISUAL DISTURBANCES: NOT PRESENT
TREMOR: NO TREMOR
AUDITORY DISTURBANCES: NOT PRESENT
AGITATION: NORMAL ACTIVITY
ORIENTATION AND CLOUDING OF SENSORIUM: ORIENTED AND CAN DO SERIAL ADDITIONS

## 2022-06-07 ASSESSMENT — ENCOUNTER SYMPTOMS
NAUSEA: 1
HEADACHES: 0
SENSORY CHANGE: 0
NERVOUS/ANXIOUS: 1
FOCAL WEAKNESS: 0
NECK PAIN: 0
SEIZURES: 1
WEAKNESS: 0
CHILLS: 0
ABDOMINAL PAIN: 1
TREMORS: 0
VOMITING: 1
MYALGIAS: 0
VOMITING: 0
SHORTNESS OF BREATH: 0
FEVER: 0
TINGLING: 0
ORTHOPNEA: 0
MEMORY LOSS: 0
EYES NEGATIVE: 1
PALPITATIONS: 1
DEPRESSION: 1
LOSS OF CONSCIOUSNESS: 0
DOUBLE VISION: 0
PHOTOPHOBIA: 0
FALLS: 0
BACK PAIN: 0
INSOMNIA: 1
DIARRHEA: 1
DIZZINESS: 0
FALLS: 1
WHEEZING: 0
NAUSEA: 0
BLURRED VISION: 0
SPEECH CHANGE: 0
DIARRHEA: 0
CHILLS: 1

## 2022-06-07 ASSESSMENT — PAIN DESCRIPTION - PAIN TYPE
TYPE: ACUTE PAIN
TYPE: ACUTE PAIN

## 2022-06-07 NOTE — PROCEDURES
CONTINUOUS VIDEO ELECTROENCEPHALOGRAM REPORT      Referring provider: Dr. Greco    DOS: 6/7/2022 (2 hours and 13 minutes of total recording time).     INDICATION:  Grey Diop 27 y.o. male presenting with unspecified convulsions    CURRENT ANTIEPILEPTIC AND/OR SEDATING REGIMEN:     Current Facility-Administered Medications:   •  vitamin D3 (cholecalciferol) tablet 1,000 Units, 1,000 Units, Oral, DAILY, Mekhi Michael M.D.  •  potassium chloride SA (Kdur) tablet 40 mEq, 40 mEq, Oral, Once, Mekhi Michael M.D.  •  diazePAM (VALIUM) injection 5 mg, 5 mg, Intravenous, Q4HRS PRN, MARYAM Fuentes M.D.  •  chlordiazePOXIDE (LIBRIUM) capsule 25 mg, 25 mg, Oral, Q8HRS, Mekhi Michael M.D.  •  diazePAM (VALIUM) injection 2.5 mg, 2.5 mg, Intravenous, Q8HRS, Mekhi Michael M.D.  •  sucralfate (CARAFATE) 1 GM/10ML suspension 1 g, 1 g, Oral, TID, Mekhi Michael M.D., 1 g at 06/06/22 1609  •  omeprazole (PRILOSEC) capsule 40 mg, 40 mg, Oral, BID, Mekhi Michael M.D., 40 mg at 06/07/22 0550  •  senna-docusate (PERICOLACE or SENOKOT S) 8.6-50 MG per tablet 2 Tablet, 2 Tablet, Oral, BID, 2 Tablet at 06/05/22 2145 **AND** polyethylene glycol/lytes (MIRALAX) PACKET 1 Packet, 1 Packet, Oral, QDAY PRN **AND** magnesium hydroxide (MILK OF MAGNESIA) suspension 30 mL, 30 mL, Oral, QDAY PRN **AND** bisacodyl (DULCOLAX) suppository 10 mg, 10 mg, Rectal, QDAY PRN, Emmanuel Wynn M.D.  •  lactated ringers infusion (BOLUS), 500 mL, Intravenous, Once PRN, Emmanuel Wynn M.D.  •  enoxaparin (Lovenox) inj 40 mg, 40 mg, Subcutaneous, DAILY AT 1800, Emmanuel Wynn M.D., 40 mg at 06/06/22 1749  •  hydrALAZINE (APRESOLINE) injection 10 mg, 10 mg, Intravenous, Q4HRS PRN, Emmanuel Wynn M.D.  •  ondansetron (ZOFRAN) syringe/vial injection 4 mg, 4 mg, Intravenous, Q4HRS PRN, Emmanuel Wynn M.D., 4 mg at 06/06/22 1339  •  ondansetron (ZOFRAN ODT) dispertab 4 mg, 4 mg, Oral, Q4HRS PRN, Emmanuel Wynn M.D.  •  promethazine (PHENERGAN)  tablet 12.5-25 mg, 12.5-25 mg, Oral, Q4HRS PRN, Emmanuel Wynn M.D.  •  promethazine (PHENERGAN) suppository 12.5-25 mg, 12.5-25 mg, Rectal, Q4HRS PRN, Emmanuel Wynn M.D.  •  prochlorperazine (COMPAZINE) injection 5-10 mg, 5-10 mg, Intravenous, Q4HRS PRN, Emmanuel Wynn M.D.  •  guaiFENesin dextromethorphan (ROBITUSSIN DM) 100-10 MG/5ML syrup 10 mL, 10 mL, Oral, Q6HRS PRN, Emmanuel Wynn M.D.  •  thiamine (Vitamin B-1) tablet 100 mg, 100 mg, Oral, Q EVENING, 100 mg at 06/05/22 2145 **AND** [DISCONTINUED] thiamine (Vitamin B-1) tablet 100 mg, 100 mg, Oral, DAILY **AND** multivitamin (THERAGRAN) tablet 1 Tablet, 1 Tablet, Oral, DAILY, 1 Tablet at 06/07/22 0550 **AND** folic acid (FOLVITE) tablet 1 mg, 1 mg, Oral, DAILY, Emmanuel Wynn M.D., 1 mg at 06/07/22 0550  •  cloNIDine (CATAPRES) tablet 0.1 mg, 0.1 mg, Oral, Q HOUR PRN, Emmanuel Wynn M.D.      TECHNIQUE: CVEEG was set up by a Neurodiagnostic technologist who performed education to the patient and staff. A minimum of 23 electrodes and 23 channel recording was setup and performed by Neurodiagnostic technologist, in accordance with the international 10-20 system. Impedence, electrode integrity, and technical impressions were documented a minimum of every 2-24 hour period by a Neurodiagnostic Technologist and reviewed by Interpreting Physician. The study was reviewed in bipolar and referential montages. The recording examined the patient in the awake state(s).     DESCRIPTION OF THE RECORD:  During maximal wakefulness, the background was continuous and showed a 9 Hz posterior dominant rhythm, with a mixture of alpha/theta frequencies with overriding beta at times.  Reactivity and state changes were present .    Sleep was not captured.    ACTIVATION PROCEDURES:   NA    ICTAL AND INTERICTAL FINDINGS:   No focal or generalized epileptiform activity noted.     No regional slowing was seen during this routine study.      No electroclinical or electrographic seizures were  reported or recorded during the study.     EKG: Sampling of the EKG recording showed frequent tachycardia    EVENTS: Multiple events captured on video and EEG where the patient can be seen shaking body very finely, often stopping and starting, head shaking off and on, occasional leg jerking, and unresponsiveness to questions and verbal stimuli.  There is no abnormal EEG correlate to these clinical events.      INTERPRETATION:  Normal video EEG recording in the awake state(s):  - Multiple events captured on video and EEG where the patient can be seen shaking body very finely often stopping and starting, intermittent head shaking off and on, occasional leg jerking, and unresponsiveness to questions and verbal stimuli.  There is no abnormal EEG correlate to these clinical events.  Specifically, these clinical events are nonepileptic in nature.    - No definitive epileptiform discharges or other epileptiform phenomena seen   - No seizures. Clinical correlation is recommended.              Emmanuel Chase MD  Epilepsy and General Neurology  Department of Neurology  Instructor of Clinical Neurology [facility].   Phone: [phone]

## 2022-06-07 NOTE — CARE PLAN
"The patient is Stable - Low risk of patient condition declining or worsening    Shift Goals  Clinical Goals: free from falls, safety  Patient Goals: \"get xanax\"  Family Goals: MARKELL- none present    Progress made toward(s) clinical / shift goals:    Problem: Knowledge Deficit - Standard  Goal: Patient and family/care givers will demonstrate understanding of plan of care, disease process/condition, diagnostic tests and medications  Outcome: Progressing     Problem: Seizure Precautions  Goal: Implementation of seizure precautions  Outcome: Progressing     Problem: Hemodynamics  Goal: Patient's hemodynamics, fluid balance and neurologic status will be stable or improve  Outcome: Progressing       Patient is not progressing towards the following goals:      "

## 2022-06-07 NOTE — PROGRESS NOTES
Daily Progress Note:     Date of Service: 6/7/2022  Primary Team: [facility]   Attending: MARYAM Fuentes M.D.   Senior Resident: Dr. Gildardo Hills  Intern: Dr. Mekhi iMchael  Contact:  [phone]    Chief Complaint:  Seizure-like activity    ID:  27 year old male with past medical history of anxiety and polysubstance abuse (marijuana, oxycodone and benzodiazepines) with prior admissions for cyclical vomiting who presents to the ED for seizure-like activity. Patient chronically taking benzodiazepines for the past 10 years, which he obtains through a psychiatrist in [place]. Patient had ran out of benzodiazepines and was unable to acquire a prescription from [place] or [facility]. No epileptic seizure witnessed during current hospital stay, half hour EEG negative for epileptic seizures.     Subjective:  Patient had 2 episodes of convulsions overnight. Patient has been receiving Xanax 0.5 mg Q 8 hours. He was complaining of nausea until it was informed that he would not receive Xanax, but IV ativan instead for concern of his vomiting up his medications. He otherwise has had no further vomiting episodes since yesterday. He notes his abdominal pain has improved with sucralfate and PPI. He has had no further loose stools.   Patient had a 25 minute convulsion/shaking episode this morning, hitting head against floor. 1 mg IM ativan was given without resolution of symptoms. Oxygen saturation stayed around 8 am. Orders were placed for patient to be transferred to neurology floor for 24 hour EEG monitoring. Neurology captured EEG monitoring during episodes showing no epileptic wave forms confirming diagnosis of psychogenic non-epileptic seizures.     Consultants/Specialty:  Neurology  Psychiatry    Review of Systems:   Review of Systems   Constitutional: Positive for chills. Negative for fever.   HENT: Negative.    Eyes: Negative.    Respiratory: Negative for shortness of breath and wheezing.     Cardiovascular: Positive for palpitations. Negative for chest pain and orthopnea.   Gastrointestinal: Positive for abdominal pain and nausea. Negative for diarrhea and vomiting.   Genitourinary: Negative.    Skin: Negative for itching and rash.   Neurological: Positive for seizures (Convulsions conistent with psychogenic non-epileptic seizures). Negative for loss of consciousness.   Psychiatric/Behavioral: Positive for substance abuse. The patient is nervous/anxious.        Objective Data:   Physical Exam:   Vitals:   Temp:  [36.5 °C (97.7 °F)-38.1 °C (100.5 °F)] 37.1 °C (98.8 °F)  Pulse:  [] 72  Resp:  [16-22] 18  BP: (102-135)/(71-90) 120/78  SpO2:  [96 %-98 %] 96 %    Physical Exam  Constitutional:       General: He is not in acute distress.     Appearance: Normal appearance. He is not ill-appearing, toxic-appearing or diaphoretic.   HENT:      Head: Normocephalic and atraumatic.   Cardiovascular:      Rate and Rhythm: Normal rate.      Pulses: Normal pulses.      Heart sounds: Normal heart sounds. No murmur heard.  Musculoskeletal:      Cervical back: Normal range of motion.   Neurological:      Mental Status: He is alert.           Labs:   Recent Labs     06/06/22  0830 06/06/22  1156 06/07/22  0334   WBC 9.8 10.9* 10.4   RBC 4.42* 4.51* 4.19*   HEMOGLOBIN 11.9* 12.2* 11.4*   HEMATOCRIT 34.6* 35.4* 32.8*   MCV 78.3* 78.5* 78.3*   MCH 26.9* 27.1 27.2   RDW 37.4 37.4 37.2   PLATELETCT 344 326 331   MPV 11.1 11.2 10.9   NEUTSPOLYS 73.50* 82.50* 73.40*   LYMPHOCYTES 20.90* 12.10* 17.60*   MONOCYTES 4.60 4.60 7.90   EOSINOPHILS 0.20 0.00 0.20   BASOPHILS 0.30 0.30 0.20     Lab Results   Component Value Date/Time    SODIUM 138 06/07/2022 03:34 AM    POTASSIUM 3.3 (L) 06/07/2022 03:34 AM    CHLORIDE 107 06/07/2022 03:34 AM    CO2 18 (L) 06/07/2022 03:34 AM    GLUCOSE 103 (H) 06/07/2022 03:34 AM    BUN 17 06/07/2022 03:34 AM    CREATININE 0.83 06/07/2022 03:34 AM    CREATININE 0.4 04/10/2006 12:00 PM         PRELIMINARY EEG REPORT 06/07/22   Multiple events captured on video and EEG where the patient can be seen shaking body very finely, often stopping and starting, head shaking off and on, occasional leg jerking, and unresponsiveness to questions and verbal stimuli.  There is no abnormal EEG correlate to these clinical events.  Specifically, these clinical events are nonepileptic in nature.  No focal asymmetries or epileptiform phenomenon seen thus far.    Problem Representation:  27 year old male with past medical history of anxiety and polysubstance abuse (marijuana, oxycodone and benzodiazepines) with prior admissions for cyclical vomiting who presents to the ED for seizure-like activity. Patient chronically taking benzodiazepines for the past 10 years, which he obtains through a psychiatrist in [place]. Patient had ran out of benzodiazepines and was unable to acquire a prescription from [place] or [facility]. No epileptic seizure witnessed during current hospital stay, neurology confirmed EEG during episode was negative for epileptic seizures, confirming diagnosis of psychogenic non-epileptic seizures.     * Psychogenic nonepileptic seizure  Assessment & Plan  Psychogenic nonepileptic seizure vs. Malingering/drug seeking behavior.  Patient displaying seizure-like activity in the setting of benzodiazepine withdrawal. Half hour EEG was negative for seizure. Patient protecting face from drop arm test, eyes closed during seizure, response to sternal rub and cessation of seizure when discussed that Xanax will be stopped as seizure-like activity persists. Lactic acid mildly increased, which can be seen in convulsive non-epileptic seizures, but CPK was otherwise normal. Neurology was able to capture multiple events on video and EEG where patient can be seen finely shaking body with starting and stopping. There was no abnormal EEG correlate to clinical events. Noting they were nonepileptic in nature. Behavior  health aware of patient having psychogenic nonepileptic seizures.   Plan:  -Transferred to neurology floor for increased nursing needs without need for telemetry  -Plan for outpatient librium taper with outpatient psychiatry follow-up    Benzodiazepine withdrawal without complication (HCC)- (present on admission)  Assessment & Plan  Patient reports using benzodiazepines for the past 10 years. Reports using 2 to 4mg of Xanax daily. Has been having psychogenic nonepileptic seizures and asking specifically for 2 mg doses of Xanax and dilaudid for pain control (notes anaphylaxis symptoms to Tylenol).  Plan:  -Start Librium 25 mg Q 8 hours, ultimately plan for outpatient taper with outpatient psychiatry  -Scheduled diazepam 2.5 mg scheduled Q 8 hours for 3 doses  -Diazepam 5 mg PRN for confirmed epileptic seizure  -Supportive blood pressure control with clonidine 0.1mg 1 hour PRN for blood pressure >180/125 mmHg.    Marijuana use- (present on admission)  Assessment & Plan  Patient declines marijuana use for the past month. UDS was positive for cannabinoids, though can test positive for up to 2 months in previously chronic users, though ER physical exam noted patient smelled of recent marijuana use.   Plan:  -Encourage marijuana cessation.    Cyclical vomiting- (present on admission)  Assessment & Plan  EGD from 05/17/22 showed grade 2 esophagitis, but otherwise unremarkable.   Likely due to cannabinoid hyperemesis syndrome. Patient declines any improvement with hot showers, but also reports inconsistent history.     Epigastric abdominal pain- (present on admission)  Assessment & Plan  Patient has chronic epigastric pain with history of recurrent vomiting/cannabinoid hyperemesis syndrome.   Grade 2 esophagitis on EGD from 05/17/22. GI recommendations at that time were sucralfate 1g TID and PPI 40 mg BID.  Plan:  -1g sucralfate TID and omeprazole 40mg BID    Diarrhea  Assessment & Plan  Resolved.   On 06/06/22 had 3 loose  watery stools. Unlikely C diff without leukocytosis and suddenly resolved. Previous diarrhea could be related to benzodiazepine withdrawal from sudden decrease in GABAergic stimulation.     Hypophosphatemia  Assessment & Plan  Repleted from phos of 1.6 to 4.1 with IV phos.   Possibly related to low vitamin D vs. Poor nutrition during benzodiazepine abuse.   Plan:  -Started on vitamin D3    Lactic acidosis  Assessment & Plan  Resolved.   Lactic acid high at 4.5, which trended down to 1.0 on repeat lab.   Likely due to volume depletion and psychogenic seizure have been shown to cause elevated lactic acid levels. Repeat lactic acid levels in the 1.3 to 2.0 range.
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
## 2022-06-07 NOTE — CARE PLAN
The patient is Unstable - High likelihood or risk of patient condition declining or worsening    Shift Goals  Clinical Goals: Updated plan of care, safety  Patient Goals: Receive Xanax  Family Goals: MARKELL- none present    Progress made toward(s) clinical / shift goals:    Problem: Seizure Precautions  Goal: Implementation of seizure precautions  Outcome: Progressing     Problem: Fall Risk  Goal: Patient will remain free from falls  Outcome: Progressing  Pt has 1:1 sitter for safety r/t impulsivity. Mattress moved to floor. High risk fall precautions in place.     Patient is not progressing towards the following goals:  Problem: Psychosocial  Goal: Patient's level of anxiety will decrease  Outcome: Not Progressing  Pt anxious throughout the day. Asks for Xanax frequently, has seizure like activity when he is told he cannot have it.

## 2022-06-07 NOTE — EEG PROGRESS NOTE
Vitals machine unplugged from port for EEG machine. RN notified and manual machine will be used while EEG is in place.

## 2022-06-07 NOTE — PROGRESS NOTES
Report received from Tyrone Jackson. Assumed pt care. Pt resting in bed. Pt A&O x 4. Fall precautions in place, call light and belongings within reach, bed in lowest position. No signs of distress.

## 2022-06-07 NOTE — PROGRESS NOTES
"1717: Pt had assisted fall in presence of this RN and sitter. Pt was taking a few steps with x2 assist to stretch legs. No apparent injuries. Pt began having seizure like behavior similar to events this morning. Pt had pupillary response and pain response. VS documented. MD notified. 1 mg Ativan given per MAR. Pt assisted back to bed by staff.     1730: Shaking continues. Dr. Michael to bedside for assessment.     1737: Shaking stopped. Pt A&Ox4, speaking clearly in full sentences. Reports being under a lot of stress, states he has had seizures twice when weaning from Xanax. Pt states to MD that he wants to wean off Xanax but needs it for short term anxiety due to phobias and panic attacks. Pt expressed desire for \"3-5 day script\".   "

## 2022-06-07 NOTE — PROGRESS NOTES
1830: Assisted fall. No injuries noted; pt did not hit head. Pt moved back to bed. MD notified. Day shift and night shift charge RNs at bedside and aware of situation. PSA informed that pt is not able to get up. PSA urged to call RN if pt wants to get up and press staff assist if unable to call RN prior to pt attempting to get up.

## 2022-06-07 NOTE — ASSESSMENT & PLAN NOTE
Etiology concerning for psychogenic non-epileptic seizure in setting of recent acute stressors involving family.  Pt does have extensive benzodiazepine use, 2-4mg of alprazolam daily and is at risk of benzodiazepine withdrawal seizures.  Half hour EEG for seizure activity.  24 hours video EEG shows no abnormal EEG correlating to seizure like activity.    -chlordiazepoxide taper initiated, starting 50 mg TID  -psychiatry consulted, appreciate assistance  -neurology consulted, appreciate assistance

## 2022-06-07 NOTE — PROGRESS NOTES
Patient had EEG place by Tech. Pt having intermittent shaking/convulsing movements, pts VSS, O2 > 96%, pt slightly tachycardic. Pt not answering questions, having sporadic eye movements. Neurology MD at bedside, assessed patient, discussed pt status with this RN at bedside. MD Michael called by neuro MD, discussed pt status. Ok to hold off on PO meds a this time for transfer to neurology floor. OK with MD Michael to hold off on pt receiving IV valium since results reviewed. Pt then had belongings packed up, tele box removed and pt placed on zoll, and transferred to S176 with no events. Report given to primary RN Katherine by RN apprentice Escobedo, all further questions answered by this RN. Pt transferred.

## 2022-06-07 NOTE — PROGRESS NOTES
Whole [facility] team aware of event-new PIV established. MAR to be updated r/t to previous ativan admin.

## 2022-06-07 NOTE — PROGRESS NOTES
Pt transported to S176 via gurney on Zoll. Pt hooked up to VEEG in room and tele box placed. Pt having slight tremors in bed and not arouseable with verbal or painful stimuli on transfer, however pt did sit up as RN was leaving room asking for medications. VSS. Pt educated on seizure and safety precautions in place. Safety sitter at bedside.      4 Eyes Skin Assessment Completed by SHAWNA Murphy and SHAWNA Barnhart.    Head WDL  Ears WDL  Nose WDL  Mouth WDL  Neck WDL  Breast/Chest WDL  Shoulder Blades WDL  Spine WDL  (R) Arm/Elbow/Hand WDL  (L) Arm/Elbow/Hand WDL  Abdomen WDL  Groin WDL  Scrotum/Coccyx/Buttocks WDL  (R) Leg WDL  (L) Leg WDL  (R) Heel/Foot/Toe Bruising  (L) Heel/Foot/Toe Bruising          Devices In Places Tele Box, Pulse Ox and EEG      Interventions In Place Pillows and Pressure Redistribution Mattress    Possible Skin Injury No    Pictures Uploaded Into Epic N/A  Wound Consult Placed N/A  RN Wound Prevention Protocol Ordered No

## 2022-06-07 NOTE — PROGRESS NOTES
PRELIMINARY EEG REPORT    Multiple events captured on video and EEG where the patient can be seen shaking body very finely, often stopping and starting, head shaking off and on, occasional leg jerking, and unresponsiveness to questions and verbal stimuli.  There is no abnormal EEG correlate to these clinical events.  Specifically, these clinical events are nonepileptic in nature.  No focal asymmetries or epileptiform phenomenon seen thus far.    Note: This is a prelimary report only. Full detailed report to follow.      Emmanuel Chase MD  Epilepsy and General Neurology  Department of Neurology  Instructor of Clinical Neurology [facility].   Contact: [phone]

## 2022-06-07 NOTE — ASSESSMENT & PLAN NOTE
Repleted from phos of 1.6 to 4.1 with IV phos.   Possibly related to low vitamin D vs. Poor nutrition during benzodiazepine abuse.   Plan:  -Started on vitamin D3

## 2022-06-07 NOTE — PROGRESS NOTES
Patient had staff assist called @ 0810 AM by safety sitter at bedside. Patient found half way out of bed, having convulsing/shaking movement, hitting head against floor. Patient not responsive to name, eyes appeared rolled to backk of head, had pillow placed under head, turned on side. Patient IV access leaking, unable to give medication since no PIV access. MD Michael called to bedside, verbal order to give 1 mg IM Ativan since lack of PIV access. Pt continued to shake/convulse, even after medication admin, otherwise, VSS. Pt started to slow down shaking and start again. O2 did not drop below 96%. Patient lifted back into bed. Patient continuing to shake, no further orders. Patient continued movement for ~ 25 minutes. RRT SHAWNA Escobedo to bedside to assess, chart reviewed. Patient has neurology transfer orders placed at this time.

## 2022-06-07 NOTE — ASSESSMENT & PLAN NOTE
Resolved.   On 06/06/22 had 3 loose watery stools. Unlikely C diff without leukocytosis and suddenly resolved. Previous diarrhea could be related to benzodiazepine withdrawal from sudden decrease in GABAergic stimulation.

## 2022-06-07 NOTE — NON-PROVIDER
"Daily Progress Note:     Date of Service: 6/7/2022  Primary Team: [facility]  Attending: MARYAM Fuentes M.D.   Senior Resident: Dr. Gildardo Hills  Intern: Dr. Michael  Contact:  [phone]    Chief Complaint:   Management of benzodiazepene withdrawal    Subjective: CIWA score has been 4 overnight. During day shift, pt had around 5 falls. Per night nurse, pt had two episodes of seizure like activity w/o falls or hitting his head. He would have pupillary and pain response. When the nurse would talk about giving him Xanax, he would stop and ask about the dose. Pt now has a sitter and mattress is placed on floor. Pt states \"I think I'm having a panic attack\" that kept him from sleeping last night. Pt reports one episode of vomiting but denies nausea now. Pt reports his abdominal pain is now at 8/10. Pt reports two episodes of diarrhea. Pt reports sweating and chills. Pt reports palpitations associated w/ anxiety. Pt denies chest pain. An hour later, pt sat up in bed, fell to his L side onto the floor, hitting his head and began shaking.    Consultants/Specialty:  Neurology    Review of Systems:    Review of Systems   Constitutional: Positive for chills.   Cardiovascular: Positive for palpitations. Negative for chest pain.   Gastrointestinal: Positive for abdominal pain, diarrhea and vomiting. Negative for nausea.   Musculoskeletal: Positive for falls.   Neurological: Positive for seizures. Negative for dizziness and headaches.        Seizure like activity   Psychiatric/Behavioral: Positive for substance abuse. The patient is nervous/anxious and has insomnia.        Objective Data:   Physical Exam:   Vitals:   Temp:  [36.5 °C (97.7 °F)-38.1 °C (100.5 °F)] 37.3 °C (99.1 °F)  Pulse:  [] 61  Resp:  [16-22] 16  BP: (102-135)/(71-90) 128/71  SpO2:  [96 %-98 %] 97 %     Physical Exam  HENT:      Head: Atraumatic.   Cardiovascular:      Rate and Rhythm: Regular rhythm. Tachycardia present.   Pulmonary: "      Effort: Pulmonary effort is normal.      Breath sounds: No wheezing or rales.   Abdominal:      Tenderness: There is no abdominal tenderness.   Neurological:      Mental Status: He is alert.       Labs:   Recent Labs     06/05/22  1614 06/06/22  0830 06/06/22  1010 06/06/22  1156 06/07/22  0334   SODIUM 137   < > 136 137 138   POTASSIUM 4.1   < > 3.5* 3.5* 3.3*   CHLORIDE 106   < > 107 108 107   CO2 15*   < > 18* 18* 18*   GLUCOSE 142*   < > 117* 103* 103*   BUN 25*   < > 19 19 17   CPKTOTAL 55  --  54  --   --     < > = values in this interval not displayed.     Recent Labs     06/06/22  0830 06/06/22  1010 06/06/22  1156 06/07/22  0334   ALBUMIN 4.2 4.2 4.3 4.0   TBILIRUBIN 0.5 0.4  --  0.4   ALKPHOSPHAT 65 64  --  61   TOTPROTEIN 7.1 6.9  --  6.6   ALTSGPT 10 9  --  10   ASTSGOT 23 20  --  16   CREATININE 0.89 0.87 0.83 0.83     Recent Labs     06/06/22  0830 06/06/22  1156 06/07/22  0334   WBC 9.8 10.9* 10.4   RBC 4.42* 4.51* 4.19*   HEMOGLOBIN 11.9* 12.2* 11.4*   HEMATOCRIT 34.6* 35.4* 32.8*   MCV 78.3* 78.5* 78.3*   MCH 26.9* 27.1 27.2   RDW 37.4 37.4 37.2   PLATELETCT 344 326 331   MPV 11.1 11.2 10.9   NEUTSPOLYS 73.50* 82.50* 73.40*   LYMPHOCYTES 20.90* 12.10* 17.60*   MONOCYTES 4.60 4.60 7.90   EOSINOPHILS 0.20 0.00 0.20   BASOPHILS 0.30 0.30 0.20     PHOSPHORUS [[id number]] Collected: 06/07/22 0334   Order Status: Completed Specimen: Blood Updated: 06/07/22 0407    Phosphorus 4.1 mg/dL      ESTIMATED GFR [[id number]] Collected: 06/07/22 0334   Order Status: Completed Updated: 06/07/22 0408    GFR (CKD-EPI) 124 mL/min/1.73 m 2        Imaging:   No new imaging    Problem Representation: 28 y/o male with past medical history of marijuana use, anxiety and benzodiazepine dependence with multiple hospitalizations who presented to the ER for seizure-like activity. Pt continues to have multiple falls and psychogenic nonepileptic seizures.    Benzodiazepine withdrawal without complication  Psychogenic  nonepileptic seizure  Assessment & Plan  Hx of  Benzodiazepine dependence for the past 10 years, which he obtains through a psychiatrist in [place], whom he visits every 6 months.  Patient has been displaying seizure-like activity, though it appears to be psychogenic nonepileptic seizures as supported by normal EEG, protecting face from hand drop, closed eyes during seizure, response to sternal rub during seizure, no post-ictal confusion and immediate seizure cessation when discussed in room that Xanax would have to be switched to another medication as it did not seem to be controlling his seizure-like activity. Pt continues to ask for Xanax.  Plan:  -Switch Xanax to IV diazepam 2.5 mg q6h, switch ativan to diazepam 5 mg q4h PRN  -Sitter in place  -Fall precautions in place  -Transfer pt to neurology for safety precautions for falls and hitting his head  -24 hr monitoring EEG    Periumbilical abdominal pain  Assessment & Plan  Pt states his abdominal pain is slightly improved. Possibly psychogenic secondary to benzo withdrawal. Pt also has hx of esophagitis from 5/17 EGD.  -Continue to monitor  -1g sucralfate TID and omeprazole 40mg BID     Nausea and vomiting  Assessment & Plan  Pt has hx of cannabis hyperemesis syndrome. Pt denies improvement w/ hot showers. Vomiting could be secondary to marijuana abuse and benzo withdrawal. Pt had positive urine cannabinoid on admission.  -Continue zofran, compazine and phenergan PRN for nausea/vomiting    Diarrhea  Assessment & Plan  Nurse reports 3 loose watery stools. Patient had no recent antibiotic use. No leukocytosis on labs. Low suspicion for C. Diff at this point as pt has not taken antibiotics recently. Likely related to benzodiazepine withdrawal.   -Re-evaluate for C diff if wbc increases tomorrow and diarrhea persists.  -Continue  IV LR    Hypophosphatemia  Assessment & Plan  Resolved, phosphorus now at 4.1 after IV K-phos  -Continue to monitor    Lactic  acidosis  Assessment & Plan  Resolved in ER. Secondary to psychogenic seizure combined w/ dehydration. Lactic acid is more often elevated in psychogenic nonepileptic seizures but not CPK. Both are often elevated in epileptic seizures. CPK level WNL.  -Continue to monitor

## 2022-06-08 ENCOUNTER — APPOINTMENT (OUTPATIENT)
Dept: RADIOLOGY | Facility: MEDICAL CENTER | Age: 27
DRG: 897 | End: 2022-06-08
Payer: COMMERCIAL

## 2022-06-08 LAB
ALBUMIN SERPL BCP-MCNC: 4.1 G/DL (ref 3.2–4.9)
ALBUMIN/GLOB SERPL: 1.6 G/DL
ALP SERPL-CCNC: 61 U/L (ref 30–99)
ALT SERPL-CCNC: 8 U/L (ref 2–50)
ANION GAP SERPL CALC-SCNC: 13 MMOL/L (ref 7–16)
AST SERPL-CCNC: 15 U/L (ref 12–45)
BILIRUB SERPL-MCNC: 0.4 MG/DL (ref 0.1–1.5)
BUN SERPL-MCNC: 19 MG/DL (ref 8–22)
CALCIUM SERPL-MCNC: 9.1 MG/DL (ref 8.5–10.5)
CHLORIDE SERPL-SCNC: 110 MMOL/L (ref 96–112)
CO2 SERPL-SCNC: 18 MMOL/L (ref 20–33)
CREAT SERPL-MCNC: 0.89 MG/DL (ref 0.5–1.4)
GFR SERPLBLD CREATININE-BSD FMLA CKD-EPI: 122 ML/MIN/1.73 M 2
GLOBULIN SER CALC-MCNC: 2.6 G/DL (ref 1.9–3.5)
GLUCOSE SERPL-MCNC: 121 MG/DL (ref 65–99)
POTASSIUM SERPL-SCNC: 3.2 MMOL/L (ref 3.6–5.5)
PROT SERPL-MCNC: 6.7 G/DL (ref 6–8.2)
SODIUM SERPL-SCNC: 141 MMOL/L (ref 135–145)
T4 FREE SERPL-MCNC: 1.52 NG/DL (ref 0.93–1.7)
TSH SERPL DL<=0.005 MIU/L-ACNC: 0.18 UIU/ML (ref 0.38–5.33)

## 2022-06-08 PROCEDURE — 770020 HCHG ROOM/CARE - TELE (206)

## 2022-06-08 PROCEDURE — 700102 HCHG RX REV CODE 250 W/ 637 OVERRIDE(OP)

## 2022-06-08 PROCEDURE — A9270 NON-COVERED ITEM OR SERVICE: HCPCS | Performed by: STUDENT IN AN ORGANIZED HEALTH CARE EDUCATION/TRAINING PROGRAM

## 2022-06-08 PROCEDURE — 700102 HCHG RX REV CODE 250 W/ 637 OVERRIDE(OP): Performed by: STUDENT IN AN ORGANIZED HEALTH CARE EDUCATION/TRAINING PROGRAM

## 2022-06-08 PROCEDURE — 99233 SBSQ HOSP IP/OBS HIGH 50: CPT | Mod: GC | Performed by: HOSPITALIST

## 2022-06-08 PROCEDURE — A9270 NON-COVERED ITEM OR SERVICE: HCPCS

## 2022-06-08 PROCEDURE — 36415 COLL VENOUS BLD VENIPUNCTURE: CPT

## 2022-06-08 PROCEDURE — 700111 HCHG RX REV CODE 636 W/ 250 OVERRIDE (IP): Performed by: STUDENT IN AN ORGANIZED HEALTH CARE EDUCATION/TRAINING PROGRAM

## 2022-06-08 PROCEDURE — 70450 CT HEAD/BRAIN W/O DYE: CPT

## 2022-06-08 PROCEDURE — 80053 COMPREHEN METABOLIC PANEL: CPT

## 2022-06-08 RX ORDER — POTASSIUM CHLORIDE 20 MEQ/1
40 TABLET, EXTENDED RELEASE ORAL ONCE
Status: DISCONTINUED | OUTPATIENT
Start: 2022-06-08 | End: 2022-06-08 | Stop reason: CLARIF

## 2022-06-08 RX ORDER — LORAZEPAM 2 MG/ML
2 INJECTION INTRAMUSCULAR
Status: DISCONTINUED | OUTPATIENT
Start: 2022-06-08 | End: 2022-06-08

## 2022-06-08 RX ORDER — LORAZEPAM 1 MG/1
1 TABLET ORAL EVERY 4 HOURS PRN
Status: DISCONTINUED | OUTPATIENT
Start: 2022-06-08 | End: 2022-06-08

## 2022-06-08 RX ORDER — CHLORDIAZEPOXIDE HYDROCHLORIDE 25 MG/1
50 CAPSULE, GELATIN COATED ORAL EVERY 8 HOURS
Status: DISCONTINUED | OUTPATIENT
Start: 2022-06-08 | End: 2022-06-09

## 2022-06-08 RX ORDER — LORAZEPAM 2 MG/1
4 TABLET ORAL
Status: DISCONTINUED | OUTPATIENT
Start: 2022-06-08 | End: 2022-06-08

## 2022-06-08 RX ORDER — LORAZEPAM 1 MG/1
0.5 TABLET ORAL EVERY 4 HOURS PRN
Status: DISCONTINUED | OUTPATIENT
Start: 2022-06-08 | End: 2022-06-08

## 2022-06-08 RX ORDER — MIRTAZAPINE 15 MG/1
7.5 TABLET, FILM COATED ORAL
Status: DISCONTINUED | OUTPATIENT
Start: 2022-06-08 | End: 2022-06-08

## 2022-06-08 RX ORDER — POTASSIUM CHLORIDE 20 MEQ/1
40 TABLET, EXTENDED RELEASE ORAL ONCE
Status: COMPLETED | OUTPATIENT
Start: 2022-06-08 | End: 2022-06-08

## 2022-06-08 RX ORDER — LORAZEPAM 2 MG/ML
0.5 INJECTION INTRAMUSCULAR EVERY 4 HOURS PRN
Status: DISCONTINUED | OUTPATIENT
Start: 2022-06-08 | End: 2022-06-08

## 2022-06-08 RX ORDER — LORAZEPAM 2 MG/ML
1.5 INJECTION INTRAMUSCULAR
Status: DISCONTINUED | OUTPATIENT
Start: 2022-06-08 | End: 2022-06-08

## 2022-06-08 RX ORDER — LORAZEPAM 2 MG/1
2 TABLET ORAL
Status: DISCONTINUED | OUTPATIENT
Start: 2022-06-08 | End: 2022-06-08

## 2022-06-08 RX ORDER — LORAZEPAM 2 MG/ML
1 INJECTION INTRAMUSCULAR
Status: DISCONTINUED | OUTPATIENT
Start: 2022-06-08 | End: 2022-06-08

## 2022-06-08 RX ADMIN — OMEPRAZOLE 40 MG: 20 CAPSULE, DELAYED RELEASE ORAL at 16:29

## 2022-06-08 RX ADMIN — Medication 100 MG: at 16:29

## 2022-06-08 RX ADMIN — CHLORDIAZEPOXIDE HYDROCHLORIDE 25 MG: 25 CAPSULE ORAL at 06:18

## 2022-06-08 RX ADMIN — FOLIC ACID 1 MG: 1 TABLET ORAL at 06:18

## 2022-06-08 RX ADMIN — ONDANSETRON 4 MG: 4 TABLET, ORALLY DISINTEGRATING ORAL at 17:31

## 2022-06-08 RX ADMIN — OMEPRAZOLE 40 MG: 20 CAPSULE, DELAYED RELEASE ORAL at 06:18

## 2022-06-08 RX ADMIN — CHLORDIAZEPOXIDE HYDROCHLORIDE 50 MG: 25 CAPSULE ORAL at 13:26

## 2022-06-08 RX ADMIN — CHLORDIAZEPOXIDE HYDROCHLORIDE 50 MG: 25 CAPSULE ORAL at 21:14

## 2022-06-08 RX ADMIN — Medication 1000 UNITS: at 06:18

## 2022-06-08 RX ADMIN — POTASSIUM CHLORIDE 40 MEQ: 1500 TABLET, EXTENDED RELEASE ORAL at 17:59

## 2022-06-08 RX ADMIN — THERA TABS 1 TABLET: TAB at 06:18

## 2022-06-08 RX ADMIN — SUCRALFATE 1 G: 1 SUSPENSION ORAL at 21:14

## 2022-06-08 RX ADMIN — ENOXAPARIN SODIUM 40 MG: 40 INJECTION SUBCUTANEOUS at 16:29

## 2022-06-08 RX ADMIN — SUCRALFATE 1 G: 1 SUSPENSION ORAL at 14:58

## 2022-06-08 ASSESSMENT — ENCOUNTER SYMPTOMS
TREMORS: 1
FEVER: 0
SHORTNESS OF BREATH: 0
NAUSEA: 1
DIARRHEA: 0
HEADACHES: 0
VOMITING: 0
FOCAL WEAKNESS: 0
ABDOMINAL PAIN: 0
WEAKNESS: 0
COUGH: 0
PALPITATIONS: 0
FALLS: 1
SEIZURES: 0
BLURRED VISION: 0
DOUBLE VISION: 0
CHILLS: 0

## 2022-06-08 ASSESSMENT — PAIN DESCRIPTION - PAIN TYPE
TYPE: ACUTE PAIN
TYPE: ACUTE PAIN
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
## 2022-06-08 NOTE — CARE PLAN
The patient is Watcher - Medium risk of patient condition declining or worsening    Shift Goals  Clinical Goals: Safety, stable neuro checks, monitor for seizure activity  Patient Goals: Medications  Family Goals: Safety and Rest    Progress made toward(s) clinical / shift goals:  POC discussed with mother and pt at bedside. Pt A/Ox4, but still focused on getting more medication. Reinforcement needed. Safety sitRoscoe hobbs at bedside. Fall precautions explained to pt and pt has strict orders to stay in bed with no out of bed privileges. Call light within reach.     Problem: Seizure Precautions  Goal: Implementation of seizure precautions  Outcome: Progressing  Note: Seizure precautions are in place. Bed locked in lowest position, all four side rails are padded, three side rails up at any time, suction in place, and oxygen in place, sitter at bedside. Pt educated to call for assistance. Pt verbalized understanding. Call light within reach.       Problem: Fall Risk  Goal: Patient will remain free from falls  Outcome: Progressing  Note: Fall precautions in place. Bed locked and in lowest position, bed alarm in place, treaded socks used when ambulated, call light within reach. Pt educated to call for assistance. Safety sitRoscoe hobbs, at bedside. Pt rounded on frequently throughout shift.       Problem: Safety - Medical Restraint  Goal: Remains free of injury from restraints (Restraint for Interference with Medical Device)  Outcome: Progressing  Note: Pt has remained injury free from restraints. Pt has four side rails up, extra padding, and safety sitter at bedside. Pt assessed Q2hr per restraint protocol.        Patient is not progressing towards the following goals:      Problem: Psychosocial  Goal: Patient's level of anxiety will decrease  Outcome: Not Progressing  Note: Pt still reporting high levels of anxiety

## 2022-06-08 NOTE — CARE PLAN
The patient is Stable - Low risk of patient condition declining or worsening    Shift Goals  Clinical Goals: safety  Patient Goals: medications  Family Goals: n/a    Progress made toward(s) clinical / shift goals:  Pt. Is safely resting in bed with fall, aspiration, and seizure precautions in place.     Patient is not progressing towards the following goals: n/a

## 2022-06-08 NOTE — PROGRESS NOTES
Monitor summary: -152, MS 0.17, QRS 0.10, QT 0.35, with HR 130s-160s per strip from monitor room.

## 2022-06-08 NOTE — PROGRESS NOTES
Daily Progress Note:     Date of Service: 6/8/2022  Primary Team: [facility]   Attending: Jose A Lazaro M.D.   Senior Resident: Dr. Valera  Intern: Dr. Michaels  Contact:  [phone]    Chief Complaint and pt ID:   Seizure like activity  Grey Diop is a 26 y/o man w/ PMHx of benzodiazepine use (w/ overdose in 2/2022), cyclic vomiting, gastritis who was admitted on 6/5 for seizure like activity.  Pt takes benzodiazepine chronically, and ran out 2 days prior to admission.  He has had multiple episodes of seizure like activity, eeg shows no epileptic activity, 24 hr video eeg shows body shaking episodes with no epileptic activity.      Interval Update:  NAEON.  Pt had two episodes overnight, one resulting in a fall where he hit his head, and another where he hit his head on the bed rails.  Pt reports that he doesn't remember these episodes, but has never had any incontinence, tongue biting.  Pt reporting that he is anxious right now and afraid of having another panic attack.  He requests multiple benzodiazepines by name, we discussed getting him off these medications.  Pt reports no SI.      Later in AM pt had an episode, he was rousable to sternal rub and was able to answer questions appropriately immediately following, pt continued to have tremulousness during conversation.      Consultants/Specialty:  Psychiatry  Neurology    Review of Systems:    Review of Systems   Constitutional: Positive for malaise/fatigue. Negative for chills and fever.   Eyes: Negative for blurred vision and double vision.   Respiratory: Negative for cough and shortness of breath.    Cardiovascular: Negative for chest pain and palpitations.   Gastrointestinal: Positive for nausea (intermittent). Negative for abdominal pain, diarrhea and vomiting.   Musculoskeletal: Positive for falls.   Neurological: Positive for tremors (tremulous activity, pt arousably during activity). Negative for focal weakness, seizures, weakness and  headaches.       Objective Data:   Physical Exam:   Vitals:   Temp:  [36.4 °C (97.6 °F)-37.2 °C (99 °F)] 37 °C (98.6 °F)  Pulse:  [] 98  Resp:  [17-18] 18  BP: (103-131)/(78-99) 131/91  SpO2:  [95 %-99 %] 98 %    Physical Exam  Vitals and nursing note reviewed.   Constitutional:       General: He is not in acute distress.     Appearance: He is not ill-appearing or toxic-appearing.   HENT:      Head: Normocephalic and atraumatic.      Mouth/Throat:      Mouth: Mucous membranes are moist.      Pharynx: Oropharynx is clear.   Eyes:      General: No scleral icterus.     Extraocular Movements: Extraocular movements intact.      Pupils: Pupils are equal, round, and reactive to light.   Cardiovascular:      Rate and Rhythm: Regular rhythm. Tachycardia present.      Pulses: Normal pulses.      Heart sounds: Normal heart sounds. No murmur heard.    No friction rub. No gallop.   Pulmonary:      Effort: Pulmonary effort is normal. No respiratory distress.      Breath sounds: Normal breath sounds. No wheezing, rhonchi or rales.   Abdominal:      General: Abdomen is flat. Bowel sounds are normal. There is no distension.      Palpations: Abdomen is soft.      Tenderness: There is no abdominal tenderness. There is no guarding.   Musculoskeletal:      Right lower leg: No edema.      Left lower leg: No edema.   Skin:     General: Skin is warm and dry.   Neurological:      General: No focal deficit present.      Mental Status: He is alert.      Comments: Pt A+Ox4, CN II-XII intact w/o deficit.  Strength 5/5 in b/l UE and LE, sensation intact bilaterally.           Labs:   HEMATOLOGY/ ONCOLOGY/ID:            Recent Labs     06/06/22  0830 06/06/22  1156 06/07/22  0334   WBC 9.8 10.9* 10.4   RBC 4.42* 4.51* 4.19*   HEMOGLOBIN 11.9* 12.2* 11.4*   HEMATOCRIT 34.6* 35.4* 32.8*   MCV 78.3* 78.5* 78.3*   MCH 26.9* 27.1 27.2   RDW 37.4 37.4 37.2   PLATELETCT 344 326 331   MPV 11.1 11.2 10.9   NEUTSPOLYS 73.50* 82.50* 73.40*    LYMPHOCYTES 20.90* 12.10* 17.60*   MONOCYTES 4.60 4.60 7.90   EOSINOPHILS 0.20 0.00 0.20   BASOPHILS 0.30 0.30 0.20     No results found for: [id number], FOLATE, FERRITIN, IRON, TOTIRONBC    RENAL:        Estimated GFR/CRCL = Estimated Creatinine Clearance: 102.1 mL/min (by C-G formula based on SCr of 0.89 mg/dL).  Recent Labs     22  0830 22  1010 22  1156 22  0334 22  0716   SODIUM 137 136 137 138 141   POTASSIUM 3.7 3.5* 3.5* 3.3* 3.2*   CHLORIDE 107 107 108 107 110   CO2 15* 18* 18* 18* 18*   GLUCOSE 124* 117* 103* 103* 121*   BUN 20 19 19 17 19   CREATININE 0.89 0.87 0.83 0.83 0.89   CALCIUM 9.4 9.3 9.4 8.8 9.1   MAGNESIUM 1.7 1.7 1.8  --   --    PHOSPHORUS  --  1.6* 1.9* 4.1  --    ALBUMIN 4.2 4.2 4.3 4.0 4.1       GASTROINTESTINAL/ HEPATIC:          Recent Labs     22  1010 22  1156 22  0334 22  0716   ALTSGPT 9  --  10 8   ASTSGOT 20  --  16 15   ALKPHOSPHAT 64  --  61 61   TBILIRUBIN 0.4  --  0.4 0.4   ALBUMIN 4.2 4.3 4.0 4.1   GLOBULIN 2.7  --  2.6 2.6     No results found for: AMMONIA    ENDOCRINE:              Recent Labs     22  1156 22  0334 22  0716   GLUCOSE 103* 103* 121*     Lab Results   Component Value Date    HBA1C 5.1 2021    FREET4 1.52 2022    CORTISOL 39.8 (H) 11/10/2021       Imagin/8 head CT w/o contrast:  1. No acute intracranial abnormality. No evidence of acute intracranial hemorrhage or mass lesion.    Problem Representation:  Grey Diop is a 28 y/o man w/ PMHx of benzodiazepine use (w/ overdose in 2022), cyclic vomiting, gastritis who was admitted on  for seizure like activity.  Pt takes benzodiazepine chronically, and ran out 2 days prior to admission.  He has had multiple episodes of seizure like activity, eeg shows no epileptic activity, 24 hr video eeg shows body shaking episodes with no epileptic activity.     * Psychogenic nonepileptic seizure  Assessment & Plan  Etiology  concerning for psychogenic non-epileptic seizure in setting of recent acute stressors involving family.  Pt does have extensive benzodiazepine use, 2-4mg of alprazolam daily and is at risk of benzodiazepine withdrawal seizures.  Half hour EEG for seizure activity.  24 hours video EEG shows no abnormal EEG correlating to seizure like activity.    -chlordiazepoxide taper initiated, starting 50 mg TID  -psychiatry consulted, appreciate assistance  -neurology consulted, appreciate assistance    Benzodiazepine withdrawal without complication (HCC)- (present on admission)  Assessment & Plan  Patient reports using benzodiazepines for the past 10 years. Reports using 2 to 4mg of Xanax daily.  Pt reports getting xanax prescription from [place], PDMP negative for [place].  -Start Librium taper 50 mg Q8h    Cyclical vomiting- (present on admission)  Assessment & Plan  EGD on 05/17/22 showed grade 2 esophagitis, but otherwise unremarkable.  Etiology likely due to gastritis vs psychogenic vs cyclic vomiting syndrome.  -PRN zofran  -sucralfate  -omeprazole 40 mg BID    Diarrhea  Assessment & Plan  Resolved.   On 06/06/22 had 3 loose watery stools. Unlikely C diff without leukocytosis and suddenly resolved. Previous diarrhea could be related to benzodiazepine withdrawal from sudden decrease in GABAergic stimulation.     Hypophosphatemia  Assessment & Plan  Repleted from phos of 1.6 to 4.1 with IV phos.   Possibly related to low vitamin D vs. Poor nutrition during benzodiazepine abuse.   Plan:  -Started on vitamin D3    Lactic acidosis  Assessment & Plan  Resolved.   Lactic acid high at 4.5, which trended down to 1.0 on repeat labs.  Likely due to volume depletion. Repeat lactic acid levels in the 1.3 to 2.0 range.       Marijuana use- (present on admission)  Assessment & Plan  Patient declines marijuana use for the past month. UDS was positive for cannabinoids, though can test positive for up to 2 months in previously chronic users,  though ER physical exam noted patient smelled of recent marijuana use.   Plan:  -Encourage marijuana cessation.    Epigastric abdominal pain- (present on admission)  Assessment & Plan  Patient has chronic epigastric pain with history of recurrent vomiting/cannabinoid hyperemesis syndrome.   Grade 2 esophagitis on EGD from 05/17/22. GI recommendations at that time were sucralfate 1g TID and PPI 40 mg BID.  Plan:  -1g sucralfate TID and omeprazole 40mg BID      Code Status: Full code  DVT ppx: enoxaparin  Diet: regular  GI: PPI  T/L/D: pIV  Disposition: pending chlordiazepoxide taper      Gil Michaels DO  PGY-1, [facility]

## 2022-06-08 NOTE — CONSULTS
Referring Physician: Dr. Mekhi Michael    Referral Reason: Nonepileptic event request for prolonged EEG monitoring    HPI:  Mr. Grey Diop is a 27 y.o. right-handed male history of anxiety and benzodiazepine dependence who is currently taking Xanax 2 to 4 mg a day, was admitted on 2022 for evaluation of seizure-like activity at home.  Apparently patient has history of multiple hospitalization for benzodiazepine withdrawal and history of drug-seeking behavior.  Today he was noted to be constantly seizing for which a continuous EEG monitoring was requested with neurology consultation.  I evaluated the patient in 2 occasion.  At first after he was hooked up to EEG where he was on the floor of his room on the mattress continuously shaking.  EEG hooked up and there was no correlation with his seizure-like activity.  Occasionally during his continuous seizure-like activity he would talk and ask for help.  Subsequently I visited him again when he was transferred to neuroscience floor.  At that time he was completely awake, alert and fully oriented and told me he is having a rough time losing few family member.  He told me his uncle  yesterday and he is causing  a month ago from overdose and he has hard time coping with that and needs Xanax.  He also told me he has had withdrawal seizure if he does not receive Xanax.    ROS:   Review of Systems   Constitutional: Negative for chills, fever and malaise/fatigue.   HENT: Negative for hearing loss and tinnitus.    Eyes: Negative for blurred vision, double vision and photophobia.   Respiratory: Negative for shortness of breath.    Cardiovascular: Negative for chest pain.   Gastrointestinal: Negative for nausea and vomiting.   Genitourinary: Negative for hematuria.   Musculoskeletal: Negative for back pain, falls, myalgias and neck pain.   Skin: Negative for rash.   Neurological: Positive for seizures. Negative for dizziness, tingling, tremors, sensory  change, speech change, focal weakness, loss of consciousness, weakness and headaches.   Psychiatric/Behavioral: Positive for depression. Negative for memory loss. The patient is nervous/anxious.        Past Medical History:   Past Medical History:   Diagnosis Date   • Abdominal migraine    • Abdominal migraine    • Anxiety    • Benzodiazepine dependence (HCC)    • Colitis    • Cyclic vomiting syndrome     History of   • Gastritis    • Scoliosis        Past Surgical History:   Past Surgical History:   Procedure Laterality Date   • DE UPPER GI ENDOSCOPY,DIAGNOSIS N/A 5/17/2022    Procedure: GASTROSCOPY;  Surgeon: Bud Chowdhury M.D.;  Location: SURGERY SAME DAY [facility];  Service: Gastroenterology   • GASTROSCOPY-ENDO  11/15/2014    Performed by Robert Taylor M.D. at ENDOSCOPY [facility] ORS   • GASTROSCOPY  9/3/2014    Performed by Robert Taylor M.D. at SURGERY [facility]   • OTHER      hernia   • OTHER ORTHOPEDIC SURGERY      Scoliosis       Social History:   Social History     Socioeconomic History   • Marital status: Single     Spouse name: Not on file   • Number of children: Not on file   • Years of education: Not on file   • Highest education level: Not on file   Occupational History   • Not on file   Tobacco Use   • Smoking status: Never Smoker   • Smokeless tobacco: Never Used   Vaping Use   • Vaping Use: Some days   • Substances: THC   Substance and Sexual Activity   • Alcohol use: No   • Drug use: Yes     Types: Inhaled, Marijuana     Comment: THC daily   • Sexual activity: Never   Other Topics Concern   • Not on file   Social History Narrative   • Not on file     Social Determinants of Health     Financial Resource Strain: Not on file   Food Insecurity: Not on file   Transportation Needs: Not on file   Physical Activity: Not on file   Stress: Not on file   Social Connections: Not on file   Intimate Partner Violence: Not on file   Housing Stability: Not on file       Family Hx: History  reviewed. No pertinent family history.    Current Medications:   Current Facility-Administered Medications   Medication Dose Route Frequency Provider Last Rate Last Admin   • vitamin D3 (cholecalciferol) tablet 1,000 Units  1,000 Units Oral DAILY Mekhi Michael M.D.       • potassium chloride SA (Kdur) tablet 40 mEq  40 mEq Oral Once Mekhi Michael M.D.       • diazePAM (VALIUM) injection 5 mg  5 mg Intravenous Q4HRS PRN MARYAM Fuentes M.D.       • chlordiazePOXIDE (LIBRIUM) capsule 25 mg  25 mg Oral Q8HR Mekhi Michael M.D.       • sucralfate (CARAFATE) 1 GM/10ML suspension 1 g  1 g Oral TID Mekhi Michael M.D.   1 g at 06/07/22 1651   • omeprazole (PRILOSEC) capsule 40 mg  40 mg Oral BID Mekhi Michael M.D.   40 mg at 06/07/22 1651   • senna-docusate (PERICOLACE or SENOKOT S) 8.6-50 MG per tablet 2 Tablet  2 Tablet Oral BID Emmanuel Wynn M.D.   2 Tablet at 06/05/22 2145    And   • polyethylene glycol/lytes (MIRALAX) PACKET 1 Packet  1 Packet Oral QDAY PRN Emmanuel Wynn M.D.        And   • magnesium hydroxide (MILK OF MAGNESIA) suspension 30 mL  30 mL Oral QDAY PRN Emmanuel Wynn M.D.        And   • bisacodyl (DULCOLAX) suppository 10 mg  10 mg Rectal QDAY PRN Emmanuel Wynn M.D.       • lactated ringers infusion (BOLUS)  500 mL Intravenous Once PRN Emmanuel Wynn M.D.       • enoxaparin (Lovenox) inj 40 mg  40 mg Subcutaneous DAILY AT 1800 Emmanuel Wynn M.D.   40 mg at 06/07/22 1651   • hydrALAZINE (APRESOLINE) injection 10 mg  10 mg Intravenous Q4HRS PRN Emmanuel Wynn M.D.       • ondansetron (ZOFRAN) syringe/vial injection 4 mg  4 mg Intravenous Q4HRS PRN Emmanuel Wynn M.D.   4 mg at 06/06/22 1339   • ondansetron (ZOFRAN ODT) dispertab 4 mg  4 mg Oral Q4HRS PRN Emmanuel Wynn M.D.       • promethazine (PHENERGAN) tablet 12.5-25 mg  12.5-25 mg Oral Q4HRS PRN Emmanuel Wynn M.D.       • promethazine (PHENERGAN) suppository 12.5-25 mg  12.5-25 mg Rectal Q4HRS PRN Emmanuel Wynn M.D.       •  "prochlorperazine (COMPAZINE) injection 5-10 mg  5-10 mg Intravenous Q4HRS PRN Emmanuel Wynn M.D.       • guaiFENesin dextromethorphan (ROBITUSSIN DM) 100-10 MG/5ML syrup 10 mL  10 mL Oral Q6HRS PRN Emmanuel Wynn M.D.       • multivitamin (THERAGRAN) tablet 1 Tablet  1 Tablet Oral DAILY Emmanuel Wynn M.D.   1 Tablet at 06/07/22 0550    And   • folic acid (FOLVITE) tablet 1 mg  1 mg Oral DAILY Emmanuel Wynn M.D.   1 mg at 06/07/22 0550    And   • thiamine (Vitamin B-1) tablet 100 mg  100 mg Oral Q EVENING Emmanuel Wynn M.D.   100 mg at 06/07/22 1651   • cloNIDine (CATAPRES) tablet 0.1 mg  0.1 mg Oral Q HOUR PRN Emmanuel Wynn M.D.           Allergies:   Allergies   Allergen Reactions   • Acetaminophen [Tylenol] Anaphylaxis and Nausea     Nausea, \"it just doesn't sit right\"  5/29/2021 patient states that his throat swells shut   • Ibuprofen      \"Makes my throat tighten\"       Physical Exam:   Vitals:    06/07/22 0734 06/07/22 1136 06/07/22 1526 06/07/22 1600   BP: 120/78 (!) 148/84 139/83 139/83   Pulse: 72 (!) 104 98    Resp: 18 18 18    Temp: 37.1 °C (98.8 °F) 37.7 °C (99.8 °F) 36.7 °C (98 °F)    TempSrc: Temporal Temporal Temporal    SpO2: 96% 98% 96%    Weight:       Height:           Physical Exam  During my first visit he was on the floor continuously shaking while he was hooked up to EEG monitor.  He was unresponsive with his eyes rolled back and nonverbal.    Subsequently I evaluated him in neuroscience floor and at that time his examination was as below.     GENERAL:  Lying in the hospital bed in no apparent distress.  Head: Normocephalic and atraumatic.   Eyes: Pupils are equal, round, and reactive to light. EOM are normal.   Cardiovascular: Normal rate and regular rhythm.    Pulmonary/Chest: Breath sounds normal.   Abdominal: Soft. Bowel sounds are normal. He exhibits no distension. There is no tenderness.   Skin: Skin is warm and dry. No rash noted. No erythema.  Neuro Exam  MENTAL STATUS:  Awake, alert, " oriented times 3.  Speech is fluent, comprehension is intact.  CRANIAL NERVES:  PERRL, EOMI with no nystagmus, face is symmetric, facial sensation is intact, tongue is in the midline, palate is symmetric. Hearing is intact to finger rub bilaterally. Shoulder shrugs are normal.  MOTOR:  Motor examination showed normal strength in direct testing of both upper and lower extremities, proximal and distal.    SENSATION:  Intact to light touch, temperature and proprioception throughout  REFLEXES:  2+ and symmetric, toes are downgoing bilaterally  COORDINATION:  Normal finger to nose and heel to shin bilaterally  GAIT:  Deferred     Labs:  Recent Labs     06/06/22  0830 06/06/22  1156 06/07/22  0334   WBC 9.8 10.9* 10.4   RBC 4.42* 4.51* 4.19*   HEMOGLOBIN 11.9* 12.2* 11.4*   HEMATOCRIT 34.6* 35.4* 32.8*   MCV 78.3* 78.5* 78.3*   MCH 26.9* 27.1 27.2   MCHC 34.4 34.5 34.8   RDW 37.4 37.4 37.2   PLATELETCT 344 326 331   MPV 11.1 11.2 10.9     Recent Labs     06/06/22  1010 06/06/22  1156 06/07/22  0334   SODIUM 136 137 138   POTASSIUM 3.5* 3.5* 3.3*   CHLORIDE 107 108 107   CO2 18* 18* 18*   GLUCOSE 117* 103* 103*   BUN 19 19 17   CREATININE 0.87 0.83 0.83   CALCIUM 9.3 9.4 8.8             Recent Labs     06/05/22  1614 06/06/22  1010   CPKTOTAL 55 54         Recent Labs     06/05/22  1614 06/06/22  0830 06/06/22  1010 06/06/22  1156 06/07/22  0334   SODIUM 137   < > 136 137 138   POTASSIUM 4.1   < > 3.5* 3.5* 3.3*   CHLORIDE 106   < > 107 108 107   CO2 15*   < > 18* 18* 18*   GLUCOSE 142*   < > 117* 103* 103*   BUN 25*   < > 19 19 17   CPKTOTAL 55  --  54  --   --     < > = values in this interval not displayed.     Recent Labs     06/06/22  0830 06/06/22  1010 06/06/22  1156 06/07/22  0334   SODIUM 137 136 137 138   POTASSIUM 3.7 3.5* 3.5* 3.3*   CHLORIDE 107 107 108 107   CO2 15* 18* 18* 18*   BUN 20 19 19 17   CREATININE 0.89 0.87 0.83 0.83   MAGNESIUM 1.7 1.7 1.8  --    PHOSPHORUS  --  1.6* 1.9* 4.1   CALCIUM 9.4 9.3 9.4  8.8         No results found for this or any previous visit.      Imaging reviewed:    DX-CHEST-PORTABLE (1 VIEW)   Final Result      No acute cardiopulmonary abnormality.                Assessment/Plan:  Grey is a 27 y.o. male with history of anxiety, depression and benzodiazepine dependence who was admitted on 6/5/2022 for seizure-like activity.  Continuous video EEG monitoring applied while patient had continues seizure-like activity manifest as fine tremor of both upper and lower extremity as well as head shaking with his eyes rolled back.  This episode had no correlation with electrographic changes on EEG except for muscle and movement artifact.  It was confirmed these episodes are nonepileptic events and psychogenic.  Subsequently during my second visit to him when he stopped seizing he told me about significant stress due to loss of a close family member within the last month.  He also asked for benzodiazepine and in particular Xanax to help him cope with his recent loss and depression.  Would suggest psychiatric evaluation and supportive care.  As this was confirmed that those events are not epileptic, neurology will sign off, please call us if there is any question.

## 2022-06-08 NOTE — PROGRESS NOTES
Monitor Summary: SR 97-98, OK -0.14, QRS -0.08, QT -0.32, with HR up to 159 per strip from the monitor room.

## 2022-06-08 NOTE — PROGRESS NOTES
"2218- This RN administered the pt's scheduled chlordiazepoxide and sucralfate. Pt took the chlordiazepoxide successfully, but about a minute after taking the sucralfate, the pt started dry heaving and had light pink emesis which matched the consistency of the sucralfate. Pt continued to dry heave and had other clear water like emesis. After pt was stable, the pt immediately asked about getting another pill since he \"threw up the one I just took.\" This RN checked the emesis and pill was not found.     2320: Charge RN in to check on patient due to bed alarm alarming. Patient seen kneeling on bed trying to use urinal. Sitter standing on left side of bed. Charge RN standing to left side of bed to assure patient safety due to patient being known to throw self out of bed. After about 2 minutes of attempting to use urinal, patient threw himself backwards onto the bed. Charge RN moved to end of bed to support patient head. Patient noted to be having fine tremors of upper and lower extremities as well as head. Vital signs   BP: 123/99  O2: 96% on RA  HR ranging between 130's-150's.  No IV access due to patient removing IV at beginning of shift. Multiple RNs have attempted to gain RN access and were unsuccessful.     2353: MD called and notified about episode. No new orders received. Will continue to monitor.     0030: IV placement successful. Pt eventually came out of seizure like activity stating he felt like he was \"having a panic attack.\" Patient back in bed, eating Jell-O. VSS. Sitter at bedside.     " stated

## 2022-06-08 NOTE — PROGRESS NOTES
Nurse reported that patient hit his head while kneeling on the floor while urinating in a urinal. It was a witnessed fall by the sitter. Patient sustained a superficial lump on his left forehead. Nursing neuro exam was negative. No loss of consciousness or vomiting. Nurse noted that patient asked for benzodiazepine directly after the event. Discussed with patient's mom per patient permission about his condition explaining his psychogenic nonepileptic seizure and concern for drug seeking behavior with goal of patient tapering off benzodiazepines and using antidepressants for his anxiety.  Plan:  -Q4 hour neuro checks  -CT head if abnormal neuro checks or signs/symptoms of increased cranial pressure.

## 2022-06-08 NOTE — PROGRESS NOTES
1741: This RN entered room to bed alarm going off. Upon entering the room pt was found to be shaking on the floor. Pt's mother was holding pt's head. Staff assist was called, additional staff entered the room. Pt was able to stop mid episode and ask for xanax then continued episode. Pt stopped episode and was safely moved back to bed. Pt was noted have small lump on the left temporal region. VS and assessment stable and unchanged from previous assessments.     1800: MD at bedside to assess pt, orders received and implemented.     Charge RN and supervisor notified of the fall. Sitter and mother at bedside. Extensive education given to the pt regarding safety measures in place and new orders for no OOB privileges at this time. 4 bed rails up and padded per orders, bed low and locked, strip and frame alarm in use as fall prevention measures.

## 2022-06-09 ENCOUNTER — PHARMACY VISIT (OUTPATIENT)
Dept: PHARMACY | Facility: MEDICAL CENTER | Age: 27
End: 2022-06-09
Payer: MEDICARE

## 2022-06-09 VITALS
SYSTOLIC BLOOD PRESSURE: 121 MMHG | HEIGHT: 63 IN | RESPIRATION RATE: 17 BRPM | TEMPERATURE: 98.6 F | DIASTOLIC BLOOD PRESSURE: 74 MMHG | BODY MASS INDEX: 24.22 KG/M2 | HEART RATE: 86 BPM | WEIGHT: 136.69 LBS | OXYGEN SATURATION: 99 %

## 2022-06-09 PROBLEM — G93.40 ENCEPHALOPATHY ACUTE: Status: ACTIVE | Noted: 2022-01-10

## 2022-06-09 PROBLEM — T73.0XXA STARVATION KETOACIDOSIS: Status: ACTIVE | Noted: 2022-06-09

## 2022-06-09 PROBLEM — R80.9 PROTEINURIA: Status: ACTIVE | Noted: 2022-06-09

## 2022-06-09 PROBLEM — E87.29 STARVATION KETOACIDOSIS: Status: ACTIVE | Noted: 2022-06-09

## 2022-06-09 LAB
ANION GAP SERPL CALC-SCNC: 14 MMOL/L (ref 7–16)
BACTERIA UR CULT: NORMAL
BUN SERPL-MCNC: 18 MG/DL (ref 8–22)
CALCIUM SERPL-MCNC: 9.2 MG/DL (ref 8.5–10.5)
CHLORIDE SERPL-SCNC: 113 MMOL/L (ref 96–112)
CO2 SERPL-SCNC: 18 MMOL/L (ref 20–33)
CREAT SERPL-MCNC: 0.99 MG/DL (ref 0.5–1.4)
GFR SERPLBLD CREATININE-BSD FMLA CKD-EPI: 108 ML/MIN/1.73 M 2
GLUCOSE SERPL-MCNC: 106 MG/DL (ref 65–99)
POTASSIUM SERPL-SCNC: 3.5 MMOL/L (ref 3.6–5.5)
SIGNIFICANT IND 70042: NORMAL
SITE SITE: NORMAL
SODIUM SERPL-SCNC: 145 MMOL/L (ref 135–145)
SOURCE SOURCE: NORMAL

## 2022-06-09 PROCEDURE — 700102 HCHG RX REV CODE 250 W/ 637 OVERRIDE(OP): Performed by: STUDENT IN AN ORGANIZED HEALTH CARE EDUCATION/TRAINING PROGRAM

## 2022-06-09 PROCEDURE — 80048 BASIC METABOLIC PNL TOTAL CA: CPT

## 2022-06-09 PROCEDURE — RXMED WILLOW AMBULATORY MEDICATION CHARGE: Performed by: STUDENT IN AN ORGANIZED HEALTH CARE EDUCATION/TRAINING PROGRAM

## 2022-06-09 PROCEDURE — A9270 NON-COVERED ITEM OR SERVICE: HCPCS | Performed by: STUDENT IN AN ORGANIZED HEALTH CARE EDUCATION/TRAINING PROGRAM

## 2022-06-09 PROCEDURE — A9270 NON-COVERED ITEM OR SERVICE: HCPCS

## 2022-06-09 PROCEDURE — 36415 COLL VENOUS BLD VENIPUNCTURE: CPT

## 2022-06-09 PROCEDURE — 700102 HCHG RX REV CODE 250 W/ 637 OVERRIDE(OP)

## 2022-06-09 PROCEDURE — 99239 HOSP IP/OBS DSCHRG MGMT >30: CPT | Mod: GC | Performed by: HOSPITALIST

## 2022-06-09 RX ORDER — CHLORDIAZEPOXIDE HYDROCHLORIDE 25 MG/1
50 CAPSULE, GELATIN COATED ORAL ONCE
Qty: 2 CAPSULE | Refills: 0 | Status: SHIPPED | OUTPATIENT
Start: 2022-06-09 | End: 2022-06-10

## 2022-06-09 RX ORDER — CHLORDIAZEPOXIDE HYDROCHLORIDE 5 MG/1
5 CAPSULE, GELATIN COATED ORAL ONCE
Qty: 1 CAPSULE | Refills: 0 | Status: CANCELLED | OUTPATIENT
Start: 2022-06-09 | End: 2022-06-09

## 2022-06-09 RX ORDER — CHLORDIAZEPOXIDE HYDROCHLORIDE 25 MG/1
50 CAPSULE, GELATIN COATED ORAL EVERY 12 HOURS
Status: DISCONTINUED | OUTPATIENT
Start: 2022-06-09 | End: 2022-06-09 | Stop reason: HOSPADM

## 2022-06-09 RX ORDER — CELECOXIB 200 MG/1
200 CAPSULE ORAL ONCE
Status: COMPLETED | OUTPATIENT
Start: 2022-06-09 | End: 2022-06-09

## 2022-06-09 RX ORDER — CHLORDIAZEPOXIDE HYDROCHLORIDE 10 MG/1
CAPSULE, GELATIN COATED ORAL
Qty: 26 CAPSULE | Refills: 0 | Status: SHIPPED | OUTPATIENT
Start: 2022-06-09 | End: 2022-09-06

## 2022-06-09 RX ORDER — POTASSIUM CHLORIDE 20 MEQ/1
40 TABLET, EXTENDED RELEASE ORAL ONCE
Status: COMPLETED | OUTPATIENT
Start: 2022-06-09 | End: 2022-06-09

## 2022-06-09 RX ORDER — CHLORDIAZEPOXIDE HYDROCHLORIDE 5 MG/1
5 CAPSULE, GELATIN COATED ORAL DAILY
Qty: 7 CAPSULE | Refills: 0 | Status: SHIPPED | OUTPATIENT
Start: 2022-06-09 | End: 2022-06-18

## 2022-06-09 RX ADMIN — POTASSIUM CHLORIDE 40 MEQ: 1500 TABLET, EXTENDED RELEASE ORAL at 09:11

## 2022-06-09 RX ADMIN — CHLORDIAZEPOXIDE HYDROCHLORIDE 50 MG: 25 CAPSULE ORAL at 05:10

## 2022-06-09 RX ADMIN — THERA TABS 1 TABLET: TAB at 05:10

## 2022-06-09 RX ADMIN — OMEPRAZOLE 40 MG: 20 CAPSULE, DELAYED RELEASE ORAL at 05:10

## 2022-06-09 RX ADMIN — SUCRALFATE 1 G: 1 SUSPENSION ORAL at 09:11

## 2022-06-09 RX ADMIN — Medication 1000 UNITS: at 05:11

## 2022-06-09 RX ADMIN — CELECOXIB 200 MG: 200 CAPSULE ORAL at 11:06

## 2022-06-09 RX ADMIN — FOLIC ACID 1 MG: 1 TABLET ORAL at 05:10

## 2022-06-09 NOTE — DISCHARGE SUMMARY
Discharge Summary    Date of Admission: 6/5/2022  Date of Discharge: 6/9/2022  3:31 PM  Discharging Attending:    Discharging Senior Resident: Dr. Valera    CHIEF COMPLAINT ON ADMISSION  Chief Complaint   Patient presents with   • Other       Reason for Admission  Seizure-like activity and benzodiazepine withdrawal    Admission Date  6/5/2022    CODE STATUS  Prior    HPI & HOSPITAL COURSE  Grey Diop is a 26 y/o man w/ PMHx of benzodiazepine use (w/ overdose in 2/2022), polypharmacy, cyclic vomiting, gastritis who was admitted on 6/5 for seizure like activity.  He was accompanied by a family member for a possible seizure that started 10 minutes prior to presenting to ED.    #Benzodiazepine dependence and withdrawal  #Nonepileptic seizure  #tachycardia  -Nonepileptic seizure appears to be due to benzodiazepine withdrawal. Seizure was distractible and manifested as fine tremor of both upper and lower extremities with shaking throughout the body.  On the day of discharge, patient did not have tremor. Patient's mentation improved throughout admission and he was more alert on the day of discharge. Tachycardia resolved on the day of discharge.  -Patient states that he ran out of benzodiazepines 2 days prior to admission. Patient states that he has been on Xanax for more than 10 years.  He states that he gets Xanax through [place].  PDMP was reviewed and did not show providers prescribing benzodiazepines for him in [place].  He states that he was taking 2-4 mg xanax daily. Currently he does not have psychiatrist or psychologist that he sees. Throughout admission, patient was requesting benzodiazepines.  -24 hours video EEG during admission showed no abnormal EEG correlating to seizure like activity. The video showed body shaking episodes.  -Started Librium taper during admission.  Patient was on Librium 50 mg every 12 hours on discharge and was provided with long tapering regimen.    #phobia  -chronic.  It  appears that phobia was the reason that patient started taking Xanax.  Phobia may be the cause of underlying anxiety prompting benzodiazepine use.  He reports that it feels like people are trying to get him.  -will need behavioral therapy outpatient.    #back pain  -chronic. Started after getting surgery for scoliosis. Was able to walk well on the day of discharge.  -need to be managed with physical therapy and NSAID outpatient. Has severe allergy to tylenol per patient with throat swelling.    #cannabinoid use  #cyclic vomiting syndrome  Patient declines marijuana use for the past month.  UDS positive on admission.  -cannabinoid cessation counseling provided. Likely needs ongoing counseling outpatient    #oxycodone use  -was positive on May 28 on urine drug screen. Was negative on 6/5/22.  -opioid cessation counseling provided    #reflux esophagitis  #GERD  -active esophagitis confirmed on EGD in May 2022  -currently taking omeprazole 20-40 mg daily prn at home due to heartburn according to patient, reminded patient to take it everyday given esophagitis.     FOLLOW UP ITEMS POST DISCHARGE:    -Librium taper regimen: 50 mg once at night -> 40mg twice daily -> 30 mg twice daily -> 25 mg twice daily -> 20 mg twice daily ->15 mg twice daily -> 10 mg twice daily -> 5mg twice daily -> 5mg once daily and then discontinue    -follow up next week in [facility] internal medicine clinic. Appointment at 11:30 am; check in by 11:15 am.     -follow up with mental health within 1-2 weeks.    -PT for back pain outpatient    -take omeprazole everyday for 6-8 weeks.       Therefore, he is discharged in good and stable condition to home with close outpatient follow-up.    The patient met 2-midnight criteria for an inpatient stay at the time of discharge.    PHYSICAL EXAM ON DISCHARGE  Temp:  [36.6 °C (97.8 °F)-37.1 °C (98.7 °F)] 37 °C (98.6 °F)  Pulse:  [] 86  Resp:  [17-20] 17  BP: (119-137)/(72-84) 121/74  SpO2:  [98 %-99 %] 99  %    Physical Exam  Vitals and nursing note reviewed.   Constitutional:       General: He is not in acute distress.     Appearance: He is not ill-appearing or toxic-appearing.   HENT:      Head: Normocephalic and atraumatic.      Mouth/Throat:      Mouth: Mucous membranes are moist.      Pharynx: Oropharynx is clear.   Eyes:      General: No scleral icterus.     Extraocular Movements: Extraocular movements intact.      Pupils: Pupils are equal, round, and reactive to light.   Cardiovascular:      Rate and Rhythm: Normal rate and regular rhythm.      Pulses: Normal pulses.      Heart sounds: Normal heart sounds. No murmur heard.    No friction rub. No gallop.   Pulmonary:      Effort: Pulmonary effort is normal. No respiratory distress.      Breath sounds: Normal breath sounds. No wheezing, rhonchi or rales.   Abdominal:      General: Abdomen is flat. Bowel sounds are normal. There is no distension.      Palpations: Abdomen is soft.      Tenderness: There is no abdominal tenderness. There is no guarding.   Musculoskeletal:      Right lower leg: No edema.      Left lower leg: No edema.   Skin:     General: Skin is warm and dry.   Neurological:      General: No focal deficit present.      Mental Status: He is alert.      Comments: Pt A+Ox4, CN II-XII intact w/o deficit.  Strength 5/5 in b/l UE and LE, sensation intact bilaterally.   Psychiatric:         Behavior: Behavior normal.         Judgment: Judgment normal.         Discharge Date  6/9/2022      DISCHARGE DIAGNOSES  Principal Problem:    Psychogenic nonepileptic seizure POA: Unknown  Active Problems:    Epigastric abdominal pain POA: Yes    Dehydration POA: Unknown    Cyclical vomiting POA: Yes    GERD (gastroesophageal reflux disease) (Chronic) POA: Yes    Marijuana use POA: Yes    Lactic acidosis POA: Unknown    Hypophosphatemia POA: Unknown    Benzodiazepine withdrawal without complication (HCC) POA: Yes    Diarrhea POA: Unknown      Overview:                  "Proteinuria POA: Unknown    Starvation ketoacidosis POA: Unknown  Resolved Problems:    * No resolved hospital problems. *      FOLLOW UP  Future Appointments   Date Time Provider Department Center   6/17/2022 11:30 AM Kamar Valera M.D. [facility] Val[facility] M.D.  [address] [place] [address]  [phone]    Go in 8 day(s)  Appointment at 11:30 am; check in by 11:15 am.      MEDICATIONS ON DISCHARGE     Medication List      START taking these medications      Instructions   * chlordiazePOXIDE 25 MG Caps  Commonly known as: LIBRIUM   Take 2 Capsules by mouth one time for 1 dose.  Dose: 50 mg     * chlordiazepoxide 10 MG capsule  Commonly known as: LIBRIUM   Take 4 Caps by mouth 2 times a day x 1 day, THEN 3 Caps 2 times a day x 1 day, THEN 2 Caps with one 5 mg cap (25mg) 2 times a day x 1 day, THEN 2 Capsules 2 times a day x 1 day, THEN 1 Cap with one 5 mg cap (15 mg) 2 times a day for 1 day, THEN 1 Capsule 2 times a day for 1 day     * chlordiazePOXIDE 5 MG Caps  Commonly known as: LIBRIUM   Take 1 Capsule by mouth with 10 mg capsules 2 times a day on day 4, Then take 1 Capsule with 10 mg capsules 2 times a day on day 6, then Take 1 capsule 2 times a day on day 8, Then take 1 capsule 1 time a day on day 9.  Dose: 5 mg         * This list has 3 medication(s) that are the same as other medications prescribed for you. Read the directions carefully, and ask your doctor or other care provider to review them with you.            CONTINUE taking these medications      Instructions   Naloxone 4 MG/0.1ML Liqd  Commonly known as: NARCAN   Doctor's comments: Dispense by pharmacist per CDTA  Administer a single spray of NARCAN Nasal [place] 4 mg/0.1 mL intranasally into one nostril, call 911 for emergency medical assistance.        STOP taking these medications    Non Formulary Request            Allergies  Allergies   Allergen Reactions   • Acetaminophen [Tylenol] Anaphylaxis and Nausea     Nausea, \"it just " "doesn't sit right\"  5/29/2021 patient states that his throat swells shut   • Ibuprofen      rash  Tolerated toradol       DIET  No orders of the defined types were placed in this encounter.      ACTIVITY  As tolerated.  Weight bearing as tolerated    CONSULTATIONS  Dr. Greco with Neurology Service consulted.  Treatment options were discussed and plan of care agreed upon.    Psychiatry was consulted; Lisa JAIME saw the patient    PROCEDURES  none  "

## 2022-06-09 NOTE — CARE PLAN
Problem: Knowledge Deficit - Standard  Goal: Patient and family/care givers will demonstrate understanding of plan of care, disease process/condition, diagnostic tests and medications  Outcome: Progressing     Problem: Optimal Care for Alcohol Withdrawal  Goal: Optimal Care for the alcohol withdrawal patient  Outcome: Progressing     Problem: Seizure Precautions  Goal: Implementation of seizure precautions  Outcome: Progressing     Problem: Lifestyle Changes  Goal: Patient's ability to identify lifestyle changes and available resources to help reduce recurrence of condition will improve  Outcome: Progressing     Problem: Psychosocial  Goal: Patient's level of anxiety will decrease  Outcome: Progressing  Goal: Spiritual and cultural needs incorporated into hospitalization  Outcome: Progressing     Problem: Risk for Aspiration  Goal: Patient's risk for aspiration will be absent or decrease  Outcome: Progressing     Problem: Hemodynamics  Goal: Patient's hemodynamics, fluid balance and neurologic status will be stable or improve  Outcome: Progressing     Problem: Fluid Volume  Goal: Fluid volume balance will be maintained  Outcome: Progressing     Problem: Urinary - Renal Perfusion  Goal: Ability to achieve and maintain adequate renal perfusion and functioning will improve  Outcome: Progressing     Problem: Respiratory  Goal: Patient will achieve/maintain optimum respiratory ventilation and gas exchange  Outcome: Progressing     Problem: Mechanical Ventilation  Goal: Safe management of artificial airway and ventilation  Outcome: Progressing  Goal: Successful weaning off mechanical ventilator, spontaneously maintains adequate gas exchange  Outcome: Progressing  Goal: Patient will be able to express needs and understand communication  Outcome: Progressing     Problem: Physical Regulation  Goal: Diagnostic test results will improve  Outcome: Progressing  Goal: Signs and symptoms of infection will decrease  Outcome:  Progressing     Problem: Pain - Standard  Goal: Alleviation of pain or a reduction in pain to the patient’s comfort goal  Outcome: Progressing     Problem: Fall Risk  Goal: Patient will remain free from falls  Outcome: Progressing     Problem: Safety - Medical Restraint  Goal: Remains free of injury from restraints (Restraint for Interference with Medical Device)  Outcome: Progressing  Goal: Free from restraint(s) (Restraint for Interference with Medical Device)  Outcome: Progressing   The patient is Stable - Low risk of patient condition declining or worsening    Shift Goals  Clinical Goals: safety  Patient Goals: go home  Family Goals: ivy    Progress made toward(s) clinical / shift goals:  understands poc, seizure precautions in place.     Patient is not progressing towards the following goals:

## 2022-06-09 NOTE — PROGRESS NOTES
Monitor summary: SR/ST , MN 0.14, QRS 0.08, QT 0.34, with unsustained HR in the 150s, per strip from monitor room.

## 2022-06-09 NOTE — CARE PLAN
The patient is Watcher - Medium risk of patient condition declining or worsening    Shift Goals  Clinical Goals: Safety  Patient Goals: Medications  Family Goals: MARKELL    Progress made toward(s) clinical / shift goals:  POC discussed with pt. Pt A/Ox4, however, non-complaint with instruction. Reinforcement needed. Safety sitter at bedside. All four siderails are padded and up. Pt has strict no out of bed privileges. Fall and seizure precautions in place. Call light within reach.     Problem: Seizure Precautions  Goal: Implementation of seizure precautions  Outcome: Progressing  Note: Seizure precautions are in place. Bed locked in lowest position, all four side rails are padded, three side rails up at any time, suction in place, and oxygen in place. Pt educated on seizure precautions. Pt educated to call for assistance. Pt verbalized understanding. Call light within reach.  1:1 safety sitter at bedside.      Problem: Fall Risk  Goal: Patient will remain free from falls  Outcome: Progressing  Note: Fall precautions in place. Bed locked and in lowest position, bed alarm in place, treaded socks used when ambulated, call light within reach. Pt educated to call for assistance. Pt rounded on frequently throughout shift.  1:1 Safety Sitter at bedside.      Problem: Safety - Medical Restraint  Goal: Remains free of injury from restraints (Restraint for Interference with Medical Device)  Outcome: Progressing  Note: Pt has remained free of injury from restraints. Restraint type is four side rails. Criteria for discontinuing the restraint has been explained to the patient. Pt assessed Q2hr. Safety sitter at bedside.        Patient is not progressing towards the following goals:
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
## 2022-06-09 NOTE — DISCHARGE INSTRUCTIONS
-Librium taper regimen: 50 mg once at night -> 40mg twice daily -> 30 mg twice daily -> 25 mg twice daily -> 20 mg twice daily ->15 mg twice daily -> 10 mg twice daily -> 5mg twice daily -> 5mg once daily and then discontinue    -follow up next week in [facility] internal medicine clinic. Appointment at 11:30 am; check in by 11:15 am.     -follow up with mental health within 1-2 weeks.    -take omeprazole everyday for 6-8 weeks  Discharge Instructions    Discharged to home by car with relative. Discharged via wheelchair, hospital escort: Yes.  Special equipment needed: Not Applicable    Be sure to schedule a follow-up appointment with your primary care doctor or any specialists as instructed.     Discharge Plan:   Diet Plan: Discussed  Activity Level: Discussed  Confirmed Follow up Appointment: Patient to Call and Schedule Appointment  Confirmed Symptoms Management: Discussed  Medication Reconciliation Updated: Yes    I understand that a diet low in cholesterol, fat, and sodium is recommended for good health. Unless I have been given specific instructions below for another diet, I accept this instruction as my diet prescription.       Special Instructions: None    Is patient discharged on Warfarin / Coumadin?   No     Depression / Suicide Risk    As you are discharged from this [facility] facility, it is important to learn how to keep safe from harming yourself.    Recognize the warning signs:  Abrupt changes in personality, positive or negative- including increase in energy   Giving away possessions  Change in eating patterns- significant weight changes-  positive or negative  Change in sleeping patterns- unable to sleep or sleeping all the time   Unwillingness or inability to communicate  Depression  Unusual sadness, discouragement and loneliness  Talk of wanting to die  Neglect of personal appearance   Rebelliousness- reckless behavior  Withdrawal from people/activities they love  Confusion- inability to  concentrate     If you or a loved one observes any of these behaviors or has concerns about self-harm, here's what you can do:  Talk about it- your feelings and reasons for harming yourself  Remove any means that you might use to hurt yourself (examples: pills, rope, extension cords, firearm)  Get professional help from the community (Mental Health, Substance Abuse, psychological counseling)  Do not be alone:Call your Safe Contact- someone whom you trust who will be there for you.  Call your local CRISIS HOTLINE 472-5668 or 880-629-8727  Call your local Children's Mobile Crisis Response Team [place] (452) 792-5586 or www.Zolvers  Call the toll free National Suicide Prevention Hotlines   National Suicide Prevention Lifeline 147-496-QYGG (1340)  National Hope Line Network 800-SUICIDE (598-4206)

## 2022-06-09 NOTE — PROGRESS NOTES
Monitor summary: SR/ST, HR , SD 0.13, QRS 0.09, QT 0.35 with (O) PACs per strip from monitor room

## 2022-06-09 NOTE — PROGRESS NOTES
Patient educated on home instructions, diet, activity, follow up, seeking medical attention. Verbalized understanding. Mother at bedside. Belongings and meds with pt from meds to beds.

## 2022-06-11 ENCOUNTER — PHARMACY VISIT (OUTPATIENT)
Dept: PHARMACY | Facility: MEDICAL CENTER | Age: 27
End: 2022-06-11
Payer: MEDICARE

## 2022-06-13 ENCOUNTER — PATIENT OUTREACH (OUTPATIENT)
Dept: HEALTH INFORMATION MANAGEMENT | Facility: OTHER | Age: 27
End: 2022-06-13
Payer: COMMERCIAL

## 2022-06-13 NOTE — PROGRESS NOTES
06/13/22  CHW Amanda called patient via mobile phone and left a voicemail introducing Community Care Management and providing all information for up coming appointment and CHW contact information.   06/14/22:  CHW Amanda called patient via mobile phone and left a voicemail introducing Community Care Management and providing all information for up coming appointment and CHW contact information.   06/15/22:  CHW Amanda called patient via mobile phone and left a voicemail introducing Community Care Management and provided all information for up coming appointment and all CHW contact information.  Third and final attempt . CHW will discharge patient from C[facility] and remove from case load and master list due to lack of engagement.

## 2022-09-06 ENCOUNTER — APPOINTMENT (OUTPATIENT)
Dept: RADIOLOGY | Facility: MEDICAL CENTER | Age: 27
End: 2022-09-06
Attending: EMERGENCY MEDICINE
Payer: COMMERCIAL

## 2022-09-06 ENCOUNTER — HOSPITAL ENCOUNTER (OUTPATIENT)
Facility: MEDICAL CENTER | Age: 27
End: 2022-09-08
Attending: EMERGENCY MEDICINE | Admitting: ORTHOPAEDIC SURGERY
Payer: COMMERCIAL

## 2022-09-06 DIAGNOSIS — S82.142A CLOSED FRACTURE OF LEFT TIBIAL PLATEAU, INITIAL ENCOUNTER: ICD-10-CM

## 2022-09-06 DIAGNOSIS — S82.142A TIBIAL PLATEAU FRACTURE, LEFT, CLOSED, INITIAL ENCOUNTER: ICD-10-CM

## 2022-09-06 PROCEDURE — G0378 HOSPITAL OBSERVATION PER HR: HCPCS

## 2022-09-06 PROCEDURE — 73700 CT LOWER EXTREMITY W/O DYE: CPT | Mod: LT

## 2022-09-06 PROCEDURE — 700111 HCHG RX REV CODE 636 W/ 250 OVERRIDE (IP): Performed by: EMERGENCY MEDICINE

## 2022-09-06 PROCEDURE — 96375 TX/PRO/DX INJ NEW DRUG ADDON: CPT

## 2022-09-06 PROCEDURE — 96376 TX/PRO/DX INJ SAME DRUG ADON: CPT

## 2022-09-06 PROCEDURE — 99291 CRITICAL CARE FIRST HOUR: CPT

## 2022-09-06 PROCEDURE — 700105 HCHG RX REV CODE 258: Performed by: EMERGENCY MEDICINE

## 2022-09-06 PROCEDURE — A9270 NON-COVERED ITEM OR SERVICE: HCPCS | Performed by: ORTHOPAEDIC SURGERY

## 2022-09-06 PROCEDURE — 700102 HCHG RX REV CODE 250 W/ 637 OVERRIDE(OP): Performed by: ORTHOPAEDIC SURGERY

## 2022-09-06 PROCEDURE — 73590 X-RAY EXAM OF LOWER LEG: CPT | Mod: LT

## 2022-09-06 PROCEDURE — 36415 COLL VENOUS BLD VENIPUNCTURE: CPT

## 2022-09-06 PROCEDURE — 96374 THER/PROPH/DIAG INJ IV PUSH: CPT

## 2022-09-06 RX ORDER — MORPHINE SULFATE 4 MG/ML
4 INJECTION INTRAVENOUS ONCE
Status: COMPLETED | OUTPATIENT
Start: 2022-09-06 | End: 2022-09-06

## 2022-09-06 RX ORDER — HYDROMORPHONE HYDROCHLORIDE 1 MG/ML
0.5 INJECTION, SOLUTION INTRAMUSCULAR; INTRAVENOUS; SUBCUTANEOUS
Status: DISCONTINUED | OUTPATIENT
Start: 2022-09-06 | End: 2022-09-08 | Stop reason: HOSPADM

## 2022-09-06 RX ORDER — ONDANSETRON 2 MG/ML
4 INJECTION INTRAMUSCULAR; INTRAVENOUS
Status: COMPLETED | OUTPATIENT
Start: 2022-09-06 | End: 2022-09-06

## 2022-09-06 RX ORDER — ONDANSETRON 2 MG/ML
4 INJECTION INTRAMUSCULAR; INTRAVENOUS EVERY 4 HOURS PRN
Status: DISCONTINUED | OUTPATIENT
Start: 2022-09-06 | End: 2022-09-08 | Stop reason: HOSPADM

## 2022-09-06 RX ORDER — OXYCODONE HYDROCHLORIDE 10 MG/1
10 TABLET ORAL
Status: DISCONTINUED | OUTPATIENT
Start: 2022-09-06 | End: 2022-09-08 | Stop reason: HOSPADM

## 2022-09-06 RX ORDER — DEXTROSE AND SODIUM CHLORIDE 5; .45 G/100ML; G/100ML
INJECTION, SOLUTION INTRAVENOUS CONTINUOUS
Status: DISCONTINUED | OUTPATIENT
Start: 2022-09-06 | End: 2022-09-08 | Stop reason: HOSPADM

## 2022-09-06 RX ORDER — OXYCODONE HYDROCHLORIDE 5 MG/1
5 TABLET ORAL
Status: DISCONTINUED | OUTPATIENT
Start: 2022-09-06 | End: 2022-09-08 | Stop reason: HOSPADM

## 2022-09-06 RX ORDER — ONDANSETRON 2 MG/ML
4 INJECTION INTRAMUSCULAR; INTRAVENOUS ONCE
Status: COMPLETED | OUTPATIENT
Start: 2022-09-06 | End: 2022-09-06

## 2022-09-06 RX ADMIN — MORPHINE SULFATE 4 MG: 4 INJECTION INTRAVENOUS at 15:25

## 2022-09-06 RX ADMIN — ONDANSETRON 4 MG: 2 INJECTION INTRAMUSCULAR; INTRAVENOUS at 15:25

## 2022-09-06 RX ADMIN — DEXTROSE AND SODIUM CHLORIDE: 5; 450 INJECTION, SOLUTION INTRAVENOUS at 20:16

## 2022-09-06 RX ADMIN — OXYCODONE 5 MG: 5 TABLET ORAL at 23:06

## 2022-09-06 RX ADMIN — MORPHINE SULFATE 5 MG: 10 INJECTION INTRAVENOUS at 20:15

## 2022-09-06 RX ADMIN — ONDANSETRON 4 MG: 2 INJECTION INTRAMUSCULAR; INTRAVENOUS at 20:16

## 2022-09-06 ASSESSMENT — FIBROSIS 4 INDEX: FIB4 SCORE: 0.4
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
## 2022-09-07 ENCOUNTER — ANESTHESIA (OUTPATIENT)
Dept: SURGERY | Facility: MEDICAL CENTER | Age: 27
End: 2022-09-07
Payer: COMMERCIAL

## 2022-09-07 ENCOUNTER — APPOINTMENT (OUTPATIENT)
Dept: RADIOLOGY | Facility: MEDICAL CENTER | Age: 27
End: 2022-09-07
Attending: ORTHOPAEDIC SURGERY
Payer: COMMERCIAL

## 2022-09-07 ENCOUNTER — ANESTHESIA EVENT (OUTPATIENT)
Dept: SURGERY | Facility: MEDICAL CENTER | Age: 27
End: 2022-09-07
Payer: COMMERCIAL

## 2022-09-07 LAB
ANION GAP SERPL CALC-SCNC: 8 MMOL/L (ref 7–16)
BUN SERPL-MCNC: 17 MG/DL (ref 8–22)
CALCIUM SERPL-MCNC: 8.7 MG/DL (ref 8.5–10.5)
CHLORIDE SERPL-SCNC: 103 MMOL/L (ref 96–112)
CO2 SERPL-SCNC: 26 MMOL/L (ref 20–33)
CREAT SERPL-MCNC: 0.95 MG/DL (ref 0.5–1.4)
ERYTHROCYTE [DISTWIDTH] IN BLOOD BY AUTOMATED COUNT: 43.5 FL (ref 35.9–50)
GFR SERPLBLD CREATININE-BSD FMLA CKD-EPI: 114 ML/MIN/1.73 M 2
GLUCOSE SERPL-MCNC: 115 MG/DL (ref 65–99)
HCT VFR BLD AUTO: 35.9 % (ref 42–52)
HGB BLD-MCNC: 11.6 G/DL (ref 14–18)
MCH RBC QN AUTO: 28.4 PG (ref 27–33)
MCHC RBC AUTO-ENTMCNC: 32.3 G/DL (ref 33.7–35.3)
MCV RBC AUTO: 88 FL (ref 81.4–97.8)
PLATELET # BLD AUTO: 253 K/UL (ref 164–446)
PMV BLD AUTO: 10.7 FL (ref 9–12.9)
POTASSIUM SERPL-SCNC: 4.4 MMOL/L (ref 3.6–5.5)
RBC # BLD AUTO: 4.08 M/UL (ref 4.7–6.1)
SODIUM SERPL-SCNC: 137 MMOL/L (ref 135–145)
WBC # BLD AUTO: 5.9 K/UL (ref 4.8–10.8)

## 2022-09-07 PROCEDURE — 160029 HCHG SURGERY MINUTES - 1ST 30 MINS LEVEL 4: Performed by: ORTHOPAEDIC SURGERY

## 2022-09-07 PROCEDURE — 27535 TREAT KNEE FRACTURE: CPT | Mod: 80ROC,LT | Performed by: STUDENT IN AN ORGANIZED HEALTH CARE EDUCATION/TRAINING PROGRAM

## 2022-09-07 PROCEDURE — 700102 HCHG RX REV CODE 250 W/ 637 OVERRIDE(OP): Performed by: ORTHOPAEDIC SURGERY

## 2022-09-07 PROCEDURE — 110371 HCHG SHELL REV 272: Performed by: ORTHOPAEDIC SURGERY

## 2022-09-07 PROCEDURE — 700101 HCHG RX REV CODE 250: Performed by: ORTHOPAEDIC SURGERY

## 2022-09-07 PROCEDURE — G0378 HOSPITAL OBSERVATION PER HR: HCPCS

## 2022-09-07 PROCEDURE — 27535 TREAT KNEE FRACTURE: CPT | Mod: LT | Performed by: ORTHOPAEDIC SURGERY

## 2022-09-07 PROCEDURE — 27403 REPAIR OF KNEE CARTILAGE: CPT | Performed by: ORTHOPAEDIC SURGERY

## 2022-09-07 PROCEDURE — 96365 THER/PROPH/DIAG IV INF INIT: CPT

## 2022-09-07 PROCEDURE — 700111 HCHG RX REV CODE 636 W/ 250 OVERRIDE (IP): Performed by: ANESTHESIOLOGY

## 2022-09-07 PROCEDURE — 27403 REPAIR OF KNEE CARTILAGE: CPT | Mod: 80ROC | Performed by: STUDENT IN AN ORGANIZED HEALTH CARE EDUCATION/TRAINING PROGRAM

## 2022-09-07 PROCEDURE — 96372 THER/PROPH/DIAG INJ SC/IM: CPT

## 2022-09-07 PROCEDURE — 160002 HCHG RECOVERY MINUTES (STAT): Performed by: ORTHOPAEDIC SURGERY

## 2022-09-07 PROCEDURE — 700105 HCHG RX REV CODE 258: Performed by: ANESTHESIOLOGY

## 2022-09-07 PROCEDURE — C1713 ANCHOR/SCREW BN/BN,TIS/BN: HCPCS | Performed by: ORTHOPAEDIC SURGERY

## 2022-09-07 PROCEDURE — 160036 HCHG PACU - EA ADDL 30 MINS PHASE I: Performed by: ORTHOPAEDIC SURGERY

## 2022-09-07 PROCEDURE — 80048 BASIC METABOLIC PNL TOTAL CA: CPT

## 2022-09-07 PROCEDURE — 96375 TX/PRO/DX INJ NEW DRUG ADDON: CPT

## 2022-09-07 PROCEDURE — 160048 HCHG OR STATISTICAL LEVEL 1-5: Performed by: ORTHOPAEDIC SURGERY

## 2022-09-07 PROCEDURE — A9270 NON-COVERED ITEM OR SERVICE: HCPCS | Performed by: ANESTHESIOLOGY

## 2022-09-07 PROCEDURE — 73590 X-RAY EXAM OF LOWER LEG: CPT | Mod: LT

## 2022-09-07 PROCEDURE — 502000 HCHG MISC OR IMPLANTS RC 0278: Performed by: ORTHOPAEDIC SURGERY

## 2022-09-07 PROCEDURE — 96376 TX/PRO/DX INJ SAME DRUG ADON: CPT

## 2022-09-07 PROCEDURE — A9270 NON-COVERED ITEM OR SERVICE: HCPCS | Performed by: ORTHOPAEDIC SURGERY

## 2022-09-07 PROCEDURE — 99223 1ST HOSP IP/OBS HIGH 75: CPT | Mod: 57 | Performed by: ORTHOPAEDIC SURGERY

## 2022-09-07 PROCEDURE — 01392 ANES OPN PX UPR TIB FIB&/PAT: CPT | Performed by: ANESTHESIOLOGY

## 2022-09-07 PROCEDURE — 160041 HCHG SURGERY MINUTES - EA ADDL 1 MIN LEVEL 4: Performed by: ORTHOPAEDIC SURGERY

## 2022-09-07 PROCEDURE — 85027 COMPLETE CBC AUTOMATED: CPT

## 2022-09-07 PROCEDURE — 160035 HCHG PACU - 1ST 60 MINS PHASE I: Performed by: ORTHOPAEDIC SURGERY

## 2022-09-07 PROCEDURE — 700102 HCHG RX REV CODE 250 W/ 637 OVERRIDE(OP): Performed by: ANESTHESIOLOGY

## 2022-09-07 PROCEDURE — 160009 HCHG ANES TIME/MIN: Performed by: ORTHOPAEDIC SURGERY

## 2022-09-07 PROCEDURE — 700101 HCHG RX REV CODE 250: Performed by: ANESTHESIOLOGY

## 2022-09-07 PROCEDURE — 700111 HCHG RX REV CODE 636 W/ 250 OVERRIDE (IP): Performed by: ORTHOPAEDIC SURGERY

## 2022-09-07 DEVICE — 3.5MM CORTEX TI SCREW 3.5MM  L32MM: Type: IMPLANTABLE DEVICE | Site: LEG | Status: FUNCTIONAL

## 2022-09-07 DEVICE — IMPLANTABLE DEVICE: Type: IMPLANTABLE DEVICE | Site: LEG | Status: FUNCTIONAL

## 2022-09-07 DEVICE — 3.5MM CORTEX TI SCREW 3.5MM  L24MM: Type: IMPLANTABLE DEVICE | Site: LEG | Status: FUNCTIONAL

## 2022-09-07 DEVICE — WIRE FIXATION KIRSCHNER TROCAR POINT: Type: IMPLANTABLE DEVICE | Site: LEG | Status: FUNCTIONAL

## 2022-09-07 DEVICE — 3.5MM CORTEX TI SCREW 3.5MM  L40MM: Type: IMPLANTABLE DEVICE | Site: LEG | Status: FUNCTIONAL

## 2022-09-07 RX ORDER — SODIUM CHLORIDE, SODIUM LACTATE, POTASSIUM CHLORIDE, CALCIUM CHLORIDE 600; 310; 30; 20 MG/100ML; MG/100ML; MG/100ML; MG/100ML
INJECTION, SOLUTION INTRAVENOUS CONTINUOUS
Status: DISCONTINUED | OUTPATIENT
Start: 2022-09-07 | End: 2022-09-07 | Stop reason: HOSPADM

## 2022-09-07 RX ORDER — ONDANSETRON 2 MG/ML
4 INJECTION INTRAMUSCULAR; INTRAVENOUS
Status: DISCONTINUED | OUTPATIENT
Start: 2022-09-07 | End: 2022-09-07 | Stop reason: HOSPADM

## 2022-09-07 RX ORDER — HYDROMORPHONE HYDROCHLORIDE 1 MG/ML
0.1 INJECTION, SOLUTION INTRAMUSCULAR; INTRAVENOUS; SUBCUTANEOUS
Status: DISCONTINUED | OUTPATIENT
Start: 2022-09-07 | End: 2022-09-07 | Stop reason: HOSPADM

## 2022-09-07 RX ORDER — ONDANSETRON 2 MG/ML
INJECTION INTRAMUSCULAR; INTRAVENOUS PRN
Status: DISCONTINUED | OUTPATIENT
Start: 2022-09-07 | End: 2022-09-07 | Stop reason: SURG

## 2022-09-07 RX ORDER — OXYCODONE HCL 5 MG/5 ML
5 SOLUTION, ORAL ORAL
Status: CANCELLED | OUTPATIENT
Start: 2022-09-07

## 2022-09-07 RX ORDER — HYDROMORPHONE HYDROCHLORIDE 1 MG/ML
0.4 INJECTION, SOLUTION INTRAMUSCULAR; INTRAVENOUS; SUBCUTANEOUS
Status: DISCONTINUED | OUTPATIENT
Start: 2022-09-07 | End: 2022-09-07 | Stop reason: HOSPADM

## 2022-09-07 RX ORDER — OXYCODONE HCL 5 MG/5 ML
10 SOLUTION, ORAL ORAL
Status: CANCELLED | OUTPATIENT
Start: 2022-09-07

## 2022-09-07 RX ORDER — HYDROMORPHONE HYDROCHLORIDE 1 MG/ML
0.2 INJECTION, SOLUTION INTRAMUSCULAR; INTRAVENOUS; SUBCUTANEOUS
Status: DISCONTINUED | OUTPATIENT
Start: 2022-09-07 | End: 2022-09-07 | Stop reason: HOSPADM

## 2022-09-07 RX ORDER — SODIUM CHLORIDE, SODIUM LACTATE, POTASSIUM CHLORIDE, CALCIUM CHLORIDE 600; 310; 30; 20 MG/100ML; MG/100ML; MG/100ML; MG/100ML
INJECTION, SOLUTION INTRAVENOUS
Status: DISCONTINUED | OUTPATIENT
Start: 2022-09-07 | End: 2022-09-07 | Stop reason: SURG

## 2022-09-07 RX ORDER — DIPHENHYDRAMINE HYDROCHLORIDE 50 MG/ML
12.5 INJECTION INTRAMUSCULAR; INTRAVENOUS
Status: CANCELLED | OUTPATIENT
Start: 2022-09-07

## 2022-09-07 RX ORDER — HALOPERIDOL 5 MG/ML
1 INJECTION INTRAMUSCULAR
Status: DISCONTINUED | OUTPATIENT
Start: 2022-09-07 | End: 2022-09-07 | Stop reason: HOSPADM

## 2022-09-07 RX ORDER — DIPHENHYDRAMINE HYDROCHLORIDE 50 MG/ML
12.5 INJECTION INTRAMUSCULAR; INTRAVENOUS
Status: DISCONTINUED | OUTPATIENT
Start: 2022-09-07 | End: 2022-09-07 | Stop reason: HOSPADM

## 2022-09-07 RX ORDER — OXYCODONE HCL 5 MG/5 ML
10 SOLUTION, ORAL ORAL
Status: COMPLETED | OUTPATIENT
Start: 2022-09-07 | End: 2022-09-07

## 2022-09-07 RX ORDER — HALOPERIDOL 5 MG/ML
1 INJECTION INTRAMUSCULAR
Status: CANCELLED | OUTPATIENT
Start: 2022-09-07

## 2022-09-07 RX ORDER — ONDANSETRON 2 MG/ML
4 INJECTION INTRAMUSCULAR; INTRAVENOUS
Status: CANCELLED | OUTPATIENT
Start: 2022-09-07

## 2022-09-07 RX ORDER — OXYCODONE HCL 5 MG/5 ML
5 SOLUTION, ORAL ORAL
Status: COMPLETED | OUTPATIENT
Start: 2022-09-07 | End: 2022-09-07

## 2022-09-07 RX ORDER — BUPIVACAINE HYDROCHLORIDE AND EPINEPHRINE 5; 5 MG/ML; UG/ML
INJECTION, SOLUTION EPIDURAL; INTRACAUDAL; PERINEURAL
Status: DISCONTINUED | OUTPATIENT
Start: 2022-09-07 | End: 2022-09-07 | Stop reason: HOSPADM

## 2022-09-07 RX ORDER — DEXAMETHASONE SODIUM PHOSPHATE 4 MG/ML
INJECTION, SOLUTION INTRA-ARTICULAR; INTRALESIONAL; INTRAMUSCULAR; INTRAVENOUS; SOFT TISSUE PRN
Status: DISCONTINUED | OUTPATIENT
Start: 2022-09-07 | End: 2022-09-07 | Stop reason: SURG

## 2022-09-07 RX ORDER — HYDROMORPHONE HYDROCHLORIDE 1 MG/ML
0.2 INJECTION, SOLUTION INTRAMUSCULAR; INTRAVENOUS; SUBCUTANEOUS
Status: CANCELLED | OUTPATIENT
Start: 2022-09-07

## 2022-09-07 RX ORDER — LIDOCAINE HYDROCHLORIDE 20 MG/ML
INJECTION, SOLUTION EPIDURAL; INFILTRATION; INTRACAUDAL; PERINEURAL PRN
Status: DISCONTINUED | OUTPATIENT
Start: 2022-09-07 | End: 2022-09-07 | Stop reason: SURG

## 2022-09-07 RX ORDER — ENOXAPARIN SODIUM 100 MG/ML
40 INJECTION SUBCUTANEOUS DAILY
Status: DISCONTINUED | OUTPATIENT
Start: 2022-09-07 | End: 2022-09-08 | Stop reason: HOSPADM

## 2022-09-07 RX ORDER — SODIUM CHLORIDE, SODIUM LACTATE, POTASSIUM CHLORIDE, CALCIUM CHLORIDE 600; 310; 30; 20 MG/100ML; MG/100ML; MG/100ML; MG/100ML
INJECTION, SOLUTION INTRAVENOUS CONTINUOUS
Status: CANCELLED | OUTPATIENT
Start: 2022-09-07

## 2022-09-07 RX ORDER — HYDROMORPHONE HYDROCHLORIDE 1 MG/ML
0.4 INJECTION, SOLUTION INTRAMUSCULAR; INTRAVENOUS; SUBCUTANEOUS
Status: CANCELLED | OUTPATIENT
Start: 2022-09-07

## 2022-09-07 RX ORDER — CEFAZOLIN SODIUM 1 G/3ML
INJECTION, POWDER, FOR SOLUTION INTRAMUSCULAR; INTRAVENOUS PRN
Status: DISCONTINUED | OUTPATIENT
Start: 2022-09-07 | End: 2022-09-07 | Stop reason: SURG

## 2022-09-07 RX ORDER — HYDROMORPHONE HYDROCHLORIDE 1 MG/ML
0.1 INJECTION, SOLUTION INTRAMUSCULAR; INTRAVENOUS; SUBCUTANEOUS
Status: CANCELLED | OUTPATIENT
Start: 2022-09-07

## 2022-09-07 RX ORDER — CEFAZOLIN SODIUM 2 G/100ML
2 INJECTION, SOLUTION INTRAVENOUS EVERY 8 HOURS
Status: COMPLETED | OUTPATIENT
Start: 2022-09-07 | End: 2022-09-08

## 2022-09-07 RX ADMIN — ENOXAPARIN SODIUM 40 MG: 40 INJECTION SUBCUTANEOUS at 17:03

## 2022-09-07 RX ADMIN — OXYCODONE 5 MG: 5 TABLET ORAL at 07:22

## 2022-09-07 RX ADMIN — LIDOCAINE HYDROCHLORIDE 100 MG: 20 INJECTION, SOLUTION EPIDURAL; INFILTRATION; INTRACAUDAL at 12:26

## 2022-09-07 RX ADMIN — PROPOFOL 200 MG: 10 INJECTION, EMULSION INTRAVENOUS at 12:26

## 2022-09-07 RX ADMIN — FENTANYL CITRATE 25 MCG: 50 INJECTION, SOLUTION INTRAMUSCULAR; INTRAVENOUS at 15:10

## 2022-09-07 RX ADMIN — FENTANYL CITRATE 50 MCG: 50 INJECTION, SOLUTION INTRAMUSCULAR; INTRAVENOUS at 14:50

## 2022-09-07 RX ADMIN — HALOPERIDOL LACTATE 1 MG: 5 INJECTION, SOLUTION INTRAMUSCULAR at 15:16

## 2022-09-07 RX ADMIN — OXYCODONE HYDROCHLORIDE 10 MG: 10 TABLET ORAL at 19:18

## 2022-09-07 RX ADMIN — CEFAZOLIN 2 G: 330 INJECTION, POWDER, FOR SOLUTION INTRAMUSCULAR; INTRAVENOUS at 12:20

## 2022-09-07 RX ADMIN — HYDROMORPHONE HYDROCHLORIDE 0.5 MG: 1 INJECTION, SOLUTION INTRAMUSCULAR; INTRAVENOUS; SUBCUTANEOUS at 23:54

## 2022-09-07 RX ADMIN — EPHEDRINE SULFATE 5 MG: 50 INJECTION INTRAMUSCULAR; INTRAVENOUS; SUBCUTANEOUS at 12:56

## 2022-09-07 RX ADMIN — HYDROMORPHONE HYDROCHLORIDE 0.5 MG: 1 INJECTION, SOLUTION INTRAMUSCULAR; INTRAVENOUS; SUBCUTANEOUS at 03:36

## 2022-09-07 RX ADMIN — SODIUM CHLORIDE, POTASSIUM CHLORIDE, SODIUM LACTATE AND CALCIUM CHLORIDE: 600; 310; 30; 20 INJECTION, SOLUTION INTRAVENOUS at 14:42

## 2022-09-07 RX ADMIN — CEFAZOLIN SODIUM 2 G: 2 INJECTION, SOLUTION INTRAVENOUS at 17:03

## 2022-09-07 RX ADMIN — ONDANSETRON 4 MG: 2 INJECTION INTRAMUSCULAR; INTRAVENOUS at 14:02

## 2022-09-07 RX ADMIN — FENTANYL CITRATE 50 MCG: 50 INJECTION, SOLUTION INTRAMUSCULAR; INTRAVENOUS at 13:29

## 2022-09-07 RX ADMIN — OXYCODONE 5 MG: 5 TABLET ORAL at 10:46

## 2022-09-07 RX ADMIN — OXYCODONE HYDROCHLORIDE 10 MG: 5 SOLUTION ORAL at 14:42

## 2022-09-07 RX ADMIN — OXYCODONE 5 MG: 5 TABLET ORAL at 02:25

## 2022-09-07 RX ADMIN — HYDROMORPHONE HYDROCHLORIDE 0.5 MG: 1 INJECTION, SOLUTION INTRAMUSCULAR; INTRAVENOUS; SUBCUTANEOUS at 20:32

## 2022-09-07 RX ADMIN — FENTANYL CITRATE 50 MCG: 50 INJECTION, SOLUTION INTRAMUSCULAR; INTRAVENOUS at 12:26

## 2022-09-07 RX ADMIN — SODIUM CHLORIDE, POTASSIUM CHLORIDE, SODIUM LACTATE AND CALCIUM CHLORIDE: 600; 310; 30; 20 INJECTION, SOLUTION INTRAVENOUS at 12:20

## 2022-09-07 RX ADMIN — DEXAMETHASONE SODIUM PHOSPHATE 8 MG: 4 INJECTION, SOLUTION INTRA-ARTICULAR; INTRALESIONAL; INTRAMUSCULAR; INTRAVENOUS; SOFT TISSUE at 12:30

## 2022-09-07 ASSESSMENT — PAIN DESCRIPTION - PAIN TYPE
TYPE: ACUTE PAIN

## 2022-09-07 ASSESSMENT — PAIN SCALES - GENERAL: PAIN_LEVEL: 4

## 2022-09-07 NOTE — PROGRESS NOTES
4 Eyes Skin Assessment Completed by SHAWNA Baez and SHAWNA Kaminski.    Head WDL  Ears WDL  Nose WDL  Mouth WDL  Neck WDL  Breast/Chest WDL  Shoulder Blades WDL  Spine WDL  (R) Arm/Elbow/Hand WDL  (L) Arm/Elbow/Hand WDL  Abdomen WDL  Groin WDL  Scrotum/Coccyx/Buttocks WDL  (R) Leg WDL  (L) Leg Wrapped in ace wrap post procedure   (R) Heel/Foot/Toe WDL  (L) Heel/Foot/Toe Wrapped in ace wrap post procedure  Orders to leave dressing in place     Devices In Places Blood Pressure Cuff, Pulse Ox, and Oxy Mask    Interventions In Place Pillows and Heels Loaded W/Pillows    Possible Skin Injury No    Pictures Uploaded Into Epic N/A  Wound Consult Placed N/A  RN Wound Prevention Protocol Ordered No

## 2022-09-07 NOTE — ED NOTES
Rounded on pt. Pt asleep in [facility] with unlabored breathing. No signs of distress at this time.  Call light within reach.

## 2022-09-07 NOTE — OR NURSING
Pt recovered well in PACU. VSS on 10 L O2 via oxymask per ERAS order.   Pt resting calmly following interventions for pain and nausea.   LLE elevated and ice pack in place.   Surgical drsg CDI. Left pedal pulse +3.   Pt's mother called to provide update, but there was no answer.     Report called to SHAWNA Muñoz and pt transported to .

## 2022-09-07 NOTE — ANESTHESIA PREPROCEDURE EVALUATION
Case: [id number] Date/Time: 09/07/22 1205    Procedure: INSERTION, INTRAMEDULLARY JONATHAN, TIBIA (Left)    Location: [facility] OR 16 / SURGERY [facility]    Surgeons: Ameya Kim M.D.          Relevant Problems   PULMONARY   (positive) Aspiration pneumonia (HCC)      NEURO   (positive) Psychogenic nonepileptic seizure      CARDIAC   (positive) Abdominal migraine      GI   (positive) GERD (gastroesophageal reflux disease)         (positive) Acute renal failure (HCC)       Physical Exam    Anesthesia Plan    ASA 2       Plan - general       Airway plan will be LMA          Induction: intravenous    Postoperative Plan: Postoperative administration of opioids is intended.    Pertinent diagnostic labs and testing reviewed    Informed Consent:    Anesthetic plan and risks discussed with patient.    Use of blood products discussed with: patient whom consented to blood products.

## 2022-09-07 NOTE — ED NOTES
Rounded on pt. Pt resting in [facility] on the phone.  No needs at this time. Call light within reach.
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
## 2022-09-07 NOTE — ED NOTES
Rounded on pt. Pt asleep in gurney with even rise and fall of chest visible. No signs of distress at this time.  Call light within reach.

## 2022-09-07 NOTE — ED NOTES
Report to Sepideh XIAO RN, pt being transferred to [facility], transfer of care.

## 2022-09-07 NOTE — ANESTHESIA TIME REPORT
Anesthesia Start and Stop Event Times     Date Time Event    9/7/2022 1203 Ready for Procedure     1220 Anesthesia Start     1439 Anesthesia Stop        Responsible Staff  09/07/22    Name Role Begin End    Ian Anderson M.D. Anesth 1220 1439        Overtime Reason:  no overtime (within assigned shift)    Comments:

## 2022-09-07 NOTE — ED NOTES
Rec'd report from Kaity CARY RN, assumed pt care, pt resting on stretcher, awake and alert, resp even and unlabored, in NAD, call bell in reach, bed in low and locked position, aware of POC.

## 2022-09-07 NOTE — ED NOTES
Report from SHAWNA Alves. Pt placed on monitor and updated on POC. CMS intact to left lower extremity. Pt c/o pain in leg. Will medicate per MAR.

## 2022-09-07 NOTE — ANESTHESIA PROCEDURE NOTES
Airway    Date/Time: 9/7/2022 12:27 PM  Performed by: Ian Anderson M.D.  Authorized by: Ian Anderson M.D.     Location:  OR  Urgency:  Elective  Indications for Airway Management:  Anesthesia      Spontaneous Ventilation: absent    Sedation Level:  Deep  Preoxygenated: Yes    Final Airway Type:  Supraglottic airway  Final Supraglottic Airway:  Standard LMA    SGA Size:  4  Number of Attempts at Approach:  1

## 2022-09-07 NOTE — PROGRESS NOTES
Pt brought to unit, lethargic, A&Ox2 at this time, disoriented to time and event. Pt has 10 L oxymask on at this time-order expiration @ 2127. Pt updated on plan of care, needs re-education. Skin check complete. Will continue to monitor.

## 2022-09-07 NOTE — ANESTHESIA POSTPROCEDURE EVALUATION
Patient: Grey Diop    Procedure Summary     Date: 09/07/22 Room / Location: [place] [address] / SURGERY [facility]    Anesthesia Start: 1220 Anesthesia Stop: 1439    Procedure: INSERTION, INTRAMEDULLARY JONATHAN, TIBIA (Left: Leg Lower) Diagnosis: (LEFT CLOSED TIBIAL PLATEAU FRACTURE)    Surgeons: Ameya Kim M.D. Responsible Provider: Ian Anderson M.D.    Anesthesia Type: general ASA Status: 2          Final Anesthesia Type: general  Last vitals  BP   Blood Pressure: (!) 147/90    Temp   36.4 °C (97.5 °F)    Pulse   (!) 114   Resp   15    SpO2   100 %      Anesthesia Post Evaluation    Patient location during evaluation: PACU  Patient participation: complete - patient participated  Level of consciousness: awake and alert  Pain score: 4    Airway patency: patent  Anesthetic complications: no  Cardiovascular status: hemodynamically stable  Respiratory status: acceptable  Hydration status: euvolemic    PONV: none          There were no known notable events for this encounter.     Nurse Pain Score: 10 (NPRS)

## 2022-09-07 NOTE — ED NOTES
Med rec updated and complete. Allergies reviewed.  Pt denies taking medications. Confirmed name and date of birth.      Home pharmacy [facility] [phone]

## 2022-09-07 NOTE — ED NOTES
Pt medicated for pain per MAR. Pt voided into urinal. Pt updated that he will be NPO at midnight. Pt verbalized understanding
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
## 2022-09-08 ENCOUNTER — PHARMACY VISIT (OUTPATIENT)
Dept: PHARMACY | Facility: MEDICAL CENTER | Age: 27
End: 2022-09-08
Payer: MEDICARE

## 2022-09-08 VITALS
HEART RATE: 95 BPM | SYSTOLIC BLOOD PRESSURE: 116 MMHG | TEMPERATURE: 98.6 F | WEIGHT: 155 LBS | HEIGHT: 64 IN | BODY MASS INDEX: 26.46 KG/M2 | OXYGEN SATURATION: 100 % | DIASTOLIC BLOOD PRESSURE: 72 MMHG | RESPIRATION RATE: 18 BRPM

## 2022-09-08 PROCEDURE — G0378 HOSPITAL OBSERVATION PER HR: HCPCS

## 2022-09-08 PROCEDURE — RXMED WILLOW AMBULATORY MEDICATION CHARGE: Performed by: PHYSICIAN ASSISTANT

## 2022-09-08 PROCEDURE — 97535 SELF CARE MNGMENT TRAINING: CPT

## 2022-09-08 PROCEDURE — 700105 HCHG RX REV CODE 258: Performed by: EMERGENCY MEDICINE

## 2022-09-08 PROCEDURE — 97162 PT EVAL MOD COMPLEX 30 MIN: CPT

## 2022-09-08 PROCEDURE — 96376 TX/PRO/DX INJ SAME DRUG ADON: CPT

## 2022-09-08 PROCEDURE — 73721 MRI JNT OF LWR EXTRE W/O DYE: CPT | Mod: LT

## 2022-09-08 PROCEDURE — 700111 HCHG RX REV CODE 636 W/ 250 OVERRIDE (IP): Performed by: ORTHOPAEDIC SURGERY

## 2022-09-08 PROCEDURE — 700102 HCHG RX REV CODE 250 W/ 637 OVERRIDE(OP): Performed by: ORTHOPAEDIC SURGERY

## 2022-09-08 PROCEDURE — A9270 NON-COVERED ITEM OR SERVICE: HCPCS | Performed by: ORTHOPAEDIC SURGERY

## 2022-09-08 PROCEDURE — 97166 OT EVAL MOD COMPLEX 45 MIN: CPT

## 2022-09-08 RX ORDER — ASPIRIN 81 MG/1
81 TABLET ORAL 2 TIMES DAILY
Qty: 60 TABLET | Refills: 0 | Status: SHIPPED | OUTPATIENT
Start: 2022-09-08 | End: 2023-12-09

## 2022-09-08 RX ORDER — OXYCODONE HYDROCHLORIDE 5 MG/1
5 TABLET ORAL EVERY 6 HOURS PRN
Qty: 20 TABLET | Refills: 0 | Status: SHIPPED | OUTPATIENT
Start: 2022-09-08 | End: 2022-09-18

## 2022-09-08 RX ADMIN — CEFAZOLIN SODIUM 2 G: 2 INJECTION, SOLUTION INTRAVENOUS at 02:17

## 2022-09-08 RX ADMIN — HYDROMORPHONE HYDROCHLORIDE 0.5 MG: 1 INJECTION, SOLUTION INTRAMUSCULAR; INTRAVENOUS; SUBCUTANEOUS at 04:33

## 2022-09-08 RX ADMIN — DEXTROSE AND SODIUM CHLORIDE: 5; 450 INJECTION, SOLUTION INTRAVENOUS at 08:21

## 2022-09-08 RX ADMIN — OXYCODONE HYDROCHLORIDE 10 MG: 10 TABLET ORAL at 07:50

## 2022-09-08 RX ADMIN — HYDROMORPHONE HYDROCHLORIDE 0.5 MG: 1 INJECTION, SOLUTION INTRAMUSCULAR; INTRAVENOUS; SUBCUTANEOUS at 10:08

## 2022-09-08 RX ADMIN — HYDROMORPHONE HYDROCHLORIDE 0.5 MG: 1 INJECTION, SOLUTION INTRAMUSCULAR; INTRAVENOUS; SUBCUTANEOUS at 14:33

## 2022-09-08 RX ADMIN — OXYCODONE HYDROCHLORIDE 10 MG: 10 TABLET ORAL at 00:37

## 2022-09-08 RX ADMIN — OXYCODONE HYDROCHLORIDE 10 MG: 10 TABLET ORAL at 03:34

## 2022-09-08 RX ADMIN — OXYCODONE HYDROCHLORIDE 10 MG: 10 TABLET ORAL at 12:27

## 2022-09-08 ASSESSMENT — PAIN DESCRIPTION - PAIN TYPE
TYPE: ACUTE PAIN

## 2022-09-08 ASSESSMENT — GAIT ASSESSMENTS
DEVIATION: STEP TO
DISTANCE (FEET): 25
GAIT LEVEL OF ASSIST: CONTACT GUARD ASSIST
ASSISTIVE DEVICE: CRUTCHES

## 2022-09-08 ASSESSMENT — COGNITIVE AND FUNCTIONAL STATUS - GENERAL
DAILY ACTIVITIY SCORE: 24
SUGGESTED CMS G CODE MODIFIER MOBILITY: CJ
WALKING IN HOSPITAL ROOM: A LITTLE
CLIMB 3 TO 5 STEPS WITH RAILING: A LITTLE
SUGGESTED CMS G CODE MODIFIER DAILY ACTIVITY: CH
MOBILITY SCORE: 21
MOVING FROM LYING ON BACK TO SITTING ON SIDE OF FLAT BED: A LITTLE

## 2022-09-08 ASSESSMENT — ACTIVITIES OF DAILY LIVING (ADL): TOILETING: INDEPENDENT

## 2022-09-08 NOTE — THERAPY
PT Contact Note    PT Consult received/acknowledged. PT eval attempted, no knee immobilizer at bedside. RN placed order. Will re-attempt once knee immobilizer is available.    Carol Adan, PT, DPT  Ext. [phone]
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
## 2022-09-08 NOTE — PROGRESS NOTES
POD 1 ORIF tibial plateau  No complaints, requesting DC home  Dressing NVI  Immob at bedside    Home today  TTWB in immobilizer all times except hygiene  ASA BID  ISAIAH 2 weeks

## 2022-09-08 NOTE — PROGRESS NOTES
Report recievfed from AM RN at 1900. Pt sitting up in bed, calm. AOX4. Slightly drowsy. Reporting 8/10 pain. Medicated per MAR. CMS intact to LLE. LLE elevated on pillows and ice applied. MRI screening form completed. POC discussed with pt. Call light within reach.

## 2022-09-08 NOTE — PROGRESS NOTES
Arrive to [place] [facility] instructions reviewed w/ pt. Verbalized understanding. Pt has fww and cruthes. Waiting for meds to bed.
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
## 2022-09-08 NOTE — DISCHARGE INSTRUCTIONS
Discharge Instructions    Discharged to home by car with relative. Discharged via wheelchair, hospital escort: Yes.  Special equipment needed: Not Applicable    Be sure to schedule a follow-up appointment with your primary care doctor or any specialists as instructed.     Discharge Plan:   Diet Plan: Discussed  Activity Level: Discussed  Confirmed Follow up Appointment: Patient to Call and Schedule Appointment  Confirmed Symptoms Management: Discussed  Medication Reconciliation Updated: Yes    I understand that a diet low in cholesterol, fat, and sodium is recommended for good health. Unless I have been given specific instructions below for another diet, I accept this instruction as my diet prescription.   Other diet:     Special Instructions: None    -Is this patient being discharged with medication to prevent blood clots?  No    Is patient discharged on Warfarin / Coumadin?   No   Incision Care, Adult  An incision is a surgical cut that is made through your skin. Most incisions are closed after surgery. Your incision may be closed with stitches (sutures), staples, skin glue, or adhesive strips. You may need to return to your health care provider to have sutures or staples removed. This may occur several days to several weeks after your surgery. The incision needs to be cared for properly to prevent infection.  How to care for your incision  Incision care    Follow instructions from your health care provider about how to take care of your incision. Make sure you:  Wash your hands with soap and water before you change the bandage (dressing). If soap and water are not available, use hand .  Change your dressing as told by your health care provider.  Leave sutures, skin glue, or adhesive strips in place. These skin closures may need to stay in place for 2 weeks or longer. If adhesive strip edges start to loosen and curl up, you may trim the loose edges. Do not remove adhesive strips completely unless your  health care provider tells you to do that.  Check your incision area every day for signs of infection. Check for:  More redness, swelling, or pain.  More fluid or blood.  Warmth.  Pus or a bad smell.  Ask your health care provider how to clean the incision. This may include:  Using mild soap and water.  Using a clean towel to pat the incision dry after cleaning it.  Applying a cream or ointment. Do this only as told by your health care provider.  Covering the incision with a clean dressing.  Ask your health care provider when you can leave the incision uncovered.  Do not take baths, swim, or use a hot tub until your health care provider approves. Ask your health care provider if you can take showers. You may only be allowed to take sponge baths for bathing.  Medicines  If you were prescribed an antibiotic medicine, cream, or ointment, take or apply the antibiotic as told by your health care provider. Do not stop taking or applying the antibiotic even if your condition improves.  Take over-the-counter and prescription medicines only as told by your health care provider.  General instructions  Limit movement around your incision to improve healing.  Avoid straining, lifting, or exercise for the first month, or for as long as told by your health care provider.  Follow instructions from your health care provider about returning to your normal activities.  Ask your health care provider what activities are safe.  Protect your incision from the sun when you are outside for the first 6 months, or for as long as told by your health care provider. Apply sunscreen around the scar or cover it up.  Keep all follow-up visits as told by your health care provider. This is important.  Contact a health care provider if:  Your have more redness, swelling, or pain around the incision.  You have more fluid or blood coming from the incision.  Your incision feels warm to the touch.  You have pus or a bad smell coming from the  incision.  You have a fever or shaking chills.  You are nauseous or you vomit.  You are dizzy.  Your sutures or staples come undone.  Get help right away if:  You have a red streak coming from your incision.  Your incision bleeds through the dressing and the bleeding does not stop with gentle pressure.  The edges of your incision open up and separate.  You have severe pain.  You have a rash.  You are confused.  You faint.  You have trouble breathing and a fast heartbeat.  This information is not intended to replace advice given to you by your health care provider. Make sure you discuss any questions you have with your health care provider.  Document Released: 07/07/2006 Document Revised: 12/20/2019 Document Reviewed: 07/05/2017  Elsevier Patient Education © 2020 [employer]

## 2022-09-08 NOTE — CARE PLAN
The patient is Stable - Low risk of patient condition declining or worsening    Shift Goals  Clinical Goals: Pain mgmt; MRI  Patient Goals: Rest; Pain mgmt    Progress made toward(s) clinical / shift goals:    Problem: Pain - Standard  Goal: Alleviation of pain or a reduction in pain to the patient’s comfort goal  Outcome: Progressing     Problem: Knowledge Deficit - Standard  Goal: Patient and family/care givers will demonstrate understanding of plan of care, disease process/condition, diagnostic tests and medications  Outcome: Progressing       Patient is not progressing towards the following goals:
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
## 2022-09-08 NOTE — DISCHARGE PLANNING
Case Management Discharge Planning    Admission Date: 9/6/2022  GMLOS:    ALOS: 0    6-Clicks ADL Score:    6-Clicks Mobility Score:        Anticipated Discharge Dispo: Discharge Disposition: Discharged to home/self care (01)    DME Needed: No    Action(s) Taken: DC Assessment Complete (See below)    Escalations Completed: None    Medically Clear: Yes    Next Steps: Plan for discharge home, no discharge needs or barriers identified.     Barriers to Discharge: None          Care Transition Team Assessment    Information Source  Orientation Level: Oriented X4  Information Given By: Patient  Who is responsible for making decisions for patient? : Patient    Readmission Evaluation  Is this a readmission?: No    Elopement Risk  Legal Hold: No  Ambulatory or Self Mobile in Wheelchair: No-Not an Elopement Risk  Disoriented: No  Psychiatric Symptoms: None  History of Wandering: No  Elopement this Admit: No  Vocalizing Wanting to Leave: No  Displays Behaviors, Body Language Wanting to Leave: No-Not at Risk for Elopement  Elopement Risk: Not at Risk for Elopement    Interdisciplinary Discharge Planning  Does Admitting Nurse Feel This Could be a Complex Discharge?: No  Patient or legal guardian wants to designate a caregiver: No  Prior Services: None  Patient Prefers to be Discharged to:: Home  Durable Medical Equipment: Not Applicable    Discharge Preparedness  Prior Functional Level: Ambulatory, Independent with Activities of Daily Living    Functional Assesment  Prior Functional Level: Ambulatory, Independent with Activities of Daily Living    Finances  Financial Barriers to Discharge: No  Prescription Coverage: Yes         Values / Beliefs / Concerns  Values / Beliefs Concerns : No    Advance Directive  Advance Directive?: None  Advance Directive offered?: AD Booklet refused    Domestic Abuse  Have you ever been the victim of abuse or violence?: No              Anticipated Discharge Information  Discharge Disposition:  Discharged to home/self care (01)

## 2022-09-10 ENCOUNTER — HOSPITAL ENCOUNTER (EMERGENCY)
Facility: MEDICAL CENTER | Age: 27
End: 2022-09-10
Attending: EMERGENCY MEDICINE
Payer: COMMERCIAL

## 2022-09-10 VITALS
TEMPERATURE: 98 F | DIASTOLIC BLOOD PRESSURE: 70 MMHG | HEART RATE: 115 BPM | WEIGHT: 155 LBS | RESPIRATION RATE: 18 BRPM | SYSTOLIC BLOOD PRESSURE: 116 MMHG | OXYGEN SATURATION: 97 % | BODY MASS INDEX: 26.61 KG/M2

## 2022-09-10 DIAGNOSIS — G89.18 POST-OP PAIN: ICD-10-CM

## 2022-09-10 PROCEDURE — 99284 EMERGENCY DEPT VISIT MOD MDM: CPT

## 2022-09-10 ASSESSMENT — LIFESTYLE VARIABLES: DO YOU DRINK ALCOHOL: NO

## 2022-09-10 ASSESSMENT — FIBROSIS 4 INDEX: FIB4 SCORE: 0.52

## 2022-09-11 NOTE — DISCHARGE INSTRUCTIONS
Please follow-up with the orthopedic team for pain management.  At this time your wound does not show any signs of infection or abnormality I think you are experiencing pain from the swelling and the surgery.  Please keep it elevated, ice it often, and take the oxycodone as directed and appropriately.  Do not take more than 1 at a time if this is how they are prescribed.  If you have any worsening concerns, fever, please return to the ED.

## 2022-09-11 NOTE — ED NOTES
Pt states he wants pain meds before leaving. Pt made aware doctor did not order any pain medications and according to his chart he just filled roxicodone 9/8 20 tabs so should have pain medication at home still. Pt unhappy with not getting more pain med rx. Pt refuses to sign dc paperwork

## 2022-09-11 NOTE — ED TRIAGE NOTES
Wheeled to triage  Chief Complaint   Patient presents with    Post-Op Pain     Had surgery on his L lower leg 2-3 days ago, has been taking his pain meds but is still in a lot of pain. Has also fallen 4 times since his surgery because he said he doesn't know how to use his crutches     Has been taking 5mg oxycodone, but has not been helping.
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
## 2022-09-11 NOTE — ED NOTES
"Pt to purple 72, removed left leg dressing. Cms+ 2+ pedal pulse.   Pt said that he was prescribed Oxycodone 5mg but does not work. Pt also reports also reports he is allergic to tylenol and ibuprofen. \" I just need cally dose of pain medication.:  "

## 2022-09-11 NOTE — ED NOTES
"This RN went to patient's room to change dressing, I asked pt if he could move his leg to help removed old dressing. Pt said \" Are you retarded!\".   Removed all dressing and placed new dressing and leg immobilizer. Cms+, 2+pedal pulses  "

## 2022-10-28 ENCOUNTER — HOSPITAL ENCOUNTER (EMERGENCY)
Facility: MEDICAL CENTER | Age: 27
End: 2022-10-28
Attending: EMERGENCY MEDICINE
Payer: COMMERCIAL

## 2022-10-28 VITALS
HEART RATE: 100 BPM | RESPIRATION RATE: 14 BRPM | WEIGHT: 150 LBS | TEMPERATURE: 99.8 F | SYSTOLIC BLOOD PRESSURE: 127 MMHG | DIASTOLIC BLOOD PRESSURE: 76 MMHG | OXYGEN SATURATION: 100 % | BODY MASS INDEX: 22.73 KG/M2 | HEIGHT: 68 IN

## 2022-10-28 DIAGNOSIS — G89.29 CHRONIC PAIN OF LEFT KNEE: ICD-10-CM

## 2022-10-28 DIAGNOSIS — M54.50 CHRONIC LEFT-SIDED LOW BACK PAIN WITHOUT SCIATICA: ICD-10-CM

## 2022-10-28 DIAGNOSIS — G89.29 CHRONIC LEFT-SIDED LOW BACK PAIN WITHOUT SCIATICA: ICD-10-CM

## 2022-10-28 DIAGNOSIS — M25.562 CHRONIC PAIN OF LEFT KNEE: ICD-10-CM

## 2022-10-28 PROCEDURE — 99284 EMERGENCY DEPT VISIT MOD MDM: CPT

## 2022-10-28 RX ORDER — OXYCODONE HYDROCHLORIDE 5 MG/1
5 TABLET ORAL EVERY 6 HOURS PRN
Qty: 10 TABLET | Refills: 0 | Status: SHIPPED | OUTPATIENT
Start: 2022-10-28 | End: 2022-10-31

## 2022-10-28 RX ORDER — MELOXICAM 7.5 MG/1
7.5 TABLET ORAL DAILY
Qty: 30 TABLET | Refills: 0 | Status: SHIPPED | OUTPATIENT
Start: 2022-10-28 | End: 2023-12-09

## 2022-10-28 ASSESSMENT — FIBROSIS 4 INDEX: FIB4 SCORE: 0.52
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
## 2022-11-01 ENCOUNTER — HOSPITAL ENCOUNTER (EMERGENCY)
Facility: MEDICAL CENTER | Age: 27
End: 2022-11-01
Attending: EMERGENCY MEDICINE
Payer: COMMERCIAL

## 2022-11-01 ENCOUNTER — APPOINTMENT (OUTPATIENT)
Dept: RADIOLOGY | Facility: MEDICAL CENTER | Age: 27
End: 2022-11-01
Attending: EMERGENCY MEDICINE
Payer: COMMERCIAL

## 2022-11-01 VITALS
TEMPERATURE: 97 F | SYSTOLIC BLOOD PRESSURE: 108 MMHG | HEIGHT: 64 IN | HEART RATE: 85 BPM | RESPIRATION RATE: 17 BRPM | DIASTOLIC BLOOD PRESSURE: 63 MMHG | OXYGEN SATURATION: 94 % | WEIGHT: 155 LBS | BODY MASS INDEX: 26.46 KG/M2

## 2022-11-01 DIAGNOSIS — R10.13 EPIGASTRIC PAIN: ICD-10-CM

## 2022-11-01 LAB
ALBUMIN SERPL BCP-MCNC: 5.5 G/DL (ref 3.2–4.9)
ALBUMIN/GLOB SERPL: 1.4 G/DL
ALP SERPL-CCNC: 87 U/L (ref 30–99)
ALT SERPL-CCNC: 12 U/L (ref 2–50)
ANION GAP SERPL CALC-SCNC: 17 MMOL/L (ref 7–16)
AST SERPL-CCNC: 31 U/L (ref 12–45)
BASOPHILS # BLD AUTO: 0.2 % (ref 0–1.8)
BASOPHILS # BLD: 0.02 K/UL (ref 0–0.12)
BILIRUB SERPL-MCNC: 0.3 MG/DL (ref 0.1–1.5)
BUN SERPL-MCNC: 27 MG/DL (ref 8–22)
CALCIUM SERPL-MCNC: 10.5 MG/DL (ref 8.5–10.5)
CHLORIDE SERPL-SCNC: 101 MMOL/L (ref 96–112)
CO2 SERPL-SCNC: 20 MMOL/L (ref 20–33)
CREAT SERPL-MCNC: 0.87 MG/DL (ref 0.5–1.4)
EOSINOPHIL # BLD AUTO: 0 K/UL (ref 0–0.51)
EOSINOPHIL NFR BLD: 0 % (ref 0–6.9)
ERYTHROCYTE [DISTWIDTH] IN BLOOD BY AUTOMATED COUNT: 41.9 FL (ref 35.9–50)
GFR SERPLBLD CREATININE-BSD FMLA CKD-EPI: 122 ML/MIN/1.73 M 2
GLOBULIN SER CALC-MCNC: 3.9 G/DL (ref 1.9–3.5)
GLUCOSE SERPL-MCNC: 111 MG/DL (ref 65–99)
HCT VFR BLD AUTO: 44.4 % (ref 42–52)
HGB BLD-MCNC: 14.1 G/DL (ref 14–18)
IMM GRANULOCYTES # BLD AUTO: 0.06 K/UL (ref 0–0.11)
IMM GRANULOCYTES NFR BLD AUTO: 0.5 % (ref 0–0.9)
LIPASE SERPL-CCNC: 23 U/L (ref 11–82)
LYMPHOCYTES # BLD AUTO: 1.07 K/UL (ref 1–4.8)
LYMPHOCYTES NFR BLD: 8.4 % (ref 22–41)
MCH RBC QN AUTO: 27 PG (ref 27–33)
MCHC RBC AUTO-ENTMCNC: 31.8 G/DL (ref 33.7–35.3)
MCV RBC AUTO: 84.9 FL (ref 81.4–97.8)
MONOCYTES # BLD AUTO: 0.49 K/UL (ref 0–0.85)
MONOCYTES NFR BLD AUTO: 3.8 % (ref 0–13.4)
NEUTROPHILS # BLD AUTO: 11.15 K/UL (ref 1.82–7.42)
NEUTROPHILS NFR BLD: 87.1 % (ref 44–72)
NRBC # BLD AUTO: 0 K/UL
NRBC BLD-RTO: 0 /100 WBC
PLATELET # BLD AUTO: 340 K/UL (ref 164–446)
PMV BLD AUTO: 11.8 FL (ref 9–12.9)
POTASSIUM SERPL-SCNC: 4.8 MMOL/L (ref 3.6–5.5)
PROT SERPL-MCNC: 9.4 G/DL (ref 6–8.2)
RBC # BLD AUTO: 5.23 M/UL (ref 4.7–6.1)
SODIUM SERPL-SCNC: 138 MMOL/L (ref 135–145)
WBC # BLD AUTO: 12.8 K/UL (ref 4.8–10.8)

## 2022-11-01 PROCEDURE — 96375 TX/PRO/DX INJ NEW DRUG ADDON: CPT

## 2022-11-01 PROCEDURE — 83690 ASSAY OF LIPASE: CPT

## 2022-11-01 PROCEDURE — 700111 HCHG RX REV CODE 636 W/ 250 OVERRIDE (IP): Performed by: EMERGENCY MEDICINE

## 2022-11-01 PROCEDURE — 36415 COLL VENOUS BLD VENIPUNCTURE: CPT

## 2022-11-01 PROCEDURE — 73560 X-RAY EXAM OF KNEE 1 OR 2: CPT | Mod: LT

## 2022-11-01 PROCEDURE — 96374 THER/PROPH/DIAG INJ IV PUSH: CPT

## 2022-11-01 PROCEDURE — 80053 COMPREHEN METABOLIC PANEL: CPT

## 2022-11-01 PROCEDURE — 85025 COMPLETE CBC W/AUTO DIFF WBC: CPT

## 2022-11-01 PROCEDURE — 99285 EMERGENCY DEPT VISIT HI MDM: CPT

## 2022-11-01 PROCEDURE — 700105 HCHG RX REV CODE 258: Performed by: EMERGENCY MEDICINE

## 2022-11-01 RX ORDER — HALOPERIDOL 5 MG/ML
5 INJECTION INTRAMUSCULAR ONCE
Status: COMPLETED | OUTPATIENT
Start: 2022-11-01 | End: 2022-11-01

## 2022-11-01 RX ORDER — DIPHENHYDRAMINE HYDROCHLORIDE 50 MG/ML
50 INJECTION INTRAMUSCULAR; INTRAVENOUS ONCE
Status: COMPLETED | OUTPATIENT
Start: 2022-11-01 | End: 2022-11-01

## 2022-11-01 RX ORDER — OMEPRAZOLE 40 MG/1
40 CAPSULE, DELAYED RELEASE ORAL DAILY
Qty: 30 CAPSULE | Refills: 0 | Status: SHIPPED | OUTPATIENT
Start: 2022-11-01 | End: 2023-12-09

## 2022-11-01 RX ORDER — SODIUM CHLORIDE 9 MG/ML
1000 INJECTION, SOLUTION INTRAVENOUS ONCE
Status: COMPLETED | OUTPATIENT
Start: 2022-11-01 | End: 2022-11-01

## 2022-11-01 RX ORDER — PROMETHAZINE HYDROCHLORIDE 25 MG/1
25 TABLET ORAL EVERY 6 HOURS PRN
Qty: 30 TABLET | Refills: 0 | Status: SHIPPED | OUTPATIENT
Start: 2022-11-01 | End: 2023-12-09

## 2022-11-01 RX ADMIN — DIPHENHYDRAMINE HYDROCHLORIDE 50 MG: 50 INJECTION INTRAMUSCULAR; INTRAVENOUS at 17:33

## 2022-11-01 RX ADMIN — HALOPERIDOL LACTATE 5 MG: 5 INJECTION, SOLUTION INTRAMUSCULAR at 17:27

## 2022-11-01 RX ADMIN — SODIUM CHLORIDE 1000 ML: 9 INJECTION, SOLUTION INTRAVENOUS at 17:26

## 2022-11-01 ASSESSMENT — FIBROSIS 4 INDEX: FIB4 SCORE: 0.52

## 2022-11-01 NOTE — ED TRIAGE NOTES
"Chief Complaint   Patient presents with    Post-Op Pain     Pt reports pain from surgery on L tibial plateau over a month ago.     Abdominal Pain     Started yesterday, pt reports around umbilicus     BP (!) 148/90   Pulse 98   Temp 36 °C (96.8 °F) (Temporal)   Resp 20   Ht 1.626 m (5' 4\")   Wt 70.3 kg (155 lb)   SpO2 98%   BMI 26.61 kg/m²     Pt by wheelchair to triage for above.   "

## 2022-11-01 NOTE — ED NOTES
Pt brought back to PUR 80 from triage by WC. Pt able to transfer self to bed, call light in reach. Chart up for ERP.

## 2022-11-01 NOTE — ED PROVIDER NOTES
ED Provider Note    CHIEF COMPLAINT  Chief Complaint   Patient presents with    Post-Op Pain     Pt reports pain from surgery on L tibial plateau over a month ago.     Abdominal Pain     Started yesterday, pt reports around umbilicus       HPI  Grey Diop is a 27 y.o. male who presents with abdominal pain.  The patient states he has had epigastric pain over the last 2 days.  He states there is no known exacerbating relieving factors.  He states the pain is severe.  He does have a history of cyclical vomiting syndrome as well as chronic pain.  He has not had any diarrhea.  He denies marijuana use.  The patient states he also has left knee pain.  He states that he fell on his left knee while trying to get to the restroom secondary to his emesis.  The patient states that he had surgical intervention about a month ago with ORIF of tibial plateau fracture.  The patient states he also has his chronic neck pain which is unchanged over the last several months.  He denies difficulties with urination.  He denies marijuana use.  He has not had any recent alcohol consumption.    REVIEW OF SYSTEMS  See HPI for further details. All other systems are negative.     PAST MEDICAL HISTORY  Past Medical History:   Diagnosis Date    Abdominal migraine     Abdominal migraine     Anxiety     Benzodiazepine dependence (HCC)     Colitis     Cyclic vomiting syndrome     History of    Gastritis     Scoliosis        FAMILY HISTORY  [unfilled]    SOCIAL HISTORY  Social History     Socioeconomic History    Marital status: Single   Tobacco Use    Smoking status: Never    Smokeless tobacco: Never   Vaping Use    Vaping Use: Some days    Substances: THC   Substance and Sexual Activity    Alcohol use: No    Drug use: Yes     Types: Inhaled, Marijuana     Comment: THC daily    Sexual activity: Never       SURGICAL HISTORY  Past Surgical History:   Procedure Laterality Date    TIBIA NAILING INTRAMEDULLARY Left 9/7/2022    Procedure: INSERTION,  "INTRAMEDULLARY JONATHAN, TIBIA;  Surgeon: Ameya Kim M.D.;  Location: SURGERY [facility];  Service: Orthopedics    DC UPPER GI ENDOSCOPY,DIAGNOSIS N/A 5/17/2022    Procedure: GASTROSCOPY;  Surgeon: Bud Chowdhury M.D.;  Location: SURGERY SAME DAY [facility];  Service: Gastroenterology    GASTROSCOPY-ENDO  11/15/2014    Performed by Robert Taylor M.D. at ENDOSCOPY [facility]    GASTROSCOPY  9/3/2014    Performed by Robert Taylor M.D. at SURGERY [facility] ORS    OTHER      hernia    OTHER ORTHOPEDIC SURGERY      Scoliosis       CURRENT MEDICATIONS  Home Medications       Reviewed by Nargis Mckeon R.N. (Registered Nurse) on 11/01/22 at 1452  Med List Status: Partial     Medication Last Dose Status   aspirin (ASPIRIN 81) 81 MG EC tablet  Active   cephALEXin (KEFLEX) 500 MG Cap  Active   docusate sodium (DULCOLAX) 100 MG Cap  Active   HYDROmorphone (DILAUDID) 2 MG Tab  Active   meloxicam (MOBIC) 7.5 MG Tab  Active                    ALLERGIES  Allergies   Allergen Reactions    Acetaminophen [Tylenol] Anaphylaxis and Nausea     Nausea, \"it just doesn't sit right\"  5/29/2021 patient states that his throat swells shut    Ibuprofen      rash  Tolerated toradol       PHYSICAL EXAM  VITAL SIGNS: BP (!) 148/90   Pulse 98   Temp 36 °C (96.8 °F) (Temporal)   Resp 20   Ht 1.626 m (5' 4\")   Wt 70.3 kg (155 lb)   SpO2 98%   BMI 26.61 kg/m²       Constitutional: In acute distress, Non-toxic appearance.   HENT: Normocephalic, Atraumatic, Bilateral external ears normal, Oropharynx moist, No oral exudates, Nose normal.   Eyes: PERRLA, EOMI, Conjunctiva normal, No discharge.   Neck: Normal range of motion, No tenderness, Supple, No stridor.   Lymphatic: No lymphadenopathy noted.   Cardiovascular: Normal heart rate, Normal rhythm, No murmurs, No rubs, No gallops.   Thorax & Lungs: Normal breath sounds, No respiratory distress, No wheezing, No chest tenderness.   Abdomen: Bowel sounds normal, Soft, epigastric " tenderness, No masses, No pulsatile masses.   Skin: Incision to the left knee is clean, dry, and intact.   Back: No tenderness, No CVA tenderness.   Extremities: Diffuse pain throughout the left knee with no obvious deformities nor effusion.  Extremities otherwise intact distal pulses, No edema, No tenderness, No cyanosis, No clubbing.   Neurologic: Alert & oriented x 3, Normal motor function, Normal sensory function, No focal deficits noted.   Psychiatric: Affect normal, Judgment normal, Mood normal.     Results for orders placed or performed during the hospital encounter of 11/01/22   CBC WITH DIFFERENTIAL   Result Value Ref Range    WBC 12.8 (H) 4.8 - 10.8 K/uL    RBC 5.23 4.70 - 6.10 M/uL    Hemoglobin 14.1 14.0 - 18.0 g/dL    Hematocrit 44.4 42.0 - 52.0 %    MCV 84.9 81.4 - 97.8 fL    MCH 27.0 27.0 - 33.0 pg    MCHC 31.8 (L) 33.7 - 35.3 g/dL    RDW 41.9 35.9 - 50.0 fL    Platelet Count 340 164 - 446 K/uL    MPV 11.8 9.0 - 12.9 fL    Neutrophils-Polys 87.10 (H) 44.00 - 72.00 %    Lymphocytes 8.40 (L) 22.00 - 41.00 %    Monocytes 3.80 0.00 - 13.40 %    Eosinophils 0.00 0.00 - 6.90 %    Basophils 0.20 0.00 - 1.80 %    Immature Granulocytes 0.50 0.00 - 0.90 %    Nucleated RBC 0.00 /100 WBC    Neutrophils (Absolute) 11.15 (H) 1.82 - 7.42 K/uL    Lymphs (Absolute) 1.07 1.00 - 4.80 K/uL    Monos (Absolute) 0.49 0.00 - 0.85 K/uL    Eos (Absolute) 0.00 0.00 - 0.51 K/uL    Baso (Absolute) 0.02 0.00 - 0.12 K/uL    Immature Granulocytes (abs) 0.06 0.00 - 0.11 K/uL    NRBC (Absolute) 0.00 K/uL   COMP METABOLIC PANEL   Result Value Ref Range    Sodium 138 135 - 145 mmol/L    Potassium 4.8 3.6 - 5.5 mmol/L    Chloride 101 96 - 112 mmol/L    Co2 20 20 - 33 mmol/L    Anion Gap 17.0 (H) 7.0 - 16.0    Glucose 111 (H) 65 - 99 mg/dL    Bun 27 (H) 8 - 22 mg/dL    Creatinine 0.87 0.50 - 1.40 mg/dL    Calcium 10.5 8.5 - 10.5 mg/dL    AST(SGOT) 31 12 - 45 U/L    ALT(SGPT) 12 2 - 50 U/L    Alkaline Phosphatase 87 30 - 99 U/L    Total  Bilirubin 0.3 0.1 - 1.5 mg/dL    Albumin 5.5 (H) 3.2 - 4.9 g/dL    Total Protein 9.4 (H) 6.0 - 8.2 g/dL    Globulin 3.9 (H) 1.9 - 3.5 g/dL    A-G Ratio 1.4 g/dL   LIPASE   Result Value Ref Range    Lipase 23 11 - 82 U/L   ESTIMATED GFR   Result Value Ref Range    GFR (CKD-EPI) 122 >60 mL/min/1.73 m 2     RADIOLOGY/PROCEDURES  DX-KNEE 2- LEFT   Final Result         1.    Healing left tibial plateau fracture status post ORIF in near-anatomic alignment..            COURSE & MEDICAL DECISION MAKING  Pertinent Labs & Imaging studies reviewed. (See chart for details)  Is a 27-year-old male who presents the emergency department with multiple different complaints.  The patient presents with knee pain after a fall and an x-ray was obtained that shows no evidence of hardware failure nor acute fracture to the left knee.  I suspect he suffered from a contusion.  The patient will follow up with orthopedics.  The patient also presents with epigastric pain.  Does have a history of cyclical vomiting syndrome this could be the source.  A viral gastritis would also be in the differential.  The labs do not support cholecystitis nor pancreatitis.  The patient was treated effectively with Haldol, Benadryl, and IV fluids.  On repeat exam his abdomen is nonsurgical.  The patient will be discharged home on Phenergan and Prilosec.  He will return to the emerge department if he is acutely worse.    FINAL IMPRESSION  1.  Epigastric pain  2.  Left knee contusion    Disposition  The patient will be discharged in stable condition         Electronically signed by: Cosmo Hemphill M.D., 11/1/2022 4:01 PM

## 2022-11-02 NOTE — ED NOTES
"Pt discharged home, pt A&ox4, on room air, steady gait. Used wc to lobby. Pt educated on worsening s/s, pt verbalized understanding. Called ride. IV discontinued and gauze placed, pt in possession of belongings. Pt provided discharge education and information pertaining to medications and follow up appointments. Pt received copy of discharge instructions and verbalized understanding. /63   Pulse 85   Temp 36.1 °C (97 °F) (Temporal)   Resp 17   Ht 1.626 m (5' 4\")   Wt 70.3 kg (155 lb)   SpO2 94%   BMI 26.61 kg/m²   "

## 2023-05-17 NOTE — ED NOTES
Continue oxygen supplementation for goal SpO2>94%, currently on room air but required 2 L overnight  Shortness of breath is improving    • Encourage good pulmonary hygiene  • Respiratory protocol  • Complete ambulatory pulse ox before discharge    Prn fent given by resource rn

## 2023-07-07 ENCOUNTER — HOSPITAL ENCOUNTER (EMERGENCY)
Facility: MEDICAL CENTER | Age: 28
End: 2023-07-07
Attending: EMERGENCY MEDICINE
Payer: COMMERCIAL

## 2023-07-07 VITALS
OXYGEN SATURATION: 92 % | HEIGHT: 64 IN | SYSTOLIC BLOOD PRESSURE: 101 MMHG | BODY MASS INDEX: 28 KG/M2 | HEART RATE: 72 BPM | TEMPERATURE: 98 F | RESPIRATION RATE: 18 BRPM | WEIGHT: 164.02 LBS | DIASTOLIC BLOOD PRESSURE: 68 MMHG

## 2023-07-07 DIAGNOSIS — E86.0 DEHYDRATION: ICD-10-CM

## 2023-07-07 DIAGNOSIS — E87.6 HYPOKALEMIA: ICD-10-CM

## 2023-07-07 DIAGNOSIS — R11.2 NAUSEA AND VOMITING, UNSPECIFIED VOMITING TYPE: ICD-10-CM

## 2023-07-07 LAB
ALBUMIN SERPL BCP-MCNC: 5.5 G/DL (ref 3.2–4.9)
ALBUMIN/GLOB SERPL: 1.5 G/DL
ALP SERPL-CCNC: 69 U/L (ref 30–99)
ALT SERPL-CCNC: 25 U/L (ref 2–50)
AMPHET UR QL SCN: NEGATIVE
ANION GAP SERPL CALC-SCNC: 20 MMOL/L (ref 7–16)
APPEARANCE UR: CLEAR
AST SERPL-CCNC: 25 U/L (ref 12–45)
BACTERIA #/AREA URNS HPF: NEGATIVE /HPF
BARBITURATES UR QL SCN: NEGATIVE
BASOPHILS # BLD AUTO: 0.1 % (ref 0–1.8)
BASOPHILS # BLD: 0.01 K/UL (ref 0–0.12)
BENZODIAZ UR QL SCN: NEGATIVE
BILIRUB SERPL-MCNC: 0.9 MG/DL (ref 0.1–1.5)
BILIRUB UR QL STRIP.AUTO: NEGATIVE
BUN SERPL-MCNC: 47 MG/DL (ref 8–22)
BZE UR QL SCN: NEGATIVE
CALCIUM ALBUM COR SERPL-MCNC: 9 MG/DL (ref 8.5–10.5)
CALCIUM SERPL-MCNC: 10.2 MG/DL (ref 8.5–10.5)
CANNABINOIDS UR QL SCN: POSITIVE
CHLORIDE SERPL-SCNC: 93 MMOL/L (ref 96–112)
CO2 SERPL-SCNC: 28 MMOL/L (ref 20–33)
COLOR UR: YELLOW
CREAT SERPL-MCNC: 1.16 MG/DL (ref 0.5–1.4)
EOSINOPHIL # BLD AUTO: 0.03 K/UL (ref 0–0.51)
EOSINOPHIL NFR BLD: 0.2 % (ref 0–6.9)
EPI CELLS #/AREA URNS HPF: ABNORMAL /HPF
ERYTHROCYTE [DISTWIDTH] IN BLOOD BY AUTOMATED COUNT: 36.8 FL (ref 35.9–50)
FENTANYL UR QL: NEGATIVE
GFR SERPLBLD CREATININE-BSD FMLA CKD-EPI: 89 ML/MIN/1.73 M 2
GLOBULIN SER CALC-MCNC: 3.6 G/DL (ref 1.9–3.5)
GLUCOSE SERPL-MCNC: 144 MG/DL (ref 65–99)
GLUCOSE UR STRIP.AUTO-MCNC: NEGATIVE MG/DL
HCT VFR BLD AUTO: 50 % (ref 42–52)
HGB BLD-MCNC: 17.6 G/DL (ref 14–18)
HYALINE CASTS #/AREA URNS LPF: ABNORMAL /LPF
IMM GRANULOCYTES # BLD AUTO: 0.06 K/UL (ref 0–0.11)
IMM GRANULOCYTES NFR BLD AUTO: 0.4 % (ref 0–0.9)
KETONES UR STRIP.AUTO-MCNC: 15 MG/DL
LEUKOCYTE ESTERASE UR QL STRIP.AUTO: NEGATIVE
LIPASE SERPL-CCNC: 26 U/L (ref 11–82)
LYMPHOCYTES # BLD AUTO: 1.34 K/UL (ref 1–4.8)
LYMPHOCYTES NFR BLD: 9.7 % (ref 22–41)
MAGNESIUM SERPL-MCNC: 2.7 MG/DL (ref 1.5–2.5)
MCH RBC QN AUTO: 28 PG (ref 27–33)
MCHC RBC AUTO-ENTMCNC: 35.2 G/DL (ref 32.3–36.5)
MCV RBC AUTO: 79.6 FL (ref 81.4–97.8)
METHADONE UR QL SCN: NEGATIVE
MICRO URNS: ABNORMAL
MONOCYTES # BLD AUTO: 1.25 K/UL (ref 0–0.85)
MONOCYTES NFR BLD AUTO: 9.1 % (ref 0–13.4)
NEUTROPHILS # BLD AUTO: 11.09 K/UL (ref 1.82–7.42)
NEUTROPHILS NFR BLD: 80.5 % (ref 44–72)
NITRITE UR QL STRIP.AUTO: NEGATIVE
NRBC # BLD AUTO: 0 K/UL
NRBC BLD-RTO: 0 /100 WBC (ref 0–0.2)
OPIATES UR QL SCN: NEGATIVE
OXYCODONE UR QL SCN: NEGATIVE
PCP UR QL SCN: NEGATIVE
PH UR STRIP.AUTO: 6 [PH] (ref 5–8)
PLATELET # BLD AUTO: 385 K/UL (ref 164–446)
PMV BLD AUTO: 10.9 FL (ref 9–12.9)
POTASSIUM SERPL-SCNC: 3.2 MMOL/L (ref 3.6–5.5)
PROPOXYPH UR QL SCN: NEGATIVE
PROT SERPL-MCNC: 9.1 G/DL (ref 6–8.2)
PROT UR QL STRIP: 100 MG/DL
RBC # BLD AUTO: 6.28 M/UL (ref 4.7–6.1)
RBC # URNS HPF: ABNORMAL /HPF
RBC UR QL AUTO: ABNORMAL
SODIUM SERPL-SCNC: 141 MMOL/L (ref 135–145)
SP GR UR STRIP.AUTO: 1.03
UROBILINOGEN UR STRIP.AUTO-MCNC: 1 MG/DL
WBC # BLD AUTO: 13.8 K/UL (ref 4.8–10.8)
WBC #/AREA URNS HPF: ABNORMAL /HPF

## 2023-07-07 PROCEDURE — 96374 THER/PROPH/DIAG INJ IV PUSH: CPT

## 2023-07-07 PROCEDURE — 700105 HCHG RX REV CODE 258: Mod: JZ,UD | Performed by: EMERGENCY MEDICINE

## 2023-07-07 PROCEDURE — 96375 TX/PRO/DX INJ NEW DRUG ADDON: CPT

## 2023-07-07 PROCEDURE — 83690 ASSAY OF LIPASE: CPT

## 2023-07-07 PROCEDURE — 83735 ASSAY OF MAGNESIUM: CPT

## 2023-07-07 PROCEDURE — 700111 HCHG RX REV CODE 636 W/ 250 OVERRIDE (IP): Mod: UD | Performed by: EMERGENCY MEDICINE

## 2023-07-07 PROCEDURE — 36415 COLL VENOUS BLD VENIPUNCTURE: CPT

## 2023-07-07 PROCEDURE — 99285 EMERGENCY DEPT VISIT HI MDM: CPT

## 2023-07-07 PROCEDURE — 85025 COMPLETE CBC W/AUTO DIFF WBC: CPT

## 2023-07-07 PROCEDURE — 81001 URINALYSIS AUTO W/SCOPE: CPT

## 2023-07-07 PROCEDURE — 80053 COMPREHEN METABOLIC PANEL: CPT

## 2023-07-07 PROCEDURE — 80307 DRUG TEST PRSMV CHEM ANLYZR: CPT

## 2023-07-07 RX ORDER — ONDANSETRON 4 MG/1
4 TABLET, ORALLY DISINTEGRATING ORAL EVERY 6 HOURS PRN
Qty: 20 TABLET | Refills: 0 | Status: SHIPPED | OUTPATIENT
Start: 2023-07-07 | End: 2023-12-09

## 2023-07-07 RX ORDER — DIPHENHYDRAMINE HYDROCHLORIDE 50 MG/ML
25 INJECTION INTRAMUSCULAR; INTRAVENOUS ONCE
Status: COMPLETED | OUTPATIENT
Start: 2023-07-07 | End: 2023-07-07

## 2023-07-07 RX ORDER — SODIUM CHLORIDE, SODIUM LACTATE, POTASSIUM CHLORIDE, CALCIUM CHLORIDE 600; 310; 30; 20 MG/100ML; MG/100ML; MG/100ML; MG/100ML
1000 INJECTION, SOLUTION INTRAVENOUS ONCE
Status: COMPLETED | OUTPATIENT
Start: 2023-07-07 | End: 2023-07-07

## 2023-07-07 RX ORDER — METOCLOPRAMIDE HYDROCHLORIDE 5 MG/ML
10 INJECTION INTRAMUSCULAR; INTRAVENOUS ONCE
Status: COMPLETED | OUTPATIENT
Start: 2023-07-07 | End: 2023-07-07

## 2023-07-07 RX ORDER — ONDANSETRON 2 MG/ML
4 INJECTION INTRAMUSCULAR; INTRAVENOUS ONCE
Status: COMPLETED | OUTPATIENT
Start: 2023-07-07 | End: 2023-07-07

## 2023-07-07 RX ORDER — LORAZEPAM 2 MG/ML
0.5 INJECTION INTRAMUSCULAR ONCE
Status: COMPLETED | OUTPATIENT
Start: 2023-07-07 | End: 2023-07-07

## 2023-07-07 RX ORDER — MORPHINE SULFATE 4 MG/ML
4 INJECTION INTRAVENOUS ONCE
Status: COMPLETED | OUTPATIENT
Start: 2023-07-07 | End: 2023-07-07

## 2023-07-07 RX ORDER — METOCLOPRAMIDE 10 MG/1
10 TABLET ORAL 4 TIMES DAILY
Qty: 28 TABLET | Refills: 0 | Status: SHIPPED | OUTPATIENT
Start: 2023-07-07 | End: 2023-07-14

## 2023-07-07 RX ADMIN — LORAZEPAM 0.5 MG: 2 INJECTION INTRAMUSCULAR; INTRAVENOUS at 14:55

## 2023-07-07 RX ADMIN — ONDANSETRON 4 MG: 2 INJECTION INTRAMUSCULAR; INTRAVENOUS at 14:55

## 2023-07-07 RX ADMIN — SODIUM CHLORIDE, POTASSIUM CHLORIDE, SODIUM LACTATE AND CALCIUM CHLORIDE 1000 ML: 600; 310; 30; 20 INJECTION, SOLUTION INTRAVENOUS at 13:03

## 2023-07-07 RX ADMIN — MORPHINE SULFATE 4 MG: 4 INJECTION INTRAVENOUS at 13:46

## 2023-07-07 RX ADMIN — METOCLOPRAMIDE 10 MG: 5 INJECTION, SOLUTION INTRAMUSCULAR; INTRAVENOUS at 12:59

## 2023-07-07 RX ADMIN — DIPHENHYDRAMINE HYDROCHLORIDE 25 MG: 50 INJECTION, SOLUTION INTRAMUSCULAR; INTRAVENOUS at 12:58

## 2023-07-07 ASSESSMENT — FIBROSIS 4 INDEX: FIB4 SCORE: 0.68

## 2023-07-07 NOTE — ED TRIAGE NOTES
Grey Diop  28 y.o. male  Chief Complaint   Patient presents with    N/V     X 3 days, patient states he is unable to keep anything down     Abdominal Pain     X3 days, patient reports a burning pain above the belly button        Vitals:    07/07/23 1148   BP: (!) 145/93   Pulse: (!) 124   Resp: 20   Temp: 36.6 °C (97.9 °F)   SpO2: 96%       Triage process explained to patient, apologized for wait time, and returned to lobby.  Pt informed to notify staff of any change in condition.

## 2023-07-07 NOTE — ED PROVIDER NOTES
ED Provider Note    CHIEF COMPLAINT  Chief Complaint   Patient presents with    N/V     X 3 days, patient states he is unable to keep anything down     Abdominal Pain     X3 days, patient reports a burning pain above the belly button        EXTERNAL RECORDS REVIEWED  External ED Note [facility] in November 2022 for postoperative leg pain with unremarkable work-up, also noted multiple prior admissions 3/20/2022 for abdominal pain, nausea vomiting, EGD with noted esophagitis, psychogenic seizures, polypharmacy, cyclic vomiting, cannabinoid use    HPI/ROS  LIMITATION TO HISTORY   Select: : None  OUTSIDE HISTORIAN(S):  none    Grey Diop is a 28 y.o. male who presents with abdominal pain nausea and vomiting.  Patient reports he began vomiting 2 days ago and has not been able to hold anything down since.  He describes a sharp and burning upper abdominal pain with this.  There is no blood in his emesis, he reports no diarrhea or blood in his stool.  No fevers or chills.  No chest pain or shortness of breath.  He does report several prior episodes that are the same although does not currently have medications at home    PAST MEDICAL HISTORY   has a past medical history of Abdominal migraine, Abdominal migraine, Anxiety, Benzodiazepine dependence (HCC), Colitis, Cyclic vomiting syndrome, Gastritis, and Scoliosis.    SURGICAL HISTORY   has a past surgical history that includes gastroscopy (9/3/2014); gastroscopy-endo (11/15/2014); other; other orthopedic surgery; upper gi endoscopy,diagnosis (N/A, 5/17/2022); and tibia nailing intramedullary (Left, 9/7/2022).    FAMILY HISTORY  History reviewed. No pertinent family history.    SOCIAL HISTORY  Social History     Tobacco Use    Smoking status: Never    Smokeless tobacco: Never   Vaping Use    Vaping Use: Some days    Substances: THC   Substance and Sexual Activity    Alcohol use: No    Drug use: Yes     Types: Inhaled, Marijuana     Comment: THC daily    Sexual  "activity: Never       CURRENT MEDICATIONS  Home Medications    **Home medications have not yet been reviewed for this encounter**         ALLERGIES  Allergies   Allergen Reactions    Acetaminophen [Tylenol] Anaphylaxis and Nausea     Nausea, \"it just doesn't sit right\"  5/29/2021 patient states that his throat swells shut    Ibuprofen      rash  Tolerated toradol       PHYSICAL EXAM  VITAL SIGNS: /68   Pulse 72   Temp 36.7 °C (98 °F) (Temporal)   Resp 18   Ht 1.626 m (5' 4\")   Wt 74.4 kg (164 lb 0.4 oz)   SpO2 92%   BMI 28.15 kg/m²      Pulse ox interpretation: I interpret this pulse ox as normal.  Constitutional: Alert uncomfortable  HENT: No signs of trauma, Bilateral external ears normal, Nose normal.   Eyes: Pupils are equal and reactive, Conjunctiva normal, Non-icteric.   Neck: Normal range of motion, No tenderness, Supple, No stridor.   Cardiovascular: Tachycardic, regular rhythm, no murmurs.   Thorax & Lungs: Normal breath sounds, No respiratory distress, No wheezing, No chest tenderness.   Abdomen: Bowel sounds normal, Soft, epigastric tenderness, No masses, No pulsatile masses. No peritoneal signs.  Skin: Warm, Dry, No erythema, No rash.   Back: No bony tenderness, No CVA tenderness.   Extremities: Intact distal pulses, No edema, No tenderness, No cyanosis,  Negative Fe's sign.   Musculoskeletal: Good range of motion in all major joints. No tenderness to palpation or major deformities noted.   Neurologic: Alert , Normal motor function, Normal sensory function, No focal deficits noted.   Psychiatric: Affect normal, Judgment normal, Mood normal.               DIAGNOSTIC STUDIES / PROCEDURES  Labs Reviewed   CBC WITH DIFFERENTIAL - Abnormal; Notable for the following components:       Result Value    WBC 13.8 (*)     RBC 6.28 (*)     MCV 79.6 (*)     Neutrophils-Polys 80.50 (*)     Lymphocytes 9.70 (*)     Neutrophils (Absolute) 11.09 (*)     Monos (Absolute) 1.25 (*)     All other components " within normal limits   COMP METABOLIC PANEL - Abnormal; Notable for the following components:    Potassium 3.2 (*)     Chloride 93 (*)     Anion Gap 20.0 (*)     Glucose 144 (*)     Bun 47 (*)     Albumin 5.5 (*)     Total Protein 9.1 (*)     Globulin 3.6 (*)     All other components within normal limits   URINALYSIS - Abnormal; Notable for the following components:    Ketones 15 (*)     Protein 100 (*)     Occult Blood Trace (*)     All other components within normal limits   URINE DRUG SCREEN - Abnormal; Notable for the following components:    Cannabinoid Metab Positive (*)     All other components within normal limits   MAGNESIUM - Abnormal; Notable for the following components:    Magnesium 2.7 (*)     All other components within normal limits   URINE MICROSCOPIC (W/UA) - Abnormal; Notable for the following components:    WBC 2-5 (*)     RBC 0-2 (*)     Hyaline Cast 3-5 (*)     All other components within normal limits   LIPASE   ESTIMATED GFR   CORRECTED CALCIUM             COURSE & MEDICAL DECISION MAKING    ED Observation Status? Yes; I am placing the patient in to an observation status due to a diagnostic uncertainty as well as therapeutic intensity. Patient placed in observation status at 12:21 PM, 7/7/2023.     Observation plan is as follows: IV fluids, monitoring for improvement in symptoms and tolerating taking oral fluids    Upon Reevaluation, the patient's condition has: Improved; and will be discharged.    Patient discharged from ED Observation status at 335 (Time) 07/07/23   (Date).     INITIAL ASSESSMENT, COURSE AND PLAN  Care Narrative: 12:21 PM  Patient evaluated at bedside, at this point differential includes acute pathology such as cyclic vomiting with acute exacerbation, metabolic or electrolyte derangement, pancreatitis, gastritis, while considered there are no findings at this time to suggest GI bleed, the recurrent nature of his symptoms and similarity to past as well as exam do not highly  suggest obstruction, perforation or surgical intra-abdominal process although this is considered    1:41 PM  Patient is reevaluated, reports improvement in his nausea, no more vomiting, although pain is returning, additional medication is ordered, IV fluids are infusing    2:48 PM  Patient reevaluated, pain seems to be improved now although he is having some return of nausea, reports his anxiety is also making him feel nauseated, I have ordered for additional medications    3:35 PM  Patient is reevaluated, he is feeling improved, he has had a cup of water to drink without any return of pain or vomiting, states he feels well enough for discharge    HYDRATION: Based on the patient's presentation of Acute Vomiting the patient was given IV fluids. IV Hydration was used because oral hydration was not as rapid as required. Upon recheck following hydration, the patient was stable.      ADDITIONAL PROBLEM LIST  #Nausea and vomiting.  Likely exacerbation of his cyclic vomiting with potential cannabinoid hyperemesis.  He was counseled on this.  No findings of pancreatitis, given his improvement and exam does not suggest perforation obstruction or other surgical process.  He does have a leukocytosis which I think is more stress demargination in his dehydration, also anion gap and elevated BUN consistent with his dehydration.  He is given prescription for Zofran and Reglan, primary care follow-up and he does have a GI doctor as well    #Hypokalemia, mild, educated on foods    #Dehydration, secondary to above.  Given IV fluids here, tolerating orals well at discharge  DISPOSITION AND DISCUSSIONS    Barriers to care at this time, including but not limited to: Patient does not have established PCP.  He is given resources for this and I have contacted our  to help arrange this    Decision tools and prescription drugs considered including, but not limited to:  He is given prescriptions for Zofran and Reglan .    The patient  will return for new or worsening symptoms and is stable at the time of discharge.    The patient is referred to a primary physician for blood pressure management, diabetic screening, and for all other preventative health concerns.      DISPOSITION:  Patient will be discharged home in stable condition.    FOLLOW UP:  [place] [address] [facility] [address]  [phone]  Schedule an appointment as soon as possible for a visit         OUTPATIENT MEDICATIONS:  Discharge Medication List as of 7/7/2023  3:40 PM        START taking these medications    Details   ondansetron (ZOFRAN ODT) 4 MG TABLET DISPERSIBLE Take 1 Tablet by mouth every 6 hours as needed for Nausea/Vomiting., Disp-20 Tablet, R-0, Normal      metoclopramide (REGLAN) 10 MG Tab Take 1 Tablet by mouth 4 times a day for 7 days., Disp-28 Tablet, R-0, Normal               FINAL DIAGNOSIS  1. Nausea and vomiting, unspecified vomiting type    2. Dehydration    3. Hypokalemia           Electronically signed by: Jerrell Malone M.D., 7/7/2023 12:11 PM

## 2023-10-16 ENCOUNTER — HOSPITAL ENCOUNTER (EMERGENCY)
Facility: MEDICAL CENTER | Age: 28
End: 2023-10-16
Attending: STUDENT IN AN ORGANIZED HEALTH CARE EDUCATION/TRAINING PROGRAM
Payer: COMMERCIAL

## 2023-10-16 VITALS
RESPIRATION RATE: 16 BRPM | HEIGHT: 66 IN | HEART RATE: 83 BPM | BODY MASS INDEX: 25.79 KG/M2 | OXYGEN SATURATION: 92 % | TEMPERATURE: 98.4 F | DIASTOLIC BLOOD PRESSURE: 65 MMHG | WEIGHT: 160.5 LBS | SYSTOLIC BLOOD PRESSURE: 119 MMHG

## 2023-10-16 DIAGNOSIS — R11.2 NAUSEA AND VOMITING, UNSPECIFIED VOMITING TYPE: ICD-10-CM

## 2023-10-16 LAB
ALBUMIN SERPL BCP-MCNC: 5.8 G/DL (ref 3.2–4.9)
ALBUMIN/GLOB SERPL: 1.7 G/DL
ALP SERPL-CCNC: 69 U/L (ref 30–99)
ALT SERPL-CCNC: 15 U/L (ref 2–50)
AMORPH CRY #/AREA URNS HPF: PRESENT /HPF
ANION GAP SERPL CALC-SCNC: 22 MMOL/L (ref 7–16)
APPEARANCE UR: ABNORMAL
AST SERPL-CCNC: 23 U/L (ref 12–45)
BACTERIA #/AREA URNS HPF: NEGATIVE /HPF
BASOPHILS # BLD AUTO: 0.2 % (ref 0–1.8)
BASOPHILS # BLD: 0.02 K/UL (ref 0–0.12)
BILIRUB SERPL-MCNC: 0.6 MG/DL (ref 0.1–1.5)
BILIRUB UR QL STRIP.AUTO: ABNORMAL
BUN SERPL-MCNC: 30 MG/DL (ref 8–22)
CALCIUM ALBUM COR SERPL-MCNC: 9.4 MG/DL (ref 8.5–10.5)
CALCIUM SERPL-MCNC: 10.8 MG/DL (ref 8.5–10.5)
CHLORIDE SERPL-SCNC: 105 MMOL/L (ref 96–112)
CO2 SERPL-SCNC: 19 MMOL/L (ref 20–33)
COLOR UR: ABNORMAL
CREAT SERPL-MCNC: 1.15 MG/DL (ref 0.5–1.4)
EOSINOPHIL # BLD AUTO: 0 K/UL (ref 0–0.51)
EOSINOPHIL NFR BLD: 0 % (ref 0–6.9)
EPI CELLS #/AREA URNS HPF: ABNORMAL /HPF
ERYTHROCYTE [DISTWIDTH] IN BLOOD BY AUTOMATED COUNT: 36.6 FL (ref 35.9–50)
GFR SERPLBLD CREATININE-BSD FMLA CKD-EPI: 90 ML/MIN/1.73 M 2
GLOBULIN SER CALC-MCNC: 3.4 G/DL (ref 1.9–3.5)
GLUCOSE SERPL-MCNC: 143 MG/DL (ref 65–99)
GLUCOSE UR STRIP.AUTO-MCNC: NEGATIVE MG/DL
HCT VFR BLD AUTO: 48.1 % (ref 42–52)
HGB BLD-MCNC: 17.2 G/DL (ref 14–18)
HYALINE CASTS #/AREA URNS LPF: ABNORMAL /LPF
IMM GRANULOCYTES # BLD AUTO: 0.06 K/UL (ref 0–0.11)
IMM GRANULOCYTES NFR BLD AUTO: 0.5 % (ref 0–0.9)
KETONES UR STRIP.AUTO-MCNC: 40 MG/DL
LEUKOCYTE ESTERASE UR QL STRIP.AUTO: ABNORMAL
LIPASE SERPL-CCNC: 21 U/L (ref 11–82)
LYMPHOCYTES # BLD AUTO: 0.77 K/UL (ref 1–4.8)
LYMPHOCYTES NFR BLD: 6 % (ref 22–41)
MCH RBC QN AUTO: 28.7 PG (ref 27–33)
MCHC RBC AUTO-ENTMCNC: 35.8 G/DL (ref 32.3–36.5)
MCV RBC AUTO: 80.3 FL (ref 81.4–97.8)
MICRO URNS: ABNORMAL
MONOCYTES # BLD AUTO: 0.47 K/UL (ref 0–0.85)
MONOCYTES NFR BLD AUTO: 3.7 % (ref 0–13.4)
NEUTROPHILS # BLD AUTO: 11.48 K/UL (ref 1.82–7.42)
NEUTROPHILS NFR BLD: 89.6 % (ref 44–72)
NITRITE UR QL STRIP.AUTO: NEGATIVE
NRBC # BLD AUTO: 0 K/UL
NRBC BLD-RTO: 0 /100 WBC (ref 0–0.2)
PH UR STRIP.AUTO: 5.5 [PH] (ref 5–8)
PLATELET # BLD AUTO: 344 K/UL (ref 164–446)
PMV BLD AUTO: 12.1 FL (ref 9–12.9)
POTASSIUM SERPL-SCNC: 3.7 MMOL/L (ref 3.6–5.5)
PROT SERPL-MCNC: 9.2 G/DL (ref 6–8.2)
PROT UR QL STRIP: 300 MG/DL
RBC # BLD AUTO: 5.99 M/UL (ref 4.7–6.1)
RBC # URNS HPF: ABNORMAL /HPF
RBC UR QL AUTO: NEGATIVE
SODIUM SERPL-SCNC: 146 MMOL/L (ref 135–145)
SP GR UR STRIP.AUTO: 1.04
UROBILINOGEN UR STRIP.AUTO-MCNC: 1 MG/DL
WBC # BLD AUTO: 12.8 K/UL (ref 4.8–10.8)
WBC #/AREA URNS HPF: ABNORMAL /HPF

## 2023-10-16 PROCEDURE — 83690 ASSAY OF LIPASE: CPT

## 2023-10-16 PROCEDURE — 700111 HCHG RX REV CODE 636 W/ 250 OVERRIDE (IP): Mod: JZ,UD | Performed by: STUDENT IN AN ORGANIZED HEALTH CARE EDUCATION/TRAINING PROGRAM

## 2023-10-16 PROCEDURE — 36415 COLL VENOUS BLD VENIPUNCTURE: CPT

## 2023-10-16 PROCEDURE — 96376 TX/PRO/DX INJ SAME DRUG ADON: CPT

## 2023-10-16 PROCEDURE — 700105 HCHG RX REV CODE 258: Mod: UD | Performed by: STUDENT IN AN ORGANIZED HEALTH CARE EDUCATION/TRAINING PROGRAM

## 2023-10-16 PROCEDURE — 99285 EMERGENCY DEPT VISIT HI MDM: CPT

## 2023-10-16 PROCEDURE — 96374 THER/PROPH/DIAG INJ IV PUSH: CPT

## 2023-10-16 PROCEDURE — 81001 URINALYSIS AUTO W/SCOPE: CPT

## 2023-10-16 PROCEDURE — 85025 COMPLETE CBC W/AUTO DIFF WBC: CPT

## 2023-10-16 PROCEDURE — 96375 TX/PRO/DX INJ NEW DRUG ADDON: CPT

## 2023-10-16 PROCEDURE — 80053 COMPREHEN METABOLIC PANEL: CPT

## 2023-10-16 RX ORDER — ONDANSETRON 4 MG/1
4 TABLET, ORALLY DISINTEGRATING ORAL EVERY 6 HOURS PRN
Qty: 5 TABLET | Refills: 0 | Status: SHIPPED | OUTPATIENT
Start: 2023-10-16 | End: 2023-12-09

## 2023-10-16 RX ORDER — SODIUM CHLORIDE, SODIUM LACTATE, POTASSIUM CHLORIDE, CALCIUM CHLORIDE 600; 310; 30; 20 MG/100ML; MG/100ML; MG/100ML; MG/100ML
1000 INJECTION, SOLUTION INTRAVENOUS ONCE
Status: COMPLETED | OUTPATIENT
Start: 2023-10-16 | End: 2023-10-16

## 2023-10-16 RX ORDER — LORAZEPAM 2 MG/ML
1 INJECTION INTRAMUSCULAR ONCE
Status: COMPLETED | OUTPATIENT
Start: 2023-10-16 | End: 2023-10-16

## 2023-10-16 RX ORDER — ONDANSETRON 2 MG/ML
4 INJECTION INTRAMUSCULAR; INTRAVENOUS ONCE
Status: COMPLETED | OUTPATIENT
Start: 2023-10-16 | End: 2023-10-16

## 2023-10-16 RX ORDER — DIPHENHYDRAMINE HYDROCHLORIDE 50 MG/ML
50 INJECTION INTRAMUSCULAR; INTRAVENOUS ONCE
Status: COMPLETED | OUTPATIENT
Start: 2023-10-16 | End: 2023-10-16

## 2023-10-16 RX ORDER — METOCLOPRAMIDE HYDROCHLORIDE 5 MG/ML
10 INJECTION INTRAMUSCULAR; INTRAVENOUS ONCE
Status: COMPLETED | OUTPATIENT
Start: 2023-10-16 | End: 2023-10-16

## 2023-10-16 RX ORDER — HALOPERIDOL 5 MG/ML
5 INJECTION INTRAMUSCULAR ONCE
Status: COMPLETED | OUTPATIENT
Start: 2023-10-16 | End: 2023-10-16

## 2023-10-16 RX ADMIN — SODIUM CHLORIDE, POTASSIUM CHLORIDE, SODIUM LACTATE AND CALCIUM CHLORIDE 1000 ML: 600; 310; 30; 20 INJECTION, SOLUTION INTRAVENOUS at 20:04

## 2023-10-16 RX ADMIN — SODIUM CHLORIDE, POTASSIUM CHLORIDE, SODIUM LACTATE AND CALCIUM CHLORIDE 1000 ML: 600; 310; 30; 20 INJECTION, SOLUTION INTRAVENOUS at 22:52

## 2023-10-16 RX ADMIN — DIPHENHYDRAMINE HYDROCHLORIDE 50 MG: 50 INJECTION, SOLUTION INTRAMUSCULAR; INTRAVENOUS at 19:58

## 2023-10-16 RX ADMIN — METOCLOPRAMIDE 10 MG: 5 INJECTION, SOLUTION INTRAMUSCULAR; INTRAVENOUS at 19:57

## 2023-10-16 RX ADMIN — LORAZEPAM 1 MG: 2 INJECTION INTRAMUSCULAR; INTRAVENOUS at 19:58

## 2023-10-16 RX ADMIN — ONDANSETRON 4 MG: 2 INJECTION INTRAMUSCULAR; INTRAVENOUS at 19:57

## 2023-10-16 RX ADMIN — LORAZEPAM 1 MG: 2 INJECTION INTRAMUSCULAR; INTRAVENOUS at 22:52

## 2023-10-16 RX ADMIN — HALOPERIDOL LACTATE 5 MG: 5 INJECTION, SOLUTION INTRAMUSCULAR at 22:52

## 2023-10-16 ASSESSMENT — PAIN DESCRIPTION - PAIN TYPE
TYPE: ACUTE PAIN
TYPE: ACUTE PAIN

## 2023-10-16 ASSESSMENT — FIBROSIS 4 INDEX: FIB4 SCORE: 0.34

## 2023-10-16 NOTE — ED TRIAGE NOTES
"Chief Complaint   Patient presents with    Abdominal Pain     Patient ambulates to triage c/o abdominal pain and n/v x2 days.     N/V    Flank Pain    Body Aches              BP (!) 127/95   Pulse (!) 110   Temp 36.1 °C (97 °F) (Temporal)   Resp 18   Ht 1.676 m (5' 6\")   Wt 72.8 kg (160 lb 7.9 oz)   SpO2 96%     Patient educated on ed triage process, instructed to notify staff of any new or worsening symptoms, verbalizes understanding. Patient returned to ed lobby, apologized for wait times.     "

## 2023-10-17 NOTE — ED PROVIDER NOTES
ED Provider Note    CHIEF COMPLAINT  Chief Complaint   Patient presents with    Abdominal Pain     Patient ambulates to triage c/o abdominal pain and n/v x2 days.     N/V    Flank Pain    Body Aches              EXTERNAL RECORDS REVIEWED  Outpatient Notes ED visit from 7/2023, previous CT imaging    HPI/ROS  LIMITATION TO HISTORY     OUTSIDE HISTORIAN(S):      Grey Diop is a 28 y.o. male who presents nausea and vomiting.  Patient says for the past 2 days he has been having worsening nausea and vomiting similar to previous episodes.  Patient denies fever, chills, chest pain, shortness of breath, bloody emesis, diarrhea, urinary changes.  Patient reports abdominal discomfort prior to vomiting episodes but denies pain otherwise.    PAST MEDICAL HISTORY   has a past medical history of Abdominal migraine, Abdominal migraine, Anxiety, Benzodiazepine dependence (HCC), Colitis, Cyclic vomiting syndrome, Gastritis, and Scoliosis.    SURGICAL HISTORY   has a past surgical history that includes gastroscopy (9/3/2014); gastroscopy-endo (11/15/2014); other; other orthopedic surgery; upper gi endoscopy,diagnosis (N/A, 5/17/2022); and tibia nailing intramedullary (Left, 9/7/2022).    FAMILY HISTORY  No family history on file.    SOCIAL HISTORY  Social History     Tobacco Use    Smoking status: Never    Smokeless tobacco: Never   Vaping Use    Vaping Use: Some days    Substances: THC   Substance and Sexual Activity    Alcohol use: No    Drug use: Yes     Types: Inhaled, Marijuana     Comment: THC daily    Sexual activity: Never       CURRENT MEDICATIONS  Home Medications       Reviewed by Aura Larry R.N. (Registered Nurse) on 10/16/23 at 1653  Med List Status: Not Addressed     Medication Last Dose Status   aspirin (ASPIRIN 81) 81 MG EC tablet  Active   cephALEXin (KEFLEX) 500 MG Cap  Active   docusate sodium (DULCOLAX) 100 MG Cap  Active   HYDROmorphone (DILAUDID) 2 MG Tab  Active   meloxicam (MOBIC) 7.5 MG Tab   "Active   omeprazole (PRILOSEC) 40 MG delayed-release capsule  Active   ondansetron (ZOFRAN ODT) 4 MG TABLET DISPERSIBLE  Active   promethazine (PHENERGAN) 25 MG Tab  Active                    ALLERGIES  Allergies   Allergen Reactions    Acetaminophen [Tylenol] Anaphylaxis and Nausea     Nausea, \"it just doesn't sit right\"  5/29/2021 patient states that his throat swells shut    Ibuprofen      rash  Tolerated toradol       PHYSICAL EXAM  VITAL SIGNS: /65   Pulse 83   Temp 36.1 °C (97 °F) (Temporal)   Resp 16   Ht 1.676 m (5' 6\")   Wt 72.8 kg (160 lb 7.9 oz)   SpO2 92%   BMI 25.90 kg/m²    Actively dry heaving, no scleral icterus, pupils equal round reactive, clear nasopharynx and oropharynx, no lymphadenopathy, clear bilateral breath sounds, normal heart sounds, abdomen soft nontender nondistended    DIAGNOSTIC STUDIES / PROCEDURES  EKG      LABS  Labs Reviewed   CBC WITH DIFFERENTIAL - Abnormal; Notable for the following components:       Result Value    WBC 12.8 (*)     MCV 80.3 (*)     Neutrophils-Polys 89.60 (*)     Lymphocytes 6.00 (*)     Neutrophils (Absolute) 11.48 (*)     Lymphs (Absolute) 0.77 (*)     All other components within normal limits   COMP METABOLIC PANEL - Abnormal; Notable for the following components:    Sodium 146 (*)     Co2 19 (*)     Anion Gap 22.0 (*)     Glucose 143 (*)     Bun 30 (*)     Calcium 10.8 (*)     Albumin 5.8 (*)     Total Protein 9.2 (*)     All other components within normal limits   URINALYSIS - Abnormal; Notable for the following components:    Character Turbid (*)     Ketones 40 (*)     Protein 300 (*)     Bilirubin Moderate (*)     Leukocyte Esterase Trace (*)     All other components within normal limits    Narrative:     Release to patient->Immediate   URINE MICROSCOPIC (W/UA) - Abnormal; Notable for the following components:    WBC 0-2 (*)     RBC 0-2 (*)     All other components within normal limits    Narrative:     Release to patient->Immediate "   LIPASE   ESTIMATED GFR         RADIOLOGY      COURSE & MEDICAL DECISION MAKING    ED Observation Status? Yes; I am placing the patient in to an observation status due to a diagnostic uncertainty as well as therapeutic intensity. Patient placed in observation status at 9:45 PM, 10/16/2023.     Observation plan is as follows: Serial abdominal exams and monitoring for ability to tolerate p.o.    Upon Reevaluation, the patient's condition has: Improved; and will be discharged.    Patient discharged from ED Observation status at 2340 (Time) 10/16/23 (Date).     INITIAL ASSESSMENT, COURSE AND PLAN  Care Narrative: Differential includes cyclic vomiting, biliary disease, pancreatitis, obstruction.  Patient without distention or tenderness on exam.  No significant findings on previous CT imaging.  Lower suspicion for significant intra-abdominal pathology with benign abdominal exam.  Patient is afebrile.  We will treat patient with antiemetics and IV fluids check blood work to assess for metabolic derangements.    Blood work with normal LFTs and lipase does show mild metabolic acidosis consistent with frequent vomiting.  On reassessment patient again with benign abdominal exam reports some ongoing nausea has been able to tolerate fluids.  Patient dosed with Haldol and additional lorazepam after which he reports significant improvement in symptoms able to eat solids.  Discussed with patient return precautions, outpatient follow-up, avoidance of marijuana use.  Patient comfortable with discharge planning.  HYDRATION: Based on the patient's presentation of Acute Vomiting the patient was given IV fluids. IV Hydration was used because oral hydration was not adequate alone. Upon recheck following hydration, the patient was improved.      ADDITIONAL PROBLEM LIST    DISPOSITION AND DISCUSSIONS    Barriers to care at this time, including but not limited to: Patient does not have established PCP.     Decision tools and prescription  drugs considered including, but not limited to:  Antiemetics .    FINAL DIAGNOSIS  1. Nausea and vomiting, unspecified vomiting type           Electronically signed by: Italo Cedeno D.O., 10/16/2023 11:43 PM

## 2023-10-17 NOTE — ED NOTES
Pt reports improvement in nausea following medication administration. Pt able to tolerate drinking water at this time.

## 2023-10-17 NOTE — ED NOTES
Patient ambulated chair without incident. Primary assessment complete. Patient oriented to care area. Chart up for ERP.

## 2023-10-17 NOTE — DISCHARGE INSTRUCTIONS
As we discussed you should avoid smoking marijuana.  You should follow-up with your primary care provider.  We have given you prescription for Zofran which she can use as needed.  If you have uncontrolled vomiting, fever or abdominal pain please return to the emergency department.

## 2023-12-09 ENCOUNTER — ANESTHESIA (OUTPATIENT)
Dept: SURGERY | Facility: MEDICAL CENTER | Age: 28
End: 2023-12-09
Payer: COMMERCIAL

## 2023-12-09 ENCOUNTER — APPOINTMENT (OUTPATIENT)
Dept: RADIOLOGY | Facility: MEDICAL CENTER | Age: 28
End: 2023-12-09
Attending: EMERGENCY MEDICINE
Payer: COMMERCIAL

## 2023-12-09 ENCOUNTER — HOSPITAL ENCOUNTER (OUTPATIENT)
Facility: MEDICAL CENTER | Age: 28
End: 2023-12-10
Attending: EMERGENCY MEDICINE | Admitting: INTERNAL MEDICINE
Payer: COMMERCIAL

## 2023-12-09 ENCOUNTER — ANESTHESIA EVENT (OUTPATIENT)
Dept: SURGERY | Facility: MEDICAL CENTER | Age: 28
End: 2023-12-09
Payer: COMMERCIAL

## 2023-12-09 DIAGNOSIS — E86.0 DEHYDRATION: ICD-10-CM

## 2023-12-09 DIAGNOSIS — K92.0 HEMATEMESIS WITH NAUSEA: ICD-10-CM

## 2023-12-09 DIAGNOSIS — R11.15 CYCLIC VOMITING SYNDROME: ICD-10-CM

## 2023-12-09 DIAGNOSIS — F41.9 ANXIETY: ICD-10-CM

## 2023-12-09 LAB
ALBUMIN SERPL BCP-MCNC: 5.4 G/DL (ref 3.2–4.9)
ALBUMIN/GLOB SERPL: 1.5 G/DL
ALP SERPL-CCNC: 67 U/L (ref 30–99)
ALT SERPL-CCNC: 16 U/L (ref 2–50)
ANION GAP SERPL CALC-SCNC: 25 MMOL/L (ref 7–16)
AST SERPL-CCNC: 21 U/L (ref 12–45)
BASOPHILS # BLD AUTO: 0.2 % (ref 0–1.8)
BASOPHILS # BLD: 0.03 K/UL (ref 0–0.12)
BILIRUB SERPL-MCNC: 0.7 MG/DL (ref 0.1–1.5)
BUN SERPL-MCNC: 25 MG/DL (ref 8–22)
CALCIUM ALBUM COR SERPL-MCNC: 9.3 MG/DL (ref 8.5–10.5)
CALCIUM SERPL-MCNC: 10.4 MG/DL (ref 8.5–10.5)
CHLORIDE SERPL-SCNC: 101 MMOL/L (ref 96–112)
CO2 SERPL-SCNC: 16 MMOL/L (ref 20–33)
CREAT SERPL-MCNC: 1.13 MG/DL (ref 0.5–1.4)
EKG IMPRESSION: NORMAL
EOSINOPHIL # BLD AUTO: 0 K/UL (ref 0–0.51)
EOSINOPHIL NFR BLD: 0 % (ref 0–6.9)
ERYTHROCYTE [DISTWIDTH] IN BLOOD BY AUTOMATED COUNT: 34.7 FL (ref 35.9–50)
GFR SERPLBLD CREATININE-BSD FMLA CKD-EPI: 92 ML/MIN/1.73 M 2
GLOBULIN SER CALC-MCNC: 3.6 G/DL (ref 1.9–3.5)
GLUCOSE SERPL-MCNC: 111 MG/DL (ref 65–99)
HCT VFR BLD AUTO: 47.4 % (ref 42–52)
HGB BLD-MCNC: 17.1 G/DL (ref 14–18)
IMM GRANULOCYTES # BLD AUTO: 0.06 K/UL (ref 0–0.11)
IMM GRANULOCYTES NFR BLD AUTO: 0.5 % (ref 0–0.9)
LIPASE SERPL-CCNC: 23 U/L (ref 11–82)
LYMPHOCYTES # BLD AUTO: 0.98 K/UL (ref 1–4.8)
LYMPHOCYTES NFR BLD: 7.5 % (ref 22–41)
MCH RBC QN AUTO: 28.8 PG (ref 27–33)
MCHC RBC AUTO-ENTMCNC: 36.1 G/DL (ref 32.3–36.5)
MCV RBC AUTO: 79.8 FL (ref 81.4–97.8)
MONOCYTES # BLD AUTO: 0.24 K/UL (ref 0–0.85)
MONOCYTES NFR BLD AUTO: 1.8 % (ref 0–13.4)
NEUTROPHILS # BLD AUTO: 11.74 K/UL (ref 1.82–7.42)
NEUTROPHILS NFR BLD: 90 % (ref 44–72)
NRBC # BLD AUTO: 0 K/UL
NRBC BLD-RTO: 0 /100 WBC (ref 0–0.2)
PLATELET # BLD AUTO: 357 K/UL (ref 164–446)
PMV BLD AUTO: 11.4 FL (ref 9–12.9)
POTASSIUM SERPL-SCNC: 3.9 MMOL/L (ref 3.6–5.5)
PROT SERPL-MCNC: 9 G/DL (ref 6–8.2)
RBC # BLD AUTO: 5.94 M/UL (ref 4.7–6.1)
SODIUM SERPL-SCNC: 142 MMOL/L (ref 135–145)
WBC # BLD AUTO: 13.1 K/UL (ref 4.8–10.8)

## 2023-12-09 PROCEDURE — G0378 HOSPITAL OBSERVATION PER HR: HCPCS

## 2023-12-09 PROCEDURE — 99291 CRITICAL CARE FIRST HOUR: CPT

## 2023-12-09 PROCEDURE — 96375 TX/PRO/DX INJ NEW DRUG ADDON: CPT | Mod: XU

## 2023-12-09 PROCEDURE — 76705 ECHO EXAM OF ABDOMEN: CPT

## 2023-12-09 PROCEDURE — 83690 ASSAY OF LIPASE: CPT

## 2023-12-09 PROCEDURE — 700105 HCHG RX REV CODE 258: Mod: UD | Performed by: ANESTHESIOLOGY

## 2023-12-09 PROCEDURE — 160202 HCHG ENDO MINUTES - 1ST 30 MINS LEVEL 3: Performed by: INTERNAL MEDICINE

## 2023-12-09 PROCEDURE — 700111 HCHG RX REV CODE 636 W/ 250 OVERRIDE (IP): Mod: UD

## 2023-12-09 PROCEDURE — 700101 HCHG RX REV CODE 250: Performed by: ANESTHESIOLOGY

## 2023-12-09 PROCEDURE — 96376 TX/PRO/DX INJ SAME DRUG ADON: CPT | Mod: XU

## 2023-12-09 PROCEDURE — 160035 HCHG PACU - 1ST 60 MINS PHASE I: Performed by: INTERNAL MEDICINE

## 2023-12-09 PROCEDURE — 700111 HCHG RX REV CODE 636 W/ 250 OVERRIDE (IP): Mod: JZ,UD

## 2023-12-09 PROCEDURE — 700105 HCHG RX REV CODE 258: Mod: UD | Performed by: INTERNAL MEDICINE

## 2023-12-09 PROCEDURE — 43235 EGD DIAGNOSTIC BRUSH WASH: CPT | Performed by: INTERNAL MEDICINE

## 2023-12-09 PROCEDURE — 700105 HCHG RX REV CODE 258: Mod: UD | Performed by: EMERGENCY MEDICINE

## 2023-12-09 PROCEDURE — 160048 HCHG OR STATISTICAL LEVEL 1-5: Performed by: INTERNAL MEDICINE

## 2023-12-09 PROCEDURE — 85025 COMPLETE CBC W/AUTO DIFF WBC: CPT

## 2023-12-09 PROCEDURE — C9113 INJ PANTOPRAZOLE SODIUM, VIA: HCPCS | Mod: UD | Performed by: EMERGENCY MEDICINE

## 2023-12-09 PROCEDURE — 160009 HCHG ANES TIME/MIN: Performed by: INTERNAL MEDICINE

## 2023-12-09 PROCEDURE — 700111 HCHG RX REV CODE 636 W/ 250 OVERRIDE (IP): Mod: UD | Performed by: ANESTHESIOLOGY

## 2023-12-09 PROCEDURE — 96374 THER/PROPH/DIAG INJ IV PUSH: CPT | Mod: XU

## 2023-12-09 PROCEDURE — 700111 HCHG RX REV CODE 636 W/ 250 OVERRIDE (IP): Mod: UD | Performed by: INTERNAL MEDICINE

## 2023-12-09 PROCEDURE — 36415 COLL VENOUS BLD VENIPUNCTURE: CPT

## 2023-12-09 PROCEDURE — 700111 HCHG RX REV CODE 636 W/ 250 OVERRIDE (IP): Mod: UD | Performed by: EMERGENCY MEDICINE

## 2023-12-09 PROCEDURE — 80053 COMPREHEN METABOLIC PANEL: CPT

## 2023-12-09 PROCEDURE — 99223 1ST HOSP IP/OBS HIGH 75: CPT | Performed by: INTERNAL MEDICINE

## 2023-12-09 PROCEDURE — 93005 ELECTROCARDIOGRAM TRACING: CPT | Performed by: EMERGENCY MEDICINE

## 2023-12-09 PROCEDURE — 160002 HCHG RECOVERY MINUTES (STAT): Performed by: INTERNAL MEDICINE

## 2023-12-09 PROCEDURE — C9113 INJ PANTOPRAZOLE SODIUM, VIA: HCPCS | Mod: UD | Performed by: INTERNAL MEDICINE

## 2023-12-09 PROCEDURE — 99243 OFF/OP CNSLTJ NEW/EST LOW 30: CPT | Mod: 25 | Performed by: INTERNAL MEDICINE

## 2023-12-09 RX ORDER — SODIUM CHLORIDE, SODIUM LACTATE, POTASSIUM CHLORIDE, CALCIUM CHLORIDE 600; 310; 30; 20 MG/100ML; MG/100ML; MG/100ML; MG/100ML
INJECTION, SOLUTION INTRAVENOUS CONTINUOUS
Status: DISCONTINUED | OUTPATIENT
Start: 2023-12-09 | End: 2023-12-09 | Stop reason: HOSPADM

## 2023-12-09 RX ORDER — SODIUM CHLORIDE, SODIUM LACTATE, POTASSIUM CHLORIDE, CALCIUM CHLORIDE 600; 310; 30; 20 MG/100ML; MG/100ML; MG/100ML; MG/100ML
INJECTION, SOLUTION INTRAVENOUS
Status: DISCONTINUED | OUTPATIENT
Start: 2023-12-09 | End: 2023-12-09 | Stop reason: SURG

## 2023-12-09 RX ORDER — OXYCODONE HCL 5 MG/5 ML
10 SOLUTION, ORAL ORAL
Status: DISCONTINUED | OUTPATIENT
Start: 2023-12-09 | End: 2023-12-09 | Stop reason: HOSPADM

## 2023-12-09 RX ORDER — SODIUM CHLORIDE 9 MG/ML
1000 INJECTION, SOLUTION INTRAVENOUS ONCE
Status: COMPLETED | OUTPATIENT
Start: 2023-12-09 | End: 2023-12-09

## 2023-12-09 RX ORDER — ONDANSETRON 4 MG/1
4 TABLET, ORALLY DISINTEGRATING ORAL EVERY 6 HOURS PRN
Qty: 10 TABLET | Refills: 0 | Status: SHIPPED | OUTPATIENT
Start: 2023-12-09 | End: 2024-01-19

## 2023-12-09 RX ORDER — OXYCODONE HCL 5 MG/5 ML
5 SOLUTION, ORAL ORAL
Status: DISCONTINUED | OUTPATIENT
Start: 2023-12-09 | End: 2023-12-09 | Stop reason: HOSPADM

## 2023-12-09 RX ORDER — HALOPERIDOL 5 MG/ML
1 INJECTION INTRAMUSCULAR
Status: DISCONTINUED | OUTPATIENT
Start: 2023-12-09 | End: 2023-12-09 | Stop reason: HOSPADM

## 2023-12-09 RX ORDER — DIAZEPAM 5 MG/ML
INJECTION, SOLUTION INTRAMUSCULAR; INTRAVENOUS
Status: COMPLETED
Start: 2023-12-09 | End: 2023-12-09

## 2023-12-09 RX ORDER — PROMETHAZINE HYDROCHLORIDE 25 MG/1
12.5-25 TABLET ORAL EVERY 4 HOURS PRN
Status: DISCONTINUED | OUTPATIENT
Start: 2023-12-09 | End: 2023-12-10 | Stop reason: HOSPADM

## 2023-12-09 RX ORDER — PANTOPRAZOLE SODIUM 40 MG/10ML
40 INJECTION, POWDER, LYOPHILIZED, FOR SOLUTION INTRAVENOUS ONCE
Status: COMPLETED | OUTPATIENT
Start: 2023-12-09 | End: 2023-12-09

## 2023-12-09 RX ORDER — PANTOPRAZOLE SODIUM 40 MG/10ML
40 INJECTION, POWDER, LYOPHILIZED, FOR SOLUTION INTRAVENOUS 2 TIMES DAILY
Status: DISCONTINUED | OUTPATIENT
Start: 2023-12-09 | End: 2023-12-10 | Stop reason: HOSPADM

## 2023-12-09 RX ORDER — PROMETHAZINE HYDROCHLORIDE 25 MG/1
12.5-25 SUPPOSITORY RECTAL EVERY 4 HOURS PRN
Status: DISCONTINUED | OUTPATIENT
Start: 2023-12-09 | End: 2023-12-10 | Stop reason: HOSPADM

## 2023-12-09 RX ORDER — DIAZEPAM 5 MG/ML
5 INJECTION, SOLUTION INTRAMUSCULAR; INTRAVENOUS ONCE
Status: COMPLETED | OUTPATIENT
Start: 2023-12-09 | End: 2023-12-09

## 2023-12-09 RX ORDER — DIPHENHYDRAMINE HYDROCHLORIDE 50 MG/ML
50 INJECTION INTRAMUSCULAR; INTRAVENOUS ONCE
Status: COMPLETED | OUTPATIENT
Start: 2023-12-09 | End: 2023-12-09

## 2023-12-09 RX ORDER — SODIUM CHLORIDE 9 MG/ML
INJECTION, SOLUTION INTRAVENOUS CONTINUOUS
Status: DISCONTINUED | OUTPATIENT
Start: 2023-12-09 | End: 2023-12-09

## 2023-12-09 RX ORDER — HALOPERIDOL 5 MG/ML
5 INJECTION INTRAMUSCULAR ONCE
Status: COMPLETED | OUTPATIENT
Start: 2023-12-09 | End: 2023-12-09

## 2023-12-09 RX ORDER — PROMETHAZINE HYDROCHLORIDE 25 MG/1
25 SUPPOSITORY RECTAL EVERY 6 HOURS PRN
Qty: 10 SUPPOSITORY | Refills: 0 | Status: SHIPPED | OUTPATIENT
Start: 2023-12-09

## 2023-12-09 RX ORDER — HYDROXYZINE 50 MG/1
50 TABLET, FILM COATED ORAL 3 TIMES DAILY PRN
Qty: 30 TABLET | Refills: 0 | Status: SHIPPED | OUTPATIENT
Start: 2023-12-09

## 2023-12-09 RX ORDER — ONDANSETRON 4 MG/1
4 TABLET, ORALLY DISINTEGRATING ORAL EVERY 4 HOURS PRN
Status: DISCONTINUED | OUTPATIENT
Start: 2023-12-09 | End: 2023-12-10 | Stop reason: HOSPADM

## 2023-12-09 RX ORDER — PROCHLORPERAZINE EDISYLATE 5 MG/ML
5-10 INJECTION INTRAMUSCULAR; INTRAVENOUS EVERY 4 HOURS PRN
Status: DISCONTINUED | OUTPATIENT
Start: 2023-12-09 | End: 2023-12-10 | Stop reason: HOSPADM

## 2023-12-09 RX ORDER — DIPHENHYDRAMINE HYDROCHLORIDE 50 MG/ML
12.5 INJECTION INTRAMUSCULAR; INTRAVENOUS
Status: DISCONTINUED | OUTPATIENT
Start: 2023-12-09 | End: 2023-12-09 | Stop reason: HOSPADM

## 2023-12-09 RX ORDER — ONDANSETRON 2 MG/ML
4 INJECTION INTRAMUSCULAR; INTRAVENOUS ONCE
Status: COMPLETED | OUTPATIENT
Start: 2023-12-09 | End: 2023-12-09

## 2023-12-09 RX ORDER — ONDANSETRON 2 MG/ML
4 INJECTION INTRAMUSCULAR; INTRAVENOUS
Status: DISCONTINUED | OUTPATIENT
Start: 2023-12-09 | End: 2023-12-09 | Stop reason: HOSPADM

## 2023-12-09 RX ORDER — M-VIT,TX,IRON,MINS/CALC/FOLIC 27MG-0.4MG
1 TABLET ORAL DAILY
COMMUNITY

## 2023-12-09 RX ORDER — HALOPERIDOL 5 MG/ML
INJECTION INTRAMUSCULAR
Status: COMPLETED
Start: 2023-12-09 | End: 2023-12-09

## 2023-12-09 RX ORDER — ONDANSETRON 2 MG/ML
4 INJECTION INTRAMUSCULAR; INTRAVENOUS EVERY 4 HOURS PRN
Status: DISCONTINUED | OUTPATIENT
Start: 2023-12-09 | End: 2023-12-10 | Stop reason: HOSPADM

## 2023-12-09 RX ORDER — EPHEDRINE SULFATE 50 MG/ML
5 INJECTION, SOLUTION INTRAVENOUS
Status: DISCONTINUED | OUTPATIENT
Start: 2023-12-09 | End: 2023-12-09 | Stop reason: HOSPADM

## 2023-12-09 RX ORDER — MIDAZOLAM HYDROCHLORIDE 1 MG/ML
INJECTION INTRAMUSCULAR; INTRAVENOUS PRN
Status: DISCONTINUED | OUTPATIENT
Start: 2023-12-09 | End: 2023-12-09 | Stop reason: SURG

## 2023-12-09 RX ORDER — LABETALOL HYDROCHLORIDE 5 MG/ML
5 INJECTION, SOLUTION INTRAVENOUS
Status: DISCONTINUED | OUTPATIENT
Start: 2023-12-09 | End: 2023-12-09 | Stop reason: HOSPADM

## 2023-12-09 RX ADMIN — PROPOFOL 50 MG: 10 INJECTION, EMULSION INTRAVENOUS at 14:56

## 2023-12-09 RX ADMIN — DIAZEPAM 5 MG: 10 INJECTION, SOLUTION INTRAMUSCULAR; INTRAVENOUS at 05:34

## 2023-12-09 RX ADMIN — PROCHLORPERAZINE EDISYLATE 10 MG: 5 INJECTION INTRAMUSCULAR; INTRAVENOUS at 12:29

## 2023-12-09 RX ADMIN — MIDAZOLAM HYDROCHLORIDE 2 MG: 1 INJECTION, SOLUTION INTRAMUSCULAR; INTRAVENOUS at 14:55

## 2023-12-09 RX ADMIN — ONDANSETRON 4 MG: 2 INJECTION INTRAMUSCULAR; INTRAVENOUS at 11:28

## 2023-12-09 RX ADMIN — SODIUM CHLORIDE 1000 ML: 9 INJECTION, SOLUTION INTRAVENOUS at 05:06

## 2023-12-09 RX ADMIN — DIAZEPAM 5 MG: 10 INJECTION, SOLUTION INTRAMUSCULAR; INTRAVENOUS at 07:01

## 2023-12-09 RX ADMIN — DIPHENHYDRAMINE HYDROCHLORIDE 50 MG: 50 INJECTION, SOLUTION INTRAMUSCULAR; INTRAVENOUS at 05:33

## 2023-12-09 RX ADMIN — SODIUM CHLORIDE 1000 ML: 9 INJECTION, SOLUTION INTRAVENOUS at 11:27

## 2023-12-09 RX ADMIN — Medication 25 MG: at 14:59

## 2023-12-09 RX ADMIN — DIAZEPAM 5 MG: 5 INJECTION, SOLUTION INTRAMUSCULAR; INTRAVENOUS at 05:34

## 2023-12-09 RX ADMIN — SODIUM CHLORIDE 1000 ML: 9 INJECTION, SOLUTION INTRAVENOUS at 05:50

## 2023-12-09 RX ADMIN — PROPOFOL 50 MG: 10 INJECTION, EMULSION INTRAVENOUS at 14:59

## 2023-12-09 RX ADMIN — SODIUM CHLORIDE, POTASSIUM CHLORIDE, SODIUM LACTATE AND CALCIUM CHLORIDE: 600; 310; 30; 20 INJECTION, SOLUTION INTRAVENOUS at 14:54

## 2023-12-09 RX ADMIN — HALOPERIDOL LACTATE 5 MG: 5 INJECTION, SOLUTION INTRAMUSCULAR at 05:04

## 2023-12-09 RX ADMIN — PANTOPRAZOLE SODIUM 40 MG: 40 INJECTION, POWDER, FOR SOLUTION INTRAVENOUS at 17:52

## 2023-12-09 RX ADMIN — PANTOPRAZOLE SODIUM 40 MG: 40 INJECTION, POWDER, FOR SOLUTION INTRAVENOUS at 11:25

## 2023-12-09 RX ADMIN — HALOPERIDOL 5 MG: 5 INJECTION INTRAMUSCULAR at 05:04

## 2023-12-09 ASSESSMENT — PAIN DESCRIPTION - PAIN TYPE
TYPE: SURGICAL PAIN
TYPE: ACUTE PAIN
TYPE: SURGICAL PAIN
TYPE: ACUTE PAIN
TYPE: SURGICAL PAIN
TYPE: SURGICAL PAIN
TYPE: ACUTE PAIN
TYPE: SURGICAL PAIN

## 2023-12-09 ASSESSMENT — ENCOUNTER SYMPTOMS
ABDOMINAL PAIN: 1
NAUSEA: 1
VOMITING: 1

## 2023-12-09 ASSESSMENT — LIFESTYLE VARIABLES: DO YOU DRINK ALCOHOL: NO

## 2023-12-09 ASSESSMENT — PAIN DESCRIPTION - DESCRIPTORS: DESCRIPTORS: BURNING

## 2023-12-09 ASSESSMENT — PAIN SCALES - GENERAL: PAIN_LEVEL: 2

## 2023-12-09 ASSESSMENT — FIBROSIS 4 INDEX: FIB4 SCORE: 0.45

## 2023-12-09 NOTE — ANESTHESIA TIME REPORT
Anesthesia Start and Stop Event Times       Date Time Event    12/9/2023 1410 Ready for Procedure     1454 Anesthesia Start     1514 Anesthesia Stop          Responsible Staff  12/09/23      Name Role Begin End    Dariel Michele M.D. Anesth 1454 1514          Overtime Reason:  no overtime (within assigned shift)    Comments:

## 2023-12-09 NOTE — ED PROVIDER NOTES
ED Provider Note    CHIEF COMPLAINT  No chief complaint on file.      EXTERNAL RECORDS REVIEWED  Most recent visit 10/16/2023 for cyclic vomiting syndrome, symptoms improved with Haldol and Ativan  Most recent CT 5/28/2022 which showed findings most consistent with erosive esophagitis    HPI/ROS    Grey Diop is a 28 y.o. male who presents with chief complaint of significant epigastric abdominal pain and retching.  Patient with a longstanding history of cyclic vomiting syndrome and severe anxiety.  Patient reports that he has had some diarrhea, nausea and vomiting over the last few days.  He reports he is also felt warm.  Patient reports that symptoms feel relatively similar to prior episodes of cyclic vomiting however he typically does not have diarrhea with this.  Patient admits to ongoing marijuana use.  Patient reports he felt warm yesterday but denies any specific fever.  Patient reports emesis is nonbloody nonbilious.  No associated black or bloody stool.    PAST MEDICAL HISTORY   has a past medical history of Abdominal migraine, Abdominal migraine, Anxiety, Benzodiazepine dependence (HCC), Colitis, Cyclic vomiting syndrome, Gastritis, and Scoliosis.    SURGICAL HISTORY   has a past surgical history that includes gastroscopy (9/3/2014); gastroscopy-endo (11/15/2014); other; other orthopedic surgery; upper gi endoscopy,diagnosis (N/A, 5/17/2022); and tibia nailing intramedullary (Left, 9/7/2022).    FAMILY HISTORY  No family history on file.    SOCIAL HISTORY  Social History     Tobacco Use    Smoking status: Never    Smokeless tobacco: Never   Vaping Use    Vaping Use: Some days    Substances: THC   Substance and Sexual Activity    Alcohol use: No    Drug use: Yes     Types: Inhaled, Marijuana     Comment: THC daily    Sexual activity: Never       CURRENT MEDICATIONS  Home Medications    **Home medications have not yet been reviewed for this encounter**         ALLERGIES  Allergies   Allergen  "Reactions    Acetaminophen [Tylenol] Anaphylaxis and Nausea     Nausea, \"it just doesn't sit right\"  5/29/2021 patient states that his throat swells shut    Ibuprofen      rash  Tolerated toradol       PHYSICAL EXAM  VITAL SIGNS: BP (!) 177/146 Comment: Right  Pulse (!) 152   Temp 36.2 °C (97.1 °F) (Temporal)   Resp 18   Ht 1.676 m (5' 6\")   Wt 77.1 kg (170 lb)   SpO2 96%   BMI 27.44 kg/m²    Physical Exam  Constitutional:       Appearance: Normal appearance.   HENT:      Head: Normocephalic.      Right Ear: Tympanic membrane normal.      Left Ear: Tympanic membrane normal.      Nose: Nose normal.      Mouth/Throat:      Mouth: Mucous membranes are moist.   Eyes:      Extraocular Movements: Extraocular movements intact.      Pupils: Pupils are equal, round, and reactive to light.   Cardiovascular:      Rate and Rhythm: Regular rhythm. Tachycardia present.   Pulmonary:      Effort: Pulmonary effort is normal. No respiratory distress.      Breath sounds: Normal breath sounds. No stridor. No wheezing or rales.   Chest:      Chest wall: No tenderness.   Abdominal:      General: Abdomen is flat. There is no distension.      Palpations: Abdomen is soft. There is no mass.      Tenderness: There is abdominal tenderness. There is no guarding or rebound.   Musculoskeletal:      Cervical back: Normal range of motion.   Skin:     General: Skin is warm.      Capillary Refill: Capillary refill takes less than 2 seconds.   Neurological:      General: No focal deficit present.      Mental Status: He is alert and oriented to person, place, and time.   Psychiatric:         Mood and Affect: Mood is anxious.      Comments: Severely anxious, carpopedal spasms present           DIAGNOSTIC STUDIES / PROCEDURES  EKG  I have independently interpreted this EKG  See below    LABS  Results for orders placed or performed during the hospital encounter of 12/09/23   CBC WITH DIFFERENTIAL   Result Value Ref Range    WBC 13.1 (H) 4.8 - 10.8 " K/uL    RBC 5.94 4.70 - 6.10 M/uL    Hemoglobin 17.1 14.0 - 18.0 g/dL    Hematocrit 47.4 42.0 - 52.0 %    MCV 79.8 (L) 81.4 - 97.8 fL    MCH 28.8 27.0 - 33.0 pg    MCHC 36.1 32.3 - 36.5 g/dL    RDW 34.7 (L) 35.9 - 50.0 fL    Platelet Count 357 164 - 446 K/uL    MPV 11.4 9.0 - 12.9 fL    Neutrophils-Polys 90.00 (H) 44.00 - 72.00 %    Lymphocytes 7.50 (L) 22.00 - 41.00 %    Monocytes 1.80 0.00 - 13.40 %    Eosinophils 0.00 0.00 - 6.90 %    Basophils 0.20 0.00 - 1.80 %    Immature Granulocytes 0.50 0.00 - 0.90 %    Nucleated RBC 0.00 0.00 - 0.20 /100 WBC    Neutrophils (Absolute) 11.74 (H) 1.82 - 7.42 K/uL    Lymphs (Absolute) 0.98 (L) 1.00 - 4.80 K/uL    Monos (Absolute) 0.24 0.00 - 0.85 K/uL    Eos (Absolute) 0.00 0.00 - 0.51 K/uL    Baso (Absolute) 0.03 0.00 - 0.12 K/uL    Immature Granulocytes (abs) 0.06 0.00 - 0.11 K/uL    NRBC (Absolute) 0.00 K/uL   CMP   Result Value Ref Range    Sodium 142 135 - 145 mmol/L    Potassium 3.9 3.6 - 5.5 mmol/L    Chloride 101 96 - 112 mmol/L    Co2 16 (L) 20 - 33 mmol/L    Anion Gap 25.0 (H) 7.0 - 16.0    Glucose 111 (H) 65 - 99 mg/dL    Bun 25 (H) 8 - 22 mg/dL    Creatinine 1.13 0.50 - 1.40 mg/dL    Calcium 10.4 8.5 - 10.5 mg/dL    Correct Calcium 9.3 8.5 - 10.5 mg/dL    AST(SGOT) 21 12 - 45 U/L    ALT(SGPT) 16 2 - 50 U/L    Alkaline Phosphatase 67 30 - 99 U/L    Total Bilirubin 0.7 0.1 - 1.5 mg/dL    Albumin 5.4 (H) 3.2 - 4.9 g/dL    Total Protein 9.0 (H) 6.0 - 8.2 g/dL    Globulin 3.6 (H) 1.9 - 3.5 g/dL    A-G Ratio 1.5 g/dL   LIPASE   Result Value Ref Range    Lipase 23 11 - 82 U/L   ESTIMATED GFR   Result Value Ref Range    GFR (CKD-EPI) 92 >60 mL/min/1.73 m 2   EKG (NOW)   Result Value Ref Range    Report       [facility] Emergency Dept.    Test Date:  2023  Pt Name:    TOMÁS CEBALLOS         Department: ER  MRN:        [id number]                      Room:  Gender:     Male                         Technician: [id number]  :        1995                    Requested By:ER TRIAGE PROTOCOL  Order #:    [id number]                    Reading MD: Chayito Allan MD    Measurements  Intervals                                Axis  Rate:       111                          P:          77  OR:         113                          QRS:        82  QRSD:       137                          T:          64  QT:         312  QTc:        424    Interpretive Statements  EKG is sinus tachycardia, normal axis normal intervals, some borderline T  wave changes throughout which are chronic and unchanged from EKG done  6/5/2022  Electronically Signed On 12- 06:46:33 PST by Chayito Allan MD           RADIOLOGY  I have independently interpreted the diagnostic imaging associated with this visit and am waiting the final reading from the radiologist.   My preliminary interpretation is as follows: Unremarkable for any hepatobiliary findings  Radiologist interpretation:   US-RUQ   Final Result         1. Increased renal cortical echogenicity, consistent with medical renal disease.   2. No acute process seen.            COURSE & MEDICAL DECISION MAKING        INITIAL ASSESSMENT, COURSE AND PLAN  Care Narrative: Patient here with history of cyclic vomiting, appears to be in a current cyclic vomiting attack.  Patient given Haldol.  Patient with associated carpopedal spasm and significant anxiety.  He will be given Benadryl and Ativan as well.  Patient abdominal exam with some mild tenderness, no rebound or guarding to suggest surgical pathology, pancreatitis is possible although less likely.  Patient will have basic labs checked for further restratification.  He does appear dehydrated, he is severely anxious and tachycardic.  Will give IV fluids.  Though patient was n.p.o., and we stressed the importance of him not eating or drinking anything while waiting for his results he continues to hide a Shannon soda in the room, and this had to be taken from him as he continued to check this  whenever nursing staff left the room.  Following Zofran and Ativan patient's anxiety and vomiting has improved significantly.  Patient right upper quadrant ultrasound was checked to evaluate for possible stone contributing to his symptoms as he has had some complaints of right upper quadrant pain.  Ultrasound is unremarkable.  Plan was to discharge patient as his labs were reassuring, heart rate improved significantly with a manav in the 90s, as I was planning on plan discharging patient with Zofran and Phenergan he had an episode of vomiting which did have significant amount of blood in it.  Likely sequelae of patient's erosive esophagitis, possibly Yasemin-Leonard tear.  Patient without any severe chest pain or severe pain otherwise to suggest esophageal rupture.  Patient without any history of cirrhosis, or alcohol abuse, will defer octreotide.  Patient remains with entirely benign abdominal exam, I do not believe that surgical process is likely and therefore CT deferred.  I discussed the case with APRN of gastroenterology who will discuss the case with her attending        DISPOSITION AND DISCUSSIONS  I have discussed management of the patient with the following physicians and KOFI's: Hospitalist, gastroenterology        Escalation of care considered, and ultimately not performed: Octreotide and CT were deferred, see above for reasoning    Barriers to care at this time, including but not limited to: Patient does not have established PCP.         FINAL DIAGNOSIS  1. Cyclic vomiting syndrome    2. Dehydration    3. Anxiety    4. Hematemesis with nausea

## 2023-12-09 NOTE — ED NOTES
Medication history reviewed with PT at bedside    Med rec is complete per PT reporting    Allergies reviewed.     Patient denies any outpatient antibiotics in the last 30 days.     Patient is not taking anticoagulants.    Preferred pharmacy for this visit - [facility] on [address] ([phone])

## 2023-12-09 NOTE — ED NOTES
Patient anxious, moving frequently on gurney, PIV infiltrated.  IVF stopped.  Patient updated on need for PIV, will place via US.

## 2023-12-09 NOTE — ED NOTES
Pt is now resting in bed. Calm. HR at 135 bpm. Administered Benadryl, Valium as instructed by ED MD.

## 2023-12-09 NOTE — ED NOTES
Bedside report received.  Assumed care of patient.  Patient resting on gurney, A&O x5, anxious and cooperative, communication board updated.  VS monitoring remains in place.  Patient does not require O2 to maintain SpO2 >91%, appropriate supplemental O2 available as needed.  Patient updated on POC, no questions, call light within reach, educated on use, verbalized understanding. IVF infusing in PIV at R AC.   All personal belongings wihtin reach; eunice in lowest position, will monitor frequently.

## 2023-12-09 NOTE — ANESTHESIA PREPROCEDURE EVALUATION
Case: [id number] Date/Time: 12/09/23 1433    Procedure: GASTROSCOPY    Location: [facility] OR 06 / SURGERY [facility]    Surgeons: Megan Sumner M.D.          28yoM with PMHx of psychogenic seizures, GERD, cyclic vomiting syndrome, anxiety, chronic benzo use, scoliosis    Allergies to tylenol, ibuprofen  +marijuana  NPO  No AC      Relevant Problems   PULMONARY   (positive) Aspiration pneumonia (HCC)      NEURO   (positive) Psychogenic nonepileptic seizure      CARDIAC   (positive) Abdominal migraine      GI   (positive) GERD (gastroesophageal reflux disease)         (positive) Acute renal failure (HCC)       Physical Exam    Airway   Mallampati: II       Cardiovascular - normal exam     Dental - normal exam           Pulmonary - normal exam     Abdominal - normal exam     Neurological - normal exam                   Anesthesia Plan    ASA 2       Plan - MAC               Induction: intravenous      Pertinent diagnostic labs and testing reviewed    Informed Consent:    Anesthetic plan and risks discussed with patient.

## 2023-12-09 NOTE — DISCHARGE INSTRUCTIONS
You must stop smoking marijuana, this is causing you a significant amount of issues, and is worsening your vomiting.  You can take the Vistaril I prescribed for your anxiety, I have represcribed with Zofran and Phenergan.  I would like you to follow-up with either [facility], [facility] [place] or [facility]

## 2023-12-09 NOTE — ED NOTES
Report to pre op RN for EGD, patient reports last oral intake 2 days ago.  Patient transported by Transport team to [facility] pre op per RN request.

## 2023-12-09 NOTE — ED NOTES
Multiple IV placement completed. New IV site applied. NS bolus in process. Anti anxiety med administered

## 2023-12-09 NOTE — ANESTHESIA POSTPROCEDURE EVALUATION
Patient: Grey Diop    Procedure Summary       Date: 12/09/23 Room / Location: [facility] 06 / SURGERY [facility]    Anesthesia Start: 1454 Anesthesia Stop: 1514    Procedure: GASTROSCOPY (Esophagus) Diagnosis: (gerd, mild tear ge junction)    Surgeons: Megan Sumner M.D. Responsible Provider: Dariel Michele M.D.    Anesthesia Type: MAC ASA Status: 2            Final Anesthesia Type: MAC  Last vitals  BP   Blood Pressure: 113/83    Temp   36.1 °C (97 °F)    Pulse   (!) 113   Resp   18    SpO2   97 %      Anesthesia Post Evaluation    Patient location during evaluation: PACU  Patient participation: complete - patient participated  Level of consciousness: awake and alert  Pain score: 2    Airway patency: patent  Anesthetic complications: no  Cardiovascular status: adequate and hemodynamically stable  Respiratory status: acceptable  Hydration status: acceptable    PONV: none          No notable events documented.     Nurse Pain Score: 2 (NPRS)

## 2023-12-09 NOTE — PROCEDURES
OPERATIVE REPORT    PATIENT:   Grey Diop   1995       PREOPERATIVE DIAGNOSES/INDICATIONS: GI bleeding     POSTOPERATIVE DIAGNOSIS:   GERD, grade B. Active inflammation. Mild tear. Some coffee ground, but no active bleeding.   No varix.   No other pathology.     PROCEDURE:  ESOPHAGOGASTRODUODENOSCOPY    PHYSICIAN:  Megan Sumner MD. MPH.    ANESTHESIA:  Per anesthesiologist or ICU/ED team.     LOCATION: [facility]    CONSENT:  OBTAINED. The risks, benefits and alternatives of the procedure were discussed in details. The risks include and are not limited to bleeding, infection, perforation, missed lesions, and sedations risks (cardiopulmonary compromise and allergic reaction to medications).    DESCRIPTION: The patient presented to the procedure room.  After routine checkup was performed, patient was brought into the endoscopy suite.  Patient was placed on his left lateral decubitus position. A bite block was placed in patient's mouth. Patient was sedated by anesthesia.  Vital signs were monitored throughout the procedure.  Oxygenation support was provided throughout the procedure. Upper endoscope was inserted into patient's mouth and advanced to the second portion of the duodenum under direct visualization.      Once the site was reached and examined, the upper endoscope was withdrawn.  Retroflexion was made within the stomach.  The stomach was decompressed, scope was withdrawn and the procedure was terminated.    The patient tolerated the procedure well.  There were no immediate complications.    OPERATIVE FINDINGS:  GERD, grade B. Active inflammation. Mild tear. Some coffee ground, but no active bleeding.   No varix.   No other pathology.     RECOMMENDATIONS:  PPI 40mg bid oral dose for 8 weeks then taper.    Consider outpatient GI referral to have follow up and repeat EGD.   Okay to resume diet slowly.   For vomiting, consider low dose ativan and hadol if needed as he reports this regimen worked well  last time.     GI sign off.       This note has been transcribed with digital voice recognition software and although it has been reviewed may contain grammatical or word errors

## 2023-12-09 NOTE — ED TRIAGE NOTES
"Chief Complaint   Patient presents with    Abdominal Pain     C/o ABDOMINAL PAIN LUQ/RUQ burning sensation 9/10. + vomiting + diarrhea. X 2 days, worsening today .        27 y/o male ambulatory to triage for above complaint. EKG completed   Pt in room 12    BP (!) 155/99   Pulse (!) 130   Temp 37.7 °C (99.8 °F) (Temporal)   Resp (!) 22   Ht 1.676 m (5' 6\")   Wt 77.1 kg (170 lb)   SpO2 98%   BMI 27.44 kg/m²     "

## 2023-12-09 NOTE — ED NOTES
Patient vomited 400 ml red/brown colored liquid emesis, reports feeling weak now, ERP made aware.

## 2023-12-09 NOTE — CONSULTS
Date of Consultation:  12/9/2023    Patient: : Grey Diop  MRN: [id number]    Referring Physician:  Dr. Jaspreet Onofre     GI:Megan Sumner M.D.     Reason for Consultation:   GI bleeding    History of Present Illness:   28 years old gentleman with medical history of nausea, vomiting, GI migraine, cyclic vomiting syndrome, receiving treatment from local GI office GI consultant at the [facility] in the past, recent admission for similar presentation, presented to GI inpatient service for nausea, vomiting, irregular bowel movement, possible upper GI bleeding.    Last upper GI scope simply about a year ago, esophagitis was noted.    Still uses marijuana from time to time.  Could not provide details about the last time of marijuana use.  Denies overuse of NSAID.    GI team saw the patient at emergency room bedside.  The vomitus still on the floor with small amount of blood.  Abdominal pain.  Headache.  No acute abdomen.  Very tired, not able to participate detailed interview.      Past Medical History:   Diagnosis Date    Abdominal migraine     Abdominal migraine     Anxiety     Benzodiazepine dependence (HCC)     Colitis     Cyclic vomiting syndrome     History of    Gastritis     Scoliosis          Past Surgical History:   Procedure Laterality Date    TIBIA NAILING INTRAMEDULLARY Left 9/7/2022    Procedure: INSERTION, INTRAMEDULLARY JONATHAN, TIBIA;  Surgeon: Ameya Kim M.D.;  Location: SURGERY [facility];  Service: Orthopedics    AR UPPER GI ENDOSCOPY,DIAGNOSIS N/A 5/17/2022    Procedure: GASTROSCOPY;  Surgeon: Bud Chowdhury M.D.;  Location: SURGERY SAME DAY [facility];  Service: Gastroenterology    GASTROSCOPY-ENDO  11/15/2014    Performed by Robert Taylor M.D. at ENDOSCOPY [facility] ORS    GASTROSCOPY  9/3/2014    Performed by Robert Taylor M.D. at SURGERY [facility] ORS    OTHER      hernia    OTHER ORTHOPEDIC SURGERY      Scoliosis     Constitutional: No fevers.  Eyes: No symptoms  reported.   Ears/Nose/Throat/Mouth: No choking reported.  Cardiovascular: No chest pain reported.   Respiratory: Denies shortness of breath.  Gastrointestinal/Hepatic: Per H/P.   Skin/Breast: No new rash reported.   Psychiatric: No complaints    HEENT: grossly normal.  Cardiovascular: Please refer to primary team's daily assessment.   Lungs: Please refer to primary team's daily assessment.   Abdomen: Some tenderness. But no evidence of acute abdomen.  Skin: No erythema, No rash.  Lower limbs: normal, no pitting edema.   Neurologic: Alert & oriented x 3, Normal motor function, No focal deficits noted.  PSY: stable mood.       History reviewed. No pertinent family history.    Social History     Socioeconomic History    Marital status: Single   Tobacco Use    Smoking status: Never    Smokeless tobacco: Never   Vaping Use    Vaping Use: Some days    Substances: THC   Substance and Sexual Activity    Alcohol use: No    Drug use: Yes     Types: Inhaled, Marijuana     Comment: THC daily    Sexual activity: Never           Physical Exam:  Vitals:    12/09/23 0846 12/09/23 0903 12/09/23 1001 12/09/23 1103   BP: 113/61 108/64 106/65 138/83   Pulse: (!) 106 (!) 110 (!) 107 (!) 127   Resp: 18 16 (!) 27 (!) 55   Temp:       TempSrc:       SpO2: 96% 94% 95% 96%   Weight:       Height:                 Labs:  Recent Labs     12/09/23  0508   WBC 13.1*   RBC 5.94   HEMOGLOBIN 17.1   HEMATOCRIT 47.4   MCV 79.8*   MCH 28.8   MCHC 36.1   RDW 34.7*   PLATELETCT 357   MPV 11.4     Recent Labs     12/09/23  0508   SODIUM 142   POTASSIUM 3.9   CHLORIDE 101   CO2 16*   GLUCOSE 111*   BUN 25*           Recent Labs     12/09/23  0508   ASTSGOT 21   ALTSGPT 16   TBILIRUBIN 0.7   ALKPHOSPHAT 67   GLOBULIN 3.6*         Imaging:  US-RUQ  Narrative: 12/9/2023 6:48 AM    HISTORY/REASON FOR EXAM:  Pain  Abdominal pain    TECHNIQUE/EXAM DESCRIPTION AND NUMBER OF VIEWS:  Real-time sonography of the liver and biliary tree.    COMPARISON:  None    FINDINGS:  LIVER: No focal liver lesion or biliary duct dilation. The liver measures 13.29 cm. The portal vein is patent with hepatopetal flow. The MPV measures 1.32 cm.    GALLBLADDER: No gallstone or gallbladder wall thickening. Negative sonographic Colon's sign.. Gallbladder wall thickness measures 1.70 mm.    COMMON BILE DUCT: Measures 5.40 mm.    PANCREAS: Visualized portion of the pancreas appears normal. Overall, pancreas not well-seen secondary to overlying bowel gas.    RIGHT KIDNEY: No renal stone or hydronephrosis. Increased cortical echogenicity. The right kidney measures 9.37 cm.    IVC:  Visualized portion of IVC is normal.    OTHER: There is no ascites.  Impression: 1. Increased renal cortical echogenicity, consistent with medical renal disease.  2. No acute process seen.            Impressions:  28 years old gentleman with medical history of nausea, vomiting, GI migraine, cyclic vomiting syndrome, receiving treatment from local GI office GI consultant at the [facility] in the past, recent admission for similar presentation, presented to GI inpatient service for nausea, vomiting, irregular bowel movement, possible upper GI bleeding.    More likely to be esophagitis bleeding or tear at the GE junction.  Peptic etiology including erosion, ulcer also possible.  Less likely to be AVM or varices or cancer bleeding.    Due to active GI symptoms and hematemesis, coffee-ground vomitus, GI team will offer upper GI scope today.    Based on the finding of upper GI scope, we will decide on next step of management.    Diagnosis: upper gastrointestinal bleeding.   Procedure: Esophagogastroduodenoscopy with bleeding control including banding.         This note was generated using voice recognition software which has a small chance of producing errors of grammar and possibly content. I have made every reasonable attempt to find and correct any obvious errors, but expect that some may not be found prior  to finalization of this note.

## 2023-12-10 VITALS
DIASTOLIC BLOOD PRESSURE: 83 MMHG | HEART RATE: 89 BPM | WEIGHT: 170 LBS | OXYGEN SATURATION: 95 % | SYSTOLIC BLOOD PRESSURE: 131 MMHG | HEIGHT: 66 IN | TEMPERATURE: 99 F | RESPIRATION RATE: 17 BRPM | BODY MASS INDEX: 27.32 KG/M2

## 2023-12-10 PROCEDURE — 700102 HCHG RX REV CODE 250 W/ 637 OVERRIDE(OP): Mod: UD

## 2023-12-10 PROCEDURE — A9270 NON-COVERED ITEM OR SERVICE: HCPCS | Mod: UD

## 2023-12-10 PROCEDURE — G0378 HOSPITAL OBSERVATION PER HR: HCPCS

## 2023-12-10 RX ORDER — LORAZEPAM 0.5 MG/1
0.5 TABLET ORAL ONCE
Status: COMPLETED | OUTPATIENT
Start: 2023-12-10 | End: 2023-12-10

## 2023-12-10 RX ADMIN — LORAZEPAM 0.5 MG: 0.5 TABLET ORAL at 03:49

## 2023-12-10 NOTE — PROGRESS NOTES
Patient admitted to room T436 via transport in [facility] from PACU at 1715.      Patient reports pain at 0 on a scale of 0-10. Educated patient regarding pharmacologic and non pharmacologic modalities for pain management. Oriented to room call light and smoking policy.  Reviewed plan of care (equipment, incentive spirometer, sequential compression devices, medications, activity, diet, fall precautions, skin care, and pain) with patient and family. Welcome packet given and reviewed with patient, all questions answered. Education provided on oral hygiene program.    A&Ox4. Denies CP/SOB.  Tolerating clear liquid diet. Denies N/V.  + void. Last BM PTA.  Pt ambulates with standby assist.  All needs met at this time. Call light within reach. Pt calls appropriately. Bed low and locked, non skid socks in place. Hourly rounding in place.

## 2023-12-10 NOTE — CARE PLAN
The patient is Stable - Low risk of patient condition declining or worsening    Shift Goals  Clinical Goals: post procedure vital signs,  Patient Goals: updates  Family Goals: na    Progress made toward(s) clinical / shift goals:  pt remained stable post procedure, care plan explained, pt verbalized understanding, no complaints of nausea,   Problem: Pain - Standard  Goal: Alleviation of pain or a reduction in pain to the patient’s comfort goal  Outcome: Progressing       Patient is not progressing towards the following goals:

## 2023-12-10 NOTE — PROGRESS NOTES
Grey Diop has chosen to leave the hospital against medical advice. The attending physician has not discharged the patient. The provider is aware that the patient is leaving against medical advice. Patient expresses understanding of the risks of leaving the hospital and benefits of admission including but not limited to, the availability and proximity of nurses, physicians, monitoring, diagnostic testing, treatment, and a safe discharge plan. The patient had the opportunity to ask questions about their medical condition and recommended treatment.  Patient is aware that they may return for care at any time.    IV line removed by pt, dressing applied,

## 2023-12-10 NOTE — PROGRESS NOTES
"Pt called this nurse and expressed his concerns about a possible procedure tomorrow as his diet was changed from clear liquid to Npo by GI team, pt seemed anxious sweaty, expressed increasing anxiety and inability to sleep then finally told this nurse that he wants to leave AMA, Therapeutic communication observed while talking to pt, explaines risks of LAMA and benefits of staying admitted, pt still insisting to go home as he cannot sleep saying\" I can't wait for the doctor I can't sleep might as well go home since I'm not getting any medications anyway, I can stay if you give me something to help me sleep\" informed Joshua Leal as well, and ordered for one time dose of ativan. Med given.   "

## 2023-12-10 NOTE — H&P
"Hospital Medicine History & Physical Note    Date of Service  12/9/2023    Primary Care Physician  Pcp Pt States None    Consultants  GI    Specialist Names: Dr Sumner    Code Status  Full Code    Chief Complaint  Chief Complaint   Patient presents with    Abdominal Pain     C/o ABDOMINAL PAIN LUQ/RUQ burning sensation 9/10. + vomiting + diarrhea. X 2 days, worsening today .        History of Presenting Illness  Grey Diop is a 28 y.o. male with a PMH of cyclic vomiting syndrome and anxiety who presented 12/9/2023 with nausea, vomiting and abdominal pain. Patient states he has been having significant retching and noted coffee ground emesis. He reports epigastric abdominal pain. He admits to ongoing marijuana use. He denies any fevers, chills or shortness of breath. GI was consulted and patient is planned to undergo EGD.    I discussed the plan of care with patient, bedside RN, and ERP .    Review of Systems  Review of Systems   Gastrointestinal:  Positive for abdominal pain, nausea and vomiting.       Past Medical History   has a past medical history of Abdominal migraine, Abdominal migraine, Anxiety, Benzodiazepine dependence (HCC), Colitis, Cyclic vomiting syndrome, Gastritis, and Scoliosis.    Surgical History   has a past surgical history that includes gastroscopy (9/3/2014); gastroscopy-endo (11/15/2014); other; other orthopedic surgery; pr upper gi endoscopy,diagnosis (N/A, 5/17/2022); and tibia nailing intramedullary (Left, 9/7/2022).     Family History  family history is not on file.   Family history reviewed with patient. There is no family history that is pertinent to the chief complaint.     Social History   reports that he has never smoked. He has never used smokeless tobacco. He reports current drug use. Drugs: Inhaled and Marijuana. He reports that he does not drink alcohol.    Allergies  Allergies   Allergen Reactions    Acetaminophen [Tylenol] Anaphylaxis and Nausea     Nausea, \"it just " "doesn't sit right\"  5/29/2021 patient states that his throat swells shut    Ibuprofen      rash  Tolerated toradol       Medications  Prior to Admission Medications   Prescriptions Last Dose Informant Patient Reported? Taking?   therapeutic multivitamin-minerals (THERAGRAN-M) Tab 3 DAYS AGO at [facility] Patient Yes Yes   Sig: Take 1 Tablet by mouth every day.      Facility-Administered Medications: None       Physical Exam  Temp:  [36.1 °C (97 °F)-37.7 °C (99.8 °F)] 37.2 °C (99 °F)  Pulse:  [] 91  Resp:  [16-55] 18  BP: (101-177)/() 101/57  SpO2:  [94 %-99 %] 95 %  Blood Pressure: 101/57   Temperature: 37.2 °C (99 °F)   Pulse: 91   Respiration: 18   Pulse Oximetry: 95 %       Physical Exam  Vitals and nursing note reviewed.   Constitutional:       General: He is not in acute distress.  HENT:      Head: Normocephalic.      Mouth/Throat:      Mouth: Mucous membranes are moist.   Eyes:      Pupils: Pupils are equal, round, and reactive to light.   Cardiovascular:      Rate and Rhythm: Normal rate and regular rhythm.      Pulses: Normal pulses.      Heart sounds: Normal heart sounds.   Pulmonary:      Effort: Pulmonary effort is normal.      Breath sounds: Normal breath sounds.   Abdominal:      Palpations: Abdomen is soft.      Tenderness: There is no abdominal tenderness.   Musculoskeletal:         General: No swelling.      Cervical back: Neck supple.   Skin:     General: Skin is warm.      Coloration: Skin is not jaundiced.   Neurological:      General: No focal deficit present.      Mental Status: He is alert and oriented to person, place, and time.   Psychiatric:         Mood and Affect: Mood normal.         Behavior: Behavior normal.         Laboratory:  Recent Labs     12/09/23  0508   WBC 13.1*   RBC 5.94   HEMOGLOBIN 17.1   HEMATOCRIT 47.4   MCV 79.8*   MCH 28.8   MCHC 36.1   RDW 34.7*   PLATELETCT 357   MPV 11.4     Recent Labs     12/09/23  0508   SODIUM 142   POTASSIUM 3.9   CHLORIDE 101   CO2 16* " "  GLUCOSE 111*   BUN 25*   CREATININE 1.13   CALCIUM 10.4     Recent Labs     12/09/23  0508   ALTSGPT 16   ASTSGOT 21   ALKPHOSPHAT 67   TBILIRUBIN 0.7   LIPASE 23   GLUCOSE 111*         No results for input(s): \"NTPROBNP\" in the last 72 hours.      No results for input(s): \"TROPONINT\" in the last 72 hours.    Imaging:  US-RUQ   Final Result         1. Increased renal cortical echogenicity, consistent with medical renal disease.   2. No acute process seen.            Assessment/Plan:  Justification for Admission Status  I anticipate this patient is appropriate for observation status at this time because      Patient will need a Med/Surg bed on MEDICAL service .  The need is secondary to .    * Hematemesis- (present on admission)  Assessment & Plan  Patient underwent EGD that revealed GERD, grade B. Active inflammation. Mild tear. Some coffee ground, but no active bleeding.   Clear liquid diet  PPI BID  Monitor CBC    Marijuana use- (present on admission)  Assessment & Plan  Counseled on cessation     GERD (gastroesophageal reflux disease)- (present on admission)  Assessment & Plan  PPI BID    Cyclic vomiting syndrome- (present on admission)  Assessment & Plan  IVF hydration  Zofran prn        VTE prophylaxis: SCDs/TEDs  "

## 2023-12-10 NOTE — ASSESSMENT & PLAN NOTE
PATIENT NAME: NOEL MIKE

: 1994

GENDER: MALE

MRN: [id number]

VISIT DATE: 2021

DISCHARGE DATE: 21 1621

VISIT LOCKED DATE TIME: 

PHYSICIAN: HALEY POTTER MD

RESOURCE: HALEY POTTER MD

 

           

           

REASON FOR APPOINTMENT

           

          1. PRE SEDATE FOR SPINAL TAP

           

HISTORY OF PRESENT ILLNESS

           

      GENERAL:

           26-YEAR-OLD MALE PATIENT WITH A HISTORY OF CHRONIC MIGRAINES. HE

          HAS BEEN SUFFERING FORM THIS FOR MANY YEARS. HE HAS A PAST

          HISTORY OF SPINAL MENINGITIS AND HE IS FOLLOWED BY DR. FLORES. HE

          HAS BEEN REFERRED TO OUR FACILITY FOR A SPINAL TAP TO RULE OUT

          MULTIPLE SCLEROSIS. THE PATIENT REPORTS HISTORY OF BACK PAIN.

           

      FALL RISK SCREENING:

      SCREENING

           

           

          :NO FALLS REPORTED IN THE LAST YEAR

           

      PAIN SCREENING:

      PATIENT HAS A COMPLAINT OF ACUTE OR CHRONIC PAIN

           

           

          :YES

          LOCATION OF PAIN:HEAD, UPPER BACK, MID BACK, LOW BACK

          INTENSITY OF PAIN (SCALE OF 1 TO 10):4

          WHAT DOES YOUR PAIN FEEL LIKE:ACHING, BURNING

          DURATION:CONTINOUS, CONSTANT

          PAIN IS INCREASED BY:ACTIVITIES

          PAIN IS DECREASED BY:USE OF PAIN MEDICATIONS

           

      NURSING NOTE:

           - - -.

           

      PAIN CENTER INTAKE QUESTIONS:

      DO YOU HAVE A HISTORY OF MRSA?

           

           

          :NO

           

      DO YOU TAKE A BLOOD THINNERS?

           

           

          :NO

           

      DO YOU HAVE ANY BLEEDING DISORDERS?

           

           

          :NO

           

      ANY NEW NUMBNESS OR WEAKNESS IN YOUR LEGS OR ARMS?

           

           

          :NO

           

      ANY PACEMAKER,DEFIBRILLATOR, OR DORSAL COLUMN

          STIMULATOR?

           

           

          :NO

           

      DO YOU HAVE ANY RASHES OR OPEN SORES?

           

           

          :NO

           

      ARE YOU ALLERGIC TO IV DYE?

           

           

          :NO

           

      ARE YOU DIABETIC?

           

           

          :NO

           

      ANY NEW PROBLEMS WITH YOUR MEDICATIONS?

           

           

          :NO

           

      HAVE YOU RECEIVED A VACCINE IN THE PAST 30 DAYS?

           

           

          :NO

           

      DO YOU PLAN TO RECEIVE A VACCINE IN THE NEXT 21

          DAYS?

           

           

          :NO

           

      DO YOU NEED ANY PRESCRIPTION?

           

           

          :NO

           

      DO YOU TAKE ANY IMMUNOSUPPRESSIVE MEDICATIONS?

           

           

          :NO

           

CURRENT MEDICATIONS

           

          TAKING TOPAMAX 50 MG TABLET 1 TABLET ORALLY ONCE A DAY

          TAKING EXCEDRIN MIGRAINE 250-250-65 MG TABLET 2 TABLETS ORALLY

          ONCE A DAY

          MEDICATION LIST REVIEWED AND RECONCILED WITH THE PATIENT

           

PAST MEDICAL HISTORY

           

          SPINAL MENINGITIS

          MIGRAINES

           

ALLERGIES

           

          N.K.D.A.

           

SURGICAL HISTORY

           

          OBJECT REMOVED FROM EAR AS A CHILD

           

FAMILY HISTORY

           

          FATHER: 

          MOTHER: ALIVE

          2 BROTHER(S) .

           

SOCIAL HISTORY

           

          GENERAL:

           

          TOBACCO USE

          ARE YOU A:CURRENT SMOKER

          ARE YOU INTERESTED IN QUITTING?NOT READY TO QUIT

          COUNSELED THE PATIENT ON SMOKING EFFECTS, EDUCATION

          [id number]/09/2021

          HOW MANY CIGARETTES A DAY DO YOU SMOKE?-20

           

           

          LATEX QUESTIONNAIRE

          LATEX ALLERGY : HAVE YOU EVER DEVELOPED ANY TYPE OF REACTION

          AFTER HANDLING LATEX PRODUCTS SUCH AS RUBBER GLOVES, CONDOMS,

          DIAPHRAGMS, BALLOONS, SOCKS, OR UNDERWEAR?NO

          LATEX ALLERGY : HAVE YOU EVER DEVELOPED ANY TYPE OF REACTION

          DURING OR AFTER DENTAL APPOINTMENT, VAGINAL/RECTAL EXAMINATION,

          SURGICAL PROCEDURE, OR ANY OTHER EXPOSURE?NO

          LATEX RISK : HAVE YOU EVER HAD ANY DIFFICULTY BREATHING OR HIVES

          AFTER EATING OR HANDLING ANY FRUITS, OR VEGETABLES; SUCH AS KIWI,

          BANANAS, STONE FRUITS, OR CHESTNUTSNO

          LATEX RISK : DO YOU HAVE A PREVIOUS PERSONAL HISTORY OF MORE THAN

          NINE SURGERIES, SPINA BIFIDA, OR REPEATED CATHERIZATIONS? NO

          LATEX RISK : ARE YOU FREQUENTLY EXPOSED TO LATEX PRODUCTS IN YOUR

          OCCUPATION?NO

          DATE ASKED : 2021

           

           

          ALCOHOL SCREENING

          DID YOU HAVE A DRINK CONTAINING ALCOHOL IN THE PAST YEAR?YES

          HOW OFTEN DID YOU HAVE A DRINK CONTAINING ALCOHOL IN THE PAST

          YEAR?MONTHLY OR LESS (1 POINT)

          HOW MANY DRINKS DID YOU HAVE ON A TYPICAL DAY WHEN YOU WERE

          DRINKING IN THE PAST YEAR?1 OR 2 (0 POINTS)

          POINTS1

          INTERPRETATIONNEGATIVE

           

           

          RECREATIONAL DRUG USE

          DRUG USE?YES

          HOW OFTEN AND HOW MUCH? MARIJUANA, DAILY

           

           

          LEARNING BARRIERS / SPECIAL NEEDS

          BARRIERS TO LEARNING?NO

          HEARING IMPAIRED?NO

          VISION IMPAIRED?YES

          :CORRECTIVE LENSES

          COGNITIVELY IMPAIRED?NO

          READINESS TO LEARN?YES

          LEARNING PREFERENCES?NO

          LEARNING CAPABILITIES PRESENT?YES

          EMOTIONAL BARRIERS?NO

          SPECIAL DEVICES?NO

           

           

          DOMESTIC VIOLENCE

          DO YOU FEEL SAFE IN YOUR ENVIRONMENT?YES

           

HOSPITALIZATION/MAJOR DIAGNOSTIC PROCEDURE

           

          EAR SURGERY

          SPINAL MENINGITIS

           

REVIEW OF SYSTEMS

           

          GLAUCOMA: NOTHYROID DISEASE: NOHYPERTENSION: NOHEART DISEASE:

          NOLUNG DISEASE: NODIABETES: NOGI DISEASE: NO LIVER DISEASE: NO

          KIDNEY DISEASE: NOSTERIOD USE: NONEUROLOGICAL DISEASE: YES,

          HISTORY OF MENINGITIS AND MIGRAINESBACK PROBLEMS: YES,

          PAINEXTREMITIES: NOGENITOURINARY: NOBLEEDING DISORDER: NOASA

          CLASS: [id number] CLASS: II.

           

VITAL SIGNS

           

          .8 LBS, HT 60 IN, BMI 39.02 INDEX, /75 MM HG, HR 80

          /MIN, RR 18 /MIN, TEMP 98.8 F, OXYGEN SAT % 98%, SAFE IN ENV?

          (Y/N) Y, NA INITIALS AW 1509, REVIEWED BY: EM.

           

EXAMINATION

           

      GENERAL EXAMINATION: THE PATIENT IS ALERT, ORIENTED

          TIMES THREE AND COOPERATIVE. LUNGS ARE CLEAR TO AUSCULTATION.

          HEART SHOWS REGULAR RHYTHM, NO MURMURS AND NO GALLOPS. THE

          PATIENT'S CHART HAS SOME NOTES FROM DR. FLORES. I ALSO HAVE A MRI OF

          THE BRAIN THAT SHOWS AN UNCINATE SPUR AT C3-C4.

           

ASSESSMENTS

           

          MULTIPLE SCLEROSIS - G35 (PRIMARY), RULE OUT

           

          CHRONIC MIGRAINE - G43.709

           

TREATMENT

           

      MULTIPLE SCLEROSIS

          MEDICATION: FENTANYL CITRATE 50MCG IV (ORDERED FOR 03/10/2021)

          MEDICATION: VERSED 1MG IV (MIDAZOLAM) (ORDERED FOR 03/10/2021)

          OXYGEN AT 2 LITERS PER NASAL CANNULA (ORDERED FOR 03/10/2021)

          IV LACTATED RINGER'S AT KVO (ORDERED FOR 03/10/2021)

          CLINICAL NOTES: I DISCUSSED ALTERNATIVES WITH MR. MIKE. WE AGREE

          ON DOING A SPINAL TAP WITH IV SEDATION DUE TO ANXIETY AND

          DISCOMFORT ASSOCIATED WITH THE PROCEDURE. THE PATIENT REPORTS

          UNDERSTANDING AND AGREES WITH THE PLAN. I, MIKA VEGA,

          DOCUMENTED THE ABOVE INFORMATION ACTING AS A SCRIBE FOR DR. POTTER. I HAVE REVIEWED THE ABOVE DOCUMENT, WRITTEN BY MIKA VEGA, MEDICAL SCRIBE, AND I VERIFY THAT IT IS ACCURATE.

           

PROCEDURE CODES

           

           ESTABILISHED PATIENT [facility] CHARGE

           

          [id number] OFFICE/OUTPATIENT VISIT EST

           

DISPOSITION & COMMUNICATION

           

FOLLOW UP

           

          OKAY TO BOOK (REASON: SPINAL TAP)

           

 

ELECTRONICALLY SIGNED BY HALEY POTTER MD, MD ON

          02/10/2021 AT 01:12 PM EST

           

           

           

 

DISCLAIMER :

THIS IS A VISIT SUMMARY EXTRACTED FROM THE [employer] CHART.

IT IS NOT A COPY OF THE [employer] PROGRESS NOTE.

CLAUDE PPI BID

## 2023-12-10 NOTE — OR NURSING
Patient denies pain and nausea. Patient states he is prepared to be transferred to his room. Patient's mom updated via phone. VSS. Report provided to Yaquelin Gonzalez RN. Patient transported off unit with transport on room air @ 95%. All personal belongings transported with patient.

## 2023-12-11 ENCOUNTER — PATIENT OUTREACH (OUTPATIENT)
Dept: SCHEDULING | Facility: IMAGING CENTER | Age: 28
End: 2023-12-11
Payer: COMMERCIAL

## 2024-01-19 ENCOUNTER — HOSPITAL ENCOUNTER (EMERGENCY)
Facility: MEDICAL CENTER | Age: 29
End: 2024-01-19
Attending: EMERGENCY MEDICINE
Payer: COMMERCIAL

## 2024-01-19 VITALS
BODY MASS INDEX: 24.57 KG/M2 | DIASTOLIC BLOOD PRESSURE: 79 MMHG | SYSTOLIC BLOOD PRESSURE: 128 MMHG | HEIGHT: 67 IN | RESPIRATION RATE: 15 BRPM | WEIGHT: 156.53 LBS | HEART RATE: 86 BPM | TEMPERATURE: 97.8 F | OXYGEN SATURATION: 93 %

## 2024-01-19 DIAGNOSIS — R11.2 NAUSEA AND VOMITING, UNSPECIFIED VOMITING TYPE: ICD-10-CM

## 2024-01-19 DIAGNOSIS — R10.84 GENERALIZED ABDOMINAL PAIN: ICD-10-CM

## 2024-01-19 DIAGNOSIS — R11.15 CYCLIC VOMITING SYNDROME: ICD-10-CM

## 2024-01-19 LAB
ALBUMIN SERPL BCP-MCNC: 5.4 G/DL (ref 3.2–4.9)
ALBUMIN/GLOB SERPL: 1.3 G/DL
ALP SERPL-CCNC: 69 U/L (ref 30–99)
ALT SERPL-CCNC: 20 U/L (ref 2–50)
ANION GAP SERPL CALC-SCNC: 20 MMOL/L (ref 7–16)
AST SERPL-CCNC: 28 U/L (ref 12–45)
BASOPHILS # BLD AUTO: 0.1 % (ref 0–1.8)
BASOPHILS # BLD: 0.01 K/UL (ref 0–0.12)
BILIRUB SERPL-MCNC: 0.5 MG/DL (ref 0.1–1.5)
BUN SERPL-MCNC: 56 MG/DL (ref 8–22)
CALCIUM ALBUM COR SERPL-MCNC: 9.3 MG/DL (ref 8.5–10.5)
CALCIUM SERPL-MCNC: 10.4 MG/DL (ref 8.5–10.5)
CHLORIDE SERPL-SCNC: 99 MMOL/L (ref 96–112)
CO2 SERPL-SCNC: 22 MMOL/L (ref 20–33)
CREAT SERPL-MCNC: 1.32 MG/DL (ref 0.5–1.4)
EOSINOPHIL # BLD AUTO: 0 K/UL (ref 0–0.51)
EOSINOPHIL NFR BLD: 0 % (ref 0–6.9)
ERYTHROCYTE [DISTWIDTH] IN BLOOD BY AUTOMATED COUNT: 37 FL (ref 35.9–50)
GFR SERPLBLD CREATININE-BSD FMLA CKD-EPI: 76 ML/MIN/1.73 M 2
GLOBULIN SER CALC-MCNC: 4.2 G/DL (ref 1.9–3.5)
GLUCOSE SERPL-MCNC: 164 MG/DL (ref 65–99)
HCT VFR BLD AUTO: 49.4 % (ref 42–52)
HGB BLD-MCNC: 17.6 G/DL (ref 14–18)
IMM GRANULOCYTES # BLD AUTO: 0.07 K/UL (ref 0–0.11)
IMM GRANULOCYTES NFR BLD AUTO: 0.6 % (ref 0–0.9)
LIPASE SERPL-CCNC: 26 U/L (ref 11–82)
LYMPHOCYTES # BLD AUTO: 0.84 K/UL (ref 1–4.8)
LYMPHOCYTES NFR BLD: 6.8 % (ref 22–41)
MCH RBC QN AUTO: 28.9 PG (ref 27–33)
MCHC RBC AUTO-ENTMCNC: 35.6 G/DL (ref 32.3–36.5)
MCV RBC AUTO: 81 FL (ref 81.4–97.8)
MONOCYTES # BLD AUTO: 1.06 K/UL (ref 0–0.85)
MONOCYTES NFR BLD AUTO: 8.6 % (ref 0–13.4)
NEUTROPHILS # BLD AUTO: 10.31 K/UL (ref 1.82–7.42)
NEUTROPHILS NFR BLD: 83.9 % (ref 44–72)
NRBC # BLD AUTO: 0 K/UL
NRBC BLD-RTO: 0 /100 WBC (ref 0–0.2)
PLATELET # BLD AUTO: 383 K/UL (ref 164–446)
PMV BLD AUTO: 11.2 FL (ref 9–12.9)
POTASSIUM SERPL-SCNC: 3.8 MMOL/L (ref 3.6–5.5)
PROT SERPL-MCNC: 9.6 G/DL (ref 6–8.2)
RBC # BLD AUTO: 6.1 M/UL (ref 4.7–6.1)
SODIUM SERPL-SCNC: 141 MMOL/L (ref 135–145)
WBC # BLD AUTO: 12.3 K/UL (ref 4.8–10.8)

## 2024-01-19 PROCEDURE — 85025 COMPLETE CBC W/AUTO DIFF WBC: CPT

## 2024-01-19 PROCEDURE — 99284 EMERGENCY DEPT VISIT MOD MDM: CPT

## 2024-01-19 PROCEDURE — 700105 HCHG RX REV CODE 258: Mod: UD | Performed by: EMERGENCY MEDICINE

## 2024-01-19 PROCEDURE — 96374 THER/PROPH/DIAG INJ IV PUSH: CPT

## 2024-01-19 PROCEDURE — 700111 HCHG RX REV CODE 636 W/ 250 OVERRIDE (IP): Mod: JZ,UD | Performed by: EMERGENCY MEDICINE

## 2024-01-19 PROCEDURE — 36415 COLL VENOUS BLD VENIPUNCTURE: CPT

## 2024-01-19 PROCEDURE — 80053 COMPREHEN METABOLIC PANEL: CPT

## 2024-01-19 PROCEDURE — 83690 ASSAY OF LIPASE: CPT

## 2024-01-19 RX ORDER — ONDANSETRON 2 MG/ML
4 INJECTION INTRAMUSCULAR; INTRAVENOUS ONCE
Status: COMPLETED | OUTPATIENT
Start: 2024-01-19 | End: 2024-01-19

## 2024-01-19 RX ORDER — SODIUM CHLORIDE 9 MG/ML
2000 INJECTION, SOLUTION INTRAVENOUS ONCE
Status: COMPLETED | OUTPATIENT
Start: 2024-01-19 | End: 2024-01-19

## 2024-01-19 RX ORDER — ONDANSETRON 4 MG/1
4 TABLET, ORALLY DISINTEGRATING ORAL EVERY 6 HOURS PRN
Qty: 10 TABLET | Refills: 0 | Status: SHIPPED | OUTPATIENT
Start: 2024-01-19

## 2024-01-19 RX ORDER — DIPHENHYDRAMINE HYDROCHLORIDE 50 MG/ML
50 INJECTION INTRAMUSCULAR; INTRAVENOUS ONCE
Status: DISCONTINUED | OUTPATIENT
Start: 2024-01-19 | End: 2024-01-19 | Stop reason: HOSPADM

## 2024-01-19 RX ADMIN — ONDANSETRON 4 MG: 2 INJECTION INTRAMUSCULAR; INTRAVENOUS at 14:17

## 2024-01-19 RX ADMIN — SODIUM CHLORIDE 2000 ML: 9 INJECTION, SOLUTION INTRAVENOUS at 14:16

## 2024-01-19 ASSESSMENT — FIBROSIS 4 INDEX: FIB4 SCORE: 0.38

## 2024-01-19 NOTE — ED TRIAGE NOTES
Pt amb to triage.  Chief Complaint   Patient presents with    N/V    Abdominal Pain     Symptoms x3d. Hx of cyclic vomiting.  No relief w/ home meds.

## 2024-01-19 NOTE — ED PROVIDER NOTES
"ED Provider Note    CHIEF COMPLAINT  Chief Complaint   Patient presents with    N/V    Abdominal Pain       EXTERNAL RECORDS REVIEWED  H&P 12/9/2023, admitted with abdominal pain and vomiting  Endoscopy 12/9/2023, GERD, no varix,    HPI/ROS    Grey Diop is a 28 y.o. male who presents for evaluation of vomiting and generalized abdominal pain.  Has a history of cyclic vomiting as well as benzodiazepine dependence and anxiety.  Nonfocal abdominal pain, this episode is just like all prior.  Has undergone endoscopy with no significant pathology identified.  He reports that \"I'll get right to the robby, I need IV fluids, Zofran, Benadryl and Ativan\"    PAST MEDICAL HISTORY   has a past medical history of Abdominal migraine, Abdominal migraine, Anxiety, Benzodiazepine dependence (HCC), Colitis, Cyclic vomiting syndrome, Gastritis, and Scoliosis.    SURGICAL HISTORY   has a past surgical history that includes gastroscopy (9/3/2014); gastroscopy-endo (11/15/2014); other; other orthopedic surgery; upper gi endoscopy,diagnosis (N/A, 5/17/2022); tibia nailing intramedullary (Left, 9/7/2022); and upper gi endoscopy,diagnosis (N/A, 12/9/2023).    FAMILY HISTORY  No family history on file.    SOCIAL HISTORY  Social History     Tobacco Use    Smoking status: Never    Smokeless tobacco: Never   Vaping Use    Vaping Use: Some days    Substances: THC   Substance and Sexual Activity    Alcohol use: No    Drug use: Yes     Types: Inhaled, Marijuana     Comment: THC daily    Sexual activity: Never       CURRENT MEDICATIONS  Home Medications       Reviewed by Antionette Desai R.N. (Registered Nurse) on 01/19/24 at 1217  Med List Status: Partial     Medication Last Dose Status   hydrOXYzine HCl (ATARAX) 50 MG Tab  Active   ondansetron (ZOFRAN ODT) 4 MG TABLET DISPERSIBLE  Active   promethazine (PROMETHEGAN) 25 MG Suppos  Active   therapeutic multivitamin-minerals (THERAGRAN-M) Tab  Active                    ALLERGIES  Allergies " "  Allergen Reactions    Acetaminophen [Tylenol] Anaphylaxis and Nausea     Nausea, \"it just doesn't sit right\"  5/29/2021 patient states that his throat swells shut    Ibuprofen      rash  Tolerated toradol       PHYSICAL EXAM  VITAL SIGNS: BP (!) 133/100   Pulse 96   Temp 36.4 °C (97.5 °F) (Temporal)   Resp 14   Ht 1.702 m (5' 7\")   Wt 71 kg (156 lb 8.4 oz)   SpO2 92%   BMI 24.52 kg/m²    Constitutional: Awake, alert, anxious appearing.  HENT: Normocephalic, no obvious evidence of acute trauma.  Dry mucous membranes  Eyes: No scleral icterus. Normal conjunctiva   Thorax & Lungs: Normal nonlabored respirations. I appreciate no wheezing, rhonchi or rales. There is normal air movement.  Upon cardiac ascultation I appreciate a regular heart rhythm and a normal rate.   Abdomen: The abdomen is not visibly distended.  Moderate generalized tenderness with no focality, no rebound or guarding  Skin: The exposed portions of skin reveal no obvious rash or other abnormalities.      DIAGNOSTIC STUDIES / PROCEDURES    LABS  Results for orders placed or performed during the hospital encounter of 01/19/24   CBC WITH DIFFERENTIAL   Result Value Ref Range    WBC 12.3 (H) 4.8 - 10.8 K/uL    RBC 6.10 4.70 - 6.10 M/uL    Hemoglobin 17.6 14.0 - 18.0 g/dL    Hematocrit 49.4 42.0 - 52.0 %    MCV 81.0 (L) 81.4 - 97.8 fL    MCH 28.9 27.0 - 33.0 pg    MCHC 35.6 32.3 - 36.5 g/dL    RDW 37.0 35.9 - 50.0 fL    Platelet Count 383 164 - 446 K/uL    MPV 11.2 9.0 - 12.9 fL    Neutrophils-Polys 83.90 (H) 44.00 - 72.00 %    Lymphocytes 6.80 (L) 22.00 - 41.00 %    Monocytes 8.60 0.00 - 13.40 %    Eosinophils 0.00 0.00 - 6.90 %    Basophils 0.10 0.00 - 1.80 %    Immature Granulocytes 0.60 0.00 - 0.90 %    Nucleated RBC 0.00 0.00 - 0.20 /100 WBC    Neutrophils (Absolute) 10.31 (H) 1.82 - 7.42 K/uL    Lymphs (Absolute) 0.84 (L) 1.00 - 4.80 K/uL    Monos (Absolute) 1.06 (H) 0.00 - 0.85 K/uL    Eos (Absolute) 0.00 0.00 - 0.51 K/uL    Baso (Absolute) " 0.01 0.00 - 0.12 K/uL    Immature Granulocytes (abs) 0.07 0.00 - 0.11 K/uL    NRBC (Absolute) 0.00 K/uL   COMP METABOLIC PANEL   Result Value Ref Range    Sodium 141 135 - 145 mmol/L    Potassium 3.8 3.6 - 5.5 mmol/L    Chloride 99 96 - 112 mmol/L    Co2 22 20 - 33 mmol/L    Anion Gap 20.0 (H) 7.0 - 16.0    Glucose 164 (H) 65 - 99 mg/dL    Bun 56 (H) 8 - 22 mg/dL    Creatinine 1.32 0.50 - 1.40 mg/dL    Calcium 10.4 8.5 - 10.5 mg/dL    Correct Calcium 9.3 8.5 - 10.5 mg/dL    AST(SGOT) 28 12 - 45 U/L    ALT(SGPT) 20 2 - 50 U/L    Alkaline Phosphatase 69 30 - 99 U/L    Total Bilirubin 0.5 0.1 - 1.5 mg/dL    Albumin 5.4 (H) 3.2 - 4.9 g/dL    Total Protein 9.6 (H) 6.0 - 8.2 g/dL    Globulin 4.2 (H) 1.9 - 3.5 g/dL    A-G Ratio 1.3 g/dL   LIPASE   Result Value Ref Range    Lipase 26 11 - 82 U/L   ESTIMATED GFR   Result Value Ref Range    GFR (CKD-EPI) 76 >60 mL/min/1.73 m 2      COURSE & MEDICAL DECISION MAKING    ED Observation Status? No; Patient does not meet criteria for ED Observation.     INITIAL ASSESSMENT, COURSE AND PLAN  Care Narrative: 28-year-old cyclic vomiting comes in with abdominal pain and vomiting for several days, has a tender abdomen but really no evidence of peritonitis, minimally tachycardic and having some evidence of dehydration on exam.  Workup reveals a minimal elevation in his WBC, normal lipase, normal LFTs.  He does have an elevation in his BUN concerning for dehydration.  Plan will be to administer a couple liters of IV fluids.  I will treat him with Zofran and Benadryl.  He specifically requests Ativan but I do not think that would be an appropriate treatment for his vomiting.  There is documentation that he does have a benzodiazepine dependency and I would hate to contribute to any abuse potential with regards to the benzodiazepines.  I did offer alternative treatments like Haldol which she received in the past, he reports that that made him feel horrible and does not want to be treated with  "that again.    4:16 PM patient is reevaluated.  Has not vomited at all.  IV fluids are still infusing.  He repeatedly request Ativan.  He refused the Benadryl earlier stating that he has to have Ativan with that or also makes him feel worse.  At this point, I do long conversation with him that would not be treating him with any euphorigenic type medications including Ativan, I informed him that that is not appropriate treatment for vomiting.  He attempted further bargaining, will continue with the plan of this IV fluids with the Zofran he received    Patient reevaluated just at discharge, and still no vomiting, ambulating independently.  Discharged home in stable condition  /79   Pulse 86   Temp 36.6 °C (97.8 °F)   Resp 15   Ht 1.702 m (5' 7\")   Wt 71 kg (156 lb 8.4 oz)   SpO2 93%   BMI 24.52 kg/m²   Prescription for Zofran      FINAL DIAGNOSIS  1. Nausea and vomiting, unspecified vomiting type    2. Generalized abdominal pain    3. Cyclic vomiting syndrome           Electronically signed by: Gurpreet Gotti M.D., 1/19/2024 1:50 PM      "

## 2024-01-19 NOTE — ED NOTES
Patient refusing benadryl without Ativan at the same time. Patient states benadryl makes his anxious.

## 2024-01-20 NOTE — ED NOTES
Patient updated on POC. Fluids repositioned to infuse faster. Patient aware Ativan is not ordered.

## 2024-02-07 NOTE — ASSESSMENT & PLAN NOTE
Patient underwent EGD that revealed GERD, grade B. Active inflammation. Mild tear. Some coffee ground, but no active bleeding.   Clear liquid diet  PPI BID  Monitor CBC   What Type Of Note Output Would You Prefer (Optional)?: Standard Output What Is The Reason For Today's Visit?: Full Body Skin Examination What Is The Reason For Today's Visit? (Being Monitored For X): concerning skin lesions on an annual basis

## 2025-03-10 NOTE — ED TRIAGE NOTES
"Chief Complaint   Patient presents with   • Other     Pt directly roomed form triage. Pt exhibiting shaking of extremities. EDP called to bedside. Pt is redirectable AAOx4 and answering questions. POC blood glucose 136  EKG completed.  Pt requsting xanax script   BP (!) 169/83   Pulse (!) 128   Temp 37 °C (98.6 °F) (Temporal)   Resp 12   Ht 1.6 m (5' 3\")   Wt 63.5 kg (140 lb)   SpO2 97%   BMI 24.80 kg/m²     "
How Severe Is Your Skin Lesion?: moderate
Has Your Skin Lesion Been Treated?: not been treated
Is This A New Presentation, Or A Follow-Up?: Skin Lesion

## 2025-05-22 NOTE — PROGRESS NOTES
Post fall paperwork was filled out and given to charge RN. Post fall assessment filled out in Epic. Pharmacy called and notified of situation. Patient did not want mother called. Note left about the fall on the summary page of Epic. Midas filled out. Fall precautions placed. Call light within reach. Bed locked in lowest position. Will continue to monitor patient.    alert

## (undated) DEVICE — GLOVE BIOGEL PI INDICATOR SZ 8.0 SURGICAL PF LF -(50/BX 4BX/CA)

## (undated) DEVICE — PADDING CAST 6 IN STERILE - 6 X 4 YDS (24/CA)

## (undated) DEVICE — DRILL BIT LOCKING SHORT 3.1X216MM

## (undated) DEVICE — BLADE SURGICAL #10 - (50/BX)

## (undated) DEVICE — TOWEL STOP TIMEOUT SAFETY FLAG (40EA/CA)

## (undated) DEVICE — GOWN SURGEONS X-LARGE - DISP. (30/CA)

## (undated) DEVICE — SODIUM CHL IRRIGATION 0.9% 1000ML (12EA/CA)

## (undated) DEVICE — TUBE CONNECTING SUCTION - CLEAR PLASTIC STERILE 72 IN (50EA/CA)

## (undated) DEVICE — KIT CUSTOM PROCEDURE SINGLE FOR ENDO  (15/CA)

## (undated) DEVICE — GLOVE BIOGEL SZ 7 SURGICAL PF LTX - (50PR/BX 4BX/CA)

## (undated) DEVICE — DRAPE C ARMOR (12EA/CA)

## (undated) DEVICE — MASK PANORAMIC OXYGEN PRO2 (30EA/CA)

## (undated) DEVICE — SUTURE GENERAL

## (undated) DEVICE — SET EXTENSION WITH 2 PORTS (48EA/CA) ***PART #2C8610 IS A SUBSTITUTE*****

## (undated) DEVICE — LACTATED RINGERS INJ 1000 ML - (14EA/CA 60CA/PF)

## (undated) DEVICE — SET LEADWIRE 5 LEAD BEDSIDE DISPOSABLE ECG (1SET OF 5/EA)

## (undated) DEVICE — GLOVE SZ 8 BIOGEL PI MICRO - PF LF (50PR/BX)

## (undated) DEVICE — KIT ANESTHESIA W/CIRCUIT & 3/LT BAG W/FILTER (20EA/CA)

## (undated) DEVICE — SENSOR OXIMETER ADULT SPO2 RD SET (20EA/BX)

## (undated) DEVICE — FORCEP RADIAL JAW 4 STANDARD CAPACITY W/NEEDLE 240CM (40EA/BX)

## (undated) DEVICE — TUBING CLEARLINK DUO-VENT - C-FLO (48EA/CA)

## (undated) DEVICE — DRAPE 36X28IN RAD CARM BND BG - (25/CA) O

## (undated) DEVICE — FILM CASSETTE ENDO

## (undated) DEVICE — SUTURE 3-0 ETHILON FSLX 30 (36PK/BX)"

## (undated) DEVICE — DRAPE LARGE 3 QUARTER - (20/CA)

## (undated) DEVICE — GOWN WARMING STANDARD FLEX - (30/CA)

## (undated) DEVICE — SUTURE 0 VICRYL PLUS CT-1 - 36 INCH (36/BX)

## (undated) DEVICE — SUTURE 2-0 VICRYL PLUS CT-1 36 (36PK/BX)"

## (undated) DEVICE — BIT DRILL DIA2.5MM FOR AXSOS 3 TITANIUM LOCKING PLATE SYSTEM

## (undated) DEVICE — CANISTER SUCTION RIGID RED 1500CC (40EA/CA)

## (undated) DEVICE — GOWN SURGICAL XX-LARGE - (28EA/CA) SIRUS NON REINFORCED

## (undated) DEVICE — PACK LOWER EXTREMITY - (2/CA)

## (undated) DEVICE — CANISTER SUCTION 3000ML MECHANICAL FILTER AUTO SHUTOFF MEDI-VAC NONSTERILE LF DISP  (40EA/CA)

## (undated) DEVICE — GLOVE BIOGEL PI INDICATOR SZ 7.5 SURGICAL PF LF -(50/BX 4BX/CA)

## (undated) DEVICE — SUCTION INSTRUMENT YANKAUER BULBOUS TIP W/O VENT (50EA/CA)

## (undated) DEVICE — BITE BLOCK ADULT 60FR (100EA/CA)

## (undated) DEVICE — ELECTRODE DUAL RETURN W/ CORD - (50/PK)

## (undated) DEVICE — PAD LAP STERILE 18 X 18 - (5/PK 40PK/CA)

## (undated) DEVICE — SLEEVE, VASO, THIGH, MED

## (undated) DEVICE — ELECTRODE 850 FOAM ADHESIVE - HYDROGEL RADIOTRNSPRNT (50/PK)

## (undated) DEVICE — CONTAINER, SPECIMEN, STERILE

## (undated) DEVICE — CATHETER IV 20 GA X 1-1/4 ---SURG.& SDS ONLY--- (50EA/BX)

## (undated) DEVICE — GLOVE SZ 7.5 BIOGEL PI MICRO - PF LF (50PR/BX)

## (undated) DEVICE — GLOVE BIOGEL INDICATOR SZ 7.5 SURGICAL PF LTX - (50PR/BX 4BX/CA)

## (undated) DEVICE — CANNULA O2 COMFORT SOFT EAR ADULT 7 FT TUBING (50/CA)

## (undated) DEVICE — BANDAGE ELASTIC STERILE MATRIX 6 X 10 (20EA/CA)